# Patient Record
Sex: MALE | Race: WHITE | NOT HISPANIC OR LATINO | Employment: OTHER | URBAN - METROPOLITAN AREA
[De-identification: names, ages, dates, MRNs, and addresses within clinical notes are randomized per-mention and may not be internally consistent; named-entity substitution may affect disease eponyms.]

---

## 2017-01-09 ENCOUNTER — TELEPHONE (OUTPATIENT)
Dept: INTERNAL MEDICINE | Facility: CLINIC | Age: 82
End: 2017-01-09

## 2017-01-09 NOTE — TELEPHONE ENCOUNTER
----- Message from Barb Argueta MA sent at 1/9/2017  9:10 AM CST -----  Contact: self - [phone]  Type: Rx    Name of medication(s): hydrocodone-acetaminophen 10-325mg (NORCO)  mg Tab    Is this a refill? New rx? New Rx    Who prescribed medication?    Pharmacy Name, Phone, & Location:    Comments: Please call. Thanks!

## 2017-01-11 ENCOUNTER — OFFICE VISIT (OUTPATIENT)
Dept: INFECTIOUS DISEASES | Facility: CLINIC | Age: 82
End: 2017-01-11
Payer: MEDICARE

## 2017-01-11 ENCOUNTER — TELEPHONE (OUTPATIENT)
Dept: INTERNAL MEDICINE | Facility: CLINIC | Age: 82
End: 2017-01-11

## 2017-01-11 ENCOUNTER — OFFICE VISIT (OUTPATIENT)
Dept: WOUND CARE | Facility: CLINIC | Age: 82
End: 2017-01-11
Payer: MEDICARE

## 2017-01-11 VITALS
HEIGHT: 67 IN | SYSTOLIC BLOOD PRESSURE: 139 MMHG | DIASTOLIC BLOOD PRESSURE: 63 MMHG | WEIGHT: 178.69 LBS | TEMPERATURE: 99 F | HEART RATE: 67 BPM | BODY MASS INDEX: 28.04 KG/M2

## 2017-01-11 VITALS — TEMPERATURE: 99 F | SYSTOLIC BLOOD PRESSURE: 139 MMHG | HEART RATE: 67 BPM | DIASTOLIC BLOOD PRESSURE: 63 MMHG

## 2017-01-11 DIAGNOSIS — L97.312 ULCER OF ANKLE, RIGHT, WITH FAT LAYER EXPOSED: ICD-10-CM

## 2017-01-11 DIAGNOSIS — I87.2 VENOUS INSUFFICIENCY OF BOTH LOWER EXTREMITIES: ICD-10-CM

## 2017-01-11 DIAGNOSIS — M86.659 CHRONIC OSTEOMYELITIS OF PELVIC REGION, UNSPECIFIED LATERALITY: ICD-10-CM

## 2017-01-11 DIAGNOSIS — L97.312 ULCER OF ANKLE, RIGHT, WITH FAT LAYER EXPOSED: Primary | ICD-10-CM

## 2017-01-11 DIAGNOSIS — L97.919 VARICOSE VEINS OF RIGHT LOWER EXTREMITY WITH ULCER: ICD-10-CM

## 2017-01-11 DIAGNOSIS — I83.019 VARICOSE VEINS OF RIGHT LOWER EXTREMITY WITH ULCER: ICD-10-CM

## 2017-01-11 DIAGNOSIS — I87.2 VENOUS INSUFFICIENCY OF BOTH LOWER EXTREMITIES: Primary | ICD-10-CM

## 2017-01-11 DIAGNOSIS — I83.029 VARICOSE VEINS OF LEFT LOWER EXTREMITY WITH ULCER: ICD-10-CM

## 2017-01-11 DIAGNOSIS — S71.101A: ICD-10-CM

## 2017-01-11 DIAGNOSIS — L97.212 ULCER OF CALF, RIGHT, WITH FAT LAYER EXPOSED: ICD-10-CM

## 2017-01-11 DIAGNOSIS — L92.9 ABNORMAL GRANULATION TISSUE: ICD-10-CM

## 2017-01-11 DIAGNOSIS — L97.929 VARICOSE VEINS OF LEFT LOWER EXTREMITY WITH ULCER: ICD-10-CM

## 2017-01-11 DIAGNOSIS — L97.509 ULCER OF OTHER PART OF FOOT: ICD-10-CM

## 2017-01-11 DIAGNOSIS — I70.25 ATHEROSCLEROSIS OF NATIVE ARTERIES OF THE EXTREMITIES WITH ULCERATION: ICD-10-CM

## 2017-01-11 DIAGNOSIS — L89.623 DECUBITUS ULCER OF LEFT HEEL, STAGE 3: ICD-10-CM

## 2017-01-11 DIAGNOSIS — L02.214 ABSCESS OF RIGHT GROIN: ICD-10-CM

## 2017-01-11 PROCEDURE — 99212 OFFICE O/P EST SF 10 MIN: CPT | Mod: S$PBB,,, | Performed by: NURSE PRACTITIONER

## 2017-01-11 PROCEDURE — 99213 OFFICE O/P EST LOW 20 MIN: CPT | Mod: PBBFAC,27 | Performed by: NURSE PRACTITIONER

## 2017-01-11 PROCEDURE — 17250 CHEM CAUT OF GRANLTJ TISSUE: CPT | Mod: S$PBB,LT,, | Performed by: NURSE PRACTITIONER

## 2017-01-11 PROCEDURE — 99999 PR PBB SHADOW E&M-EST. PATIENT-LVL III: CPT | Mod: PBBFAC,,, | Performed by: NURSE PRACTITIONER

## 2017-01-11 PROCEDURE — 99499 UNLISTED E&M SERVICE: CPT | Mod: S$PBB,,, | Performed by: NURSE PRACTITIONER

## 2017-01-11 PROCEDURE — 99999 PR PBB SHADOW E&M-EST. PATIENT-LVL V: CPT | Mod: PBBFAC,,, | Performed by: NURSE PRACTITIONER

## 2017-01-11 PROCEDURE — 29580 STRAPPING UNNA BOOT: CPT | Mod: 50,51,S$PBB, | Performed by: NURSE PRACTITIONER

## 2017-01-11 NOTE — PROGRESS NOTES
Subjective:       Patient ID: Humphrey Machado is a 85 y.o. male.    Chief Complaint: Wound Check    Wound Check     This patient is seen today for reevaluation of an ulcer to the right lateral calf.  He first noticed this in early summer 2016.  A football dressing is on the right foot and the plantar foot wound is healed.  The right leg ulcers are healing as evidenced by increased granulation.  The left heel ulcer now is hypergranulated.  He is afebrile.  He denies increased redness, swelling or purulent drainate.  His pain level is 8/10.  His wound healing is complicated by venous insufficiency and chronic kidney disease.  He is receiving home health services through [facility].  He is seen with Carlos Barahona NP to coordinate care.  Review of Systems   Constitutional: Negative for chills, diaphoresis, fatigue and fever.   HENT: Negative for congestion, postnasal drip, rhinorrhea, sinus pressure, sneezing, sore throat, tinnitus and trouble swallowing. Hearing loss: left ear.    Eyes: Negative for visual disturbance.   Respiratory: Negative for cough, shortness of breath and wheezing.    Cardiovascular: Positive for leg swelling. Negative for chest pain and palpitations.   Gastrointestinal: Positive for constipation. Negative for diarrhea, nausea and vomiting.   Genitourinary: Positive for difficulty urinating. Negative for dysuria and hematuria.   Musculoskeletal: Positive for back pain and joint swelling. Negative for arthralgias.   Skin: Positive for color change and wound (bilateral lower legs and feet).   Neurological: Positive for dizziness. Negative for weakness, light-headedness and headaches.   Hematological: Does not bruise/bleed easily.   Psychiatric/Behavioral: Negative for decreased concentration and sleep disturbance. The patient is not nervous/anxious.        Objective:      Physical Exam   Constitutional: He is oriented to person, place, and time. He appears well-developed and well-nourished. No  distress.   HENT:   Head: Normocephalic and atraumatic.   Cardiovascular: Intact distal pulses.    Musculoskeletal: Normal range of motion. He exhibits edema (1-2+ lower leg). He exhibits no tenderness.        Legs:       Feet:    Neurological: He is alert and oriented to person, place, and time.   Skin: Skin is warm and dry. No rash noted. He is not diaphoretic. No cyanosis or erythema. Nails show no clubbing.   Psychiatric: He has a normal mood and affect. His behavior is normal. Judgment and thought content normal.   Nursing note and vitals reviewed.      ..  Lab Results   Component Value Date    ALBUMIN 3.2 (L) 11/07/2016     PROCEDURE NOTE:  The left heel wound was cauterized today by applying silver nitrate directly to the wound bed.  The patient tolerated the procedure well.  The wound was then covered with a dry dressing.    Humphrey was seen in the clinic room and placed in the supine position on the treatment table.  Bilateral leg wounds were cleansed with Easi-clense sponges and dried thoroughly.  Medihoney gel and a hydrofiber dressing was applied to the right leg and foot wounds.  Hydrofiber was applied to the left heel wound.  Eucerin cream was applied to the lower legs.  The patient's feet were positioned at a 90 degree angle.  A zinc oxide wrap, followed by kerlix roll gauze and coban were applied using a spiral technique avoiding creases or folds.  The wraps were started behind the first metatarsal and ended below the tibial tubercle of the knee.  There was overlap of each turn half the width of the previous turn.  The compression wraps will be changed every 2-3 days.      Assessment:       1. Ulcer of ankle, right, with fat layer exposed    2. Ulcer of calf, right, with fat layer exposed    3. Ulcer of other part of foot    4. Decubitus ulcer of left heel, unstageable    5. 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 Varicose veins of right lower extremity with ulcer    7. Venous  "insufficiency of both lower extremities    8. Atherosclerosis of native arteries of the extremities with ulceration        Plan:           Nepro 2-3 cans daily in between meals.  Unna boots bilateral lower legs as detailed above.  Darco shoes bilateral feet.  1/4" plain nugauze to right thigh wound, cover with gauze and secure with medipore tape.  Patient was warned not to get the dressings wet and to use cast covers for showering.  Should the dressing become wet, he is to remove it, place a wet-to-dry dressing over the wound, cover with gauze and roll gauze and use ace wraps for compression and to secure bandages.  He should then notify home health as soon as possible to have a new dressing applied.  Return to clinic in two weeks.  [facility] notified of orders via [employer] fax.    Home Health Orders:  Cleanse bilateral leg and foot wounds and right thigh wound with wound .  1/4" plain nugauze to right thigh wound, cover with gauze and secure with medipore tape.  Apply santyl and hydrofiber to right leg and foot ulcers.  Apply mepilex foam to left heel ulcer.  Unna boots (zinc oxide, kerlix and coban) bilateral lower legs.  Skilled nurse visit-three times weekly.    Right lateral calf      Right plantar foot      Right lateral foot and ankle ulcers      Left heel unstageable decubitus      Right medial thigh                      "

## 2017-01-11 NOTE — PATIENT INSTRUCTIONS
Elevate legs as much as possible. Do not get the dressings wet and use cast covers for showering.  Should the dressing become wet, remove it, place a wet-to-dry dressing over the wound, cover with gauze and roll gauze and use ace wraps for compression and to secure bandages.  Notify home health as soon as possible to have a new dressing applied.      Wear Darco shoes on both feet when ambulating.  Do not walk in socks.

## 2017-01-11 NOTE — MR AVS SNAPSHOT
[facility]  [address]  [facility] [address]  Phone: [phone]                  Humphrey Machado   2017 1:20 PM   Office Visit    Description:  Male : 1931   Provider:  Brit Ramirez NP   Department:  [facility]           Reason for Visit     Wound Check           Diagnoses this Visit        Comments    Ulcer of ankle, right, with fat layer exposed    -  Primary     Ulcer of calf, right, with fat layer exposed         Ulcer of other part of foot         Decubitus ulcer of left heel, stage 3         Open wound of thigh, complicated, right, initial encounter         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      [facility]     [facility] On Call Nurse Care Line - 24 Assistance  Registered nurses in the [facility] provide clinical advisement, health education, appointment booking, and other advisory services.  Call for this free service at [phone].             Medications           Message regarding Medications     Verify the changes and/or additions to your medication regime listed below are the same as discussed with your clinician today.  If any of these changes or additions are incorrect, please notify your healthcare provider.             Verify that the below list of medications is an accurate representation of the medications you are currently taking.  If none reported, the list may be  "blank. If incorrect, please contact your healthcare provider. Carry this list with you in case of emergency.           Current Medications     allopurinol (ZYLOPRIM) 100 MG tablet TAKE ONE TABLET BY MOUTH EVERY DAY    aspirin 81 MG Chew Take 1 tablet (81 mg total) by mouth once daily.    atorvastatin (LIPITOR) 80 MG tablet Take 1 tablet (80 mg total) by mouth once daily.    calcitRIOL (ROCALTROL) 0.5 MCG Cap Take 0.5 mcg by mouth once daily.    carvedilol (COREG) 3.125 MG tablet Take 1 tablet (3.125 mg total) by mouth 2 (two) times daily with meals.    catheter 16-16 Fr-" Misc 1 Units by Misc.(Non-Drug; Combo Route) route once daily.    collagenase (SANTYL) ointment Apply topically once daily. Apply to wound daily as directed.    folic acid-vit B6-vit B12 (FOLBEE) 2.5-25-1 mg Tab Take 1 tablet by mouth once daily.    gabapentin (NEURONTIN) 300 MG capsule Take 1 capsule (300 mg total) by mouth 2 (two) times daily.    hydrALAZINE (APRESOLINE) 50 MG tablet Take 0.5 tablets (25 mg total) by mouth 2 (two) times daily.    hydrocodone-acetaminophen 10-325mg (NORCO)  mg Tab Take 1 tablet by mouth 4 (four) times daily as needed.    hydroxychloroquine (PLAQUENIL) 200 mg tablet Take 1 tablet (200 mg total) by mouth 2 (two) times daily.    LACTOBACILLUS ACIDOPHILUS (PROBIOTIC ORAL) Take 1 capsule by mouth once daily.     nitroGLYCERIN (NITROSTAT) 0.3 MG SL tablet Place 1 tablet (0.3 mg total) under the tongue every 5 (five) minutes as needed for Chest pain.    nystatin-triamcinolone (MYCOLOG II) cream Apply topically 4 (four) times daily.    omeprazole (PRILOSEC) 20 MG capsule Take 20 mg by mouth once daily.    omeprazole (PRILOSEC) 40 MG capsule     polyethylene glycol (GLYCOLAX) 17 gram PwPk Take 17 g by mouth once daily.    polyethylene glycol (GLYCOLAX) 17 gram/dose powder     sodium bicarbonate 650 MG tablet Take 1 tablet (650 mg total) by mouth 2 (two) times daily.    vitamin D 1000 units Tab Take 2,000 Units by " mouth once daily.            Clinical Reference Information           Allergies as of 1/11/2017     Ditropan [Oxybutynin Chloride]    Keflex [Cephalexin]    Macrobid [Nitrofurantoin Monohyd/m-cryst]      Immunizations Administered on Date of Encounter - 1/11/2017     None      Instructions    Elevate legs as much as possible. Do not get the dressings wet and use cast covers for showering.  Should the dressing become wet, remove it, place a wet-to-dry dressing over the wound, cover with gauze and roll gauze and use ace wraps for compression and to secure bandages.  Notify home health as soon as possible to have a new dressing applied.      Wear Darco shoes on both feet when ambulating.  Do not walk in socks.         Smoking Cessation     If you would like to quit smoking:   You may be eligible for free services if you are a [place] resident and started smoking cigarettes before September 1, 1988.  Call the [employer]oking [employer]essation [employer] ([employer]) toll free at (303) 752-3264 or (630) 844-0226.   Call 5-800-QUIT-NOW if you do not meet the above criteria.

## 2017-01-11 NOTE — TELEPHONE ENCOUNTER
----- Message from Patricia Castillo sent at 1/11/2017  9:13 AM CST -----  Contact: Self/ [phone] home  Type: Rx    Name of medication(s): hydrocodone-acetaminophen 10-325mg (NORCO)  mg Tab    Is this a refill? New rx?new    Who prescribed medication?Dr. Murray    Pharmacy Name, Phone, & Location:[facility]    Comments:Patient is calling to request a new Rx for medication above. He would like the Rx for five pills daily. Pt would like to speak with someone in the office. Please call and advise.    Thank you!

## 2017-01-11 NOTE — PROGRESS NOTES
Subjective:      Patient ID: Humphrey Machado is a 85 y.o. male.    Chief Complaint:Recurrent Skin Infections      History of Present Illness     Mr. Humphrey Machado is an 85 y.o. male with a long and complicated medical history.  He has history of renal cell carcinoma and is s/p left nephrectomy some years ago.    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       Recently followed by urology for recurrent right groin abscess.  Most recent cultures + for ESBL K. Pneumonia.  Just prior to this grew Pseudomonas and ESBL Kleb for which he had short course of empiric ciprofloxacin in November (before cultures had resulted) and repeat culture on 12/27 showed ESBL Kleb Pneumonia only.  At his last visit with urology, I&D and abx were recommended, but patient declined.  He also declined to come in for evaluation by ID at that time.  See Urology note of 12/27 and my telephone note of 12/29.   I am seeing him today for evaluation of abscess with MELISSA Ramirez in Wound Care who is following him for leg ulcerations.       Patient reports that the wound continues to drain spontaneously and HH is packing daily.  He notes that the drainage amount is decreasing.  Pain had resolved with spontaneous drainage.  He denies fevers, chills, sweats, myalgias.  Reports he is feeling overall better than he has been, is feeling stronger, doing a lot of walking.   His primary complaint is pain of leg ulcerations, though these are noted to be healing well.  He continues to have montano catheter, which he reports is working well and lower abdominal pain and burning he had with prior suprapubic  catheter has resolved.  Appetite fair.  No n/v/d.  Denies chest pain, SOB.  No increase in back/hip pain.  At baseline.     Review of Systems   Constitution: Negative for chills, fever, malaise/fatigue and night sweats.   Cardiovascular: Positive for leg swelling. Negative for chest pain and palpitations.   Respiratory: Negative for cough, shortness of breath and sputum production.    Hematologic/Lymphatic: Negative for adenopathy.   Skin: Positive for poor wound healing (bilateral venous insufficiency ulcerations). Negative for rash.   Musculoskeletal: Positive for back pain and joint pain. Negative for joint swelling.   Gastrointestinal: Negative for abdominal pain, diarrhea, nausea and vomiting.   Genitourinary: Positive for bladder incontinence. Negative for pelvic pain.        Stevens catheter in place     Neurological: Negative for numbness and paresthesias.     Objective:   Physical Exam   Constitutional: He appears well-developed and well-nourished. No distress.   Eyes: Conjunctivae are normal. No scleral icterus.   Cardiovascular: Normal rate and regular rhythm.    Pulmonary/Chest: Effort normal. No respiratory distress.   Abdominal: Soft. He exhibits no distension.   Genitourinary:   Genitourinary Comments: Stevens in place - draining well   Musculoskeletal:   4 Lower extremity ulcerations - see Wound care note of this date.  No evidence of active infection.    Skin: Skin is warm and dry. He is not diaphoretic.   Open wound to right inguinal thigh area  0.2 X 0 .2 X 2.5 cm  (probes 2.5 cm)  Draining moderate amount of sero-sanguinous drainage.  No purulence.  No surrounding erythema.   Firm at base, no fluctuance.   Non-tender     Vitals reviewed.        Assessment:       1. Venous insufficiency of both lower extremities    2. Abscess of right groin    3. Chronic osteomyelitis of pelvic region, unspecified laterality    4. Ulcer of other part of foot    5. Ulcer of ankle, right, with fat layer exposed      Patient with recurrent groin/inguinal abscesses.  Current abscess remains open, draining, packed daily by Home Health  Decreased in size per patient and by measurements.  No signs of extending or surrounding infection.  No signs of systemic infection.  He has been doing well without any antibiotic coverage and more than likely this is an issue of source control/ drainage.   Discussed with ID staff, Dr. Stinson.  Would continue to hold off on antibiotics at this time and watch closely and continue daily wound care so long as it continues to drain.  Highly suspect that once heals and closes, it will be matter of time before it recurs.  Long discussion with Mr. Machado regarding need for I&D should it increase in size, stop draining and again become painful.  This will not be cured with antibiotics alone.  He is not amenable to I&D at this point and wishes to continue with current wound care given that he has no pain and feels good.  I have emphasized to him that there will likely come a point where we will have to surgically I&D again, as in the past, and he verbalizes understanding.       Plan:     1.  Continue daily wound care/packing.   2.  Follow up with urology as scheduled.   3.  I will follow up with him in 2 weeks when he returns to see wound care.   4.  Will call home health to advise they are to contact us immediately for any worsening/concerns.   5.  Patient strongly counseled to call/seek immediate medical attention for fevers, chills, or if lesion increases in size, becomes warm, tender/painful, or redness, erythema of surrounding tissue.  He verbalizes understanding.   6.  Discussed with ID staff.   7.  Will communicate with urology.

## 2017-01-11 NOTE — Clinical Note
Harry Deng:  Saw Mr. Machado today with MELISSA Ramirez in Wound Care (she follows him for venous ulcers).  Appears to be filling in, decreasing.  Still draining and HH is still packing.  I discussed with ID staff.  At this point, he is doing well off abx.  Suspect he is chronically colonized. So long as it is draining, we are going to hold off.  See my note.  Suspect it will close, then fill up again and he will definitely need surgical I&D, but at this point he is not amenable to any further surgical intervention because it is draining, not painful and he feels good.  He was very strongly counseled regarding calling if anything worsens, etc.  I'll see him again in 2 weeks when he goes to see Melani for his leg wounds.  When do you need to see him again?  Call me with any questions/concerns.

## 2017-01-12 ENCOUNTER — TELEPHONE (OUTPATIENT)
Dept: INFECTIOUS DISEASES | Facility: HOSPITAL | Age: 82
End: 2017-01-12

## 2017-01-12 NOTE — TELEPHONE ENCOUNTER
Called [facility] and spoke to Mr. Machado's nurse Christina.  They are making home visits 7 days per week.   Advised to call if any worsening of wound, fevers, chills or other concerns.  They have my contact information and will advise of any changes of concern.

## 2017-01-13 RX ORDER — HYDROCODONE BITARTRATE AND ACETAMINOPHEN 10; 325 MG/1; MG/1
1 TABLET ORAL 4 TIMES DAILY PRN
Qty: 120 TABLET | Refills: 0 | Status: SHIPPED | OUTPATIENT
Start: 2017-01-13 | End: 2017-02-16 | Stop reason: SDUPTHER

## 2017-01-13 NOTE — TELEPHONE ENCOUNTER
----- Message from Jazmine Chung sent at 1/13/2017 11:46 AM CST -----  Contact: self [phone]  Patient is calling to check status of medication hydrocodone-acetaminophen 10-325mg (NORCO)  mg Tab . Please advise, Thanks !

## 2017-01-17 ENCOUNTER — TELEPHONE (OUTPATIENT)
Dept: UROLOGY | Facility: CLINIC | Age: 82
End: 2017-01-17

## 2017-01-17 NOTE — TELEPHONE ENCOUNTER
----- Message from MARILEE Olivas, ANP sent at 1/11/2017  6:38 PM CST -----  Harry Deng:  Saw Mr. Machado today with MELISSA Ramirez in Wound Care (she follows him for venous ulcers).  Appears to be filling in, decreasing.  Still draining and HH is still packing.  I discussed with ID staff.  At this point, he is doing well off abx.  Suspect he is chronically colonized. So long as it is draining, we are going to hold off.  See my note.  Suspect it will close, then fill up again and he will definitely need surgical I&D, but at this point he is not amenable to any further surgical intervention because it is draining, not painful and he feels good.  He was very strongly counseled regarding calling if anything worsens, etc.  I'll see him again in 2 weeks when he goes to see Melani for his leg wounds.  When do you need to see him again?  Call me with any questions/concerns.

## 2017-01-17 NOTE — TELEPHONE ENCOUNTER
Spoke with Mr. Machado.  He having dsg changes 3 x week.  Still draining; positioning will affect drainage.  Will have him come in 1/25/2017 since he already has appts.

## 2017-01-24 ENCOUNTER — TELEPHONE (OUTPATIENT)
Dept: INTERNAL MEDICINE | Facility: CLINIC | Age: 82
End: 2017-01-24

## 2017-01-24 NOTE — TELEPHONE ENCOUNTER
----- Message from Mickie Sanchez MA sent at 1/24/2017  3:24 PM CST -----  Contact: Betzaida calles/Wwmzyiiv-[phone]  Betzaida stated that she is calling to check the Status of the Pt's Home Health Orders that were faxed over since November. Please advise and call. Thanks!

## 2017-01-25 ENCOUNTER — OFFICE VISIT (OUTPATIENT)
Dept: INFECTIOUS DISEASES | Facility: CLINIC | Age: 82
End: 2017-01-25
Payer: MEDICARE

## 2017-01-25 ENCOUNTER — OFFICE VISIT (OUTPATIENT)
Dept: UROLOGY | Facility: CLINIC | Age: 82
End: 2017-01-25
Payer: MEDICARE

## 2017-01-25 ENCOUNTER — OFFICE VISIT (OUTPATIENT)
Dept: WOUND CARE | Facility: CLINIC | Age: 82
End: 2017-01-25
Payer: MEDICARE

## 2017-01-25 VITALS
RESPIRATION RATE: 16 BRPM | SYSTOLIC BLOOD PRESSURE: 152 MMHG | TEMPERATURE: 99 F | HEART RATE: 77 BPM | HEIGHT: 70 IN | BODY MASS INDEX: 25.77 KG/M2 | DIASTOLIC BLOOD PRESSURE: 66 MMHG | WEIGHT: 180 LBS

## 2017-01-25 VITALS
HEART RATE: 77 BPM | DIASTOLIC BLOOD PRESSURE: 97 MMHG | SYSTOLIC BLOOD PRESSURE: 179 MMHG | BODY MASS INDEX: 27.35 KG/M2 | HEIGHT: 68 IN | TEMPERATURE: 99 F | WEIGHT: 180.5 LBS

## 2017-01-25 DIAGNOSIS — I83.029 VARICOSE VEINS OF LEFT LOWER EXTREMITY WITH ULCER: ICD-10-CM

## 2017-01-25 DIAGNOSIS — L02.214 ABSCESS OF RIGHT GROIN: Primary | ICD-10-CM

## 2017-01-25 DIAGNOSIS — L97.509 ULCER OF OTHER PART OF FOOT: ICD-10-CM

## 2017-01-25 DIAGNOSIS — L89.623 DECUBITUS ULCER OF LEFT HEEL, STAGE 3: ICD-10-CM

## 2017-01-25 DIAGNOSIS — L97.919 VARICOSE VEINS OF RIGHT LOWER EXTREMITY WITH ULCER: ICD-10-CM

## 2017-01-25 DIAGNOSIS — L97.929 VARICOSE VEINS OF LEFT LOWER EXTREMITY WITH ULCER: ICD-10-CM

## 2017-01-25 DIAGNOSIS — I70.25 ATHEROSCLEROSIS OF NATIVE ARTERIES OF THE EXTREMITIES WITH ULCERATION: ICD-10-CM

## 2017-01-25 DIAGNOSIS — L92.9 ABNORMAL GRANULATION TISSUE: ICD-10-CM

## 2017-01-25 DIAGNOSIS — L97.212 ULCER OF CALF, RIGHT, WITH FAT LAYER EXPOSED: ICD-10-CM

## 2017-01-25 DIAGNOSIS — L97.312 ULCER OF ANKLE, RIGHT, WITH FAT LAYER EXPOSED: Primary | ICD-10-CM

## 2017-01-25 DIAGNOSIS — M86.659 CHRONIC OSTEOMYELITIS OF PELVIC REGION, UNSPECIFIED LATERALITY: ICD-10-CM

## 2017-01-25 DIAGNOSIS — I83.019 VARICOSE VEINS OF RIGHT LOWER EXTREMITY WITH ULCER: ICD-10-CM

## 2017-01-25 DIAGNOSIS — S71.101A COMPLICATED OPEN WOUND OF RIGHT THIGH, INITIAL ENCOUNTER: ICD-10-CM

## 2017-01-25 PROCEDURE — 99499 UNLISTED E&M SERVICE: CPT | Mod: S$PBB,,, | Performed by: NURSE PRACTITIONER

## 2017-01-25 PROCEDURE — 99999 PR PBB SHADOW E&M-EST. PATIENT-LVL V: CPT | Mod: PBBFAC,,, | Performed by: NURSE PRACTITIONER

## 2017-01-25 PROCEDURE — 99214 OFFICE O/P EST MOD 30 MIN: CPT | Mod: PBBFAC,27 | Performed by: NURSE PRACTITIONER

## 2017-01-25 PROCEDURE — 17250 CHEM CAUT OF GRANLTJ TISSUE: CPT | Mod: S$PBB,,, | Performed by: NURSE PRACTITIONER

## 2017-01-25 PROCEDURE — 99213 OFFICE O/P EST LOW 20 MIN: CPT | Mod: S$PBB,,, | Performed by: NURSE PRACTITIONER

## 2017-01-25 PROCEDURE — 99999 PR PBB SHADOW E&M-EST. PATIENT-LVL IV: CPT | Mod: PBBFAC,,, | Performed by: NURSE PRACTITIONER

## 2017-01-25 PROCEDURE — 99212 OFFICE O/P EST SF 10 MIN: CPT | Mod: S$PBB,,, | Performed by: NURSE PRACTITIONER

## 2017-01-25 PROCEDURE — 99999 PR PBB SHADOW E&M-EST. PATIENT-LVL III: CPT | Mod: PBBFAC,,, | Performed by: NURSE PRACTITIONER

## 2017-01-25 PROCEDURE — 29580 STRAPPING UNNA BOOT: CPT | Mod: 50,51,S$PBB, | Performed by: NURSE PRACTITIONER

## 2017-01-25 NOTE — PROGRESS NOTES
Subjective:       Patient ID: Humphrey Machado is a 85 y.o. male.    Chief Complaint: Recurrent Skin Infections    HPI Comments: Humphrey Machado is a 85 y.o. male with history of prostate CA, kidney CA (s/p (L) nephrectomy), urethral strictures urinary incontinence s/p radiation therapy several years ago.  He has history of CKD IV, AS, chronic UTI, chronic venous insufficiency, and inflammatory arthritis (on long term plaquenil and prednisone)   He is being followed by ID for Chronic osteomyelitis of the left hip/symphysis pubis. IR cultures of bone were negative. Pathology showed chronic osteomyelitis.   He was diagnosed with Perineal abscess by his PCP.    He is here today for  check on abscess.  He followed by ID as well.   He states that wound care already checked his groin area as well as other sites.  They cleaned, packed, and cauterized the groin abscess.  He is tender in that area for their work so does not want me to remove dressing.  He denies pain to site normally. No new abscess; very pleased how things are going.        12/27/2017 seen in clinic and I placed packing in draining right thigh abscess.  Aerobic Bacterial Culture KLEBSIELLA PNEUMONIAE ESBL Moderate  This believed to be colonized.    He has been having the packing changed by HH.  Here today reporting drainage and recheck.  Denies fever/chills/abdominal pain.          He was seen in clinic with Dr. Abraham on 09/30/2016 for evaluation.  He was admitted for I&D, IVAB and released 10/02/2016.    Extensive discussion had with patient.   Given the small caliber catheter in place, we recommend suprapubic catheter placement versus urethral dilation with upsize of urethral montano to allow for healing of fistula.   Currently inadequate drainage of bladder.   He would like to defer more definitive management until he has had further consultation.   He understands that if he leaves prior to urethral dilation with larger montano placement or suprapubic catheter  insertion that this will be against medical advice.   He will be sent home with home health.    10/06/2016 returned to ER with development of two new abscesses & (+) blood culture.  He reported in ER that he was scheduled with Dr. Zuhair Jefferson at [facility] for f/u of I&D as well as SPT placement.  He reports Dr. Jefferson placed SPT (unsure of date) and the next day he went back in due to pain to lower abdomen/pelvic pain  He has not had post op visit with original SPT change.    Seen on 11/23/2016 after his SPT came out;  He went to [facility] ER and had unsuccessful attempts of replacing SPT.  They placed 14fr montano instead and was told to f/u with Dr. Jefferson which he has not.  He is awaiting the appt.              We sent for old records but have not yet received anything.          Past Medical History:    *Atrial fibrillation                                          Acute appendicitis                              2/19/2015     Anemia                                                        Anxiety                                                       Arthritis                                                       Comment:generalized    Basal cell carcinoma                            01/2013         Comment:right temple    BCC (basal cell carcinoma)                                      Comment:right mid forearm    Bladder stone                                   6/28/2016     Blood transfusion                                             Chronic urethral stricture                      10/14/2015    Chronic venous insufficiency                    7/8/2013      CKD (chronic kidney disease) stage 3, GFR 30-5* 9/6/2012      Coronary artery disease                                       Elevated PSA                                                  Essential hypertension                          7/30/2015     Hematuria, gross                                              History of Left Nephrectomy (~2006)             1/29/2014        Comment:Done at [facility].     Hypertension                                                  Inflammatory arthritis                          8/14/2012     Kidney stone                                                  Long-term use of Plaquenil                      6/4/2015      Mixed incontinence urge and stress              4/11/2014     Nonrheumatic aortic valve stenosis              5/30/2016     NSTEMI (non-ST elevated myocardial infarction)  5/30/2016     Olecranon bursitis                              1/14/2013     Osteopenia                                      8/14/2012     Personal history of kidney cancer               4/11/2014     Prostate cancer                                               Radiation                                                       Comment:treatment for prostate cancer    Recurrent UTI                                   7/8/2013      S/P radiation therapy                           4/11/2014     Skin cancer of arm                                              Comment:left leg (malignant)    Solitary kidney                                 7/15/2013     Venous insufficiency of leg                     7/12/2012     Venous stasis ulcers                            7/6/2012      Vitamin D deficiency disease                    5/8/2013      Past Surgical History:    TONSILLECTOMY                                                  NEPHRECTOMY                                      2006-07?        Comment:Removal of left kidney    CYSTOSCOPY                                                       Comment:with dialation    Review of patient's family history indicates:    Cancer                         Mother                    Heart disease                  Father                    Urolithiasis                   Father                    Mental illness                 Daughter                  Cancer                         Brother                     Comment: prostate cancer, (Ervin) removed by                 surgery    Cancer                         Brother                     Comment: prostate cancer    Cancer                         Maternal Aunt             Melanoma                       Neg Hx                    Lupus                          Neg Hx                    Rheum arthritis                Neg Hx                      Social History    Marital status:              Spouse name: Emy               Years of education:                 Number of children: 4             Occupational History  Occupation          Employer            Comment               Retired                                                       retired 1998            Social History Main Topics    Smoking status: Current Every Day Smoker                                                     Packs/day: 0.00      Years: 40.00          Types: Cigars    Smokeless status: Current User                      Comment: pt smokes 3 cigars a day pt will make up his             mind when his ready- did give up smoking              cigarettes at age 35yrs smoked from 18 - 35              years    Alcohol use: Yes           0.6 oz/week       1 Glasses of wine per week       Comment: stop drinking 09/2009. 1 glass of wine at                bedtime     Drug use: No                 Comment: smokes 3 cigars per day    Sexual activity: No                   Other Topics            Concern    None on file    Social History Narrative    None on file        Allergies:  Ditropan (oxybutynin chloride); Keflex (cephalexin); and Macrobid (nitrofurantoin monohyd/m-cryst)    Medications:  Current Outpatient Prescriptions:   allopurinol (ZYLOPRIM) 100 MG tablet, TAKE ONE TABLET BY MOUTH EVERY DAY, Disp: 30 tablet, Rfl: 4  aspirin 81 MG Chew, Take 1 tablet (81 mg total) by mouth once daily., Disp: , Rfl: 0  atorvastatin (LIPITOR) 80 MG tablet, Take 1 tablet (80 mg total) by mouth once daily., Disp: 30 tablet, Rfl: 11  calcitRIOL (ROCALTROL) 0.5 MCG Cap,  "Take 0.5 mcg by mouth once daily., Disp: , Rfl:   carvedilol (COREG) 3.125 MG tablet, Take 1 tablet (3.125 mg total) by mouth 2 (two) times daily with meals., Disp: 60 tablet, Rfl: 11  catheter 16-16 Fr-" Misc, 1 Units by Misc.(Non-Drug; Combo Route) route once daily., Disp: 100 each, Rfl: 11  collagenase (SANTYL) ointment, Apply topically once daily. Apply to wound daily as directed., Disp: 90 g, Rfl: 0  folic acid-vit B6-vit B12 (FOLBEE) 2.5-25-1 mg Tab, Take 1 tablet by mouth once daily., Disp: 30 each, Rfl: 5  gabapentin (NEURONTIN) 300 MG capsule, Take 1 capsule (300 mg total) by mouth 2 (two) times daily., Disp: 60 capsule, Rfl: 3  hydrALAZINE (APRESOLINE) 50 MG tablet, Take 0.5 tablets (25 mg total) by mouth 2 (two) times daily., Disp: 90 tablet, Rfl: 11  hydrocodone-acetaminophen 10-325mg (NORCO)  mg Tab, Take 1 tablet by mouth 4 (four) times daily as needed., Disp: 120 tablet, Rfl: 0  hydroxychloroquine (PLAQUENIL) 200 mg tablet, Take 1 tablet (200 mg total) by mouth 2 (two) times daily., Disp: 60 tablet, Rfl: 2  LACTOBACILLUS ACIDOPHILUS (PROBIOTIC ORAL), Take 1 capsule by mouth once daily. , Disp: , Rfl:   nitroGLYCERIN (NITROSTAT) 0.3 MG SL tablet, Place 1 tablet (0.3 mg total) under the tongue every 5 (five) minutes as needed for Chest pain., Disp: 30 tablet, Rfl: 11  nystatin-triamcinolone (MYCOLOG II) cream, Apply topically 4 (four) times daily., Disp: 60 g, Rfl: 2  omeprazole (PRILOSEC) 20 MG capsule, Take 20 mg by mouth once daily., Disp: , Rfl:   omeprazole (PRILOSEC) 40 MG capsule, , Disp: , Rfl:   polyethylene glycol (GLYCOLAX) 17 gram PwPk, Take 17 g by mouth once daily., Disp: 30 packet, Rfl: 0  polyethylene glycol (GLYCOLAX) 17 gram/dose powder, , Disp: , Rfl:   sodium bicarbonate 650 MG tablet, Take 1 tablet (650 mg total) by mouth 2 (two) times daily., Disp: 60 tablet, Rfl: 11  vitamin D 1000 units Tab, Take 2,000 Units by mouth once daily. , Disp: , Rfl: "             Past Medical History:    *Atrial fibrillation                                          Acute appendicitis                              2/19/2015     Anemia                                                        Anxiety                                                       Arthritis                                                       Comment:generalized    Basal cell carcinoma                            01/2013         Comment:right temple    BCC (basal cell carcinoma)                                      Comment:right mid forearm    Bladder stone                                   6/28/2016     Blood transfusion                                             Chronic urethral stricture                      10/14/2015    Chronic venous insufficiency                    7/8/2013      CKD (chronic kidney disease) stage 3, GFR 30-5* 9/6/2012      Coronary artery disease                                       Elevated PSA                                                  Essential hypertension                          7/30/2015     Hematuria, gross                                              History of Left Nephrectomy (~2006)             1/29/2014       Comment:Done at [facility].     Hypertension                                                  Inflammatory arthritis                          8/14/2012     Kidney stone                                                  Long-term use of Plaquenil                      6/4/2015      Mixed incontinence urge and stress              4/11/2014     Nonrheumatic aortic valve stenosis              5/30/2016     NSTEMI (non-ST elevated myocardial infarction)  5/30/2016     Olecranon bursitis                              1/14/2013     Osteopenia                                      8/14/2012     Personal history of kidney cancer               4/11/2014     Prostate cancer                                               Radiation                                                        Comment:treatment for prostate cancer    Recurrent UTI                                   7/8/2013      S/P radiation therapy                           4/11/2014     Skin cancer of arm                                              Comment:left leg (malignant)    Solitary kidney                                 7/15/2013     Venous insufficiency of leg                     7/12/2012     Venous stasis ulcers                            7/6/2012      Vitamin D deficiency disease                    5/8/2013      Past Surgical History:    TONSILLECTOMY                                                  NEPHRECTOMY                                      2006-07?        Comment:Removal of left kidney    CYSTOSCOPY                                                       Comment:with dialation    Review of patient's family history indicates:    Cancer                         Mother                    Heart disease                  Father                    Urolithiasis                   Father                    Mental illness                 Daughter                  Cancer                         Brother                     Comment: prostate cancer, (Ervin) removed by                surgery    Cancer                         Brother                     Comment: prostate cancer    Cancer                         Maternal Aunt             Melanoma                       Neg Hx                    Lupus                          Neg Hx                    Rheum arthritis                Neg Hx                      Social History    Marital status:              Spouse name: Emy               Years of education:                 Number of children: 4             Occupational History  Occupation          Employer            Comment               Retired                                                       retired 1998            Social History Main Topics    Smoking status: Current Every Day Smoker                                       "               Packs/day: 0.00      Years: 40.00          Types: Cigars    Smokeless status: Current User                      Comment: pt smokes 3 cigars a day pt will make up his             mind when his ready- did give up smoking              cigarettes at age 35yrs smoked from 18 - 35              years    Alcohol use: Yes           0.6 oz/week       1 Glasses of wine per week       Comment: stop drinking 09/2009. 1 glass of wine at                bedtime     Drug use: No                 Comment: smokes 3 cigars per day    Sexual activity: No                   Other Topics            Concern    None on file    Social History Narrative    None on file        Allergies:  Ditropan (oxybutynin chloride); Keflex (cephalexin); and Macrobid (nitrofurantoin monohyd/m-cryst)    Medications:  Current Outpatient Prescriptions:   allopurinol (ZYLOPRIM) 100 MG tablet, TAKE ONE TABLET BY MOUTH EVERY DAY, Disp: 30 tablet, Rfl: 4  amoxicillin-clavulanate 500-125mg (AUGMENTIN) 500-125 mg Tab, , Disp: , Rfl:   aspirin 81 MG Chew, Take 1 tablet (81 mg total) by mouth once daily., Disp: , Rfl: 0  atorvastatin (LIPITOR) 80 MG tablet, Take 1 tablet (80 mg total) by mouth once daily., Disp: 30 tablet, Rfl: 11  calcitRIOL (ROCALTROL) 0.5 MCG Cap, Take 0.5 mcg by mouth once daily., Disp: , Rfl:   carvedilol (COREG) 3.125 MG tablet, Take 1 tablet (3.125 mg total) by mouth 2 (two) times daily with meals., Disp: 60 tablet, Rfl: 11  catheter 16-16 Fr-" Misc, 1 Units by Misc.(Non-Drug; Combo Route) route once daily., Disp: 100 each, Rfl: 11  fluconazole (DIFLUCAN) 100 MG tablet, , Disp: , Rfl:   fluconazole (DIFLUCAN) 200 MG Tab, , Disp: , Rfl:   folic acid-vit B6-vit B12 (FOLBEE) 2.5-25-1 mg Tab, Take 1 tablet by mouth once daily., Disp: 30 each, Rfl: 5  gabapentin (NEURONTIN) 300 MG capsule, Take 1 capsule (300 mg total) by mouth 2 (two) times daily., Disp: 60 capsule, Rfl: 3  hydrALAZINE (APRESOLINE) 50 MG tablet, Take 0.5 " tablets (25 mg total) by mouth 2 (two) times daily., Disp: 90 tablet, Rfl: 11  hydrocodone-acetaminophen 10-325mg (NORCO)  mg Tab, Take 1 tablet by mouth 4 (four) times daily as needed., Disp: 120 tablet, Rfl: 0  hydroxychloroquine (PLAQUENIL) 200 mg tablet, Take 1 tablet (200 mg total) by mouth 2 (two) times daily., Disp: 60 tablet, Rfl: 2  INVANZ 1 gram injection, , Disp: , Rfl:   LACTOBACILLUS ACIDOPHILUS (PROBIOTIC ORAL), Take 1 capsule by mouth once daily. , Disp: , Rfl:   nitroGLYCERIN (NITROSTAT) 0.3 MG SL tablet, Place 1 tablet (0.3 mg total) under the tongue every 5 (five) minutes as needed for Chest pain., Disp: 30 tablet, Rfl: 11  nystatin-triamcinolone (MYCOLOG II) cream, Apply topically 4 (four) times daily., Disp: 60 g, Rfl: 2  omeprazole (PRILOSEC) 20 MG capsule, Take 20 mg by mouth once daily., Disp: , Rfl:   phenazopyridine (PYRIDIUM) 100 MG tablet, Take 200 mg by mouth 3 (three) times daily as needed for Pain., Disp: , Rfl:   polyethylene glycol (GLYCOLAX) 17 gram PwPk, Take 17 g by mouth once daily., Disp: 30 packet, Rfl: 0  polyethylene glycol (GLYCOLAX) 17 gram/dose powder, , Disp: , Rfl:   sodium bicarbonate 650 MG tablet, Take 1 tablet (650 mg total) by mouth 2 (two) times daily., Disp: 60 tablet, Rfl: 11  tramadol (ULTRAM) 50 mg tablet, , Disp: , Rfl:   vitamin D 1000 units Tab, Take 2,000 Units by mouth once daily. , Disp: , Rfl:         Review of Systems   Constitutional: Negative.  Negative for activity change, appetite change and fever.   HENT: Negative.  Negative for facial swelling and trouble swallowing.    Eyes: Negative.    Respiratory: Negative.  Negative for shortness of breath.    Cardiovascular: Negative.  Negative for chest pain and palpitations.   Gastrointestinal: Negative for abdominal pain, constipation, diarrhea, nausea and vomiting.   Genitourinary: Positive for difficulty urinating and enuresis. Negative for dysuria, flank pain, frequency, genital  sores, hematuria, nocturia, penile pain, scrotal swelling, testicular pain and urgency.        Montano draining well  HH changing montano.     Musculoskeletal: Positive for arthralgias. Negative for back pain, gait problem and neck stiffness.   Skin: Positive for wound.        Dressing intact and freshly dressed.     Neurological: Negative for dizziness, tremors, seizures, syncope, speech difficulty, light-headedness and headaches.   Hematological: Does not bruise/bleed easily.   Psychiatric/Behavioral: Negative for confusion and hallucinations. The patient is not nervous/anxious.        Objective:      Physical Exam   Nursing note and vitals reviewed.  Constitutional: He is oriented to person, place, and time. He appears well-developed and well-nourished.  Non-toxic appearance. He does not have a sickly appearance.   HENT:   Head: Normocephalic and atraumatic.   Right Ear: External ear normal.   Left Ear: External ear normal.   Nose: Nose normal.   Mouth/Throat: Mucous membranes are normal.   Eyes: Conjunctivae and lids are normal. No scleral icterus.   Neck: Trachea normal, normal range of motion and full passive range of motion without pain. Neck supple. No JVD present. No tracheal deviation present.   Cardiovascular: Normal rate, regular rhythm, S1 normal and S2 normal.    Pulmonary/Chest: Effort normal and breath sounds normal. No respiratory distress. He exhibits no tenderness.   Abdominal: Soft. Normal appearance and bowel sounds are normal. There is no hepatosplenomegaly. There is no tenderness. There is no rebound, no guarding and no CVA tenderness.   Genitourinary: Testes normal and penis normal. No penile tenderness.   Musculoskeletal: Normal range of motion.   Pressure dsg to LE bilaterally intact just below the knee.   Neurological: He is alert and oriented to person, place, and time. He has normal strength.   Skin: Skin is warm, dry and intact.          Psychiatric: He has a normal mood and affect. His  behavior is normal. Judgment and thought content normal.       Assessment:       1. Abscess of right groin        Plan:         I spent 20 minutes with the patient of which more than half was spent in direct consultation with the patient in regards to our treatment and plan.    Education and recommendations of today's plan of care including home remedies.  Continue with HH and scheduled dressing changes.  Will follow through wound care.  No surgical intervention at present (nor does he want it).

## 2017-01-25 NOTE — MR AVS SNAPSHOT
[facility]  [address]  [facility] [address]  Phone: [phone]                  Humphrey Machado   2017 11:20 AM   Office Visit    Description:  Male : 1931   Provider:  Brit Ramirez NP   Department:  [facility]           Reason for Visit     Wound Check           Diagnoses this Visit        Comments    Ulcer of ankle, right, with fat layer exposed    -  Primary     Ulcer of calf, right, with fat layer exposed         Ulcer of other part of foot         Decubitus ulcer of left heel, stage 3         Complicated open wound of right thigh, initial encounter         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      [facility] On Call     [facility] On Call Nurse Care Line -  Assistance  Registered nurses in the [facility] On Call Center provide clinical advisement, health education, appointment booking, and other advisory services.  Call for this free service at [phone].             Medications           Message regarding Medications     Verify the changes and/or additions to your medication regime listed below are the same as discussed with your clinician today.  If any of these changes or additions are incorrect, please notify your healthcare provider.             Verify that the below list of medications is an accurate representation of the medications you are currently taking.  If none  "reported, the list may be blank. If incorrect, please contact your healthcare provider. Carry this list with you in case of emergency.           Current Medications     allopurinol (ZYLOPRIM) 100 MG tablet TAKE ONE TABLET BY MOUTH EVERY DAY    aspirin 81 MG Chew Take 1 tablet (81 mg total) by mouth once daily.    atorvastatin (LIPITOR) 80 MG tablet Take 1 tablet (80 mg total) by mouth once daily.    calcitRIOL (ROCALTROL) 0.5 MCG Cap Take 0.5 mcg by mouth once daily.    carvedilol (COREG) 3.125 MG tablet Take 1 tablet (3.125 mg total) by mouth 2 (two) times daily with meals.    catheter 16-16 Fr-" Misc 1 Units by Misc.(Non-Drug; Combo Route) route once daily.    collagenase (SANTYL) ointment Apply topically once daily. Apply to wound daily as directed.    folic acid-vit B6-vit B12 (FOLBEE) 2.5-25-1 mg Tab Take 1 tablet by mouth once daily.    gabapentin (NEURONTIN) 300 MG capsule Take 1 capsule (300 mg total) by mouth 2 (two) times daily.    hydrALAZINE (APRESOLINE) 50 MG tablet Take 0.5 tablets (25 mg total) by mouth 2 (two) times daily.    hydrocodone-acetaminophen 10-325mg (NORCO)  mg Tab Take 1 tablet by mouth 4 (four) times daily as needed.    hydroxychloroquine (PLAQUENIL) 200 mg tablet Take 1 tablet (200 mg total) by mouth 2 (two) times daily.    LACTOBACILLUS ACIDOPHILUS (PROBIOTIC ORAL) Take 1 capsule by mouth once daily.     nitroGLYCERIN (NITROSTAT) 0.3 MG SL tablet Place 1 tablet (0.3 mg total) under the tongue every 5 (five) minutes as needed for Chest pain.    nystatin-triamcinolone (MYCOLOG II) cream Apply topically 4 (four) times daily.    omeprazole (PRILOSEC) 20 MG capsule Take 20 mg by mouth once daily.    omeprazole (PRILOSEC) 40 MG capsule     polyethylene glycol (GLYCOLAX) 17 gram PwPk Take 17 g by mouth once daily.    polyethylene glycol (GLYCOLAX) 17 gram/dose powder     sodium bicarbonate 650 MG tablet Take 1 tablet (650 mg total) by mouth 2 (two) times daily.    vitamin D 1000 units " "Tab Take 2,000 Units by mouth once daily.            Clinical Reference Information           Vital Signs - Last Recorded  Most recent update: 1/25/2017 11:19 AM by Karen Hurtado LPN    BP Pulse Temp Ht Wt BMI    (!) 179/97 77 98.7 °F (37.1 °C) (Oral) 5' 8" (1.727 m) 81.9 kg (180 lb 8 oz) 27.44 kg/m2      Blood Pressure          Most Recent Value    BP  (!)  179/97      Allergies as of 1/25/2017     Ditropan [Oxybutynin Chloride]    Keflex [Cephalexin]    Macrobid [Nitrofurantoin Monohyd/m-cryst]      Immunizations Administered on Date of Encounter - 1/25/2017     None      Instructions    Elevate legs as much as possible. Do not get the dressings wet and use cast covers for showering.  Should the dressing become wet, remove it, place a wet-to-dry dressing over the wound, cover with gauze and roll gauze and use ace wraps for compression and to secure bandages.  Notify home health as soon as possible to have a new dressing applied.         Smoking Cessation     If you would like to quit smoking:   You may be eligible for free services if you are a [place] resident and started smoking cigarettes before September 1, 1988.  Call the Smoking Cessation Trust ([employer]) toll free at [phone] or [phone].   Call 5-619-QUIT-NOW if you do not meet the above criteria.            "

## 2017-01-25 NOTE — PROGRESS NOTES
Subjective:       Patient ID: Humphrey Machado is a 85 y.o. male.    Chief Complaint: Wound Check    Wound Check     This patient is seen today for reevaluation of an ulcer to the right lateral calf.  He first noticed this in early summer 2016.  Unna boots are on both legs and the wounds are healing as evidenced by wound contracture.  He is afebrile.  He denies increased redness, swelling or purulent drainate.  His pain level is 7/10.  His wound healing is complicated by venous insufficiency and chronic kidney disease.  He is receiving [facility].  He is seen with Carlos Barahona NP to coordinate care.  Review of Systems   Constitutional: Negative for chills, diaphoresis, fatigue and fever.   HENT: Negative for congestion, postnasal drip, rhinorrhea, sinus pressure, sneezing, sore throat, tinnitus and trouble swallowing. Hearing loss: left ear.    Eyes: Negative for visual disturbance.   Respiratory: Negative for cough, shortness of breath and wheezing.    Cardiovascular: Positive for leg swelling. Negative for chest pain and palpitations.   Gastrointestinal: Positive for constipation. Negative for diarrhea, nausea and vomiting.   Genitourinary: Positive for difficulty urinating. Negative for dysuria and hematuria.   Musculoskeletal: Positive for back pain and joint swelling. Negative for arthralgias.   Skin: Positive for color change and wound (bilateral lower legs and feet).   Neurological: Positive for dizziness. Negative for weakness, light-headedness and headaches.   Hematological: Does not bruise/bleed easily.   Psychiatric/Behavioral: Negative for decreased concentration and sleep disturbance. The patient is not nervous/anxious.        Objective:      Physical Exam   Constitutional: He is oriented to person, place, and time. He appears well-developed and well-nourished. No distress.   HENT:   Head: Normocephalic and atraumatic.   Cardiovascular: Intact distal pulses.     Musculoskeletal: Normal range of motion. He exhibits edema (1-2+ lower leg). He exhibits no tenderness.        Legs:       Feet:    Neurological: He is alert and oriented to person, place, and time.   Skin: Skin is warm and dry. No rash noted. He is not diaphoretic. No cyanosis or erythema. Nails show no clubbing.   Psychiatric: He has a normal mood and affect. His behavior is normal. Judgment and thought content normal.   Nursing note and vitals reviewed.      ..  Lab Results   Component Value Date    ALBUMIN 3.2 (L) 11/07/2016     PROCEDURE NOTE:  The right medial thigh wounds were cauterized today by applying silver nitrate directly to the wound bed.  The patient tolerated the procedure well.  The wound was then covered with a dry dressing.    Humphrey was seen in the clinic room and placed in the supine position on the treatment table.  Bilateral leg wounds were cleansed with Easi-clense sponges and dried thoroughly.  Santyl and a hydrofiber dressing was applied to the right leg and foot wounds.  Silver alginate was applied to the left heel wound.  Eucerin cream was applied to the lower legs.  The patient's feet were positioned at a 90 degree angle.  A zinc oxide wrap, followed by kerlix roll gauze and coban were applied using a spiral technique avoiding creases or folds.  The wraps were started behind the first metatarsal and ended below the tibial tubercle of the knee.  There was overlap of each turn half the width of the previous turn.  The compression wraps will be changed every 2-3 days.      Assessment:       1. Ulcer of ankle, right, with fat layer exposed    2. Ulcer of calf, right, with fat layer exposed    3. Ulcer of other part of foot    4. Decubitus ulcer of left heel, stage 3    5. Complicated open wound of right thigh, initial encounter    6. 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 Varicose veins of right lower extremity with ulcer    8. Atherosclerosis of native arteries of the  "extremities with ulceration    9. Abnormal granulation tissue        Plan:           Nepro 2-3 cans daily in between meals.  Unna boots bilateral lower legs as detailed above.  Darco shoes bilateral feet.  1/4" plain nugauze to right thigh wound, cover with gauze and secure with medipore tape.  Patient was warned not to get the dressings wet and to use cast covers for showering.  Should the dressing become wet, he is to remove it, place a wet-to-dry dressing over the wound, cover with gauze and roll gauze and use ace wraps for compression and to secure bandages.  He should then notify home health as soon as possible to have a new dressing applied.  Return to clinic in two weeks.  [facility] notified of orders via [employer] fax.    Home Health Orders:  Cleanse bilateral leg and foot wounds and right thigh wound with wound .  1/4" plain nugauze to right thigh wound, cover with gauze and secure with medipore tape.  Apply santyl and hydrofiber to right leg and foot ulcers.  Cover right calf ulcer with ABD pad to capture drainage.  Apply silver alginate to left heel ulcer.  Unna boots (zinc oxide, kerlix and coban) bilateral lower legs.  Skilled nurse visit-three times weekly.    Right lateral calf      Right lateral foot and ankle ulcers      Left heel stage III decubitus      Right medial thigh                          "

## 2017-01-25 NOTE — MR AVS SNAPSHOT
[facility]  [address]  [facility] [address]  Phone: [phone]                  Humphrey Machado   2017 1:00 PM   Office Visit    Description:  Male : 1931   Provider:  Sowmya Rincon NP   Department:  [facility]           Reason for Visit     Recurrent Skin Infections           Diagnoses this Visit        Comments    Abscess of right groin    -  Primary            To Do List           Future Appointments        Provider Department Dept Phone    2017 11:00 AM [place] MELISSA Castillo III, MD [facility] - Otorhinolaryngology [phone]    2/15/2017 11:20 AM Brit Ramirez NP [facility] - Wound Care [phone]      Goals (5 Years of Data)     None      Follow-Up and Disposition     Return if symptoms worsen or fail to improve.      [facility] On Call     [facility] On Call Nurse Care Line - 24/7 Assistance  Registered nurses in the [facility] On Call Center provide clinical advisement, health education, appointment booking, and other advisory services.  Call for this free service at [phone].             Medications           Message regarding Medications     Verify the changes and/or additions to your medication regime listed below are the same as discussed with your clinician today.  If any of these changes or additions are incorrect, please notify your healthcare provider.        STOP taking these medications     hydrALAZINE (APRESOLINE) 50 MG tablet Take 0.5 tablets (25 mg total) by mouth 2 (two) times daily.           Verify that the below list of medications is an accurate representation of the medications you are currently taking.  If none reported, the list may be blank. If incorrect, please contact your healthcare provider. Carry this list with you in case of emergency.           Current Medications     allopurinol (ZYLOPRIM) 100 MG tablet TAKE ONE TABLET BY MOUTH EVERY DAY    aspirin 81 MG Chew Take 1 tablet (81 mg total) by mouth once daily.     "atorvastatin (LIPITOR) 80 MG tablet Take 1 tablet (80 mg total) by mouth once daily.    calcitRIOL (ROCALTROL) 0.5 MCG Cap Take 0.5 mcg by mouth once daily.    carvedilol (COREG) 3.125 MG tablet Take 1 tablet (3.125 mg total) by mouth 2 (two) times daily with meals.    catheter 16-16 Fr-" Misc 1 Units by Misc.(Non-Drug; Combo Route) route once daily.    collagenase (SANTYL) ointment Apply topically once daily. Apply to wound daily as directed.    folic acid-vit B6-vit B12 (FOLBEE) 2.5-25-1 mg Tab Take 1 tablet by mouth once daily.    gabapentin (NEURONTIN) 300 MG capsule Take 1 capsule (300 mg total) by mouth 2 (two) times daily.    hydrocodone-acetaminophen 10-325mg (NORCO)  mg Tab Take 1 tablet by mouth 4 (four) times daily as needed.    hydroxychloroquine (PLAQUENIL) 200 mg tablet Take 1 tablet (200 mg total) by mouth 2 (two) times daily.    LACTOBACILLUS ACIDOPHILUS (PROBIOTIC ORAL) Take 1 capsule by mouth once daily.     nitroGLYCERIN (NITROSTAT) 0.3 MG SL tablet Place 1 tablet (0.3 mg total) under the tongue every 5 (five) minutes as needed for Chest pain.    nystatin-triamcinolone (MYCOLOG II) cream Apply topically 4 (four) times daily.    omeprazole (PRILOSEC) 20 MG capsule Take 20 mg by mouth once daily.    omeprazole (PRILOSEC) 40 MG capsule     polyethylene glycol (GLYCOLAX) 17 gram PwPk Take 17 g by mouth once daily.    polyethylene glycol (GLYCOLAX) 17 gram/dose powder     sodium bicarbonate 650 MG tablet Take 1 tablet (650 mg total) by mouth 2 (two) times daily.    vitamin D 1000 units Tab Take 2,000 Units by mouth once daily.            Clinical Reference Information           Vital Signs - Last Recorded  Most recent update: 1/25/2017  1:06 PM by Bell Shen LPN    BP Pulse Temp Resp Ht Wt    (!) 152/66 77 98.7 °F (37.1 °C) (Oral) 16 5' 10" (1.778 m) 81.6 kg (180 lb)    BMI                25.83 kg/m2          Blood Pressure          Most Recent Value    BP  (!)  152/66      Allergies as of " 1/25/2017     Ditropan [Oxybutynin Chloride]    Keflex [Cephalexin]    Macrobid [Nitrofurantoin Monohyd/m-cryst]      Immunizations Administered on Date of Encounter - 1/25/2017     None      Smoking Cessation     If you would like to quit smoking:   You may be eligible for free services if you are a [place] resident and started smoking cigarettes before September 1, 1988.  Call the Smoking Cessation Trust ([facility]) toll free at ([phone] or [phone].   Call 2-419-QUIT-NOW if you do not meet the above criteria.

## 2017-01-25 NOTE — TELEPHONE ENCOUNTER
----- Message from Gabriel Polanco sent at 1/25/2017  3:57 PM CST -----  Contact: [facility]/ [facility]/ [phone]  Type: Rx    Name of medication(s): gabapentin (NEURONTIN) 300 MG capsule    Is this a refill? New rx? refill    Who prescribed medication? Dr. Murray    Pharmacy Name, Phone, & Location: [facility] on file    Comments:  Pharmacist is calling to request a refill on the medication above.  The pharmacy states that they have been faxing over the request since last week, but there is nothing in the system.  Please call and advise.    Thank you

## 2017-01-26 RX ORDER — GABAPENTIN 300 MG/1
300 CAPSULE ORAL 2 TIMES DAILY
Qty: 60 CAPSULE | Refills: 3 | Status: ON HOLD | OUTPATIENT
Start: 2017-01-26 | End: 2017-06-01 | Stop reason: HOSPADM

## 2017-01-27 NOTE — PROGRESS NOTES
Subjective:      Patient ID: Humphrey Machado is a 85 y.o. male.    Chief Complaint:Follow-up and Wound Infection      History of Present Illness     Mr. Humphrey Machado is an 85 y.o. male with a long and complicated medical history.  He has history of renal cell carcinoma and is s/p left nephrectomy some years ago.    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  He is here for follow up of groin abscess.  See my note of 1/11/17.  I am seeing him today with MELISSA Ramirez NP of Wound care.      Patient reports that the wound continues to drain spontaneously and HH is packing three times per week.  Nopain at site.  He denies fevers, chills, sweats.  Continues to have his chronic back and hip pain which is at baseline.   Still walking and exercising.   He has pain related to his  leg ulcerations, though these are noted to be healing well.  He continues to have montano catheter, which he reports is working well   No n/v/d.  Denies chest pain, SOB.     He is also following up with Urology today.     Review of Systems   Constitution: Negative for chills, fever, malaise/fatigue and night sweats.   Cardiovascular: Positive for leg swelling. Negative for chest pain and palpitations.   Respiratory: Negative for cough, shortness of breath and sputum production.    Hematologic/Lymphatic: Negative for adenopathy.   Skin: Positive for poor wound healing (bilateral venous insufficiency ulcerations). Negative for rash.   Musculoskeletal: Positive for back pain and joint pain. Negative for joint swelling.   Gastrointestinal: Negative for abdominal pain, diarrhea, nausea and  vomiting.   Genitourinary: Positive for bladder incontinence. Negative for pelvic pain.        Stevens catheter in place     Neurological: Negative for numbness and paresthesias.     Objective:   Physical Exam   Constitutional: He appears well-developed and well-nourished. No distress.   Eyes: Conjunctivae are normal. No scleral icterus.   Cardiovascular: Normal rate and regular rhythm.    Pulmonary/Chest: Effort normal. No respiratory distress.   Abdominal: Soft. He exhibits no distension.   Genitourinary:   Genitourinary Comments: Stevens in place - draining well   Musculoskeletal:   Lower extremity ulcerations - see Wound care note of this date.  No evidence of active infection.    Skin: Skin is warm and dry. He is not diaphoretic.    Right inner thigh/ inguinal thigh area wound appears to be closing up - depth today 1.4 cm, previously 2.5 cm.  Hypergranulation tissue.  Small amount of serosanguinous drainage. No purulence.    Firm at base, no fluctuance.   Non-tender  Packed by Wound Care     Vitals reviewed.        Assessment:       1. Abscess of right groin    2. Chronic osteomyelitis of pelvic region, unspecified laterality     Patient with recurrent groin/inguinal abscess here for follow up.  Continues to drain spontaneously and packed 3 time per week by HH.  Improved from last visit.  No purulence, no fluctuance/erythema.   Continues to do well without antibiotics.  Would continue to hold off on antibiotics at this time and watch closely and continue  wound care so long as it continues to drain.      Chronic hip and back pain unchanged.  Chronic osteo stable.  Last indium on 11/25/16 showed no evidence of active infection.   Plan:     1.  Continue wound care/packing.  No antibiotics needed at this time.   2.  Follow up with urology as scheduled.   3.  Follow up with next Wound Care visit.   4.  Patient again strongly counseled to call/seek immediate medical attention for fevers, chills, or if lesion increases  in size, becomes warm, tender/painful, or redness, erythema of surrounding tissue.  He verbalizes understanding.

## 2017-02-07 ENCOUNTER — TELEPHONE (OUTPATIENT)
Dept: INTERNAL MEDICINE | Facility: CLINIC | Age: 82
End: 2017-02-07

## 2017-02-08 ENCOUNTER — TELEPHONE (OUTPATIENT)
Dept: INFECTIOUS DISEASES | Facility: HOSPITAL | Age: 82
End: 2017-02-08

## 2017-02-08 ENCOUNTER — CLINICAL SUPPORT (OUTPATIENT)
Dept: AUDIOLOGY | Facility: CLINIC | Age: 82
End: 2017-02-08
Payer: MEDICARE

## 2017-02-08 ENCOUNTER — OFFICE VISIT (OUTPATIENT)
Dept: OTOLARYNGOLOGY | Facility: CLINIC | Age: 82
End: 2017-02-08
Payer: MEDICARE

## 2017-02-08 DIAGNOSIS — H61.20 IMPACTED CERUMEN, UNSPECIFIED LATERALITY: ICD-10-CM

## 2017-02-08 DIAGNOSIS — J31.0 CHRONIC RHINITIS: Primary | ICD-10-CM

## 2017-02-08 DIAGNOSIS — H61.23 BILATERAL IMPACTED CERUMEN: Primary | ICD-10-CM

## 2017-02-08 DIAGNOSIS — H93.8X2 EAR POPPING, LEFT: ICD-10-CM

## 2017-02-08 DIAGNOSIS — H90.3 SENSORINEURAL HEARING LOSS, BILATERAL: ICD-10-CM

## 2017-02-08 DIAGNOSIS — H90.3 SENSORINEURAL HEARING LOSS, BILATERAL: Primary | ICD-10-CM

## 2017-02-08 DIAGNOSIS — H93.8X2 EAR FULLNESS, LEFT: ICD-10-CM

## 2017-02-08 PROCEDURE — 99211 OFF/OP EST MAY X REQ PHY/QHP: CPT | Mod: PBBFAC | Performed by: OTOLARYNGOLOGY

## 2017-02-08 PROCEDURE — 99214 OFFICE O/P EST MOD 30 MIN: CPT | Mod: S$PBB,,, | Performed by: OTOLARYNGOLOGY

## 2017-02-08 PROCEDURE — 99213 OFFICE O/P EST LOW 20 MIN: CPT | Mod: 25,S$PBB,, | Performed by: OTOLARYNGOLOGY

## 2017-02-08 PROCEDURE — 69210 REMOVE IMPACTED EAR WAX UNI: CPT | Mod: 50,PBBFAC | Performed by: OTOLARYNGOLOGY

## 2017-02-08 PROCEDURE — 99999 PR PBB SHADOW E&M-EST. PATIENT-LVL I: CPT | Mod: PBBFAC,,, | Performed by: OTOLARYNGOLOGY

## 2017-02-08 PROCEDURE — 99999 PR PBB SHADOW E&M-EST. PATIENT-LVL II: CPT | Mod: PBBFAC,,, | Performed by: OTOLARYNGOLOGY

## 2017-02-08 PROCEDURE — 69210 REMOVE IMPACTED EAR WAX UNI: CPT | Mod: S$PBB,,, | Performed by: OTOLARYNGOLOGY

## 2017-02-08 RX ORDER — AZELASTINE 1 MG/ML
1 SPRAY, METERED NASAL 2 TIMES DAILY
Qty: 30 ML | Refills: 1 | Status: SHIPPED | OUTPATIENT
Start: 2017-02-08 | End: 2017-05-03 | Stop reason: CLARIF

## 2017-02-08 NOTE — TELEPHONE ENCOUNTER
Received call from Home Health nurse who had seen Mr. Machado today.  Reports he had temp of 100.5.  No chills or other symptoms.  His thigh wound is almost completely closed, no drainage, erythema, induration.  Called patient.  Spoke to his wife who advises he is napping.  She denies that he has been having any subjective fevers, chills or other symptoms.  Continues to have his chronic back pain, but otherwise no changes.      I have asked Home Health to look in on him tomorrow.   Wife advised to call immediately or seek medical attention if persistent fevers.   Patient has follow up with me next week.

## 2017-02-08 NOTE — PROGRESS NOTES
"Chief complaint: Ear cleaning plus   HPI:Mr. Machado is a 85-year-old  gentleman who walks slowly and deliberately with the assistance of a walker; he is wearing a urinary catheter.  He was referred by my colleague Dr. LIZ Castillo ( who he had just visited) for an ear cleaning procedure late this morning.  I worked him into my schedule.  His chief complaint is a blocked sensation in his left ear as if there is fluid there.  He can occasionally "pop" his ear.  He admits to significant weight loss over the past 6 months.  He may be describing eustachian tube dysfunction.    PE: Vital signs not recorded  Both ears were examined under the microscope and the micro-procedure room.  A large piece of cerumen is extracted from the outer one third of the right ear canal with a blunt curette.  Right eardrum is slightly pale white by do believe the right middle ear space is well aerated.  A piece of cerumen extracted from the floor the left ear canal in a similar fashion.  Left eardrum is relatively clear when visualized in its entirety.    The patient is immediately sent for an audiometric study which is kindly performed by BHARGAVI Guerin during the lunch hour.    The results are discussed with the patient in some detail.  I cannot reproduce the audiometric study into the EMR at this point.  SRT scores measured 25 dB for each ear.  Tympanometry is within normal limits with normal pressures measured for both ears; the right eardrum graft is lower in amplitude than the left.  The 2K pure tone responses measured 35 dB and a 3K responses measured 55 dB.  The 1K response measured at 25 dB for both ears    DIAGNOSIS:   1. Bilateral impacted cerumen     2. Ear fullness, left     3. Ear popping, left     4. Sensorineural hearing loss, bilateral       PLAN: Cerumen removed from both ear canals  Audiometry reviewed: bilateral mild to moderate to severe 1 through 8K sloping sensorineural hearing loss  Patient is a candidate for hearing " amplication for one or both ears  Copy of audiogram/[facility]'s card/prescription to obtain hearing aid(s) provided  Monitor hearing yearly

## 2017-02-08 NOTE — LETTER
February 9, 2017      Gonzalez Castillo III, MD  [address]  [facility] [address]           [facility] - Otorhinolaryngology  [address]  [facility] [address]  Phone: [phone]  Fax: [phone]          Patient: Humphrey Machado   MR Number: [id number]   YOB: 1931   Date of Visit: 2/8/2017       Dear Dr. Gonzalez Castillo III:    Thank you for referring Humphrey Machado to me for evaluation. Attached you will find relevant portions of my assessment and plan of care.    If you have questions, please do not hesitate to call me. I look forward to following Humphrey Machado along with you.    Sincerely,    Jonathan Hill III, MD    Enclosure  CC:  No Recipients    If you would like to receive this communication electronically, please contact [email or web address] or [phone] to request more information on [employer] access.    For providers and/or their staff who would like to refer a patient to [facility], please contact us through our one-stop-shop provider referral line, [facility], at [phone].    If you feel you have received this communication in error or would no longer like to receive these types of communications, please e-mail [email or web address]

## 2017-02-08 NOTE — PATIENT INSTRUCTIONS
Cerumen removed from both ear canals  Audiometry reviewed: bilateral mild to moderate to severe 1 through 8K sloping sensorineural hearing loss  Patient is a candidate for hearing amplication for one or both ears  Copy of audiogram/M Henry's card/prescription to obtain hearing aid(s) provided  Monitor hearing yearly

## 2017-02-08 NOTE — MR AVS SNAPSHOT
[facility] - Otorhinolaryngology  [address]  [facility] [address]  Phone: [phone]  Fax: [phone]                  Humphrey Machado   2017 4:00 PM   Office Visit    Description:  Male : 1931   Provider:  Jonathan Hill III, MD   Department:  [facility] - Otorhinolaryngology           Diagnoses this Visit        Comments    Bilateral impacted cerumen    -  Primary     Ear fullness, left         Ear popping, left         Sensorineural hearing loss, bilateral                To Do List           Future Appointments        Provider Department Dept Phone    2/15/2017 11:20 AM Brit Ramirez, EDWIN [facility] - Wound Care [phone]    2/15/2017 11:30 AM MARILEE Olivas, ANP [facility] - Infectious Diseases [phone]    2017 11:30 AM LAB, APPOINTMENT [place] [facility] [address] [phone]    2017 11:35 AM SPECIMEN, MAIN CAMPUS [facility] [address] [phone]    3/1/2017 2:20 PM Diya Vega MD [facility] - Nephrology [phone]      Goals (5 Years of Data)     None      [facility]     [facility] Nurse Care Line - 24/7 Assistance  Registered nurses in the [facility] provide clinical advisement, health education, appointment booking, and other advisory services.  Call for this free service at [phone].             Medications           Message regarding Medications     Verify the changes and/or additions to your medication regime listed below are the same as discussed with your clinician today.  If any of these changes or additions are incorrect, please notify your healthcare provider.             Verify that the below list of medications is an accurate representation of the medications you are currently taking.  If none reported, the list may be blank. If incorrect, please contact your healthcare provider. Carry this list with you in case of emergency.           Current Medications     allopurinol (ZYLOPRIM) 100 MG  "tablet TAKE ONE TABLET BY MOUTH EVERY DAY    aspirin 81 MG Chew Take 1 tablet (81 mg total) by mouth once daily.    atorvastatin (LIPITOR) 80 MG tablet Take 1 tablet (80 mg total) by mouth once daily.    azelastine (ASTELIN) 137 mcg (0.1 %) nasal spray 1 spray (137 mcg total) by Nasal route 2 (two) times daily.    calcitRIOL (ROCALTROL) 0.5 MCG Cap Take 0.5 mcg by mouth once daily.    carvedilol (COREG) 3.125 MG tablet Take 1 tablet (3.125 mg total) by mouth 2 (two) times daily with meals.    catheter 16-16 Fr-" Misc 1 Units by Misc.(Non-Drug; Combo Route) route once daily.    collagenase (SANTYL) ointment Apply topically once daily. Apply to wound daily as directed.    folic acid-vit B6-vit B12 (FOLBEE) 2.5-25-1 mg Tab Take 1 tablet by mouth once daily.    gabapentin (NEURONTIN) 300 MG capsule Take 1 capsule (300 mg total) by mouth 2 (two) times daily.    hydrocodone-acetaminophen 10-325mg (NORCO)  mg Tab Take 1 tablet by mouth 4 (four) times daily as needed.    hydroxychloroquine (PLAQUENIL) 200 mg tablet Take 1 tablet (200 mg total) by mouth 2 (two) times daily.    LACTOBACILLUS ACIDOPHILUS (PROBIOTIC ORAL) Take 1 capsule by mouth once daily.     nitroGLYCERIN (NITROSTAT) 0.3 MG SL tablet Place 1 tablet (0.3 mg total) under the tongue every 5 (five) minutes as needed for Chest pain.    nystatin-triamcinolone (MYCOLOG II) cream Apply topically 4 (four) times daily.    omeprazole (PRILOSEC) 20 MG capsule Take 20 mg by mouth once daily.    omeprazole (PRILOSEC) 40 MG capsule     polyethylene glycol (GLYCOLAX) 17 gram PwPk Take 17 g by mouth once daily.    polyethylene glycol (GLYCOLAX) 17 gram/dose powder     sodium bicarbonate 650 MG tablet Take 1 tablet (650 mg total) by mouth 2 (two) times daily.    vitamin D 1000 units Tab Take 2,000 Units by mouth once daily.            Clinical Reference Information           Allergies as of 2/8/2017     Ditropan [Oxybutynin Chloride]    Keflex [Cephalexin]    Macrobid " [Nitrofurantoin Monohyd/m-cryst]      Immunizations Administered on Date of Encounter - 2/8/2017     None      Instructions    Cerumen removed from both ear canals  Audiometry reviewed: bilateral mild to moderate to severe 1 through 8K sloping sensorineural hearing loss  Patient is a candidate for hearing application for one or both ears  Copy of audiogram/M Henry's card/prescription to obtain hearing aid(s) provided  Monitor hearing yearly       Smoking Cessation     If you would like to quit smoking:   You may be eligible for free services if you are a [place] resident and started smoking cigarettes before September 1, 1988.  Call the Smoking Cessation Trust (SCT) toll free at (120) 818-7042 or ([phone] 695-4187.   Call 1-800-QUIT-NOW if you do not meet the above criteria.            Language Assistance Services     ATTENTION: Language assistance services are available, free of charge. Please call [phone].      ATENCIÓN: Si habla español, tiene a lawrence disposición servicios gratuitos de asistencia lingüística. Llame al [phone].     CHÚ Ý: N?u b?n nói Ti?ng Vi?t, có các d?ch v? h? tr? ngôn ng? mi?n phí dành cho b?n. G?i s? [phone].         [facility] complies with applicable Federal civil rights laws and does not discriminate on the basis of race, color, national origin, age, disability, or sex.

## 2017-02-10 NOTE — CONSULTS
Mr. Machado presents for consultation.    VITAL SIGNS:  Per nurses' notes.    CHIEF COMPLAINT:  Clogged ears and allergy.    HISTORY OF PRESENT ILLNESS:  This is an 85-year-old white male with multiple   medical issues complaining of a clogged feeling in his ears as well as some   allergy symptoms with marked postnasal drip.  The discharge is clear and never   thick or purulent.  He has tried Mucinex with no help.    REVIEW OF SYSTEMS:  CONSTITUTIONAL: Weight loss or weight gain: Negative.  ALLERGY/IMMUNOLOGIC: Negative.  ENT/Mouth:  Hearing Loss/Dizziness/Tinnitus: Negative.  Ear Infections/Otalgia: Negative.  Rhinitis/Sinusitis/Epistaxis: Negative.  Headache/Facial Pain: Negative.  Nasal Obstruction/Snoring/ANITA: Negative.  Throat: Infections/Pain: Negative.  Hoarseness/Speech Disturbance: Negative.  Salivary Glands Disorder: Negative.  Trauma: Hx: Negative.    Cardiovascular:  MI/Angina: Negative.  Hypertension: Negative.  Endo: DM/Steroids: Negative.  Eyes: Negative.  GI: Dysphagia/Reflux: Negative.  : GYN Pregnancy: Negative.      Renal: Dialysis: Negative.  Lymph: Neck Mass/Lymphadenopathy: Negative.  Musculoskeletal: Negative.  Hem: Bleeding Disorders/Anemia: Negative.  Neuro: Cranial/Neuralgia: Negative.  Pulm: Asthma/SOB/Cough: Negative.  Skin/Breast: Negative.    PAST MEDICAL/FAMILY/SOCIAL HISTORY:    Additional Past Medical History   ENT Surgery: Negative.   Occupational Exposure: Negative.    Problems: Negative.   Cancer: Positive for basal cell carcinomas.   Positive for atrial fibrillation, anxiety, chronic kidney disease, coronary   artery disease, hypertension, elevated PSA and venous insufficiency of his legs.    Surgeries include tonsillectomy, nephrectomy and cystoscopy.    Past Family History   Family history hearing loss: Negative.   Family history cancer: Positive for prostate cancer.   Positive for heart disease and mental illness.    Past Social History   Tobacco: He is a cigar  smoker for the last 40 years daily.  Actually, he smokes   three cigars per day.   Alcohol: He has one glass of wine at bedtime.    MEDICATIONS:  Per MedCard.    ALLERGIES:  Per MedCard.    EXAMINATION:  General Appearance:  Elderly 85-year-old white male in no acute distress.  Communication Ability:  Good.  EARS, NOSE, THROAT, MOUTH;  EARS:   External auditory canals: Bilateral external auditory canals show some cerumen   impaction.   Hearing: Grossly Intact.   Tympanic membranes: Clear.  NOSE:   External: Normal.   Intranasal: Septum is straight.  He has 1+ turbinates.  Airway clear.  MOUTH:   Intraorally: Lips, teeth and gums: Normal.   Oropharynx: Normal.   Mucosa: Normal.  THROAT:   Tongue: Normal.   Palate: Normal.   Tonsils: Absent.   Posterior pharynx: Normal.  HEAD/FACE INSPECTION: Normal and atraumatic.   Palpation/Percussion:  Non tender.   Facial Strength: Normal and symmetric.   Salivary glands: Normal.    NECK: Supple.  THYROID: No masses.  LYMPHATICS: No nodes.  RESPIRATORY:   Effort: Normal.  EYES:   Ocular Mobility: Normal.   Vision: Grossly intact.  NEURO/PSYCH:   Cranial nerves: 2-12 grossly intact.   Orientation: x3.   Mood/Affect: Normal.    IMPRESSION:  An 85-year-old white male with history of chronic rhinitis and   cerumen impaction.    RECOMMENDATION:  I have discussed my findings with him in detail as well as my   recommendations for treatment.  We have discussed sinus rinses utilizing   distilled water and I have given him literature on this.  I have also   e-prescribed Astelin as a spray antihistamine for him to help dry his watery   postnasal drip.  I have referred him to Dr. Hill to address his cerumen   impaction.  He will return to clinic p.r.n.      HG/HN  dd: 02/09/2017 12:19:13 (CST)  td: 02/10/2017 04:18:27 (CST)  Doc ID   #[id number]  Job ID #[id number]    CC:

## 2017-02-13 RX ORDER — HYDROCODONE BITARTRATE AND ACETAMINOPHEN 10; 325 MG/1; MG/1
1 TABLET ORAL 4 TIMES DAILY PRN
Qty: 120 TABLET | Refills: 0 | Status: CANCELLED | OUTPATIENT
Start: 2017-02-13

## 2017-02-13 NOTE — TELEPHONE ENCOUNTER
----- Message from Mickie Sanchez MA sent at 2/13/2017  8:36 AM CST -----  Contact: [phone]  Please advise that the Pt is calling to check the status of the Medication hydrocodone-acetaminophen 10-325mg (NORCO)  mg Tab. Please call. Thanks!

## 2017-02-14 RX ORDER — HYDROCODONE BITARTRATE AND ACETAMINOPHEN 10; 325 MG/1; MG/1
1 TABLET ORAL 4 TIMES DAILY PRN
Qty: 120 TABLET | Refills: 0 | Status: CANCELLED | OUTPATIENT
Start: 2017-02-14

## 2017-02-15 ENCOUNTER — OFFICE VISIT (OUTPATIENT)
Dept: WOUND CARE | Facility: CLINIC | Age: 82
End: 2017-02-15
Payer: MEDICARE

## 2017-02-15 ENCOUNTER — OFFICE VISIT (OUTPATIENT)
Dept: INFECTIOUS DISEASES | Facility: CLINIC | Age: 82
End: 2017-02-15
Payer: MEDICARE

## 2017-02-15 VITALS
HEIGHT: 70 IN | SYSTOLIC BLOOD PRESSURE: 173 MMHG | WEIGHT: 181.19 LBS | HEART RATE: 74 BPM | BODY MASS INDEX: 25.94 KG/M2 | DIASTOLIC BLOOD PRESSURE: 73 MMHG | TEMPERATURE: 99 F

## 2017-02-15 DIAGNOSIS — L97.212 ULCER OF CALF, RIGHT, WITH FAT LAYER EXPOSED: ICD-10-CM

## 2017-02-15 DIAGNOSIS — I70.25 ATHEROSCLEROSIS OF NATIVE ARTERIES OF THE EXTREMITIES WITH ULCERATION: ICD-10-CM

## 2017-02-15 DIAGNOSIS — L02.214 ABSCESS OF RIGHT GROIN: Primary | ICD-10-CM

## 2017-02-15 DIAGNOSIS — L97.509 ULCER OF OTHER PART OF FOOT: Primary | ICD-10-CM

## 2017-02-15 DIAGNOSIS — S71.101A COMPLICATED OPEN WOUND OF RIGHT THIGH, INITIAL ENCOUNTER: ICD-10-CM

## 2017-02-15 DIAGNOSIS — L97.312 ULCER OF ANKLE, RIGHT, WITH FAT LAYER EXPOSED: ICD-10-CM

## 2017-02-15 DIAGNOSIS — L89.623 DECUBITUS ULCER OF LEFT HEEL, STAGE 3: ICD-10-CM

## 2017-02-15 DIAGNOSIS — L97.919 VARICOSE VEINS OF RIGHT LOWER EXTREMITY WITH ULCER: ICD-10-CM

## 2017-02-15 DIAGNOSIS — L97.929 VARICOSE VEINS OF LEFT LOWER EXTREMITY WITH ULCER: ICD-10-CM

## 2017-02-15 DIAGNOSIS — I83.019 VARICOSE VEINS OF RIGHT LOWER EXTREMITY WITH ULCER: ICD-10-CM

## 2017-02-15 DIAGNOSIS — L92.9 ABNORMAL GRANULATION TISSUE: ICD-10-CM

## 2017-02-15 DIAGNOSIS — I83.029 VARICOSE VEINS OF LEFT LOWER EXTREMITY WITH ULCER: ICD-10-CM

## 2017-02-15 PROCEDURE — 99999 PR PBB SHADOW E&M-EST. PATIENT-LVL III: CPT | Mod: PBBFAC,,, | Performed by: NURSE PRACTITIONER

## 2017-02-15 PROCEDURE — 17250 CHEM CAUT OF GRANLTJ TISSUE: CPT | Mod: S$PBB,,, | Performed by: NURSE PRACTITIONER

## 2017-02-15 PROCEDURE — 29580 STRAPPING UNNA BOOT: CPT | Mod: 50,51,S$PBB, | Performed by: NURSE PRACTITIONER

## 2017-02-15 PROCEDURE — 99215 OFFICE O/P EST HI 40 MIN: CPT | Mod: PBBFAC | Performed by: NURSE PRACTITIONER

## 2017-02-15 PROCEDURE — 99499 UNLISTED E&M SERVICE: CPT | Mod: S$PBB,,, | Performed by: NURSE PRACTITIONER

## 2017-02-15 PROCEDURE — 29580 STRAPPING UNNA BOOT: CPT | Mod: 50,PBBFAC | Performed by: NURSE PRACTITIONER

## 2017-02-15 PROCEDURE — 17250 CHEM CAUT OF GRANLTJ TISSUE: CPT | Mod: PBBFAC | Performed by: NURSE PRACTITIONER

## 2017-02-15 PROCEDURE — 99212 OFFICE O/P EST SF 10 MIN: CPT | Mod: S$PBB,,, | Performed by: NURSE PRACTITIONER

## 2017-02-15 PROCEDURE — 99999 PR PBB SHADOW E&M-EST. PATIENT-LVL V: CPT | Mod: PBBFAC,,, | Performed by: NURSE PRACTITIONER

## 2017-02-15 NOTE — MR AVS SNAPSHOT
[facility]  [address]  [facility] [address]  Phone: [phone]                  Humphrey Machado   2/15/2017 11:20 AM   Office Visit    Description:  Male : 1931   Provider:  Brit Ramirez NP   Department:  [facility]           Reason for Visit     Wound Check           Diagnoses this Visit        Comments    Ulcer of other part of foot    -  Primary     Ulcer of calf, right, with fat layer exposed         Ulcer of ankle, right, with fat layer exposed         Decubitus ulcer of left heel, stage 3         Complicated open wound of right thigh, initial encounter         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         Varicose veins of right lower extremity with ulcer         Atherosclerosis of native arteries of the extremities with ulceration         Abnormal granulation tissue                To Do List           Future Appointments        Provider Department Dept Phone    2017 1:30 PM LAB, [facility] [phone]    2017 2:20 PM [facility], MAIN CAMPUS [facility] [phone]    3/1/2017 2:20 PM Diya Vega MD [facility] - Nephrology [phone]      Goals (5 Years of Data)     None      Follow-Up and Disposition     Return in about 2 weeks (around 3/1/2017) for Wound evaluation.      [facility]     [facility] Nurse Care Line - 24/7 Assistance  Registered nurses in the [facility] provide clinical advisement, health education, appointment booking, and other advisory services.  Call for this free service at [phone].             Medications           Message regarding Medications     Verify the changes and/or additions to your medication regime listed below are the same as discussed with your clinician today.  If any of these changes or additions are incorrect, please notify your healthcare provider.             Verify that the below list of medications is an accurate  "representation of the medications you are currently taking.  If none reported, the list may be blank. If incorrect, please contact your healthcare provider. Carry this list with you in case of emergency.           Current Medications     allopurinol (ZYLOPRIM) 100 MG tablet TAKE ONE TABLET BY MOUTH EVERY DAY    aspirin 81 MG Chew Take 1 tablet (81 mg total) by mouth once daily.    atorvastatin (LIPITOR) 80 MG tablet Take 1 tablet (80 mg total) by mouth once daily.    azelastine (ASTELIN) 137 mcg (0.1 %) nasal spray 1 spray (137 mcg total) by Nasal route 2 (two) times daily.    calcitRIOL (ROCALTROL) 0.5 MCG Cap Take 0.5 mcg by mouth once daily.    carvedilol (COREG) 3.125 MG tablet Take 1 tablet (3.125 mg total) by mouth 2 (two) times daily with meals.    catheter 16-16 Fr-" Misc 1 Units by Misc.(Non-Drug; Combo Route) route once daily.    collagenase (SANTYL) ointment Apply topically once daily. Apply to wound daily as directed.    folic acid-vit B6-vit B12 (FOLBEE) 2.5-25-1 mg Tab Take 1 tablet by mouth once daily.    gabapentin (NEURONTIN) 300 MG capsule Take 1 capsule (300 mg total) by mouth 2 (two) times daily.    hydrocodone-acetaminophen 10-325mg (NORCO)  mg Tab Take 1 tablet by mouth 4 (four) times daily as needed.    hydroxychloroquine (PLAQUENIL) 200 mg tablet Take 1 tablet (200 mg total) by mouth 2 (two) times daily.    LACTOBACILLUS ACIDOPHILUS (PROBIOTIC ORAL) Take 1 capsule by mouth once daily.     nitroGLYCERIN (NITROSTAT) 0.3 MG SL tablet Place 1 tablet (0.3 mg total) under the tongue every 5 (five) minutes as needed for Chest pain.    nystatin-triamcinolone (MYCOLOG II) cream Apply topically 4 (four) times daily.    omeprazole (PRILOSEC) 20 MG capsule Take 20 mg by mouth once daily.    omeprazole (PRILOSEC) 40 MG capsule     polyethylene glycol (GLYCOLAX) 17 gram PwPk Take 17 g by mouth once daily.    polyethylene glycol (GLYCOLAX) 17 gram/dose powder     sodium bicarbonate 650 MG tablet Take " "1 tablet (650 mg total) by mouth 2 (two) times daily.    vitamin D 1000 units Tab Take 2,000 Units by mouth once daily.            Clinical Reference Information           Your Vitals Were     Height Weight BMI          5' 10" (1.778 m) 82.2 kg (181 lb 3.2 oz) 26 kg/m2        Allergies as of 2/15/2017     Ditropan [Oxybutynin Chloride]    Keflex [Cephalexin]    Macrobid [Nitrofurantoin Monohyd/m-cryst]      Immunizations Administered on Date of Encounter - 2/15/2017     None      Instructions    Elevate legs as much as possible. Do not get the dressings wet and use cast covers for showering.  Should the dressing become wet, remove it, place a wet-to-dry dressing over the wound, cover with gauze and roll gauze and use ace wraps for compression and to secure bandages.  Notify home health as soon as possible to have a new dressing applied.      Keep your right thigh dressing dry.  On the day home health is coming to change your dressing, you may shower with a mild soap such as Dove.  Irrigate the wound with lukewarm water for 5 minutes, then dry thoroughly.  Place a dry bandage over the wound to cover it until the nurse arrives.       Smoking Cessation     If you would like to quit smoking:   You may be eligible for free services if you are a [place] resident and started smoking cigarettes before September 1, 1988.  Call the Smoking Cessation Trust ([facility]) toll free at (762) [phone] or (831) 931-7220.   Call 1-800-QUIT-NOW if you do not meet the above criteria.            Language Assistance Services     ATTENTION: Language assistance services are available, free of charge. Please call [phone].      ATENCIÓN: Si habla español, tiene a lawrence disposición servicios gratuitos de asistencia lingüística. Llame al [phone].     [facility] Ý: N?u b?n nói Ti?ng Vi?t, có các d?ch v? h? tr? ngôn ng? mi?n phí dành cho b?n. G?i s? [phone].         [facility] complies with applicable Federal civil rights laws " and does not discriminate on the basis of race, color, national origin, age, disability, or sex.

## 2017-02-15 NOTE — PROGRESS NOTES
Subjective:       Patient ID: Humphrey Machado is a 85 y.o. male.    Chief Complaint: Wound Check    Wound Check     This patient is seen today for reevaluation of an ulcer to the right lateral calf.  He first noticed this in early summer 2016.  Unna boots are on both legs and the wounds are healing as evidenced by wound contracture.  He is afebrile.  He denies increased redness, swelling or purulent drainate.  His pain level is 4/10.  His wound healing is complicated by venous insufficiency and chronic kidney disease.  He is receiving home health services through [facility].  He is seen with Carlos Barahona NP to coordinate care.  Review of Systems   Constitutional: Negative for chills, diaphoresis, fatigue and fever.   HENT: Negative for congestion, postnasal drip, rhinorrhea, sinus pressure, sneezing, sore throat, tinnitus and trouble swallowing. Hearing loss: left ear.    Eyes: Negative for visual disturbance.   Respiratory: Negative for cough, shortness of breath and wheezing.    Cardiovascular: Positive for leg swelling. Negative for chest pain and palpitations.   Gastrointestinal: Positive for constipation. Negative for diarrhea, nausea and vomiting.   Genitourinary: Positive for difficulty urinating. Negative for dysuria and hematuria.   Musculoskeletal: Positive for back pain and joint swelling. Negative for arthralgias.   Skin: Positive for color change and wound (bilateral lower legs and feet).   Neurological: Positive for dizziness. Negative for weakness, light-headedness and headaches.   Hematological: Does not bruise/bleed easily.   Psychiatric/Behavioral: Negative for decreased concentration and sleep disturbance. The patient is not nervous/anxious.        Objective:      Physical Exam   Constitutional: He is oriented to person, place, and time. He appears well-developed and well-nourished. No distress.   HENT:   Head: Normocephalic and atraumatic.   Cardiovascular: Intact distal pulses.     Musculoskeletal: Normal range of motion. He exhibits edema (1-2+ lower leg). He exhibits no tenderness.        Legs:       Feet:    Neurological: He is alert and oriented to person, place, and time.   Skin: Skin is warm and dry. No rash noted. He is not diaphoretic. No cyanosis or erythema. Nails show no clubbing.   Psychiatric: He has a normal mood and affect. His behavior is normal. Judgment and thought content normal.   Nursing note and vitals reviewed.      ..  Lab Results   Component Value Date    ALBUMIN 3.2 (L) 11/07/2016     PROCEDURE NOTE:  The right medial thigh and left heel wounds were cauterized today by applying silver nitrate directly to the wound bed.  The patient tolerated the procedure well.  The wound was then covered with a dry dressing.    Humphrey was seen in the clinic room and placed in the supine position on the treatment table.  The unna boots were removed with scissors and the leg was cleansed with Easi-clense sponges and dried thoroughly.  Eucerin cream cream was applied to the lower legs. Santyl and a hydrofiber dressing was applied to the right leg wounds.  A hydrofiber dressing was applied to the left heel wound.  The patient's feet were positioned at a 90 degree angle.  The coflex was applied per package instructions.  A two layered application was performed using a spiral technique beginning with the foam layer followed by the cohesive bandage avoiding creases or folds.  The wraps were started behind the first metatarsal and ended below the tibial tubercle of the knee.  There was overlap of each turn half the width of the previous turn.  The compression wraps will be changed every 2-3 days.    Assessment:       1. Ulcer of other part of foot    2. Ulcer of calf, right, with fat layer exposed    3. Ulcer of ankle, right, with fat layer exposed    4. Decubitus ulcer of left heel, stage 3    5. Complicated open wound of right thigh, initial encounter    6. Varicose veins of left lower  extremity with ulcer    7. Varicose veins of right lower extremity with ulcer    8. Atherosclerosis of native arteries of the extremities with ulceration    9. Abnormal granulation tissue        Plan:           Nepro 2-3 cans daily in between meals.  Coflex compression wraps bilateral lower legs as detailed above.  Darco shoes bilateral feet.  Mepore pro island dressing to right groin wound.  Patient was warned not to get the dressings wet and to use cast covers for showering.  Should the dressing become wet, he is to remove it, place a wet-to-dry dressing over the wound, cover with gauze and roll gauze and use ace wraps for compression and to secure bandages.  He should then notify home health as soon as possible to have a new dressing applied.  Return to clinic in two weeks.  [facility] notified of orders via [employer] fax.    Home Health Orders:  Cleanse bilateral leg and foot wounds and right thigh wound with wound .  Skin prep to periwound area and mepilex foam border dressing to right medial groin wound. Change dressing 3 times weekly.  Apply santyl and hydrofiber to right leg and foot ulcers.  Cover right calf ulcer with ABD pad to capture drainage.  Apply mepilex foam to left heel ulcer.  Coflex compression wrap bilateral lower legs.  Change wraps three times weekly.  Skilled nurse visit-three times weekly.    Right lateral calf      Right lateral foot and ankle ulcers      Left heel stage III decubitus      Right medial thigh

## 2017-02-15 NOTE — PATIENT INSTRUCTIONS
Elevate legs as much as possible. Do not get the dressings wet and use cast covers for showering.  Should the dressing become wet, remove it, place a wet-to-dry dressing over the wound, cover with gauze and roll gauze and use ace wraps for compression and to secure bandages.  Notify home health as soon as possible to have a new dressing applied.      Keep your right thigh dressing dry.  On the day home health is coming to change your dressing, you may shower with a mild soap such as Dove.  Irrigate the wound with lukewarm water for 5 minutes, then dry thoroughly.  Place a dry bandage over the wound to cover it until the nurse arrives.

## 2017-02-16 RX ORDER — HYDROCODONE BITARTRATE AND ACETAMINOPHEN 10; 325 MG/1; MG/1
1 TABLET ORAL 4 TIMES DAILY PRN
Qty: 120 TABLET | Refills: 0 | Status: SHIPPED | OUTPATIENT
Start: 2017-02-16 | End: 2017-03-13 | Stop reason: SDUPTHER

## 2017-02-16 NOTE — PROGRESS NOTES
Subjective:      Patient ID: Humphrey Machado is a 85 y.o. male.    Chief Complaint:Follow-up and Recurrent Skin Infections      History of Present Illness     Mr. Humphrey Machado is an 85 y.o. male with a long and complicated medical history.  See my note of 1/11/17. He is here for follow up of recurrent perineal groin abscesses.  I am seeing him today with MELISSA Ramirez in Wound Care.       Patient reports that the wound is closing and Home Health is unable to pack.   No drainage, no pain.  No current fevers, chills, sweats.  No new complaints.     Review of Systems   Constitution: Negative for chills, fever, malaise/fatigue and night sweats.   Cardiovascular: Positive for leg swelling. Negative for chest pain and palpitations.   Respiratory: Negative for cough, shortness of breath and sputum production.    Hematologic/Lymphatic: Negative for adenopathy.   Skin: Positive for poor wound healing (bilateral venous insufficiency ulcerations). Negative for rash.   Musculoskeletal: Positive for back pain and joint pain. Negative for joint swelling.   Gastrointestinal: Negative for abdominal pain, diarrhea, nausea and vomiting.   Genitourinary: Positive for bladder incontinence. Negative for pelvic pain.        Stevens catheter in place     Neurological: Negative for numbness and paresthesias.     Objective:   Physical Exam   Constitutional: He appears well-developed and well-nourished. No distress.   Eyes: Conjunctivae are normal. No scleral icterus.   Cardiovascular: Normal rate and regular rhythm.    Pulmonary/Chest: Effort normal. No respiratory distress.   Abdominal: Soft. He exhibits no distension.   Genitourinary:   Genitourinary Comments: Stevens in place - draining well   Musculoskeletal:   Lower extremity ulcerations - see Wound care note of this date.  No evidence of active infection.    Skin: Skin is warm and dry. He is not diaphoretic.    Right inner thigh/ inguinal thigh area wound is closed with now pink  hypergranulation tissue.  No induration, underlying fluctuance or surrounding erythema.   No drainage.     Vitals reviewed.    Assessment:       1. Abscess of right groin      Wound has healed.  No evidence of infection.  Sliver nitrate applied to hypergranulation tissue by MELISSA Ramirez NP>     Plan:     1.  Continue wound care with MELISSA Ramirez NP who will monitor wound  2.  Follow up with ID as needed.

## 2017-02-16 NOTE — TELEPHONE ENCOUNTER
----- Message from Loni Valenzuela sent at 2/16/2017 12:40 PM CST -----  Contact: self  [phone]  Pt states he is completely out of his medication hydrocodone-acetaminophen (NORCO)  mg Tab and has been trying to reach someone for the past few days to see if it has been refilled. Please call to advise or when script is ready for .

## 2017-02-20 DIAGNOSIS — M19.90 INFLAMMATORY ARTHRITIS: ICD-10-CM

## 2017-02-20 RX ORDER — HYDROXYCHLOROQUINE SULFATE 200 MG/1
200 TABLET, FILM COATED ORAL 2 TIMES DAILY
Qty: 60 TABLET | Refills: 2 | Status: ON HOLD | OUTPATIENT
Start: 2017-02-20 | End: 2017-06-01 | Stop reason: HOSPADM

## 2017-02-21 ENCOUNTER — LAB VISIT (OUTPATIENT)
Dept: LAB | Facility: HOSPITAL | Age: 82
End: 2017-02-21
Attending: INTERNAL MEDICINE
Payer: MEDICARE

## 2017-02-21 DIAGNOSIS — N18.4 CHRONIC KIDNEY DISEASE (CKD), STAGE 4 (SEVERE): ICD-10-CM

## 2017-02-21 DIAGNOSIS — N18.30 CHRONIC KIDNEY DISEASE, STAGE III (MODERATE): ICD-10-CM

## 2017-02-21 DIAGNOSIS — R80.9 PROTEINURIA: ICD-10-CM

## 2017-02-21 LAB
ALBUMIN SERPL BCP-MCNC: 2.6 G/DL
ANION GAP SERPL CALC-SCNC: 12 MMOL/L
ANION GAP SERPL CALC-SCNC: 12 MMOL/L
BUN SERPL-MCNC: 52 MG/DL
BUN SERPL-MCNC: 52 MG/DL
C3 SERPL-MCNC: 149 MG/DL
C4 SERPL-MCNC: 34 MG/DL
CALCIUM SERPL-MCNC: 9.8 MG/DL
CALCIUM SERPL-MCNC: 9.8 MG/DL
CHLORIDE SERPL-SCNC: 106 MMOL/L
CHLORIDE SERPL-SCNC: 106 MMOL/L
CO2 SERPL-SCNC: 21 MMOL/L
CO2 SERPL-SCNC: 21 MMOL/L
CREAT SERPL-MCNC: 2.5 MG/DL
CREAT SERPL-MCNC: 2.5 MG/DL
EST. GFR  (AFRICAN AMERICAN): 26.1 ML/MIN/1.73 M^2
EST. GFR  (AFRICAN AMERICAN): 26.1 ML/MIN/1.73 M^2
EST. GFR  (NON AFRICAN AMERICAN): 22.6 ML/MIN/1.73 M^2
EST. GFR  (NON AFRICAN AMERICAN): 22.6 ML/MIN/1.73 M^2
GLUCOSE SERPL-MCNC: 82 MG/DL
GLUCOSE SERPL-MCNC: 82 MG/DL
PHOSPHATE SERPL-MCNC: 4.4 MG/DL
POTASSIUM SERPL-SCNC: 4.4 MMOL/L
POTASSIUM SERPL-SCNC: 4.4 MMOL/L
SODIUM SERPL-SCNC: 139 MMOL/L
SODIUM SERPL-SCNC: 139 MMOL/L

## 2017-02-21 PROCEDURE — 36415 COLL VENOUS BLD VENIPUNCTURE: CPT | Mod: PO

## 2017-02-21 PROCEDURE — 86160 COMPLEMENT ANTIGEN: CPT

## 2017-02-21 PROCEDURE — 80069 RENAL FUNCTION PANEL: CPT

## 2017-02-21 PROCEDURE — 86160 COMPLEMENT ANTIGEN: CPT | Mod: 59

## 2017-02-21 PROCEDURE — 86255 FLUORESCENT ANTIBODY SCREEN: CPT | Mod: 91

## 2017-02-21 PROCEDURE — 86038 ANTINUCLEAR ANTIBODIES: CPT

## 2017-02-22 ENCOUNTER — TELEPHONE (OUTPATIENT)
Dept: INTERNAL MEDICINE | Facility: CLINIC | Age: 82
End: 2017-02-22

## 2017-02-22 LAB — ANA SER QL IF: NORMAL

## 2017-02-23 LAB — BM IGG SER-ACNC: <0.2 U

## 2017-02-24 LAB
ANCA AB TITR SER IF: NORMAL TITER
P-ANCA TITR SER IF: NORMAL TITER

## 2017-03-01 ENCOUNTER — OFFICE VISIT (OUTPATIENT)
Dept: NEPHROLOGY | Facility: CLINIC | Age: 82
End: 2017-03-01
Payer: MEDICARE

## 2017-03-01 VITALS
HEIGHT: 70 IN | WEIGHT: 177.69 LBS | BODY MASS INDEX: 25.44 KG/M2 | OXYGEN SATURATION: 95 % | DIASTOLIC BLOOD PRESSURE: 70 MMHG | SYSTOLIC BLOOD PRESSURE: 130 MMHG | HEART RATE: 80 BPM

## 2017-03-01 DIAGNOSIS — N18.4 CKD (CHRONIC KIDNEY DISEASE), STAGE 4 (SEVERE): Primary | ICD-10-CM

## 2017-03-01 PROCEDURE — 99214 OFFICE O/P EST MOD 30 MIN: CPT | Mod: S$PBB,GC,, | Performed by: INTERNAL MEDICINE

## 2017-03-01 PROCEDURE — 99999 PR PBB SHADOW E&M-EST. PATIENT-LVL IV: CPT | Mod: PBBFAC,GC,, | Performed by: INTERNAL MEDICINE

## 2017-03-01 PROCEDURE — 99214 OFFICE O/P EST MOD 30 MIN: CPT | Mod: PBBFAC | Performed by: INTERNAL MEDICINE

## 2017-03-01 RX ORDER — FERROUS SULFATE 324(65)MG
325 TABLET, DELAYED RELEASE (ENTERIC COATED) ORAL 3 TIMES DAILY
Refills: 0 | Status: ON HOLD | COMMUNITY
Start: 2017-03-01 | End: 2017-06-01 | Stop reason: HOSPADM

## 2017-03-01 NOTE — PROGRESS NOTES
Subjective:       Patient ID: Humphrey Machado is a 85 y.o. male.    Chief Complaint: Follow-up    HPI   84 y.o. c PMH of anemia, anxiety, arthritis, a-fib, HTN, CKD, prostate cancer s/p XRT, CAD, and asthma recently admitted with osteomyelitis and CKD stage III-IV.   Pt here for f/u of CKD     Since last visit no h/o ED visits or hospitalizations.   No c/o SOB, pt has a chronic montano cath in place for ureteral strictures and urinary incontinance s/p radiotion therapy many yrs back. Has urology follow up regularly   Has a groin abscess which is healing at this time per ID     Review of Systems      Constitutional: no chills, no fevers  HEENT: no issues   Resp: no shortness of breath, no cough   Cardiac: no chest pain, no palpitations  Abd: no nausea or vomiting. No abdominal pain  : gross hematuria, severe pain in the perineal region due to chronic abscess which is healing   Neuro: no headache, no dizziness.   Psych: seems depressed, no agitation    Objective:      Physical Exam      General: Well developed, well nourished in NAD, presents in wheel chair   HEENT: Conjunctiva clear; Oropharynx clear  Neck: No JVD noted, Supple  CV- Normal S1, S2 with no murmurs,gallops,rubs  Resp- Lungs CTA Bilaterally, Unlabored  Abdomen- NTND, BS normoactive x4 quads, soft, has supra pubic cath   Extrem- No cyanosis, clubbing, edema.  Skin- No rashes, lesions, ulcers  Neuro: awake, Oriented x3, no FND      Assessment:       No diagnosis found.    Plan:           1. CKD: stage IV likely 2/2 HTN, solitary kidney and recurrent UTIs as a complication h/o prostate ca and XRt many yrs ago  - baseline creat 2.4 to 2.7   - creat 3 wks back at baseline.      Lab Results   Component Value Date    CREATININE 2.5 (H) 02/21/2017    CREATININE 2.5 (H) 02/21/2017     Protein Creatinine Ratios: high, but holding off lisinopril due to tendency towards hyperkalemia   Deferred biopsy as pt has solitary kidney     Prot/Creat Ratio, Ur   Date Value Ref  Range Status   02/21/2017 4.22 (H) 0.00 - 0.20 Final   05/30/2016 1.71 (H) 0.00 - 0.20 Final   05/27/2016 2.91 (H) 0.00 - 0.20 Final     ·   ·   Acid-Base: stable   Lab Results   Component Value Date     02/21/2017     02/21/2017    K 4.4 02/21/2017    K 4.4 02/21/2017    CO2 21 (L) 02/21/2017    CO2 21 (L) 02/21/2017     2. HTN: Blood pressures stable, continue coreg    3. Renal osteodystrophy: PTH normal, asif recheck PTH at next visit   Ca+, phos normal, continue vitamin D 2000 U daily and calcitriol   Lab Results   Component Value Date    PTH 58.0 05/13/2016    CALCIUM 9.8 02/21/2017    CALCIUM 9.8 02/21/2017    PHOS 4.4 02/21/2017       4. Anemia:  Iron studies show low TSAT. Will start PO iron supplementation   Lab Results   Component Value Date    HGB 8.0 (L) 12/07/2016        5. DM:  Last HbA1C normal. Management per primary   Lab Results   Component Value Date    HGBA1C 4.8 05/29/2016       6. Lipid management: normal. Management per primary   Lab Results   Component Value Date    LDLCALC 64.6 05/29/2016       7. ESRD planning     Will rpt renal panel, PTH, Vit D in 3 months at [facility]   Pt says it is highly inconvenient for him to come to clinic appts.   Will make appt based on labs     Diya Vega MD   Nephrology fellow   Pager [phone]

## 2017-03-08 ENCOUNTER — TELEPHONE (OUTPATIENT)
Dept: RHEUMATOLOGY | Facility: CLINIC | Age: 82
End: 2017-03-08

## 2017-03-08 RX ORDER — HYDROCODONE BITARTRATE AND ACETAMINOPHEN 10; 325 MG/1; MG/1
1 TABLET ORAL 4 TIMES DAILY PRN
Qty: 120 TABLET | Refills: 0 | Status: CANCELLED | OUTPATIENT
Start: 2017-03-08

## 2017-03-08 NOTE — TELEPHONE ENCOUNTER
----- Message from Trinidad Samaniego MA sent at 3/7/2017  1:11 PM CST -----  Contact: self/home       ----- Message -----     From: Brain Verdugo     Sent: 3/7/2017   1:09 PM       To: [facility] A Staff    Pt called in stating that he needs to speak with you regarding a different type of medication due to plaquenil price increasing.

## 2017-03-08 NOTE — TELEPHONE ENCOUNTER
----- Message from Kay Robles sent at 3/8/2017  8:55 AM CST -----  Contact: Self/[phone]   RX request - refill or new RX.  Is this a refill or new RX:  Refill   RX name and strength: hydrocodone-acetaminophen 10-325mg (NORCO)  mg Tab  Directions: Take 1 tablet by mouth 4 (four) times daily as needed  Is this a 30 day or 90 day RX:    Pharmacy name and phone #: pt has to get a wriiten rx  Comments:  Pt said to please call him when the rx is ready to be picked up. Please call and advise        Thank you

## 2017-03-10 ENCOUNTER — TELEPHONE (OUTPATIENT)
Dept: INFECTIOUS DISEASES | Facility: HOSPITAL | Age: 82
End: 2017-03-10

## 2017-03-10 ENCOUNTER — OFFICE VISIT (OUTPATIENT)
Dept: UROLOGY | Facility: CLINIC | Age: 82
End: 2017-03-10
Payer: MEDICARE

## 2017-03-10 VITALS — HEART RATE: 68 BPM | DIASTOLIC BLOOD PRESSURE: 66 MMHG | SYSTOLIC BLOOD PRESSURE: 142 MMHG

## 2017-03-10 DIAGNOSIS — L02.91 ABSCESS: Primary | ICD-10-CM

## 2017-03-10 DIAGNOSIS — L02.415 ABSCESS OF LEG, RIGHT: Primary | ICD-10-CM

## 2017-03-10 PROCEDURE — 99999 PR PBB SHADOW E&M-EST. PATIENT-LVL IV: CPT | Mod: PBBFAC,,, | Performed by: NURSE PRACTITIONER

## 2017-03-10 PROCEDURE — 99214 OFFICE O/P EST MOD 30 MIN: CPT | Mod: PBBFAC | Performed by: NURSE PRACTITIONER

## 2017-03-10 PROCEDURE — 99214 OFFICE O/P EST MOD 30 MIN: CPT | Mod: S$PBB,,, | Performed by: NURSE PRACTITIONER

## 2017-03-10 PROCEDURE — 87186 SC STD MICRODIL/AGAR DIL: CPT

## 2017-03-10 PROCEDURE — 87070 CULTURE OTHR SPECIMN AEROBIC: CPT

## 2017-03-10 PROCEDURE — 87077 CULTURE AEROBIC IDENTIFY: CPT | Mod: 59

## 2017-03-10 NOTE — PROGRESS NOTES
Subjective:       Patient ID: Humphrey Machado is a 85 y.o. male.    Chief Complaint: Abscess    HPI Comments: Humphrey Machado is a 85 y.o. male with history of prostate CA, kidney CA (s/p (L) nephrectomy), urethral strictures urinary incontinence s/p radiation therapy several years ago.  He has history of CKD IV, AS, chronic UTI, chronic venous insufficiency, and inflammatory arthritis (on long term plaquenil and prednisone)   He is being followed by ID for Chronic osteomyelitis of the left hip/symphysis pubis. IR cultures of bone were negative. Pathology showed chronic osteomyelitis.   He was diagnosed with Perineal abscess by his PCP.  12/27/2017 seen in clinic and I placed packing in draining right thigh abscess.  Aerobic Bacterial Culture KLEBSIELLA PNEUMONIAE ESBL Moderate  This believed to be colonized.    He had been having the packing changed by HH.  Last clinic visit was 1/25/2017.  He saw ID and wound care on 02/15/2017;  Groin abscess had healed up.    He is here today because he had some dysuria yesterday; took some AZO that helped.  He had montano changed < 10 days ago; he noticed some clear drainage coming from top part of his right leg.   Home health came today to change dressing and noticed it; now drainage has yellow tint from AZO  He denies any pustular or pain from site.                  He was seen in clinic with Dr. Abraham on 09/30/2016 for evaluation.  He was admitted for I&D, IVAB and released 10/02/2016.    Extensive discussion had with patient.   Given the small caliber catheter in place, we recommend suprapubic catheter placement versus urethral dilation with upsize of urethral montano to allow for healing of fistula.   Currently inadequate drainage of bladder.   He would like to defer more definitive management until he has had further consultation.   He understands that if he leaves prior to urethral dilation with larger montano placement or suprapubic catheter insertion that this will be against  medical advice.   He will be sent home with home health.    10/06/2016 returned to ER with development of two new abscesses & (+) blood culture.  He reported in ER that he was scheduled with Dr. Zuhair Jefferson at [facility] for f/u of I&D as well as SPT placement.  He reports Dr. Jefferson placed SPT (unsure of date) and the next day he went back in due to pain to lower abdomen/pelvic pain  He has not had post op visit with original SPT change.    Seen on 11/23/2016 after his SPT came out;  He went to [facility] ER and had unsuccessful attempts of replacing SPT.  They placed 14fr montano instead and was told to f/u with Dr. Jefferson which he has not.  He is awaiting the appt.              We sent for old records but have not yet received anything.          Past Medical History:    *Atrial fibrillation                                          Acute appendicitis                              2/19/2015     Anemia                                                        Anxiety                                                       Arthritis                                                       Comment:generalized    Basal cell carcinoma                            01/2013         Comment:right temple    BCC (basal cell carcinoma)                                      Comment:right mid forearm    Bladder stone                                   6/28/2016     Blood transfusion                                             Chronic urethral stricture                      10/14/2015    Chronic venous insufficiency                    7/8/2013      CKD (chronic kidney disease) stage 3, GFR 30-5* 9/6/2012      Coronary artery disease                                       Elevated PSA                                                  Essential hypertension                          7/30/2015     Hematuria, gross                                              History of Left Nephrectomy (~2006)             1/29/2014       Comment:Done at [facility].      Hypertension                                                  Inflammatory arthritis                          8/14/2012     Kidney stone                                                  Long-term use of Plaquenil                      6/4/2015      Mixed incontinence urge and stress              4/11/2014     Nonrheumatic aortic valve stenosis              5/30/2016     NSTEMI (non-ST elevated myocardial infarction)  5/30/2016     Olecranon bursitis                              1/14/2013     Osteopenia                                      8/14/2012     Personal history of kidney cancer               4/11/2014     Prostate cancer                                               Radiation                                                       Comment:treatment for prostate cancer    Recurrent UTI                                   7/8/2013      S/P radiation therapy                           4/11/2014     Skin cancer of arm                                              Comment:left leg (malignant)    Solitary kidney                                 7/15/2013     Venous insufficiency of leg                     7/12/2012     Venous stasis ulcers                            7/6/2012      Vitamin D deficiency disease                    5/8/2013      Past Surgical History:    TONSILLECTOMY                                                  NEPHRECTOMY                                      2006-07?        Comment:Removal of left kidney    CYSTOSCOPY                                                       Comment:with dialation    Review of patient's family history indicates:    Cancer                         Mother                    Heart disease                  Father                    Urolithiasis                   Father                    Mental illness                 Daughter                  Cancer                         Brother                     Comment: prostate cancer, (Ervin) removed by                surgery    Cancer                          Brother                     Comment: prostate cancer    Cancer                         Maternal Aunt             Melanoma                       Neg Hx                    Lupus                          Neg Hx                    Rheum arthritis                Neg Hx                      Social History    Marital status:              Spouse name: Emy               Years of education:                 Number of children: 4             Occupational History  Occupation          Employer            Comment               Retired                                                       retired 1998            Social History Main Topics    Smoking status: Current Every Day Smoker                                                     Packs/day: 0.00      Years: 40.00          Types: Cigars    Smokeless status: Current User                      Comment: pt smokes 3 cigars a day pt will make up his             mind when his ready- did give up smoking              cigarettes at age 35yrs smoked from 18 - 35              years    Alcohol use: Yes           0.6 oz/week       1 Glasses of wine per week       Comment: stop drinking 09/2009. 1 glass of wine at                bedtime     Drug use: No                 Comment: smokes 3 cigars per day    Sexual activity: No                   Other Topics            Concern    None on file    Social History Narrative    None on file        Allergies:  Ditropan (oxybutynin chloride); Keflex (cephalexin); and Macrobid (nitrofurantoin monohyd/m-cryst)    Medications:  Current Outpatient Prescriptions:   allopurinol (ZYLOPRIM) 100 MG tablet, TAKE ONE TABLET BY MOUTH EVERY DAY, Disp: 30 tablet, Rfl: 4  aspirin 81 MG Chew, Take 1 tablet (81 mg total) by mouth once daily., Disp: , Rfl: 0  atorvastatin (LIPITOR) 80 MG tablet, Take 1 tablet (80 mg total) by mouth once daily., Disp: 30 tablet, Rfl: 11  calcitRIOL (ROCALTROL) 0.5 MCG Cap, Take 0.5 mcg by mouth once  "daily., Disp: , Rfl:   carvedilol (COREG) 3.125 MG tablet, Take 1 tablet (3.125 mg total) by mouth 2 (two) times daily with meals., Disp: 60 tablet, Rfl: 11  catheter 16-16 Fr-" Misc, 1 Units by Misc.(Non-Drug; Combo Route) route once daily., Disp: 100 each, Rfl: 11  collagenase (SANTYL) ointment, Apply topically once daily. Apply to wound daily as directed., Disp: 90 g, Rfl: 0  folic acid-vit B6-vit B12 (FOLBEE) 2.5-25-1 mg Tab, Take 1 tablet by mouth once daily., Disp: 30 each, Rfl: 5  gabapentin (NEURONTIN) 300 MG capsule, Take 1 capsule (300 mg total) by mouth 2 (two) times daily., Disp: 60 capsule, Rfl: 3  hydrALAZINE (APRESOLINE) 50 MG tablet, Take 0.5 tablets (25 mg total) by mouth 2 (two) times daily., Disp: 90 tablet, Rfl: 11  hydrocodone-acetaminophen 10-325mg (NORCO)  mg Tab, Take 1 tablet by mouth 4 (four) times daily as needed., Disp: 120 tablet, Rfl: 0  hydroxychloroquine (PLAQUENIL) 200 mg tablet, Take 1 tablet (200 mg total) by mouth 2 (two) times daily., Disp: 60 tablet, Rfl: 2  LACTOBACILLUS ACIDOPHILUS (PROBIOTIC ORAL), Take 1 capsule by mouth once daily. , Disp: , Rfl:   nitroGLYCERIN (NITROSTAT) 0.3 MG SL tablet, Place 1 tablet (0.3 mg total) under the tongue every 5 (five) minutes as needed for Chest pain., Disp: 30 tablet, Rfl: 11  nystatin-triamcinolone (MYCOLOG II) cream, Apply topically 4 (four) times daily., Disp: 60 g, Rfl: 2  omeprazole (PRILOSEC) 20 MG capsule, Take 20 mg by mouth once daily., Disp: , Rfl:   omeprazole (PRILOSEC) 40 MG capsule, , Disp: , Rfl:   polyethylene glycol (GLYCOLAX) 17 gram PwPk, Take 17 g by mouth once daily., Disp: 30 packet, Rfl: 0  polyethylene glycol (GLYCOLAX) 17 gram/dose powder, , Disp: , Rfl:   sodium bicarbonate 650 MG tablet, Take 1 tablet (650 mg total) by mouth 2 (two) times daily., Disp: 60 tablet, Rfl: 11  vitamin D 1000 units Tab, Take 2,000 Units by mouth once daily. , Disp: , Rfl:             Past Medical History:    " *Atrial fibrillation                                          Acute appendicitis                              2/19/2015     Anemia                                                        Anxiety                                                       Arthritis                                                       Comment:generalized    Basal cell carcinoma                            01/2013         Comment:right temple    BCC (basal cell carcinoma)                                      Comment:right mid forearm    Bladder stone                                   6/28/2016     Blood transfusion                                             Chronic urethral stricture                      10/14/2015    Chronic venous insufficiency                    7/8/2013      CKD (chronic kidney disease) stage 3, GFR 30-5* 9/6/2012      Coronary artery disease                                       Elevated PSA                                                  Essential hypertension                          7/30/2015     Hematuria, gross                                              History of Left Nephrectomy (~2006)             1/29/2014       Comment:Done at [facility].     Hypertension                                                  Inflammatory arthritis                          8/14/2012     Kidney stone                                                  Long-term use of Plaquenil                      6/4/2015      Mixed incontinence urge and stress              4/11/2014     Nonrheumatic aortic valve stenosis              5/30/2016     NSTEMI (non-ST elevated myocardial infarction)  5/30/2016     Olecranon bursitis                              1/14/2013     Osteopenia                                      8/14/2012     Personal history of kidney cancer               4/11/2014     Prostate cancer                                               Radiation                                                       Comment:treatment for prostate  cancer    Recurrent UTI                                   7/8/2013      S/P radiation therapy                           4/11/2014     Skin cancer of arm                                              Comment:left leg (malignant)    Solitary kidney                                 7/15/2013     Venous insufficiency of leg                     7/12/2012     Venous stasis ulcers                            7/6/2012      Vitamin D deficiency disease                    5/8/2013      Past Surgical History:    TONSILLECTOMY                                                  NEPHRECTOMY                                      2006-07?        Comment:Removal of left kidney    CYSTOSCOPY                                                       Comment:with dialation    Review of patient's family history indicates:    Cancer                         Mother                    Heart disease                  Father                    Urolithiasis                   Father                    Mental illness                 Daughter                  Cancer                         Brother                     Comment: prostate cancer, (Ervin) removed by                surgery    Cancer                         Brother                     Comment: prostate cancer    Cancer                         Maternal Aunt             Melanoma                       Neg Hx                    Lupus                          Neg Hx                    Rheum arthritis                Neg Hx                      Social History    Marital status:              Spouse name: Emy               Years of education:                 Number of children: 4             Occupational History  Occupation          Employer            Comment               Retired                                                       retired 1998            Social History Main Topics    Smoking status: Current Every Day Smoker                                                     Packs/day: 0.00  "     Years: 40.00          Types: Cigars    Smokeless status: Current User                      Comment: pt smokes 3 cigars a day pt will make up his             mind when his ready- did give up smoking              cigarettes at age 35yrs smoked from 18 - 35              years    Alcohol use: Yes           0.6 oz/week       1 Glasses of wine per week       Comment: stop drinking 09/2009. 1 glass of wine at                bedtime     Drug use: No                 Comment: smokes 3 cigars per day    Sexual activity: No                   Other Topics            Concern    None on file    Social History Narrative    None on file        Allergies:  Ditropan (oxybutynin chloride); Keflex (cephalexin); and Macrobid (nitrofurantoin monohyd/m-cryst)    Medications:  Current Outpatient Prescriptions:   allopurinol (ZYLOPRIM) 100 MG tablet, TAKE ONE TABLET BY MOUTH EVERY DAY, Disp: 30 tablet, Rfl: 4  amoxicillin-clavulanate 500-125mg (AUGMENTIN) 500-125 mg Tab, , Disp: , Rfl:   aspirin 81 MG Chew, Take 1 tablet (81 mg total) by mouth once daily., Disp: , Rfl: 0  atorvastatin (LIPITOR) 80 MG tablet, Take 1 tablet (80 mg total) by mouth once daily., Disp: 30 tablet, Rfl: 11  calcitRIOL (ROCALTROL) 0.5 MCG Cap, Take 0.5 mcg by mouth once daily., Disp: , Rfl:   carvedilol (COREG) 3.125 MG tablet, Take 1 tablet (3.125 mg total) by mouth 2 (two) times daily with meals., Disp: 60 tablet, Rfl: 11  catheter 16-16 Fr-" Misc, 1 Units by Misc.(Non-Drug; Combo Route) route once daily., Disp: 100 each, Rfl: 11  fluconazole (DIFLUCAN) 100 MG tablet, , Disp: , Rfl:   fluconazole (DIFLUCAN) 200 MG Tab, , Disp: , Rfl:   folic acid-vit B6-vit B12 (FOLBEE) 2.5-25-1 mg Tab, Take 1 tablet by mouth once daily., Disp: 30 each, Rfl: 5  gabapentin (NEURONTIN) 300 MG capsule, Take 1 capsule (300 mg total) by mouth 2 (two) times daily., Disp: 60 capsule, Rfl: 3  hydrALAZINE (APRESOLINE) 50 MG tablet, Take 0.5 tablets (25 mg total) by mouth 2 " (two) times daily., Disp: 90 tablet, Rfl: 11  hydrocodone-acetaminophen 10-325mg (NORCO)  mg Tab, Take 1 tablet by mouth 4 (four) times daily as needed., Disp: 120 tablet, Rfl: 0  hydroxychloroquine (PLAQUENIL) 200 mg tablet, Take 1 tablet (200 mg total) by mouth 2 (two) times daily., Disp: 60 tablet, Rfl: 2  INVANZ 1 gram injection, , Disp: , Rfl:   LACTOBACILLUS ACIDOPHILUS (PROBIOTIC ORAL), Take 1 capsule by mouth once daily. , Disp: , Rfl:   nitroGLYCERIN (NITROSTAT) 0.3 MG SL tablet, Place 1 tablet (0.3 mg total) under the tongue every 5 (five) minutes as needed for Chest pain., Disp: 30 tablet, Rfl: 11  nystatin-triamcinolone (MYCOLOG II) cream, Apply topically 4 (four) times daily., Disp: 60 g, Rfl: 2  omeprazole (PRILOSEC) 20 MG capsule, Take 20 mg by mouth once daily., Disp: , Rfl:   phenazopyridine (PYRIDIUM) 100 MG tablet, Take 200 mg by mouth 3 (three) times daily as needed for Pain., Disp: , Rfl:   polyethylene glycol (GLYCOLAX) 17 gram PwPk, Take 17 g by mouth once daily., Disp: 30 packet, Rfl: 0  polyethylene glycol (GLYCOLAX) 17 gram/dose powder, , Disp: , Rfl:   sodium bicarbonate 650 MG tablet, Take 1 tablet (650 mg total) by mouth 2 (two) times daily., Disp: 60 tablet, Rfl: 11  tramadol (ULTRAM) 50 mg tablet, , Disp: , Rfl:   vitamin D 1000 units Tab, Take 2,000 Units by mouth once daily. , Disp: , Rfl:         Review of Systems   Constitutional: Negative.  Negative for activity change, appetite change and fever.   HENT: Negative.  Negative for facial swelling and trouble swallowing.    Eyes: Negative.    Respiratory: Negative.  Negative for shortness of breath.    Cardiovascular: Negative.  Negative for chest pain and palpitations.   Gastrointestinal: Negative for abdominal pain, constipation, diarrhea, nausea and vomiting.   Genitourinary: Positive for difficulty urinating. Negative for dysuria, enuresis, flank pain, frequency, genital sores, hematuria, nocturia, penile pain,  scrotal swelling, testicular pain and urgency.        Stevens draining; burning improved with AZO   Musculoskeletal: Positive for arthralgias and gait problem. Negative for back pain and neck stiffness.   Skin: Positive for wound.        Draining from opening on right leg     Neurological: Negative for dizziness, tremors, seizures, syncope, speech difficulty, light-headedness and headaches.   Hematological: Does not bruise/bleed easily.   Psychiatric/Behavioral: Negative for confusion and hallucinations. The patient is not nervous/anxious.        Objective:      Physical Exam   Nursing note and vitals reviewed.  Constitutional: He is oriented to person, place, and time. Vital signs are normal. He appears well-developed and well-nourished. He is active and cooperative.  Non-toxic appearance. He does not have a sickly appearance.   HENT:   Head: Normocephalic and atraumatic.   Right Ear: External ear normal.   Left Ear: External ear normal.   Nose: Nose normal.   Mouth/Throat: Mucous membranes are normal.   Eyes: Conjunctivae and lids are normal. No scleral icterus.   Neck: Trachea normal, normal range of motion and full passive range of motion without pain. Neck supple. No JVD present. No tracheal deviation present.   Cardiovascular: Normal rate, regular rhythm, S1 normal and S2 normal.    Pulmonary/Chest: Effort normal and breath sounds normal. No respiratory distress. He exhibits no tenderness.   Abdominal: Soft. Normal appearance and bowel sounds are normal. There is no hepatosplenomegaly. There is no tenderness. There is no rebound, no guarding and no CVA tenderness.   Genitourinary: Testes normal and penis normal. Right testis shows no mass, no swelling and no tenderness. Left testis shows no mass, no swelling and no tenderness. Circumcised. No hypospadias, penile erythema or penile tenderness. No discharge found.         Genitourinary Comments: Stevens draining; well; clear dark yellow/orange (AZO).      Musculoskeletal: Normal range of motion.   Neurological: He is alert and oriented to person, place, and time. He has normal strength.   Skin: Skin is warm, dry and intact.     Psychiatric: He has a normal mood and affect. His behavior is normal. Judgment and thought content normal.       Assessment:       1. Abscess of leg, right        Plan:         I spent 30 minutes with the patient of which more than half was spent in direct consultation with the patient in regards to our treatment and plan.    Education and recommendations of today's plan of care including home remedies.  Discussed surgical consult.  Discussed findings and possible surgical management; he not wanting any surgery/hospitalization.  I took culture from opening;   Cleaned site then placed some iodoform packing; dressing to cover.  Will notify HH as well as ID  Continue dressing changes

## 2017-03-10 NOTE — TELEPHONE ENCOUNTER
Received call from Home Health this afternoon to advise that nurse believes groin/thigh abscess has re-formed and is draining clear yellow/orange fluid that he belies is urine?  Reports he is afebrile.   Per nurse patient has appointment with Urology today.  I will follow up on that visit.

## 2017-03-10 NOTE — MR AVS SNAPSHOT
[facility] Urology 4th Floor  [address]  [facility] [address]  Phone: [phone]                  Humphrey Machado   3/10/2017 1:40 PM   Office Visit    Description:  Male : 1931   Provider:  Sowmya Rincon NP   Department:  [facility] - Urolog 4th Floor           Reason for Visit     Abscess           Diagnoses this Visit        Comments    Abscess of leg, right    -  Primary            To Do List           Future Appointments        Provider Department Dept Phone    3/15/2017 12:40 PM Brit Ramirez NP [facility] Wound Care [phone]    2017 1:30 PM LAB, [facility] [phone]      Goals (5 Years of Data)     None      Follow-Up and Disposition     Return in about 4 weeks (around 2017), or if symptoms worsen or fail to improve.      [facility] -  Assistance  Registered nurses in the [facility] provide clinical advisement, health education, appointment booking, and other advisory services.  Call for this free service at [phone].             Medications           Message regarding Medications     Verify the changes and/or additions to your medication regime listed below are the same as discussed with your clinician today.  If any of these changes or additions are incorrect, please notify your healthcare provider.             Verify that the below list of medications is an accurate representation of the medications you are currently taking.  If none reported, the list may be blank. If incorrect, please contact your healthcare provider. Carry this list with you in case of emergency.           Current Medications     allopurinol (ZYLOPRIM) 100 MG tablet TAKE ONE TABLET BY MOUTH EVERY DAY    aspirin 81 MG Chew Take 1 tablet (81 mg total) by mouth once daily.    atorvastatin (LIPITOR) 80 MG tablet Take 1 tablet (80 mg total) by mouth once daily.    azelastine (ASTELIN) 137 mcg (0.1 %)  "nasal spray 1 spray (137 mcg total) by Nasal route 2 (two) times daily.    calcitRIOL (ROCALTROL) 0.5 MCG Cap Take 0.5 mcg by mouth once daily.    carvedilol (COREG) 3.125 MG tablet Take 1 tablet (3.125 mg total) by mouth 2 (two) times daily with meals.    catheter 16-16 Fr-" Misc 1 Units by Misc.(Non-Drug; Combo Route) route once daily.    collagenase (SANTYL) ointment Apply topically once daily. Apply to wound daily as directed.    ferrous sulfate 324 mg (65 mg iron) TbEC Take 1 tablet (325 mg total) by mouth 3 (three) times daily.    folic acid-vit B6-vit B12 (FOLBEE) 2.5-25-1 mg Tab Take 1 tablet by mouth once daily.    gabapentin (NEURONTIN) 300 MG capsule Take 1 capsule (300 mg total) by mouth 2 (two) times daily.    hydrocodone-acetaminophen 10-325mg (NORCO)  mg Tab Take 1 tablet by mouth 4 (four) times daily as needed.    hydroxychloroquine (PLAQUENIL) 200 mg tablet Take 1 tablet (200 mg total) by mouth 2 (two) times daily.    LACTOBACILLUS ACIDOPHILUS (PROBIOTIC ORAL) Take 1 capsule by mouth once daily.     nitroGLYCERIN (NITROSTAT) 0.3 MG SL tablet Place 1 tablet (0.3 mg total) under the tongue every 5 (five) minutes as needed for Chest pain.    nystatin-triamcinolone (MYCOLOG II) cream Apply topically 4 (four) times daily.    omeprazole (PRILOSEC) 20 MG capsule Take 20 mg by mouth once daily.    omeprazole (PRILOSEC) 40 MG capsule     polyethylene glycol (GLYCOLAX) 17 gram PwPk Take 17 g by mouth once daily.    polyethylene glycol (GLYCOLAX) 17 gram/dose powder     sodium bicarbonate 650 MG tablet Take 1 tablet (650 mg total) by mouth 2 (two) times daily.    vitamin D 1000 units Tab Take 2,000 Units by mouth once daily.            Clinical Reference Information           Your Vitals Were     BP Pulse                142/66 (BP Location: Left arm, Patient Position: Sitting, BP Method: Automatic) 68          Blood Pressure          Most Recent Value    BP  (!)  142/66      Allergies as of 3/10/2017     " Ditropan [Oxybutynin Chloride]    Keflex [Cephalexin]    Macrobid [Nitrofurantoin Monohyd/m-cryst]      Immunizations Administered on Date of Encounter - 3/10/2017     None      Orders Placed During Today's Visit      Normal Orders This Visit    CULTURE, AEROBIC  (SPECIFY SOURCE)       Smoking Cessation     If you would like to quit smoking:   You may be eligible for free services if you are a [place] resident and started smoking cigarettes before September 1, 1988.  Call the Smoking Cessation Trust ([facility]) toll free at [phone] or ([phone] 639-9455.   Call -129-QUIT-NOW if you do not meet the above criteria.            Language Assistance Services     ATTENTION: Language assistance services are available, free of charge. Please call [phone].      ATENCIÓN: Si evangelina becki, tiene a lawrence disposición servicios gratuitos de asistencia lingüística. Llame al [phone].     JHONATAN Ý: N?u b?n nói Ti?ng Vi?t, có các d?ch v? h? tr? ngôn ng? mi?n phí dành cho b?n. G?i s? [phone].         [facility] 4th Floor complies with applicable Federal civil rights laws and does not discriminate on the basis of race, color, national origin, age, disability, or sex.

## 2017-03-14 LAB
BACTERIA SPEC AEROBE CULT: NORMAL
BACTERIA SPEC AEROBE CULT: NORMAL

## 2017-03-14 RX ORDER — HYDROCODONE BITARTRATE AND ACETAMINOPHEN 10; 325 MG/1; MG/1
1 TABLET ORAL 4 TIMES DAILY PRN
Qty: 120 TABLET | Refills: 0 | Status: SHIPPED | OUTPATIENT
Start: 2017-03-14 | End: 2017-04-11 | Stop reason: SDUPTHER

## 2017-03-15 ENCOUNTER — TELEPHONE (OUTPATIENT)
Dept: INTERNAL MEDICINE | Facility: CLINIC | Age: 82
End: 2017-03-15

## 2017-03-15 ENCOUNTER — OFFICE VISIT (OUTPATIENT)
Dept: WOUND CARE | Facility: CLINIC | Age: 82
End: 2017-03-15
Payer: MEDICARE

## 2017-03-15 VITALS
SYSTOLIC BLOOD PRESSURE: 159 MMHG | HEART RATE: 74 BPM | BODY MASS INDEX: 27.11 KG/M2 | HEIGHT: 68 IN | TEMPERATURE: 98 F | DIASTOLIC BLOOD PRESSURE: 75 MMHG | WEIGHT: 178.88 LBS

## 2017-03-15 DIAGNOSIS — I83.019 VARICOSE VEINS OF RIGHT LOWER EXTREMITY WITH ULCER: ICD-10-CM

## 2017-03-15 DIAGNOSIS — S71.101A COMPLICATED OPEN WOUND OF RIGHT THIGH, INITIAL ENCOUNTER: ICD-10-CM

## 2017-03-15 DIAGNOSIS — L89.623 DECUBITUS ULCER OF LEFT HEEL, STAGE 3: ICD-10-CM

## 2017-03-15 DIAGNOSIS — L30.9 DERMATITIS: ICD-10-CM

## 2017-03-15 DIAGNOSIS — I83.029 VARICOSE VEINS OF LEFT LOWER EXTREMITY WITH ULCER: ICD-10-CM

## 2017-03-15 DIAGNOSIS — L97.509 ULCER OF OTHER PART OF FOOT: ICD-10-CM

## 2017-03-15 DIAGNOSIS — L97.919 VARICOSE VEINS OF RIGHT LOWER EXTREMITY WITH ULCER: ICD-10-CM

## 2017-03-15 DIAGNOSIS — L97.929 VARICOSE VEINS OF LEFT LOWER EXTREMITY WITH ULCER: ICD-10-CM

## 2017-03-15 DIAGNOSIS — I70.25 ATHEROSCLEROSIS OF NATIVE ARTERIES OF THE EXTREMITIES WITH ULCERATION: ICD-10-CM

## 2017-03-15 DIAGNOSIS — L97.312 ULCER OF ANKLE, RIGHT, WITH FAT LAYER EXPOSED: Primary | ICD-10-CM

## 2017-03-15 DIAGNOSIS — L97.212 ULCER OF CALF, RIGHT, WITH FAT LAYER EXPOSED: ICD-10-CM

## 2017-03-15 PROCEDURE — 29580 STRAPPING UNNA BOOT: CPT | Mod: 50,S$PBB,, | Performed by: NURSE PRACTITIONER

## 2017-03-15 PROCEDURE — 99499 UNLISTED E&M SERVICE: CPT | Mod: S$PBB,,, | Performed by: NURSE PRACTITIONER

## 2017-03-15 PROCEDURE — 99215 OFFICE O/P EST HI 40 MIN: CPT | Mod: PBBFAC,25 | Performed by: NURSE PRACTITIONER

## 2017-03-15 PROCEDURE — 99999 PR PBB SHADOW E&M-EST. PATIENT-LVL V: CPT | Mod: PBBFAC,,, | Performed by: NURSE PRACTITIONER

## 2017-03-15 PROCEDURE — 29580 STRAPPING UNNA BOOT: CPT | Mod: 50,PBBFAC | Performed by: NURSE PRACTITIONER

## 2017-03-15 RX ORDER — TRIAMCINOLONE ACETONIDE 1 MG/G
CREAM TOPICAL 2 TIMES DAILY
Qty: 454 G | Refills: 0 | Status: ON HOLD | OUTPATIENT
Start: 2017-03-15 | End: 2017-06-01 | Stop reason: HOSPADM

## 2017-03-15 NOTE — PROGRESS NOTES
Subjective:       Patient ID: Humphrey Machado is a 85 y.o. male.    Chief Complaint: Wound Check    Wound Check     This patient is seen today for reevaluation of an ulcers to both lower legs.  Unna boots are on both legs and the wounds are healing as evidenced by wound contracture.  The right medial thigh wound has grown larger and he reports that this is draining urine (see photo below).  He is afebrile.  He denies increased redness, swelling or purulent drainage.  His pain level is 4/10.  His wound healing is complicated by venous insufficiency and chronic kidney disease.  He is receiving home health services through [facility].  Carlos Barahona NP is aware of his condition.    Review of Systems   Constitutional: Negative for chills, diaphoresis, fatigue and fever.   HENT: Negative for congestion, postnasal drip, rhinorrhea, sinus pressure, sneezing, sore throat, tinnitus and trouble swallowing. Hearing loss: left ear.    Eyes: Negative for visual disturbance.   Respiratory: Negative for cough, shortness of breath and wheezing.    Cardiovascular: Positive for leg swelling. Negative for chest pain and palpitations.   Gastrointestinal: Positive for constipation. Negative for diarrhea, nausea and vomiting.   Genitourinary: Positive for difficulty urinating. Negative for dysuria and hematuria.   Musculoskeletal: Positive for back pain and joint swelling. Negative for arthralgias.   Skin: Positive for color change and wound (bilateral lower legs and feet).   Neurological: Positive for dizziness. Negative for weakness, light-headedness and headaches.   Hematological: Does not bruise/bleed easily.   Psychiatric/Behavioral: Negative for decreased concentration and sleep disturbance. The patient is not nervous/anxious.        Objective:      Physical Exam   Constitutional: He is oriented to person, place, and time. He appears well-developed and well-nourished. No distress.   HENT:   Head: Normocephalic and atraumatic.    Cardiovascular: Intact distal pulses.    Musculoskeletal: Normal range of motion. He exhibits edema (1-2+ lower leg). He exhibits no tenderness.        Legs:       Feet:    Neurological: He is alert and oriented to person, place, and time.   Skin: Skin is warm and dry. No rash noted. He is not diaphoretic. No cyanosis or erythema. Nails show no clubbing.   Psychiatric: He has a normal mood and affect. His behavior is normal. Judgment and thought content normal.   Nursing note and vitals reviewed.      ..  Lab Results   Component Value Date    ALBUMIN 2.6 (L) 02/21/2017     Humphrey was seen in the clinic room and placed in the supine position on the treatment table.  The unna boots were removed with scissors and the leg was cleansed with Easi-clense sponges and dried thoroughly.  Eucerin cream cream was applied to the lower legs. Santyl and a hydrofiber dressing was applied to the wounds on both lower legs.  The patient's feet were positioned at a 90 degree angle.  The coflex was applied per package instructions.  A two layered application was performed using a spiral technique beginning with the foam layer followed by the cohesive bandage avoiding creases or folds.  The wraps were started behind the first metatarsal and ended below the tibial tubercle of the knee.  There was overlap of each turn half the width of the previous turn.  The compression wraps will be changed every 2-3 days.    Assessment:       1. Ulcer of ankle, right, with fat layer exposed    2. Ulcer of calf, right, with fat layer exposed    3. Ulcer of other part of foot    4. Decubitus ulcer of left heel, stage 3    5. Complicated open wound of right thigh, initial encounter    6. 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 Varicose veins of right lower extremity with ulcer    8. Atherosclerosis of native arteries of the extremities with ulceration    9. Dermatitis        Plan:           Nepro 2-3 cans daily in between meals.  Coflex  "compression wraps bilateral lower legs as detailed above.  Darco shoes bilateral feet.  Pack right groin wound with 1/4" plain nugauze and cover with ABD pad.  Use diaper to hold dressing in place.  Patient was warned not to get the dressings wet and to use cast covers for showering.  Should the dressing become wet, he is to remove it, place a wet-to-dry dressing over the wound, cover with gauze and roll gauze and use ace wraps for compression and to secure bandages.  He should then notify home health as soon as possible to have a new dressing applied.  Return to clinic in two weeks.  [facility] notified of orders via [employer] fax.    Home Health Orders:  Cleanse bilateral leg and foot wounds and right thigh wound with wound .  Pack right groin wound with 1/4" plain nugauze and cover with ABD pad.  Use diaper to hold dressing in place.  Change daily.  Triamcinolone cream to right lower leg dermatitis.  Apply santyl and hydrofiber to bilateral leg and foot ulcers.  Cover right calf ulcer with ABD pad to capture drainage.  Coflex compression wrap bilateral lower legs.  Change wraps three times weekly.  Skilled nurse visit-three times weekly.    Right lateral calf      Right lateral foot and ankle ulcers      Left heel stage III decubitus      Right medial thigh                                  "

## 2017-03-15 NOTE — MR AVS SNAPSHOT
[facility]  [address]  [facility] [address]  Phone: [phone]                  Humphrey Machado   3/15/2017 12:40 PM   Office Visit    Description:  Male : 1931   Provider:  Brit Ramirez NP   Department:  [facility]           Reason for Visit     Wound Check           Diagnoses this Visit        Comments    Ulcer of ankle, right, with fat layer exposed    -  Primary     Ulcer of calf, right, with fat layer exposed         Ulcer of other part of foot         Decubitus ulcer of left heel, stage 3         Complicated open wound of right thigh, initial encounter         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       These Medications        Disp Refills Start End    triamcinolone acetonide 0.1% (KENALOG) 0.1 % cream 454 g 0 3/15/2017     Apply topically 2 (two) times daily. Apply to affected area twice daily as directed. - Topical (Top)    Pharmacy: [id number] [place] [facility] [address] #: [phone]         [facility]     [facility] On Call Nurse Care Line -  Assistance  Registered nurses in the [facility] provide clinical advisement, health education, appointment booking, and other advisory services.  Call for this free service at [phone].             Medications           Message regarding Medications     Verify the changes and/or additions to your medication regime listed below are the same as discussed with your clinician today.  If any of  "these changes or additions are incorrect, please notify your healthcare provider.        START taking these NEW medications        Refills    triamcinolone acetonide 0.1% (KENALOG) 0.1 % cream 0    Sig: Apply topically 2 (two) times daily. Apply to affected area twice daily as directed.    Class: Normal    Route: Topical (Top)      STOP taking these medications     triamcinolone acetonide 0.025% (KENALOG) 0.025 % cream Apply topically 2 (two) times daily.    triamcinolone acetonide 0.1% (KENALOG) 0.1 % ointment Apply topically 2 (two) times daily. Apply to rash in groin           Verify that the below list of medications is an accurate representation of the medications you are currently taking.  If none reported, the list may be blank. If incorrect, please contact your healthcare provider. Carry this list with you in case of emergency.           Current Medications     allopurinol (ZYLOPRIM) 100 MG tablet TAKE ONE TABLET BY MOUTH EVERY DAY    aspirin 81 MG Chew Take 1 tablet (81 mg total) by mouth once daily.    atorvastatin (LIPITOR) 80 MG tablet Take 1 tablet (80 mg total) by mouth once daily.    azelastine (ASTELIN) 137 mcg (0.1 %) nasal spray 1 spray (137 mcg total) by Nasal route 2 (two) times daily.    calcitRIOL (ROCALTROL) 0.5 MCG Cap Take 0.5 mcg by mouth once daily.    carvedilol (COREG) 3.125 MG tablet Take 1 tablet (3.125 mg total) by mouth 2 (two) times daily with meals.    catheter 16-16 Fr-" Misc 1 Units by Misc.(Non-Drug; Combo Route) route once daily.    collagenase (SANTYL) ointment Apply topically once daily. Apply to wound daily as directed.    ferrous sulfate 324 mg (65 mg iron) TbEC Take 1 tablet (325 mg total) by mouth 3 (three) times daily.    folic acid-vit B6-vit B12 (FOLBEE) 2.5-25-1 mg Tab Take 1 tablet by mouth once daily.    gabapentin (NEURONTIN) 300 MG capsule Take 1 capsule (300 mg total) by mouth 2 (two) times daily.    hydrocodone-acetaminophen 10-325mg (NORCO)  mg Tab Take 1 " "tablet by mouth 4 (four) times daily as needed.    hydroxychloroquine (PLAQUENIL) 200 mg tablet Take 1 tablet (200 mg total) by mouth 2 (two) times daily.    LACTOBACILLUS ACIDOPHILUS (PROBIOTIC ORAL) Take 1 capsule by mouth once daily.     nitroGLYCERIN (NITROSTAT) 0.3 MG SL tablet Place 1 tablet (0.3 mg total) under the tongue every 5 (five) minutes as needed for Chest pain.    nystatin-triamcinolone (MYCOLOG II) cream Apply topically 4 (four) times daily.    omeprazole (PRILOSEC) 20 MG capsule Take 20 mg by mouth once daily.    omeprazole (PRILOSEC) 40 MG capsule     polyethylene glycol (GLYCOLAX) 17 gram PwPk Take 17 g by mouth once daily.    polyethylene glycol (GLYCOLAX) 17 gram/dose powder     sodium bicarbonate 650 MG tablet Take 1 tablet (650 mg total) by mouth 2 (two) times daily.    triamcinolone acetonide 0.1% (KENALOG) 0.1 % cream Apply topically 2 (two) times daily. Apply to affected area twice daily as directed.    vitamin D 1000 units Tab Take 2,000 Units by mouth once daily.            Clinical Reference Information           Your Vitals Were     BP Pulse Temp Height Weight BMI    159/75 74 97.5 °F (36.4 °C) (Oral) 5' 8" (1.727 m) 81.1 kg (178 lb 14.4 oz) 27.2 kg/m2      Blood Pressure          Most Recent Value    BP  (!)  159/75      Allergies as of 3/15/2017     Ditropan [Oxybutynin Chloride]    Keflex [Cephalexin]    Macrobid [Nitrofurantoin Monohyd/m-cryst]      Immunizations Administered on Date of Encounter - 3/15/2017     None      Instructions    Elevate legs as much as possible. Do not get the dressings wet and use cast covers for showering.  Should the dressing become wet, remove it, place a wet-to-dry dressing over the wound, cover with gauze and roll gauze and use ace wraps for compression and to secure bandages.  Notify this office as soon as possible to have a new dressing applied.         Smoking Cessation     If you would like to quit smoking:   You may be eligible for free " services if you are a [place] resident and started smoking cigarettes before September 1, 1988.  Call the Smoking Cessation Trust ([employer]) toll free at (589) 657-2042 or (103) 077-4862.   Call 1-800-QUIT-NOW if you do not meet the above criteria.            Language Assistance Services     ATTENTION: Language assistance services are available, free of charge. Please call [phone].      ATENCIÓN: Si habla español, tiene a lawrence disposición servicios gratuitos de asistencia lingüística. Llame al [phone].     CHÚ Ý: N?u b?n nói Ti?ng Vi?t, có các d?ch v? h? tr? ngôn ng? mi?n phí dành cho b?n. G?i s? [phone].         [facility] complies with applicable Federal civil rights laws and does not discriminate on the basis of race, color, national origin, age, disability, or sex.

## 2017-03-15 NOTE — TELEPHONE ENCOUNTER
----- Message from Babr Argueta MA sent at 3/15/2017 12:31 PM CDT -----  Contact: Andrew calles/Lady - [phone]  Did not need to see patient for Wound care today. Patient is seeing another provider for wound care. Thanks!

## 2017-03-15 NOTE — PATIENT INSTRUCTIONS
Elevate legs as much as possible. Do not get the dressings wet and use cast covers for showering.  Should the dressing become wet, remove it, place a wet-to-dry dressing over the wound, cover with gauze and roll gauze and use ace wraps for compression and to secure bandages.  Notify this office as soon as possible to have a new dressing applied.

## 2017-03-29 ENCOUNTER — TELEPHONE (OUTPATIENT)
Dept: UROLOGY | Facility: CLINIC | Age: 82
End: 2017-03-29

## 2017-03-29 DIAGNOSIS — L02.214 ABSCESS OF RIGHT GROIN: Primary | ICD-10-CM

## 2017-03-29 NOTE — TELEPHONE ENCOUNTER
Spoke with Dr. Abraham yesterday.  Will have HH collect fluid from groin abscess and send for creatinine.   I spoke with Stacia at [facility] to collection process.    I faxed the orders.

## 2017-03-29 NOTE — PROGRESS NOTES
Discussed with Dr. Abraham  Will have home health collect fluid from right groin abscess and send for creatinine    Orders to [facility] faxed.

## 2017-04-10 NOTE — TELEPHONE ENCOUNTER
Home health called stating the patient is still in a lot of pain please advise thanks the pain medication pt is on is not helping

## 2017-04-10 NOTE — TELEPHONE ENCOUNTER
----- Message from Barb Argueta MA sent at 4/10/2017 12:41 PM CDT -----  Contact: Andrew calles/Lady  - [phone]   Stated patient pain is not being well controlled on current pain medication. Please call. Thanks!

## 2017-04-11 NOTE — TELEPHONE ENCOUNTER
----- Message from Michelle Garcia sent at 4/11/2017 12:36 PM CDT -----  Contact: pt [phone]  RX request - refill or new RX.  Is this a refill or new RX:    RX name and strength: hydrocodone-acetaminophen 10-325mg (NORCO)  mg Tab  Directions:   Is this a 30 day or 90 day RX:      Comments:

## 2017-04-12 ENCOUNTER — OFFICE VISIT (OUTPATIENT)
Dept: UROLOGY | Facility: CLINIC | Age: 82
End: 2017-04-12
Payer: MEDICARE

## 2017-04-12 ENCOUNTER — OFFICE VISIT (OUTPATIENT)
Dept: WOUND CARE | Facility: CLINIC | Age: 82
End: 2017-04-12
Payer: MEDICARE

## 2017-04-12 ENCOUNTER — OFFICE VISIT (OUTPATIENT)
Dept: INFECTIOUS DISEASES | Facility: CLINIC | Age: 82
End: 2017-04-12
Payer: MEDICARE

## 2017-04-12 VITALS
DIASTOLIC BLOOD PRESSURE: 78 MMHG | HEIGHT: 68 IN | WEIGHT: 180 LBS | HEART RATE: 78 BPM | RESPIRATION RATE: 16 BRPM | SYSTOLIC BLOOD PRESSURE: 132 MMHG | BODY MASS INDEX: 27.28 KG/M2

## 2017-04-12 VITALS
SYSTOLIC BLOOD PRESSURE: 138 MMHG | BODY MASS INDEX: 27.34 KG/M2 | WEIGHT: 180.38 LBS | HEIGHT: 68 IN | DIASTOLIC BLOOD PRESSURE: 71 MMHG | HEART RATE: 72 BPM | TEMPERATURE: 100 F

## 2017-04-12 DIAGNOSIS — L97.512 ULCER OF RIGHT FOOT WITH FAT LAYER EXPOSED: ICD-10-CM

## 2017-04-12 DIAGNOSIS — L89.623 DECUBITUS ULCER OF LEFT HEEL, STAGE 3: ICD-10-CM

## 2017-04-12 DIAGNOSIS — L02.214 ABSCESS OF RIGHT GROIN: Primary | ICD-10-CM

## 2017-04-12 DIAGNOSIS — L97.919 VARICOSE VEINS OF RIGHT LOWER EXTREMITY WITH ULCER: ICD-10-CM

## 2017-04-12 DIAGNOSIS — I83.029 VARICOSE VEINS OF LEFT LOWER EXTREMITY WITH ULCER: ICD-10-CM

## 2017-04-12 DIAGNOSIS — S71.101A COMPLICATED OPEN WOUND OF RIGHT THIGH, INITIAL ENCOUNTER: ICD-10-CM

## 2017-04-12 DIAGNOSIS — S71.101D COMPLICATED OPEN WOUND OF RIGHT THIGH, SUBSEQUENT ENCOUNTER: Primary | ICD-10-CM

## 2017-04-12 DIAGNOSIS — L97.312 ULCER OF ANKLE, RIGHT, WITH FAT LAYER EXPOSED: Primary | ICD-10-CM

## 2017-04-12 DIAGNOSIS — L97.929 VARICOSE VEINS OF LEFT LOWER EXTREMITY WITH ULCER: ICD-10-CM

## 2017-04-12 DIAGNOSIS — I83.019 VARICOSE VEINS OF RIGHT LOWER EXTREMITY WITH ULCER: ICD-10-CM

## 2017-04-12 DIAGNOSIS — L97.212 ULCER OF CALF, RIGHT, WITH FAT LAYER EXPOSED: ICD-10-CM

## 2017-04-12 PROCEDURE — 99999 PR PBB SHADOW E&M-EST. PATIENT-LVL IV: CPT | Mod: PBBFAC,,, | Performed by: NURSE PRACTITIONER

## 2017-04-12 PROCEDURE — 29580 STRAPPING UNNA BOOT: CPT | Mod: 50,S$PBB,, | Performed by: NURSE PRACTITIONER

## 2017-04-12 PROCEDURE — 99214 OFFICE O/P EST MOD 30 MIN: CPT | Mod: PBBFAC,27 | Performed by: NURSE PRACTITIONER

## 2017-04-12 PROCEDURE — 99999 PR PBB SHADOW E&M-EST. PATIENT-LVL V: CPT | Mod: PBBFAC,,, | Performed by: NURSE PRACTITIONER

## 2017-04-12 PROCEDURE — 99499 UNLISTED E&M SERVICE: CPT | Mod: S$PBB,,, | Performed by: NURSE PRACTITIONER

## 2017-04-12 PROCEDURE — 99212 OFFICE O/P EST SF 10 MIN: CPT | Mod: S$PBB,,, | Performed by: NURSE PRACTITIONER

## 2017-04-12 PROCEDURE — 99999 PR PBB SHADOW E&M-EST. PATIENT-LVL III: CPT | Mod: PBBFAC,,, | Performed by: NURSE PRACTITIONER

## 2017-04-12 PROCEDURE — 99214 OFFICE O/P EST MOD 30 MIN: CPT | Mod: S$PBB,,, | Performed by: NURSE PRACTITIONER

## 2017-04-12 RX ORDER — HYDROCODONE BITARTRATE AND ACETAMINOPHEN 10; 325 MG/1; MG/1
1 TABLET ORAL 4 TIMES DAILY PRN
Qty: 120 TABLET | Refills: 0 | Status: ON HOLD | OUTPATIENT
Start: 2017-04-12 | End: 2017-06-01 | Stop reason: HOSPADM

## 2017-04-12 NOTE — PROGRESS NOTES
Subjective:      Patient ID: Humphrey Machado is a 85 y.o. male.    Chief Complaint:Follow-up (abscess of right groin )      History of Present Illness  HPI     Follow-up    Additional comments: abscess of right groin        Last edited by MARILEE Olivas, ANP on 4/12/2017  2:24 PM. (History)        Mr. Machado is here today for follow up of right thigh wound.  I am seeing him today with MELISSA Ramirez in Wound Care who is following him for chronic leg ulcerations.     He has a long and complicated medical history.    He has history of renal cell carcinoma and is s/p left nephrectomy some years ago.    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  He has had a recurring abscess of right thigh.  At multiple visits surgical I&D was recommended but patient declined.  The wound had actually healed, closed in February, but re-opened in March and was draining what appeared to be urine.    Cultures chronically grow ESBL K. Pneumonia and Pseudomonas, but there is no associated purulence or surrounding erythema and he has been without symptoms of systemic infection and in January had determined that so long as stable, would hold off antibiotics and continue wound care as issue unlikely to clear with antibiotics alone and we've been concerned about creating more resistance.  Since wound re-opened, it has continued to drain, though he denies purulent drainage or pain at site.      He denies fevers, chills, sweats or myalgias.   Does have intermittent temperatures in 99's.  Appetite good.  No n/v/d.  Complaining of constipation.  He continues to have hip/back/pelvic pain  "(like a toothache) which is worse with sitting, relieved when supine.  Last indium in November showed no osteo.   He reports he has been overall feeling "normal", trying to exercise.     Urology had ordered HH to attempt to collect fluid from wound to send for creatinine, but they have been unable to do so.     He has follow up with Urology today.           Review of Systems   Constitution: Negative for chills, decreased appetite, fever, malaise/fatigue and night sweats.   Cardiovascular: Positive for leg swelling (improved). Negative for chest pain and palpitations.   Respiratory: Negative for cough, shortness of breath and sputum production.    Skin: Positive for poor wound healing (bilateral venous insufficiency ulcerations - improving ). Negative for rash.   Musculoskeletal: Positive for back pain and joint pain. Negative for joint swelling.   Gastrointestinal: Positive for constipation. Negative for abdominal pain, diarrhea, nausea and vomiting.   Genitourinary: Positive for bladder incontinence and pelvic pain.        Stevens catheter in place     Neurological: Negative for numbness and paresthesias.     Objective:   Physical Exam   Constitutional: He appears well-developed and well-nourished. No distress.   Eyes: Conjunctivae are normal. No scleral icterus.   Cardiovascular: Normal rate and regular rhythm.  Exam reveals no gallop and no friction rub.    Murmur heard.  Pulmonary/Chest: Effort normal. No respiratory distress.   Abdominal: Soft. He exhibits no distension.   Genitourinary:   Genitourinary Comments: Stevens in place - draining well   Musculoskeletal:   Lower extremity ulcerations - see Wound care note of this date.  No evidence of active infection.    Skin: Skin is warm and dry. He is not diaphoretic.    Right inner thigh/ inguinal thigh area wound is open, surrounded by hypergranulation tissue and looks like fistula.   No purulence, no malodor, no underlying fluctuance or surrounding erythema.  with " now pink hypergranulation tissue. No active drainage at time of exam.    Vitals reviewed.    Assessment:       1. Complicated open wound of right thigh, subsequent encounter        Right groin wound appears to be  fistula - ? Draining urine.  No active drainage at time of exam. No purulence. Urology evaluation is pending and will discuss with them and discuss further with ID staff once seen by Urology.     Plan:     1.  Will follow up with Urology after his visit.   2.  No antibiotics indicated at this time.   3.  Follow up to be determined pending possible imaging/surgical plan?   4.  Instructed to call or seek immediate medical attention for any fevers, chills, sweats, increase in pain, swelling, purulent drainage from wound.

## 2017-04-12 NOTE — Clinical Note
What are we going to do with Mr. Machado?  Keep me posted on your discussions with Dr. Abraham!  Thanks, Carlos

## 2017-04-12 NOTE — MR AVS SNAPSHOT
[facility]  [address]  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 [phone]      Goals (5 Years of Data)     None      Follow-Up and Disposition     Return in about 2 weeks (around 2017) for Wound evaluation.    Follow-up and Disposition History      [facility]     [facility] -  Assistance  Unless otherwise directed by your provider, please contact [facility], our nurse care line that is available for  assistance.     Registered nurses in the [facility] provide: appointment scheduling, clinical advisement, health education, and other advisory services.  Call: [phone] (toll free)               Medications           Message regarding Medications     Verify the changes and/or additions to your medication regime listed below are the same as discussed with your clinician today.  If any of these changes or additions are incorrect, please notify your healthcare provider.       "       Verify that the below list of medications is an accurate representation of the medications you are currently taking.  If none reported, the list may be blank. If incorrect, please contact your healthcare provider. Carry this list with you in case of emergency.           Current Medications     allopurinol (ZYLOPRIM) 100 MG tablet TAKE ONE TABLET BY MOUTH EVERY DAY    aspirin 81 MG Chew Take 1 tablet (81 mg total) by mouth once daily.    atorvastatin (LIPITOR) 80 MG tablet Take 1 tablet (80 mg total) by mouth once daily.    azelastine (ASTELIN) 137 mcg (0.1 %) nasal spray 1 spray (137 mcg total) by Nasal route 2 (two) times daily.    calcitRIOL (ROCALTROL) 0.5 MCG Cap Take 0.5 mcg by mouth once daily.    carvedilol (COREG) 3.125 MG tablet Take 1 tablet (3.125 mg total) by mouth 2 (two) times daily with meals.    catheter 16-16 Fr-" Misc 1 Units by Misc.(Non-Drug; Combo Route) route once daily.    collagenase (SANTYL) ointment Apply topically once daily. Apply to wound daily as directed.    ferrous sulfate 324 mg (65 mg iron) TbEC Take 1 tablet (325 mg total) by mouth 3 (three) times daily.    folic acid-vit B6-vit B12 (FOLBEE) 2.5-25-1 mg Tab Take 1 tablet by mouth once daily.    gabapentin (NEURONTIN) 300 MG capsule Take 1 capsule (300 mg total) by mouth 2 (two) times daily.    hydrocodone-acetaminophen 10-325mg (NORCO)  mg Tab Take 1 tablet by mouth 4 (four) times daily as needed.    hydroxychloroquine (PLAQUENIL) 200 mg tablet Take 1 tablet (200 mg total) by mouth 2 (two) times daily.    LACTOBACILLUS ACIDOPHILUS (PROBIOTIC ORAL) Take 1 capsule by mouth once daily.     nitroGLYCERIN (NITROSTAT) 0.3 MG SL tablet Place 1 tablet (0.3 mg total) under the tongue every 5 (five) minutes as needed for Chest pain.    nystatin-triamcinolone (MYCOLOG II) cream Apply topically 4 (four) times daily.    omeprazole (PRILOSEC) 20 MG capsule Take 20 mg by mouth once daily.    omeprazole (PRILOSEC) 40 MG capsule     " "polyethylene glycol (GLYCOLAX) 17 gram PwPk Take 17 g by mouth once daily.    polyethylene glycol (GLYCOLAX) 17 gram/dose powder     sodium bicarbonate 650 MG tablet Take 1 tablet (650 mg total) by mouth 2 (two) times daily.    triamcinolone acetonide 0.1% (KENALOG) 0.1 % cream Apply topically 2 (two) times daily. Apply to affected area twice daily as directed.    vitamin D 1000 units Tab Take 2,000 Units by mouth once daily.            Clinical Reference Information           Your Vitals Were     BP Pulse Temp Height Weight BMI    138/71 72 100.2 °F (37.9 °C) 5' 8" (1.727 m) 81.8 kg (180 lb 6.4 oz) 27.43 kg/m2      Blood Pressure          Most Recent Value    BP  138/71      Allergies as of 4/12/2017     Ditropan [Oxybutynin Chloride]    Keflex [Cephalexin]    Macrobid [Nitrofurantoin Monohyd/m-cryst]      Immunizations Administered on Date of Encounter - 4/12/2017     None      Instructions    Elevate legs as much as possible. Do not get the dressings wet and use cast covers for showering.  Should the dressing become wet, remove it, place a wet-to-dry dressing over the wound, cover with gauze and roll gauze and use ace wraps for compression and to secure bandages.  Notify home health as soon as possible to have a new dressing applied.         Smoking Cessation     If you would like to quit smoking:   You may be eligible for free services if you are a [place] resident and started smoking cigarettes before September 1, 1988.  Call the Smoking Cessation [employer] ([facility]) toll free at ([phone] [phone] or [phone] 123-9976.   Call 1-800-QUIT-NOW if you do not meet the above criteria.   Contact us via email: [email or web address]   View our website for more information: [email or web address]        Language Assistance Services     ATTENTION: Language assistance services are available, free of charge. Please call [phone].      ATENCIÓN: Si habla español, tiene a lawrence disposición servicios gratuitos de asistencia " lingüística. Angelica al [phone].     JHONATAN Ý: N?u b?n nói Ti?ng Vi?t, có các d?ch v? h? tr? ngôn ng? mi?n phí dành cho b?n. G?i s? [phone].         [facility] complies with applicable Federal civil rights laws and does not discriminate on the basis of race, color, national origin, age, disability, or sex.

## 2017-04-12 NOTE — PROGRESS NOTES
Subjective:       Patient ID: Humphrey Machado is a 85 y.o. male.    Chief Complaint: Wound Check    Wound Check     This patient is seen today for reevaluation of an ulcers to both lower legs.  Unna boots are on both legs and the wounds are healing as evidenced by wound contracture.  The right medial thigh wound is unchanged (see photo below).  He is afebrile.  He denies increased redness, swelling or purulent drainage.  His pain level is 6/10.  His wound healing is complicated by venous insufficiency and chronic kidney disease.  He is receiving home health services through [facility].  Carlos Barahona NP is aware of his condition.    Review of Systems   Constitutional: Negative for chills, diaphoresis, fatigue and fever.   HENT: Negative for congestion, postnasal drip, rhinorrhea, sinus pressure, sneezing, sore throat, tinnitus and trouble swallowing. Hearing loss: left ear.    Eyes: Negative for visual disturbance.   Respiratory: Negative for cough, shortness of breath and wheezing.    Cardiovascular: Positive for leg swelling. Negative for chest pain and palpitations.   Gastrointestinal: Positive for constipation. Negative for diarrhea, nausea and vomiting.   Genitourinary: Positive for difficulty urinating. Negative for dysuria and hematuria.   Musculoskeletal: Positive for back pain and joint swelling. Negative for arthralgias.   Skin: Positive for color change and wound (bilateral lower legs and feet).   Neurological: Positive for dizziness. Negative for weakness, light-headedness and headaches.   Hematological: Does not bruise/bleed easily.   Psychiatric/Behavioral: Negative for decreased concentration and sleep disturbance. The patient is not nervous/anxious.        Objective:      Physical Exam   Constitutional: He is oriented to person, place, and time. He appears well-developed and well-nourished. No distress.   HENT:   Head: Normocephalic and atraumatic.   Cardiovascular: Intact distal pulses.     Musculoskeletal: Normal range of motion. He exhibits edema (1-2+ lower leg). He exhibits no tenderness.        Legs:       Feet:    Neurological: He is alert and oriented to person, place, and time.   Skin: Skin is warm and dry. No rash noted. He is not diaphoretic. No cyanosis or erythema. Nails show no clubbing.   Psychiatric: He has a normal mood and affect. His behavior is normal. Judgment and thought content normal.   Nursing note and vitals reviewed.      ..  Lab Results   Component Value Date    ALBUMIN 2.6 (L) 02/21/2017     Humphrey was seen in the clinic room and placed in the supine position on the treatment table.  The unna boots were removed with scissors and the leg was cleansed with Easi-clense sponges and dried thoroughly.  Eucerin cream cream was applied to the lower legs. Santyl and a hydrofiber dressing was applied to the right leg and foot wounds.  A mepilex foam dressing was placed on the left heel wound.  The patient's feet were positioned at a 90 degree angle.  The coflex was applied per package instructions.  A two layered application was performed using a spiral technique beginning with the foam layer followed by the cohesive bandage avoiding creases or folds.  The wraps were started behind the first metatarsal and ended below the tibial tubercle of the knee.  There was overlap of each turn half the width of the previous turn.  The compression wraps will be changed every 2-3 days.    Assessment:       1. Ulcer of ankle, right, with fat layer exposed    2. Ulcer of calf, right, with fat layer exposed    3. Ulcer of right foot with fat layer exposed    4. Decubitus ulcer of left heel, stage 3    5. Complicated open wound of right thigh, initial encounter    6. 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 Varicose veins of right lower extremity with ulcer        Plan:           Nepro 2-3 cans daily in between meals.  Coflex compression wraps bilateral lower legs as detailed  "above.  Darco shoes bilateral feet.  Pack right groin wound with 1/4" plain nugauze and cover with ABD pad.  Use diaper to hold dressing in place.  Patient was warned not to get the dressings wet and to use cast covers for showering.  Should the dressing become wet, he is to remove it, place a wet-to-dry dressing over the wound, cover with gauze and roll gauze and use ace wraps for compression and to secure bandages.  He should then notify home health as soon as possible to have a new dressing applied.  Return to clinic in two weeks.  [facility] notified of orders via [employer] fax.    Home Health Orders:  Cleanse bilateral leg and foot wounds and right thigh wound with wound .  Cover right groin wound with ABD pad.  Use diaper to hold dressing in place.  Change daily.  Triamcinolone cream to right lower leg dermatitis.  Apply santyl and hydrofiber to right leg and foot ulcers.  Apply mepilex foam to left heel ulcer.  Cover right calf ulcer with ABD pad to capture drainage.  Coflex compression wrap bilateral lower legs.  Change wraps three times weekly.  Skilled nurse visit-three times weekly.    Right lateral calf      Right lateral ankle ulcer      Right lateral foot ulcer      Left heel stage III decubitus      Right medial thigh                                      "

## 2017-04-12 NOTE — PROGRESS NOTES
Subjective:       Patient ID: Humphrey Machado is a 85 y.o. male.    Chief Complaint: perineal abcess (f/u)    HPI Comments: Humphrey Machado is a 85 y.o. male with history of prostate CA, kidney CA (s/p (L) nephrectomy), urethral strictures urinary incontinence s/p radiation therapy several years ago.  He has history of CKD IV, AS, chronic UTI, chronic venous insufficiency, and inflammatory arthritis (on long term plaquenil and prednisone)   He is being followed by ID for Chronic osteomyelitis of the left hip/symphysis pubis. IR cultures of bone were negative. Pathology showed chronic osteomyelitis.   He was diagnosed with Perineal abscess by his PCP.  12/27/2017 seen in clinic and I placed packing in draining right thigh abscess.  Aerobic Bacterial Culture KLEBSIELLA PNEUMONIAE ESBL Moderate  This believed to be colonized.    He saw ID and wound care on 02/15/2017;  Groin abscess had healed up.  On 3/10/2017 at visit  he noticed some clear drainage coming from top part of his right leg.   Home health came today to change dressing and noticed it; now drainage has yellow tint from AZO  He denies any pustular or pain from site.    He had been having the packing changed by HH and unable to collect fluid from site.  He is here today after seeing wound care to see if can collect urine.                        He was seen in clinic with Dr. Abraham on 09/30/2016 for evaluation.  He was admitted for I&D, IVAB and released 10/02/2016.    Extensive discussion had with patient.   Given the small caliber catheter in place, we recommend suprapubic catheter placement versus urethral dilation with upsize of urethral montano to allow for healing of fistula.   Currently inadequate drainage of bladder.   He would like to defer more definitive management until he has had further consultation.   He understands that if he leaves prior to urethral dilation with larger montano placement or suprapubic catheter insertion that this will be against  medical advice.   He will be sent home with home health.    10/06/2016 returned to ER with development of two new abscesses & (+) blood culture.  He reported in ER that he was scheduled with Dr. Zuhair Jefferson at [facility] for f/u of I&D as well as SPT placement.  He reports Dr. Jefferson placed SPT (unsure of date) and the next day he went back in due to pain to lower abdomen/pelvic pain  He has not had post op visit with original SPT change.    Seen on 11/23/2016 after his SPT came out;  He went to [facility] ER and had unsuccessful attempts of replacing SPT.  They placed 14fr montano instead and was told to f/u with Dr. Jefferson which he has not.  He is awaiting the appt.              We sent for old records but have not yet received anything.          Past Medical History:    *Atrial fibrillation                                          Acute appendicitis                              2/19/2015     Anemia                                                        Anxiety                                                       Arthritis                                                       Comment:generalized    Basal cell carcinoma                            01/2013         Comment:right temple    BCC (basal cell carcinoma)                                      Comment:right mid forearm    Bladder stone                                   6/28/2016     Blood transfusion                                             Chronic urethral stricture                      10/14/2015    Chronic venous insufficiency                    7/8/2013      CKD (chronic kidney disease) stage 3, GFR 30-5* 9/6/2012      Coronary artery disease                                       Elevated PSA                                                  Essential hypertension                          7/30/2015     Hematuria, gross                                              History of Left Nephrectomy (~2006)             1/29/2014       Comment:Done at [facility].      Hypertension                                                  Inflammatory arthritis                          8/14/2012     Kidney stone                                                  Long-term use of Plaquenil                      6/4/2015      Mixed incontinence urge and stress              4/11/2014     Nonrheumatic aortic valve stenosis              5/30/2016     NSTEMI (non-ST elevated myocardial infarction)  5/30/2016     Olecranon bursitis                              1/14/2013     Osteopenia                                      8/14/2012     Personal history of kidney cancer               4/11/2014     Prostate cancer                                               Radiation                                                       Comment:treatment for prostate cancer    Recurrent UTI                                   7/8/2013      S/P radiation therapy                           4/11/2014     Skin cancer of arm                                              Comment:left leg (malignant)    Solitary kidney                                 7/15/2013     Venous insufficiency of leg                     7/12/2012     Venous stasis ulcers                            7/6/2012      Vitamin D deficiency disease                    5/8/2013      Past Surgical History:    TONSILLECTOMY                                                  NEPHRECTOMY                                      2006-07?        Comment:Removal of left kidney    CYSTOSCOPY                                                       Comment:with dialation    Review of patient's family history indicates:    Cancer                         Mother                    Heart disease                  Father                    Urolithiasis                   Father                    Mental illness                 Daughter                  Cancer                         Brother                     Comment: prostate cancer, (Ervin) removed by                surgery    Cancer                          Brother                     Comment: prostate cancer    Cancer                         Maternal Aunt             Melanoma                       Neg Hx                    Lupus                          Neg Hx                    Rheum arthritis                Neg Hx                      Social History    Marital status:              Spouse name: Emy               Years of education:                 Number of children: 4             Occupational History  Occupation          Employer            Comment               Retired                                                       retired 1998            Social History Main Topics    Smoking status: Current Every Day Smoker                                                     Packs/day: 0.00      Years: 40.00          Types: Cigars    Smokeless status: Current User                      Comment: pt smokes 3 cigars a day pt will make up his             mind when his ready- did give up smoking              cigarettes at age 35yrs smoked from 18 - 35              years    Alcohol use: Yes           0.6 oz/week       1 Glasses of wine per week       Comment: stop drinking 09/2009. 1 glass of wine at                bedtime     Drug use: No                 Comment: smokes 3 cigars per day    Sexual activity: No                   Other Topics            Concern    None on file    Social History Narrative    None on file        Allergies:  Ditropan (oxybutynin chloride); Keflex (cephalexin); and Macrobid (nitrofurantoin monohyd/m-cryst)    Medications:  Current Outpatient Prescriptions:   allopurinol (ZYLOPRIM) 100 MG tablet, TAKE ONE TABLET BY MOUTH EVERY DAY, Disp: 30 tablet, Rfl: 4  aspirin 81 MG Chew, Take 1 tablet (81 mg total) by mouth once daily., Disp: , Rfl: 0  atorvastatin (LIPITOR) 80 MG tablet, Take 1 tablet (80 mg total) by mouth once daily., Disp: 30 tablet, Rfl: 11  calcitRIOL (ROCALTROL) 0.5 MCG Cap, Take 0.5 mcg by mouth once  "daily., Disp: , Rfl:   carvedilol (COREG) 3.125 MG tablet, Take 1 tablet (3.125 mg total) by mouth 2 (two) times daily with meals., Disp: 60 tablet, Rfl: 11  catheter 16-16 Fr-" Misc, 1 Units by Misc.(Non-Drug; Combo Route) route once daily., Disp: 100 each, Rfl: 11  collagenase (SANTYL) ointment, Apply topically once daily. Apply to wound daily as directed., Disp: 90 g, Rfl: 0  folic acid-vit B6-vit B12 (FOLBEE) 2.5-25-1 mg Tab, Take 1 tablet by mouth once daily., Disp: 30 each, Rfl: 5  gabapentin (NEURONTIN) 300 MG capsule, Take 1 capsule (300 mg total) by mouth 2 (two) times daily., Disp: 60 capsule, Rfl: 3  hydrALAZINE (APRESOLINE) 50 MG tablet, Take 0.5 tablets (25 mg total) by mouth 2 (two) times daily., Disp: 90 tablet, Rfl: 11  hydrocodone-acetaminophen 10-325mg (NORCO)  mg Tab, Take 1 tablet by mouth 4 (four) times daily as needed., Disp: 120 tablet, Rfl: 0  hydroxychloroquine (PLAQUENIL) 200 mg tablet, Take 1 tablet (200 mg total) by mouth 2 (two) times daily., Disp: 60 tablet, Rfl: 2  LACTOBACILLUS ACIDOPHILUS (PROBIOTIC ORAL), Take 1 capsule by mouth once daily. , Disp: , Rfl:   nitroGLYCERIN (NITROSTAT) 0.3 MG SL tablet, Place 1 tablet (0.3 mg total) under the tongue every 5 (five) minutes as needed for Chest pain., Disp: 30 tablet, Rfl: 11  nystatin-triamcinolone (MYCOLOG II) cream, Apply topically 4 (four) times daily., Disp: 60 g, Rfl: 2  omeprazole (PRILOSEC) 20 MG capsule, Take 20 mg by mouth once daily., Disp: , Rfl:   omeprazole (PRILOSEC) 40 MG capsule, , Disp: , Rfl:   polyethylene glycol (GLYCOLAX) 17 gram PwPk, Take 17 g by mouth once daily., Disp: 30 packet, Rfl: 0  polyethylene glycol (GLYCOLAX) 17 gram/dose powder, , Disp: , Rfl:   sodium bicarbonate 650 MG tablet, Take 1 tablet (650 mg total) by mouth 2 (two) times daily., Disp: 60 tablet, Rfl: 11  vitamin D 1000 units Tab, Take 2,000 Units by mouth once daily. , Disp: , Rfl:             Past Medical History:    " *Atrial fibrillation                                          Acute appendicitis                              2/19/2015     Anemia                                                        Anxiety                                                       Arthritis                                                       Comment:generalized    Basal cell carcinoma                            01/2013         Comment:right temple    BCC (basal cell carcinoma)                                      Comment:right mid forearm    Bladder stone                                   6/28/2016     Blood transfusion                                             Chronic urethral stricture                      10/14/2015    Chronic venous insufficiency                    7/8/2013      CKD (chronic kidney disease) stage 3, GFR 30-5* 9/6/2012      Coronary artery disease                                       Elevated PSA                                                  Essential hypertension                          7/30/2015     Hematuria, gross                                              History of Left Nephrectomy (~2006)             1/29/2014       Comment:Done at [facility].     Hypertension                                                  Inflammatory arthritis                          8/14/2012     Kidney stone                                                  Long-term use of Plaquenil                      6/4/2015      Mixed incontinence urge and stress              4/11/2014     Nonrheumatic aortic valve stenosis              5/30/2016     NSTEMI (non-ST elevated myocardial infarction)  5/30/2016     Olecranon bursitis                              1/14/2013     Osteopenia                                      8/14/2012     Personal history of kidney cancer               4/11/2014     Prostate cancer                                               Radiation                                                       Comment:treatment for prostate  cancer    Recurrent UTI                                   7/8/2013      S/P radiation therapy                           4/11/2014     Skin cancer of arm                                              Comment:left leg (malignant)    Solitary kidney                                 7/15/2013     Venous insufficiency of leg                     7/12/2012     Venous stasis ulcers                            7/6/2012      Vitamin D deficiency disease                    5/8/2013      Past Surgical History:    TONSILLECTOMY                                                  NEPHRECTOMY                                      2006-07?        Comment:Removal of left kidney    CYSTOSCOPY                                                       Comment:with dialation    Review of patient's family history indicates:    Cancer                         Mother                    Heart disease                  Father                    Urolithiasis                   Father                    Mental illness                 Daughter                  Cancer                         Brother                     Comment: prostate cancer, (Ervin) removed by                surgery    Cancer                         Brother                     Comment: prostate cancer    Cancer                         Maternal Aunt             Melanoma                       Neg Hx                    Lupus                          Neg Hx                    Rheum arthritis                Neg Hx                      Social History    Marital status:              Spouse name: Emy               Years of education:                 Number of children: 4             Occupational History  Occupation          Employer            Comment               Retired                                                       retired 1998            Social History Main Topics    Smoking status: Current Every Day Smoker                                                     Packs/day: 0.00  "     Years: 40.00          Types: Cigars    Smokeless status: Current User                      Comment: pt smokes 3 cigars a day pt will make up his             mind when his ready- did give up smoking              cigarettes at age 35yrs smoked from 18 - 35              years    Alcohol use: Yes           0.6 oz/week       1 Glasses of wine per week       Comment: stop drinking 09/2009. 1 glass of wine at                bedtime     Drug use: No                 Comment: smokes 3 cigars per day    Sexual activity: No                   Other Topics            Concern    None on file    Social History Narrative    None on file        Allergies:  Ditropan (oxybutynin chloride); Keflex (cephalexin); and Macrobid (nitrofurantoin monohyd/m-cryst)    Medications:  Current Outpatient Prescriptions:   allopurinol (ZYLOPRIM) 100 MG tablet, TAKE ONE TABLET BY MOUTH EVERY DAY, Disp: 30 tablet, Rfl: 4  amoxicillin-clavulanate 500-125mg (AUGMENTIN) 500-125 mg Tab, , Disp: , Rfl:   aspirin 81 MG Chew, Take 1 tablet (81 mg total) by mouth once daily., Disp: , Rfl: 0  atorvastatin (LIPITOR) 80 MG tablet, Take 1 tablet (80 mg total) by mouth once daily., Disp: 30 tablet, Rfl: 11  calcitRIOL (ROCALTROL) 0.5 MCG Cap, Take 0.5 mcg by mouth once daily., Disp: , Rfl:   carvedilol (COREG) 3.125 MG tablet, Take 1 tablet (3.125 mg total) by mouth 2 (two) times daily with meals., Disp: 60 tablet, Rfl: 11  catheter 16-16 Fr-" Misc, 1 Units by Misc.(Non-Drug; Combo Route) route once daily., Disp: 100 each, Rfl: 11  fluconazole (DIFLUCAN) 100 MG tablet, , Disp: , Rfl:   fluconazole (DIFLUCAN) 200 MG Tab, , Disp: , Rfl:   folic acid-vit B6-vit B12 (FOLBEE) 2.5-25-1 mg Tab, Take 1 tablet by mouth once daily., Disp: 30 each, Rfl: 5  gabapentin (NEURONTIN) 300 MG capsule, Take 1 capsule (300 mg total) by mouth 2 (two) times daily., Disp: 60 capsule, Rfl: 3  hydrALAZINE (APRESOLINE) 50 MG tablet, Take 0.5 tablets (25 mg total) by mouth 2 " (two) times daily., Disp: 90 tablet, Rfl: 11  hydrocodone-acetaminophen 10-325mg (NORCO)  mg Tab, Take 1 tablet by mouth 4 (four) times daily as needed., Disp: 120 tablet, Rfl: 0  hydroxychloroquine (PLAQUENIL) 200 mg tablet, Take 1 tablet (200 mg total) by mouth 2 (two) times daily., Disp: 60 tablet, Rfl: 2  INVANZ 1 gram injection, , Disp: , Rfl:   LACTOBACILLUS ACIDOPHILUS (PROBIOTIC ORAL), Take 1 capsule by mouth once daily. , Disp: , Rfl:   nitroGLYCERIN (NITROSTAT) 0.3 MG SL tablet, Place 1 tablet (0.3 mg total) under the tongue every 5 (five) minutes as needed for Chest pain., Disp: 30 tablet, Rfl: 11  nystatin-triamcinolone (MYCOLOG II) cream, Apply topically 4 (four) times daily., Disp: 60 g, Rfl: 2  omeprazole (PRILOSEC) 20 MG capsule, Take 20 mg by mouth once daily., Disp: , Rfl:   phenazopyridine (PYRIDIUM) 100 MG tablet, Take 200 mg by mouth 3 (three) times daily as needed for Pain., Disp: , Rfl:   polyethylene glycol (GLYCOLAX) 17 gram PwPk, Take 17 g by mouth once daily., Disp: 30 packet, Rfl: 0  polyethylene glycol (GLYCOLAX) 17 gram/dose powder, , Disp: , Rfl:   sodium bicarbonate 650 MG tablet, Take 1 tablet (650 mg total) by mouth 2 (two) times daily., Disp: 60 tablet, Rfl: 11  tramadol (ULTRAM) 50 mg tablet, , Disp: , Rfl:   vitamin D 1000 units Tab, Take 2,000 Units by mouth once daily. , Disp: , Rfl:         Review of Systems   Constitutional: Negative.  Negative for activity change, appetite change and fever.   HENT: Negative.  Negative for facial swelling and trouble swallowing.    Eyes: Negative.    Respiratory: Negative.  Negative for shortness of breath.    Cardiovascular: Negative.  Negative for chest pain and palpitations.   Gastrointestinal: Negative for abdominal pain, constipation, diarrhea, nausea and vomiting.   Genitourinary: Positive for difficulty urinating. Negative for dysuria, enuresis, flank pain, frequency, genital sores, hematuria, nocturia, penile pain,  scrotal swelling, testicular pain and urgency.        Stevens draining well.     Musculoskeletal: Negative for back pain, gait problem and neck stiffness.   Skin: Positive for wound.        Dsg have been changed.  Not much drainage from leg.     Neurological: Negative for dizziness, tremors, seizures, syncope, speech difficulty, light-headedness and headaches.   Hematological: Does not bruise/bleed easily.   Psychiatric/Behavioral: Negative for confusion and hallucinations. The patient is not nervous/anxious.        Objective:      Physical Exam   Nursing note and vitals reviewed.  Constitutional: He is oriented to person, place, and time. Vital signs are normal. He appears well-developed and well-nourished. He is active and cooperative.  Non-toxic appearance. He does not have a sickly appearance.   HENT:   Head: Normocephalic and atraumatic.   Right Ear: External ear normal.   Left Ear: External ear normal.   Nose: Nose normal.   Mouth/Throat: Mucous membranes are normal.   Eyes: Conjunctivae and lids are normal. No scleral icterus.   Neck: Trachea normal, normal range of motion and full passive range of motion without pain. Neck supple. No JVD present. No tracheal deviation present.   Cardiovascular: Normal rate, regular rhythm, S1 normal and S2 normal.    Pulmonary/Chest: Effort normal and breath sounds normal. No respiratory distress. He exhibits no tenderness.   Abdominal: Soft. Normal appearance and bowel sounds are normal. There is no hepatosplenomegaly. There is no tenderness. There is no rebound, no guarding and no CVA tenderness.   Genitourinary: Testes normal and penis normal. No penile tenderness.         Musculoskeletal: Normal range of motion.   Neurological: He is alert and oriented to person, place, and time. He has normal strength.   Skin: Skin is warm, dry and intact.     Psychiatric: He has a normal mood and affect. His behavior is normal. Judgment and thought content normal.       Assessment:        1. Abscess of right groin        Plan:         I spent 30 minutes with the patient of which more than half was spent in direct consultation with the patient in regards to our treatment and plan.    Education and recommendations of today's plan of care including home remedies.  I did not see drainage from site.  I probed site with sterile qtip; no drainage received; placed sterile iodoform packing to site but unable to absorb fluid; some bloody drainage noted.  Drainage dsg sent to lab;  Continue home care  Will discuss with Dr. Abraham next step; imaging needed?  Voiced understanding

## 2017-04-12 NOTE — MR AVS SNAPSHOT
[facility]  [address]  [facility] [address]  Phone: [phone]                  Humphrey Machado   2017 2:40 PM   Office Visit    Description:  Male : 1931   Provider:  Sowmya Rincon NP   Department:  [facility]           Reason for Visit     perineal abcess           Diagnoses this Visit        Comments    Abscess of right groin    -  Primary            To Do List           Future Appointments        Provider Department Dept Phone    2017 1:20 PM Brit Ramirez NP [facility] [phone]    2017 1:30 PM LAB, [facility] [phone]      Goals (5 Years of Data)     None      Follow-Up and Disposition     Return if symptoms worsen or fail to improve.      [facility]     [facility] -  Assistance  Unless otherwise directed by your provider, please contact [facility], our nurse care line that is available for  assistance.     Registered nurses in the [facility] provide: appointment scheduling, clinical advisement, health education, and other advisory services.  Call: [phone] (toll free)               Medications           Message regarding Medications     Verify the changes and/or additions to your medication regime listed below are the same as discussed with your clinician today.  If any of these changes or additions are incorrect, please notify your healthcare provider.        STOP taking these medications     polyethylene glycol (GLYCOLAX) 17 gram/dose powder            Verify that the below list of medications is an accurate representation of the medications you are currently taking.  If none reported, the list may be blank. If incorrect, please contact your healthcare provider. Carry this list with you in case of emergency.           Current Medications     allopurinol (ZYLOPRIM) 100 MG tablet TAKE ONE TABLET BY MOUTH EVERY DAY    aspirin 81  "MG Chew Take 1 tablet (81 mg total) by mouth once daily.    atorvastatin (LIPITOR) 80 MG tablet Take 1 tablet (80 mg total) by mouth once daily.    azelastine (ASTELIN) 137 mcg (0.1 %) nasal spray 1 spray (137 mcg total) by Nasal route 2 (two) times daily.    calcitRIOL (ROCALTROL) 0.5 MCG Cap Take 0.5 mcg by mouth once daily.    carvedilol (COREG) 3.125 MG tablet Take 1 tablet (3.125 mg total) by mouth 2 (two) times daily with meals.    catheter 16-16 Fr-" Misc 1 Units by Misc.(Non-Drug; Combo Route) route once daily.    collagenase (SANTYL) ointment Apply topically once daily. Apply to wound daily as directed.    ferrous sulfate 324 mg (65 mg iron) TbEC Take 1 tablet (325 mg total) by mouth 3 (three) times daily.    folic acid-vit B6-vit B12 (FOLBEE) 2.5-25-1 mg Tab Take 1 tablet by mouth once daily.    gabapentin (NEURONTIN) 300 MG capsule Take 1 capsule (300 mg total) by mouth 2 (two) times daily.    hydrocodone-acetaminophen 10-325mg (NORCO)  mg Tab Take 1 tablet by mouth 4 (four) times daily as needed.    hydroxychloroquine (PLAQUENIL) 200 mg tablet Take 1 tablet (200 mg total) by mouth 2 (two) times daily.    LACTOBACILLUS ACIDOPHILUS (PROBIOTIC ORAL) Take 1 capsule by mouth once daily.     nitroGLYCERIN (NITROSTAT) 0.3 MG SL tablet Place 1 tablet (0.3 mg total) under the tongue every 5 (five) minutes as needed for Chest pain.    nystatin-triamcinolone (MYCOLOG II) cream Apply topically 4 (four) times daily.    omeprazole (PRILOSEC) 20 MG capsule Take 20 mg by mouth once daily.    omeprazole (PRILOSEC) 40 MG capsule     polyethylene glycol (GLYCOLAX) 17 gram PwPk Take 17 g by mouth once daily.    sodium bicarbonate 650 MG tablet Take 1 tablet (650 mg total) by mouth 2 (two) times daily.    triamcinolone acetonide 0.1% (KENALOG) 0.1 % cream Apply topically 2 (two) times daily. Apply to affected area twice daily as directed.    vitamin D 1000 units Tab Take 2,000 Units by mouth once daily.          " "  Clinical Reference Information           Your Vitals Were     BP Pulse Resp Height Weight BMI    132/78 (BP Location: Right arm, Patient Position: Sitting, BP Method: Automatic) 78 16 5' 8" (1.727 m) 81.6 kg (180 lb) 27.37 kg/m2      Blood Pressure          Most Recent Value    BP  132/78      Allergies as of 4/12/2017     Ditropan [Oxybutynin Chloride]    Keflex [Cephalexin]    Macrobid [Nitrofurantoin Monohyd/m-cryst]      Immunizations Administered on Date of Encounter - 4/12/2017     None      Orders Placed During Today's Visit     Future Labs/Procedures Expected by Expires    Creatinine, Peritoneal, Pleural Fluid or TOSIN Drainage, In-House Peritoneal Fluid  4/12/2017 6/11/2018      Smoking Cessation     If you would like to quit smoking:   You may be eligible for free services if you are a [place] resident and started smoking cigarettes before September 1, 1988.  Call the Smoking Cessation [employer] ([facility]) toll free at [phone] [phone] or [phone].   Call 1-800-QUIT-NOW if you do not meet the above criteria.   Contact us via email: [email or web address]   View our website for more information: [email or web address]        Language Assistance Services     ATTENTION: Language assistance services are available, free of charge. Please call [phone].      ATENCIÓN: Si habla español, tiene a lawrence disposición servicios gratuitos de asistencia lingüística. Llame al [phone].     CHÚ Ý: N?u b?n nói Ti?ng Vi?t, có các d?ch v? h? tr? ngôn ng? mi?n phí dành cho b?n. G?i s? [phone].         [facility] complies with applicable Federal civil rights laws and does not discriminate on the basis of race, color, national origin, age, disability, or sex.        "

## 2017-04-17 ENCOUNTER — TELEPHONE (OUTPATIENT)
Dept: INTERNAL MEDICINE | Facility: CLINIC | Age: 82
End: 2017-04-17

## 2017-04-17 NOTE — TELEPHONE ENCOUNTER
----- Message from Mickie Sanchez MA sent at 4/17/2017  8:24 AM CDT -----  Contact: Andrew calles/[facility]-[phone]  Please advise Pt missed Home Health appt on 4/12/17 because of Wound Care and Andrew is request to speak with the Staff. Please call. Thanks!

## 2017-04-26 ENCOUNTER — TELEPHONE (OUTPATIENT)
Dept: INTERNAL MEDICINE | Facility: CLINIC | Age: 82
End: 2017-04-26

## 2017-04-26 NOTE — TELEPHONE ENCOUNTER
----- Message from Kay Robles sent at 4/26/2017  1:09 PM CDT -----  Contact: [facility]/[phone]  Type: Home Health (orders, updates, clarifications, etc.)    Home Health Agency[facility]Nurse:[facility]    Phone number: [phone]    Reason for call: Report on patient     Comments: Andrew is calling in regards to needing to let the doctor know that patient is complaining about increase in pain in the pelvis and urethra. Please call and advise.         Thank you

## 2017-05-01 ENCOUNTER — TELEPHONE (OUTPATIENT)
Dept: INTERNAL MEDICINE | Facility: CLINIC | Age: 82
End: 2017-05-01

## 2017-05-01 ENCOUNTER — PATIENT MESSAGE (OUTPATIENT)
Dept: INTERNAL MEDICINE | Facility: CLINIC | Age: 82
End: 2017-05-01

## 2017-05-01 ENCOUNTER — OFFICE VISIT (OUTPATIENT)
Dept: UROLOGY | Facility: CLINIC | Age: 82
End: 2017-05-01
Payer: MEDICARE

## 2017-05-01 ENCOUNTER — TELEPHONE (OUTPATIENT)
Dept: UROLOGY | Facility: CLINIC | Age: 82
End: 2017-05-01

## 2017-05-01 VITALS — DIASTOLIC BLOOD PRESSURE: 68 MMHG | SYSTOLIC BLOOD PRESSURE: 149 MMHG | HEIGHT: 70 IN

## 2017-05-01 DIAGNOSIS — L02.214 ABSCESS OF RIGHT GROIN: Primary | ICD-10-CM

## 2017-05-01 DIAGNOSIS — Z92.3 HISTORY OF RADIATION THERAPY: ICD-10-CM

## 2017-05-01 DIAGNOSIS — R33.9 URINARY RETENTION: ICD-10-CM

## 2017-05-01 DIAGNOSIS — Z97.8 CHRONIC INDWELLING FOLEY CATHETER: ICD-10-CM

## 2017-05-01 DIAGNOSIS — C61 PROSTATE CANCER: ICD-10-CM

## 2017-05-01 DIAGNOSIS — N39.45 CONTINUOUS LEAKAGE OF URINE: ICD-10-CM

## 2017-05-01 PROCEDURE — 99999 PR PBB SHADOW E&M-EST. PATIENT-LVL IV: CPT | Mod: PBBFAC,,, | Performed by: NURSE PRACTITIONER

## 2017-05-01 PROCEDURE — 99214 OFFICE O/P EST MOD 30 MIN: CPT | Mod: PBBFAC | Performed by: NURSE PRACTITIONER

## 2017-05-01 PROCEDURE — 99214 OFFICE O/P EST MOD 30 MIN: CPT | Mod: S$PBB,,, | Performed by: NURSE PRACTITIONER

## 2017-05-01 NOTE — TELEPHONE ENCOUNTER
Call pt number on five but was not able to leave a  because it was not set up. i have scheduled an appt per  on tomorrow for 11:30

## 2017-05-01 NOTE — TELEPHONE ENCOUNTER
----- Message from Jeff Carranza sent at 5/1/2017  9:24 AM CDT -----  Contact: Pt at [phone]  Pt said he osteomyelitis has become more painful. Script scripthydrocodone-acetaminophen 10-325mg (NORCO)  mg Tab is not working for him.

## 2017-05-01 NOTE — PROGRESS NOTES
Subjective:       Patient ID: Humphrey Machado is a 85 y.o. male.    Chief Complaint: Recurrent Skin Infections and Urinary Retention (chronic montano)    HPI Comments: Humphrey Machado is a 85 y.o. male with history of prostate CA, kidney CA (s/p (L) nephrectomy), urethral strictures urinary incontinence s/p radiation therapy several years ago.  He has history of CKD IV, AS, chronic UTI, chronic venous insufficiency, and inflammatory arthritis (on long term plaquenil and prednisone)   He is being followed by ID for Chronic osteomyelitis of the left hip/symphysis pubis. IR cultures of bone were negative. Pathology showed chronic osteomyelitis.   He was diagnosed with Perineal abscess by his PCP.  12/27/2017 seen in clinic and I placed packing in draining right thigh abscess.  Aerobic Bacterial Culture KLEBSIELLA PNEUMONIAE ESBL Moderate  This believed to be colonized.    He saw ID and wound care on 02/15/2017;  Groin abscess had healed up.  On 3/10/2017 at visit  he noticed some clear drainage coming from top part of his right leg.   Home health came today to change dressing and noticed it; now drainage has yellow tint from AZO  He denies any pustular or pain from site.    He had been having the packing changed by HH and unable to collect fluid from site.  04/12/2017 last clinic visit was unable to collect fluid.    Here today with c/o for discomfort on the inside.   Discomfort/raw feeling inside groin and penis.  His montano was changed ~ 10 days ago.  He tells me today home health was able to collect some fluid he was told it was fluid.  I have not received a report from HH.    Today he states he is now ready for intervention;   He would like to meet with dr. Abraham to discuss options.            He was seen in clinic with Dr. Abraham on 09/30/2016 for evaluation.  He was admitted for I&D, IVAB and released 10/02/2016.    Extensive discussion had with patient.   Given the small caliber catheter in place, we recommend suprapubic  catheter placement versus urethral dilation with upsize of urethral montano to allow for healing of fistula.   Currently inadequate drainage of bladder.   He would like to defer more definitive management until he has had further consultation.   He understands that if he leaves prior to urethral dilation with larger montano placement or suprapubic catheter insertion that this will be against medical advice.   He will be sent home with home health.    10/06/2016 returned to ER with development of two new abscesses & (+) blood culture.  He reported in ER that he was scheduled with Dr. Zuhair Jefferson at [facility] for f/u of I&D as well as SPT placement.  He reports Dr. Jefferson placed SPT (unsure of date) and the next day he went back in due to pain to lower abdomen/pelvic pain  He has not had post op visit with original SPT change.    Seen on 11/23/2016 after his SPT came out;  He went to [facility] ER and had unsuccessful attempts of replacing SPT.  They placed 14fr montano instead and was told to f/u with Dr. Jefferson which he has not.  He is awaiting the appt.              We sent for old records but have not yet received anything.          Past Medical History:    *Atrial fibrillation                                          Acute appendicitis                              2/19/2015     Anemia                                                        Anxiety                                                       Arthritis                                                       Comment:generalized    Basal cell carcinoma                            01/2013         Comment:right temple    BCC (basal cell carcinoma)                                      Comment:right mid forearm    Bladder stone                                   6/28/2016     Blood transfusion                                             Chronic urethral stricture                      10/14/2015    Chronic venous insufficiency                    7/8/2013      CKD (chronic kidney  disease) stage 3, GFR 30-5* 9/6/2012      Coronary artery disease                                       Elevated PSA                                                  Essential hypertension                          7/30/2015     Hematuria, gross                                              History of Left Nephrectomy (~2006)             1/29/2014       Comment:Done at [facility].     Hypertension                                                  Inflammatory arthritis                          8/14/2012     Kidney stone                                                  Long-term use of Plaquenil                      6/4/2015      Mixed incontinence urge and stress              4/11/2014     Nonrheumatic aortic valve stenosis              5/30/2016     NSTEMI (non-ST elevated myocardial infarction)  5/30/2016     Olecranon bursitis                              1/14/2013     Osteopenia                                      8/14/2012     Personal history of kidney cancer               4/11/2014     Prostate cancer                                               Radiation                                                       Comment:treatment for prostate cancer    Recurrent UTI                                   7/8/2013      S/P radiation therapy                           4/11/2014     Skin cancer of arm                                              Comment:left leg (malignant)    Solitary kidney                                 7/15/2013     Venous insufficiency of leg                     7/12/2012     Venous stasis ulcers                            7/6/2012      Vitamin D deficiency disease                    5/8/2013      Past Surgical History:    TONSILLECTOMY                                                  NEPHRECTOMY                                      2006-07?        Comment:Removal of left kidney    CYSTOSCOPY                                                       Comment:with dialation    Review of patient's family  history indicates:    Cancer                         Mother                    Heart disease                  Father                    Urolithiasis                   Father                    Mental illness                 Daughter                  Cancer                         Brother                     Comment: prostate cancer, (Ervin) removed by                surgery    Cancer                         Brother                     Comment: prostate cancer    Cancer                         Maternal Aunt             Melanoma                       Neg Hx                    Lupus                          Neg Hx                    Rheum arthritis                Neg Hx                      Social History    Marital status:              Spouse name: Emy               Years of education:                 Number of children: 4             Occupational History  Occupation          Employer            Comment               Retired                                                       retired 1998            Social History Main Topics    Smoking status: Current Every Day Smoker                                                     Packs/day: 0.00      Years: 40.00          Types: Cigars    Smokeless status: Current User                      Comment: pt smokes 3 cigars a day pt will make up his             mind when his ready- did give up smoking              cigarettes at age 35yrs smoked from 18 - 35              years    Alcohol use: Yes           0.6 oz/week       1 Glasses of wine per week       Comment: stop drinking 09/2009. 1 glass of wine at                bedtime     Drug use: No                 Comment: smokes 3 cigars per day    Sexual activity: No                   Other Topics            Concern    None on file    Social History Narrative    None on file        Allergies:  Ditropan (oxybutynin chloride); Keflex (cephalexin); and Macrobid (nitrofurantoin monohyd/m-cryst)    Medications:  Current  "Outpatient Prescriptions:   allopurinol (ZYLOPRIM) 100 MG tablet, TAKE ONE TABLET BY MOUTH EVERY DAY, Disp: 30 tablet, Rfl: 4  aspirin 81 MG Chew, Take 1 tablet (81 mg total) by mouth once daily., Disp: , Rfl: 0  atorvastatin (LIPITOR) 80 MG tablet, Take 1 tablet (80 mg total) by mouth once daily., Disp: 30 tablet, Rfl: 11  calcitRIOL (ROCALTROL) 0.5 MCG Cap, Take 0.5 mcg by mouth once daily., Disp: , Rfl:   carvedilol (COREG) 3.125 MG tablet, Take 1 tablet (3.125 mg total) by mouth 2 (two) times daily with meals., Disp: 60 tablet, Rfl: 11  catheter 16-16 Fr-" Misc, 1 Units by Misc.(Non-Drug; Combo Route) route once daily., Disp: 100 each, Rfl: 11  collagenase (SANTYL) ointment, Apply topically once daily. Apply to wound daily as directed., Disp: 90 g, Rfl: 0  folic acid-vit B6-vit B12 (FOLBEE) 2.5-25-1 mg Tab, Take 1 tablet by mouth once daily., Disp: 30 each, Rfl: 5  gabapentin (NEURONTIN) 300 MG capsule, Take 1 capsule (300 mg total) by mouth 2 (two) times daily., Disp: 60 capsule, Rfl: 3  hydrALAZINE (APRESOLINE) 50 MG tablet, Take 0.5 tablets (25 mg total) by mouth 2 (two) times daily., Disp: 90 tablet, Rfl: 11  hydrocodone-acetaminophen 10-325mg (NORCO)  mg Tab, Take 1 tablet by mouth 4 (four) times daily as needed., Disp: 120 tablet, Rfl: 0  hydroxychloroquine (PLAQUENIL) 200 mg tablet, Take 1 tablet (200 mg total) by mouth 2 (two) times daily., Disp: 60 tablet, Rfl: 2  LACTOBACILLUS ACIDOPHILUS (PROBIOTIC ORAL), Take 1 capsule by mouth once daily. , Disp: , Rfl:   nitroGLYCERIN (NITROSTAT) 0.3 MG SL tablet, Place 1 tablet (0.3 mg total) under the tongue every 5 (five) minutes as needed for Chest pain., Disp: 30 tablet, Rfl: 11  nystatin-triamcinolone (MYCOLOG II) cream, Apply topically 4 (four) times daily., Disp: 60 g, Rfl: 2  omeprazole (PRILOSEC) 20 MG capsule, Take 20 mg by mouth once daily., Disp: , Rfl:   omeprazole (PRILOSEC) 40 MG capsule, , Disp: , Rfl:   polyethylene glycol " (GLYCOLAX) 17 gram PwPk, Take 17 g by mouth once daily., Disp: 30 packet, Rfl: 0  polyethylene glycol (GLYCOLAX) 17 gram/dose powder, , Disp: , Rfl:   sodium bicarbonate 650 MG tablet, Take 1 tablet (650 mg total) by mouth 2 (two) times daily., Disp: 60 tablet, Rfl: 11  vitamin D 1000 units Tab, Take 2,000 Units by mouth once daily. , Disp: , Rfl:             Past Medical History:    *Atrial fibrillation                                          Acute appendicitis                              2/19/2015     Anemia                                                        Anxiety                                                       Arthritis                                                       Comment:generalized    Basal cell carcinoma                            01/2013         Comment:right temple    BCC (basal cell carcinoma)                                      Comment:right mid forearm    Bladder stone                                   6/28/2016     Blood transfusion                                             Chronic urethral stricture                      10/14/2015    Chronic venous insufficiency                    7/8/2013      CKD (chronic kidney disease) stage 3, GFR 30-5* 9/6/2012      Coronary artery disease                                       Elevated PSA                                                  Essential hypertension                          7/30/2015     Hematuria, gross                                              History of Left Nephrectomy (~2006)             1/29/2014       Comment:Done at [facility].     Hypertension                                                  Inflammatory arthritis                          8/14/2012     Kidney stone                                                  Long-term use of Plaquenil                      6/4/2015      Mixed incontinence urge and stress              4/11/2014     Nonrheumatic aortic valve stenosis              5/30/2016     NSTEMI (non-ST  elevated myocardial infarction)  5/30/2016     Olecranon bursitis                              1/14/2013     Osteopenia                                      8/14/2012     Personal history of kidney cancer               4/11/2014     Prostate cancer                                               Radiation                                                       Comment:treatment for prostate cancer    Recurrent UTI                                   7/8/2013      S/P radiation therapy                           4/11/2014     Skin cancer of arm                                              Comment:left leg (malignant)    Solitary kidney                                 7/15/2013     Venous insufficiency of leg                     7/12/2012     Venous stasis ulcers                            7/6/2012      Vitamin D deficiency disease                    5/8/2013      Past Surgical History:    TONSILLECTOMY                                                  NEPHRECTOMY                                      2006-07?        Comment:Removal of left kidney    CYSTOSCOPY                                                       Comment:with dialation    Review of patient's family history indicates:    Cancer                         Mother                    Heart disease                  Father                    Urolithiasis                   Father                    Mental illness                 Daughter                  Cancer                         Brother                     Comment: prostate cancer, (Ervin) removed by                surgery    Cancer                         Brother                     Comment: prostate cancer    Cancer                         Maternal Aunt             Melanoma                       Neg Hx                    Lupus                          Neg Hx                    Rheum arthritis                Neg Hx                      Social History    Marital status:              Spouse name: Emy     "           Years of education:                 Number of children: 4             Occupational History  Occupation          Employer            Comment               Retired                                                       retired 1998            Social History Main Topics    Smoking status: Current Every Day Smoker                                                     Packs/day: 0.00      Years: 40.00          Types: Cigars    Smokeless status: Current User                      Comment: pt smokes 3 cigars a day pt will make up his             mind when his ready- did give up smoking              cigarettes at age 35yrs smoked from 18 - 35              years    Alcohol use: Yes           0.6 oz/week       1 Glasses of wine per week       Comment: stop drinking 09/2009. 1 glass of wine at                bedtime     Drug use: No                 Comment: smokes 3 cigars per day    Sexual activity: No                   Other Topics            Concern    None on file    Social History Narrative    None on file        Allergies:  Ditropan (oxybutynin chloride); Keflex (cephalexin); and Macrobid (nitrofurantoin monohyd/m-cryst)    Medications:  Current Outpatient Prescriptions:   allopurinol (ZYLOPRIM) 100 MG tablet, TAKE ONE TABLET BY MOUTH EVERY DAY, Disp: 30 tablet, Rfl: 4  amoxicillin-clavulanate 500-125mg (AUGMENTIN) 500-125 mg Tab, , Disp: , Rfl:   aspirin 81 MG Chew, Take 1 tablet (81 mg total) by mouth once daily., Disp: , Rfl: 0  atorvastatin (LIPITOR) 80 MG tablet, Take 1 tablet (80 mg total) by mouth once daily., Disp: 30 tablet, Rfl: 11  calcitRIOL (ROCALTROL) 0.5 MCG Cap, Take 0.5 mcg by mouth once daily., Disp: , Rfl:   carvedilol (COREG) 3.125 MG tablet, Take 1 tablet (3.125 mg total) by mouth 2 (two) times daily with meals., Disp: 60 tablet, Rfl: 11  catheter 16-16 Fr-" Misc, 1 Units by Misc.(Non-Drug; Combo Route) route once daily., Disp: 100 each, Rfl: 11  fluconazole (DIFLUCAN) 100 MG tablet, , " Disp: , Rfl:   fluconazole (DIFLUCAN) 200 MG Tab, , Disp: , Rfl:   folic acid-vit B6-vit B12 (FOLBEE) 2.5-25-1 mg Tab, Take 1 tablet by mouth once daily., Disp: 30 each, Rfl: 5  gabapentin (NEURONTIN) 300 MG capsule, Take 1 capsule (300 mg total) by mouth 2 (two) times daily., Disp: 60 capsule, Rfl: 3  hydrALAZINE (APRESOLINE) 50 MG tablet, Take 0.5 tablets (25 mg total) by mouth 2 (two) times daily., Disp: 90 tablet, Rfl: 11  hydrocodone-acetaminophen 10-325mg (NORCO)  mg Tab, Take 1 tablet by mouth 4 (four) times daily as needed., Disp: 120 tablet, Rfl: 0  hydroxychloroquine (PLAQUENIL) 200 mg tablet, Take 1 tablet (200 mg total) by mouth 2 (two) times daily., Disp: 60 tablet, Rfl: 2  INVANZ 1 gram injection, , Disp: , Rfl:   LACTOBACILLUS ACIDOPHILUS (PROBIOTIC ORAL), Take 1 capsule by mouth once daily. , Disp: , Rfl:   nitroGLYCERIN (NITROSTAT) 0.3 MG SL tablet, Place 1 tablet (0.3 mg total) under the tongue every 5 (five) minutes as needed for Chest pain., Disp: 30 tablet, Rfl: 11  nystatin-triamcinolone (MYCOLOG II) cream, Apply topically 4 (four) times daily., Disp: 60 g, Rfl: 2  omeprazole (PRILOSEC) 20 MG capsule, Take 20 mg by mouth once daily., Disp: , Rfl:   phenazopyridine (PYRIDIUM) 100 MG tablet, Take 200 mg by mouth 3 (three) times daily as needed for Pain., Disp: , Rfl:   polyethylene glycol (GLYCOLAX) 17 gram PwPk, Take 17 g by mouth once daily., Disp: 30 packet, Rfl: 0  polyethylene glycol (GLYCOLAX) 17 gram/dose powder, , Disp: , Rfl:   sodium bicarbonate 650 MG tablet, Take 1 tablet (650 mg total) by mouth 2 (two) times daily., Disp: 60 tablet, Rfl: 11  tramadol (ULTRAM) 50 mg tablet, , Disp: , Rfl:   vitamin D 1000 units Tab, Take 2,000 Units by mouth once daily. , Disp: , Rfl:         Review of Systems   Constitutional: Negative.  Negative for activity change, appetite change and fever.   HENT: Negative.  Negative for facial swelling and trouble swallowing.    Eyes:  Negative.    Respiratory: Negative.  Negative for shortness of breath.    Cardiovascular: Negative.  Negative for chest pain and palpitations.   Gastrointestinal: Negative for abdominal pain, constipation, diarrhea, nausea and vomiting.   Genitourinary: Positive for difficulty urinating, dysuria, penile pain and urgency. Negative for enuresis, flank pain, frequency, genital sores, hematuria, nocturia, scrotal swelling and testicular pain.        Stevens draining;   Groin pain     Musculoskeletal: Positive for arthralgias. Negative for back pain, gait problem and neck stiffness.   Skin: Positive for wound.        Right groin wound leaking urine.     Neurological: Negative for dizziness, tremors, seizures, syncope, speech difficulty, light-headedness and headaches.   Hematological: Does not bruise/bleed easily.   Psychiatric/Behavioral: Negative for confusion and hallucinations. The patient is not nervous/anxious.        Objective:      Physical Exam   Nursing note and vitals reviewed.  Constitutional: He is oriented to person, place, and time. Vital signs are normal. He appears well-developed and well-nourished. He is cooperative.  Non-toxic appearance. He does not have a sickly appearance.   HENT:   Head: Normocephalic and atraumatic.   Right Ear: External ear normal.   Left Ear: External ear normal.   Nose: Nose normal.   Mouth/Throat: Mucous membranes are normal.   Eyes: Conjunctivae and lids are normal. No scleral icterus.   Neck: Trachea normal, normal range of motion and full passive range of motion without pain. Neck supple. No JVD present. No tracheal deviation present.   Cardiovascular: Normal rate, regular rhythm, S1 normal and S2 normal.    Pulmonary/Chest: Effort normal and breath sounds normal. No respiratory distress. He exhibits no tenderness.   Abdominal: Soft. Normal appearance and bowel sounds are normal. There is no hepatosplenomegaly. There is no tenderness. There is no rebound, no guarding and no  "CVA tenderness.   Genitourinary: Testes normal and penis normal.       Right testis shows no mass, no swelling and no tenderness. Left testis shows no mass, no swelling and no tenderness. No penile erythema or penile tenderness. No discharge found.   Genitourinary Comments: ~1"x~1/2" area of hypergranulation tissue. No surrounding erythema/induration noted. No drainage illicite with palpation, standing or with preventing montano from draining.  (see pic in media section)     Musculoskeletal: Normal range of motion.   Neurological: He is alert and oriented to person, place, and time. He has normal strength.   Skin: Skin is warm, dry and intact.     Psychiatric: He has a normal mood and affect. His behavior is normal. Judgment and thought content normal.       Assessment:       1. Abscess of right groin    2. Urinary retention    3. Prostate cancer    4. History of radiation therapy    5. Continuous leakage of urine    6. Chronic indwelling Montano catheter        Plan:         I spent 35 minutes with the patient of which more than half was spent in direct consultation with the patient in regards to our treatment and plan.    Education and recommendations of today's plan of care including home remedies.  Attempted to collect fluid from site but unsuccessful  Covered with dsg  Have him f/u with Dr. Abraham tomorrow.      "

## 2017-05-01 NOTE — TELEPHONE ENCOUNTER
----- Message from Bell Resendiz LPN sent at 5/1/2017  2:12 PM CDT -----  Call me when you can [phone] can offer 1pm tomorrow , but he has a wound care  clara't. So i booked him next Friday at 1020am,   ----- Message -----     From: Sowmya Rincon NP     Sent: 5/1/2017   1:50 PM       To: Bell Resendiz LPN    Anyway he could be put in your urgent care spot?  This abscess/fistula getting worse.  He wants Dr. Abraham.  He said did not fire him!

## 2017-05-01 NOTE — MR AVS SNAPSHOT
[facility] Urolog 4th Floor  [address]  [facility] [address]  Phone: [phone]                  Humphrey Machado   2017 1:40 PM   Office Visit    Description:  Male : 1931   Provider:  Sowmya Rincon NP   Department:  [facility] Urolog 4th Floor           Reason for Visit     Recurrent Skin Infections     Urinary Retention           Diagnoses this Visit        Comments    Abscess of right groin    -  Primary     Urinary retention         Prostate cancer         History of radiation therapy         Continuous leakage of urine         Chronic indwelling Stevens catheter                To Do List           Future Appointments        Provider Department Dept Phone    2017 1:00 PM Seth Abraham MD [facility] 4th Floor [phone]    5/3/2017 11:30 AM Andrew Murray MD [facility] 100 [phone]    2017 10:20 AM Seth Abraham MD [facility] 4th Floor [phone]    2017 2:00 PM Brit Ramirez NP [facility] [phone]    2017 1:30 PM LAB, [facility] [phone]      Goals (5 Years of Data)     None      Follow-Up and Disposition     Return in about 1 day (around 2017).      [facility]     [facility] Nurse Care Line - 24/ Assistance  Unless otherwise directed by your provider, please contact [facility], our nurse care line that is available for  assistance.     Registered nurses in the [facility] provide: appointment scheduling, clinical advisement, health education, and other advisory services.  Call: [phone] (toll free)               Medications           Message regarding Medications     Verify the changes and/or additions to your medication regime listed below are the same as discussed with your clinician today.  If any of these changes or additions are incorrect, please notify your healthcare provider.             Verify that the below  "list of medications is an accurate representation of the medications you are currently taking.  If none reported, the list may be blank. If incorrect, please contact your healthcare provider. Carry this list with you in case of emergency.           Current Medications     allopurinol (ZYLOPRIM) 100 MG tablet Take 1 tablet (100 mg total) by mouth once daily.    aspirin 81 MG Chew Take 1 tablet (81 mg total) by mouth once daily.    atorvastatin (LIPITOR) 80 MG tablet Take 1 tablet (80 mg total) by mouth once daily.    azelastine (ASTELIN) 137 mcg (0.1 %) nasal spray 1 spray (137 mcg total) by Nasal route 2 (two) times daily.    calcitRIOL (ROCALTROL) 0.5 MCG Cap Take 0.5 mcg by mouth once daily.    carvedilol (COREG) 3.125 MG tablet Take 1 tablet (3.125 mg total) by mouth 2 (two) times daily with meals.    catheter 16-16 Fr-" Misc 1 Units by Misc.(Non-Drug; Combo Route) route once daily.    collagenase (SANTYL) ointment Apply topically once daily. Apply to wound daily as directed.    ferrous sulfate 324 mg (65 mg iron) TbEC Take 1 tablet (325 mg total) by mouth 3 (three) times daily.    folic acid-vit B6-vit B12 (FOLBEE) 2.5-25-1 mg Tab Take 1 tablet by mouth once daily.    gabapentin (NEURONTIN) 300 MG capsule Take 1 capsule (300 mg total) by mouth 2 (two) times daily.    hydrocodone-acetaminophen 10-325mg (NORCO)  mg Tab Take 1 tablet by mouth 4 (four) times daily as needed.    hydroxychloroquine (PLAQUENIL) 200 mg tablet Take 1 tablet (200 mg total) by mouth 2 (two) times daily.    LACTOBACILLUS ACIDOPHILUS (PROBIOTIC ORAL) Take 1 capsule by mouth once daily.     nitroGLYCERIN (NITROSTAT) 0.3 MG SL tablet Place 1 tablet (0.3 mg total) under the tongue every 5 (five) minutes as needed for Chest pain.    nystatin-triamcinolone (MYCOLOG II) cream Apply topically 4 (four) times daily.    omeprazole (PRILOSEC) 20 MG capsule Take 20 mg by mouth once daily.    omeprazole (PRILOSEC) 40 MG capsule     polyethylene " "glycol (GLYCOLAX) 17 gram PwPk Take 17 g by mouth once daily.    sodium bicarbonate 650 MG tablet Take 1 tablet (650 mg total) by mouth 2 (two) times daily.    triamcinolone acetonide 0.1% (KENALOG) 0.1 % cream Apply topically 2 (two) times daily. Apply to affected area twice daily as directed.    vitamin D 1000 units Tab Take 2,000 Units by mouth once daily.            Clinical Reference Information           Your Vitals Were     BP Height                149/68 5' 10" (1.778 m)          Blood Pressure          Most Recent Value    BP  (!)  149/68      Allergies as of 5/1/2017     Ditropan [Oxybutynin Chloride]    Keflex [Cephalexin]    Macrobid [Nitrofurantoin Monohyd/m-cryst]      Immunizations Administered on Date of Encounter - 5/1/2017     None      Smoking Cessation     If you would like to quit smoking:   You may be eligible for free services if you are a [place] resident and started smoking cigarettes before September 1, 1988.  Call the Smoking Cessation Trust ([facility]) toll free at [phone] or [phone].   Call 1-800-QUIT-NOW if you do not meet the above criteria.   Contact us via email: [email or web address]   View our website for more information: [email or web address]        Language Assistance Services     ATTENTION: Language assistance services are available, free of charge. Please call [phone].      ATENCIÓN: Si habla español, tiene a lawrence disposición servicios gratuitos de asistencia lingüística. Llame al [phone].     CHÚ Ý: N?u b?n nói Ti?ng Vi?t, có các d?ch v? h? tr? ngôn ng? mi?n phí dành cho b?n. G?i s? [phone].         [facility] 4th Floor complies with applicable Federal civil rights laws and does not discriminate on the basis of race, color, national origin, age, disability, or sex.        "

## 2017-05-02 ENCOUNTER — TELEPHONE (OUTPATIENT)
Dept: WOUND CARE | Facility: CLINIC | Age: 82
End: 2017-05-02

## 2017-05-02 ENCOUNTER — OFFICE VISIT (OUTPATIENT)
Dept: UROLOGY | Facility: CLINIC | Age: 82
End: 2017-05-02
Payer: MEDICARE

## 2017-05-02 ENCOUNTER — HOSPITAL ENCOUNTER (OUTPATIENT)
Dept: CARDIOLOGY | Facility: CLINIC | Age: 82
Discharge: HOME | End: 2017-05-02
Payer: MEDICARE

## 2017-05-02 ENCOUNTER — TELEPHONE (OUTPATIENT)
Dept: UROLOGY | Facility: CLINIC | Age: 82
End: 2017-05-02

## 2017-05-02 VITALS
HEIGHT: 70 IN | HEART RATE: 71 BPM | BODY MASS INDEX: 25.77 KG/M2 | WEIGHT: 180 LBS | DIASTOLIC BLOOD PRESSURE: 59 MMHG | SYSTOLIC BLOOD PRESSURE: 145 MMHG

## 2017-05-02 DIAGNOSIS — N36.0 URETHROCUTANEOUS FISTULA IN MALE: ICD-10-CM

## 2017-05-02 DIAGNOSIS — N36.0 URETHRAL FISTULA: ICD-10-CM

## 2017-05-02 DIAGNOSIS — Z90.5 S/P NEPHRECTOMY: ICD-10-CM

## 2017-05-02 DIAGNOSIS — N18.30 CKD (CHRONIC KIDNEY DISEASE) STAGE 3, GFR 30-59 ML/MIN: ICD-10-CM

## 2017-05-02 DIAGNOSIS — R31.29 HEMATURIA, MICROSCOPIC: ICD-10-CM

## 2017-05-02 DIAGNOSIS — N36.0 URETHROCUTANEOUS FISTULA IN MALE: Primary | ICD-10-CM

## 2017-05-02 DIAGNOSIS — N36.0 URETHRAL FISTULA: Primary | ICD-10-CM

## 2017-05-02 DIAGNOSIS — N18.4 CHRONIC KIDNEY DISEASE, STAGE 4, SEVERELY DECREASED GFR: ICD-10-CM

## 2017-05-02 PROCEDURE — 93010 ELECTROCARDIOGRAM REPORT: CPT | Mod: S$PBB,,, | Performed by: INTERNAL MEDICINE

## 2017-05-02 PROCEDURE — 99215 OFFICE O/P EST HI 40 MIN: CPT | Mod: 57,S$PBB,, | Performed by: UROLOGY

## 2017-05-02 PROCEDURE — 93005 ELECTROCARDIOGRAM TRACING: CPT | Mod: PBBFAC | Performed by: INTERNAL MEDICINE

## 2017-05-02 PROCEDURE — 99999 PR PBB SHADOW E&M-EST. PATIENT-LVL IV: CPT | Mod: 25,PBBFAC,, | Performed by: UROLOGY

## 2017-05-02 RX ORDER — ALLOPURINOL 100 MG/1
100 TABLET ORAL DAILY
Qty: 30 TABLET | Refills: 4 | Status: ON HOLD | OUTPATIENT
Start: 2017-05-02 | End: 2017-06-01 | Stop reason: HOSPADM

## 2017-05-02 RX ORDER — CIPROFLOXACIN 500 MG/1
500 TABLET ORAL DAILY
Qty: 14 TABLET | Refills: 0 | Status: ON HOLD | OUTPATIENT
Start: 2017-05-02 | End: 2017-05-17

## 2017-05-02 RX ORDER — OXYCODONE AND ACETAMINOPHEN 5; 325 MG/1; MG/1
1 TABLET ORAL EVERY 6 HOURS PRN
Qty: 30 TABLET | Refills: 0 | Status: ON HOLD | OUTPATIENT
Start: 2017-05-02 | End: 2017-06-01 | Stop reason: HOSPADM

## 2017-05-02 NOTE — TELEPHONE ENCOUNTER
----- Message from Estefania Graham sent at 5/1/2017  4:23 PM CDT -----  Contact: self  Pt states he would like to know if he can be seen earlier (11:00 to 11:30) or later(2:15) due to him having an emergency apt with urology @ 1:00pm.Please call Humphrey back @ [phone].Thank you.

## 2017-05-02 NOTE — MR AVS SNAPSHOT
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       These Medications        Disp Refills Start End    ciprofloxacin HCl (CIPRO) 500 MG tablet 14 tablet 0 2017    Take 1 tablet (500 mg total) by mouth once daily at 6am. - Oral    Pharmacy: [id number] [address] Ph #: [phone]         [facility]     [facility] Nurse Care Line -  Assistance  Unless otherwise directed by your provider, please contact [facility], our nurse care line that is available for  assistance.     Registered nurses in the [facility] provide: appointment scheduling, clinical advisement, health education, and other advisory services.  Call: [phone] (toll free)               Medications           Message regarding Medications     Verify the changes and/or additions to your medication regime listed below are the same as discussed with your clinician today.  If any of these changes or additions are incorrect, please notify your healthcare provider.        START taking these NEW medications      "   Refills    ciprofloxacin HCl (CIPRO) 500 MG tablet 0    Sig: Take 1 tablet (500 mg total) by mouth once daily at 6am.    Class: Normal    Route: Oral           Verify that the below list of medications is an accurate representation of the medications you are currently taking.  If none reported, the list may be blank. If incorrect, please contact your healthcare provider. Carry this list with you in case of emergency.           Current Medications     allopurinol (ZYLOPRIM) 100 MG tablet Take 1 tablet (100 mg total) by mouth once daily.    aspirin 81 MG Chew Take 1 tablet (81 mg total) by mouth once daily.    atorvastatin (LIPITOR) 80 MG tablet Take 1 tablet (80 mg total) by mouth once daily.    azelastine (ASTELIN) 137 mcg (0.1 %) nasal spray 1 spray (137 mcg total) by Nasal route 2 (two) times daily.    calcitRIOL (ROCALTROL) 0.5 MCG Cap Take 0.5 mcg by mouth once daily.    carvedilol (COREG) 3.125 MG tablet Take 1 tablet (3.125 mg total) by mouth 2 (two) times daily with meals.    catheter 16-16 Fr-" Misc 1 Units by Misc.(Non-Drug; Combo Route) route once daily.    ciprofloxacin HCl (CIPRO) 500 MG tablet Take 1 tablet (500 mg total) by mouth once daily at 6am.    collagenase (SANTYL) ointment Apply topically once daily. Apply to wound daily as directed.    ferrous sulfate 324 mg (65 mg iron) TbEC Take 1 tablet (325 mg total) by mouth 3 (three) times daily.    folic acid-vit B6-vit B12 (FOLBEE) 2.5-25-1 mg Tab Take 1 tablet by mouth once daily.    gabapentin (NEURONTIN) 300 MG capsule Take 1 capsule (300 mg total) by mouth 2 (two) times daily.    hydrocodone-acetaminophen 10-325mg (NORCO)  mg Tab Take 1 tablet by mouth 4 (four) times daily as needed.    hydroxychloroquine (PLAQUENIL) 200 mg tablet Take 1 tablet (200 mg total) by mouth 2 (two) times daily.    LACTOBACILLUS ACIDOPHILUS (PROBIOTIC ORAL) Take 1 capsule by mouth once daily.     nitroGLYCERIN (NITROSTAT) 0.3 MG SL tablet Place 1 tablet (0.3 mg " "total) under the tongue every 5 (five) minutes as needed for Chest pain.    nystatin-triamcinolone (MYCOLOG II) cream Apply topically 4 (four) times daily.    omeprazole (PRILOSEC) 20 MG capsule Take 20 mg by mouth once daily.    omeprazole (PRILOSEC) 40 MG capsule     polyethylene glycol (GLYCOLAX) 17 gram PwPk Take 17 g by mouth once daily.    sodium bicarbonate 650 MG tablet Take 1 tablet (650 mg total) by mouth 2 (two) times daily.    triamcinolone acetonide 0.1% (KENALOG) 0.1 % cream Apply topically 2 (two) times daily. Apply to affected area twice daily as directed.    vitamin D 1000 units Tab Take 2,000 Units by mouth once daily.            Clinical Reference Information           Your Vitals Were     BP Pulse Height Weight BMI    145/59 (BP Location: Right arm, Patient Position: Sitting, BP Method: Automatic) 71 5' 10" (1.778 m) 81.6 kg (180 lb) 25.83 kg/m2      Blood Pressure          Most Recent Value    BP  (!)  145/59      Allergies as of 5/2/2017     Ditropan [Oxybutynin Chloride]    Keflex [Cephalexin]    Macrobid [Nitrofurantoin Monohyd/m-cryst]      Immunizations Administered on Date of Encounter - 5/2/2017     None      Orders Placed During Today's Visit     Future Labs/Procedures Expected by Expires    CBC auto differential  5/2/2017 5/2/2018    Comprehensive metabolic panel  5/2/2017 5/2/2018      Smoking Cessation     If you would like to quit smoking:   You may be eligible for free services if you are a [place] resident and started smoking cigarettes before September 1, 1988.  Call the Smoking Cessation [employer] ([facility]) toll free at [phone] or [phone] [phone].   Call 4-334-QUIT-NOW if you do not meet the above criteria.   Contact us via email: [email or web address]   View our website for more information: [email or web address]        Language Assistance Services     ATTENTION: Language assistance services are available, free of charge. Please call [phone].      ATENCIÓN: Si " habla español, tiene a lawrence disposición servicios gratuitos de asistencia lingüística. Angelica al [phone].     JHONATAN Ý: N?u b?n nói Ti?ng Vi?t, có các d?ch v? h? tr? ngôn ng? mi?n phí dành cho b?n. G?i s? [phone].         [facility] 4th [facility] complies with applicable Federal civil rights laws and does not discriminate on the basis of race, color, national origin, age, disability, or sex.

## 2017-05-02 NOTE — PROGRESS NOTES
CC: fistula    Humphrey Machado is a 85 y.o. man who is here for the evaluation of recurrent fistula and pain.  pt was referred by Sowmya Rincon for recurrent fistula and severe pain.  C/o recurrent urine leak thru right inner groin area.    history of prostate CA, kidney CA (s/p (L) nephrectomy), urethral strictures urinary incontinence s/p radiation therapy several years ago.  He has history of CKD IV, AS, chronic UTI, chronic venous insufficiency, and inflammatory arthritis (on long term plaquenil and prednisone)   He is being followed by ID for Chronic osteomyelitis of the left hip/symphysis pubis. IR cultures of bone were negative. Pathology showed chronic osteomyelitis.   He was diagnosed with Perineal abscess by his PCP.  12/27/2017 seen in clinic and I placed packing in draining right thigh abscess.  Aerobic Bacterial Culture KLEBSIELLA PNEUMONIAE ESBL Moderate  This believed to be colonized.     He saw ID and wound care on 02/15/2017;  Groin abscess had healed up.  On 3/10/2017 at visit he noticed some clear drainage coming from top part of his right leg.   Home health came today to change dressing and noticed it; now drainage has yellow tint from AZO  He denies any pustular or pain from site.     He had been having the packing changed by HH and unable to collect fluid from site.  04/12/2017 last clinic visit was unable to collect fluid.     Here today with c/o for discomfort on the inside.   Discomfort/raw feeling inside groin and penis.  His montano was changed ~ 10 days ago.  He tells me today home health was able to collect some fluid he was told it was fluid.    Denies fever or chills.  Has an indwelling montano which was changed 2 wks ago.     Past Medical History:   Diagnosis Date    *Atrial fibrillation     Acute appendicitis 2/19/2015    Anemia     Anxiety     Arthritis     generalized    Basal cell carcinoma 01/2013    right temple    BCC (basal cell carcinoma)     right mid forearm    Bladder  stone 6/28/2016    Blood transfusion     Chronic urethral stricture 10/14/2015    Chronic venous insufficiency 7/8/2013    CKD (chronic kidney disease) stage 3, GFR 30-59 ml/min 9/6/2012    Coronary artery disease     Elevated PSA     Essential hypertension 7/30/2015    Hematuria, gross     History of Left Nephrectomy (~2006) 1/29/2014    Done at [facility].     Hypertension     Inflammatory arthritis 8/14/2012    Kidney stone     Long-term use of Plaquenil 6/4/2015    Mixed incontinence urge and stress 4/11/2014    Nonrheumatic aortic valve stenosis 5/30/2016    NSTEMI (non-ST elevated myocardial infarction) 5/30/2016    Olecranon bursitis 1/14/2013    Osteopenia 8/14/2012    Personal history of kidney cancer 4/11/2014    Prostate cancer     Radiation     treatment for prostate cancer    Recurrent UTI 7/8/2013    S/P radiation therapy 4/11/2014    Skin cancer of arm     left leg (malignant)    Solitary kidney 7/15/2013    Venous insufficiency of leg 7/12/2012    Venous stasis ulcers 7/6/2012    Vitamin D deficiency disease 5/8/2013     Past Surgical History:   Procedure Laterality Date    CYSTOSCOPY      with dialation    NEPHRECTOMY  2006-07?    Removal of left kidney    TONSILLECTOMY       Social History   Substance Use Topics    Smoking status: Current Every Day Smoker     Years: 40.00     Types: Cigars    Smokeless tobacco: Current User      Comment: pt smokes 3 cigars a day pt will make up his mind when his ready- did give up smoking cigarettes at age 35yrs smoked from 18 - 35 years    Alcohol use 0.6 oz/week     1 Glasses of wine per week      Comment: stop drinking 09/2009. 1 glass of wine at bedtime      Family History   Problem Relation Age of Onset    Cancer Mother     Heart disease Father     Urolithiasis Father     Mental illness Daughter     Cancer Brother      prostate cancer, (Ervin) removed by surgery    Cancer Brother      prostate cancer    Cancer Maternal  "Aunt     Melanoma Neg Hx     Lupus Neg Hx     Rheum arthritis Neg Hx      Allergy:  Review of patient's allergies indicates:   Allergen Reactions    Ditropan [oxybutynin chloride]      Unable to describe side effect other than "felt strange"     Keflex [cephalexin] Rash     Morbilliform  Has tolerated multiple cephalosporins since 2013.    Macrobid [nitrofurantoin monohyd/m-cryst] Hives and Rash     Outpatient Encounter Prescriptions as of 5/2/2017   Medication Sig Dispense Refill    allopurinol (ZYLOPRIM) 100 MG tablet Take 1 tablet (100 mg total) by mouth once daily. 30 tablet 4    aspirin 81 MG Chew Take 1 tablet (81 mg total) by mouth once daily.  0    atorvastatin (LIPITOR) 80 MG tablet Take 1 tablet (80 mg total) by mouth once daily. 30 tablet 11    azelastine (ASTELIN) 137 mcg (0.1 %) nasal spray 1 spray (137 mcg total) by Nasal route 2 (two) times daily. 30 mL 1    calcitRIOL (ROCALTROL) 0.5 MCG Cap Take 0.5 mcg by mouth once daily.      carvedilol (COREG) 3.125 MG tablet Take 1 tablet (3.125 mg total) by mouth 2 (two) times daily with meals. 60 tablet 11    catheter 16-16 Fr-" Misc 1 Units by Misc.(Non-Drug; Combo Route) route once daily. 100 each 11    collagenase (SANTYL) ointment Apply topically once daily. Apply to wound daily as directed. 90 g 0    ferrous sulfate 324 mg (65 mg iron) TbEC Take 1 tablet (325 mg total) by mouth 3 (three) times daily.  0    folic acid-vit B6-vit B12 (FOLBEE) 2.5-25-1 mg Tab Take 1 tablet by mouth once daily. 30 each 5    gabapentin (NEURONTIN) 300 MG capsule Take 1 capsule (300 mg total) by mouth 2 (two) times daily. 60 capsule 3    hydrocodone-acetaminophen 10-325mg (NORCO)  mg Tab Take 1 tablet by mouth 4 (four) times daily as needed. 120 tablet 0    hydroxychloroquine (PLAQUENIL) 200 mg tablet Take 1 tablet (200 mg total) by mouth 2 (two) times daily. 60 tablet 2    LACTOBACILLUS ACIDOPHILUS (PROBIOTIC ORAL) Take 1 capsule by mouth once daily. "       nitroGLYCERIN (NITROSTAT) 0.3 MG SL tablet Place 1 tablet (0.3 mg total) under the tongue every 5 (five) minutes as needed for Chest pain. 30 tablet 11    nystatin-triamcinolone (MYCOLOG II) cream Apply topically 4 (four) times daily. 60 g 2    omeprazole (PRILOSEC) 20 MG capsule Take 20 mg by mouth once daily.      omeprazole (PRILOSEC) 40 MG capsule       polyethylene glycol (GLYCOLAX) 17 gram PwPk Take 17 g by mouth once daily. 30 packet 0    sodium bicarbonate 650 MG tablet Take 1 tablet (650 mg total) by mouth 2 (two) times daily. 60 tablet 11    triamcinolone acetonide 0.1% (KENALOG) 0.1 % cream Apply topically 2 (two) times daily. Apply to affected area twice daily as directed. 454 g 0    vitamin D 1000 units Tab Take 2,000 Units by mouth once daily.       ciprofloxacin HCl (CIPRO) 500 MG tablet Take 1 tablet (500 mg total) by mouth once daily at 6am. 14 tablet 0    oxycodone-acetaminophen (PERCOCET) 5-325 mg per tablet Take 1 tablet by mouth every 6 (six) hours as needed for Pain. 30 tablet 0     No facility-administered encounter medications on file as of 5/2/2017.      Review of Systems   General ROS: GENERAL:  No weight gain or loss  SKIN:  No rashes or lacerations  HEAD:  No headaches  EYES:  No exophthalmos, jaundice or blindness  EARS:  No dizziness, tinnitus or hearing loss  NOSE:  No changes in smell  MOUTH & THROAT:  No dyskinetic movements or obvious goiter  CHEST:  No shortness of breath, hyperventilation or cough  CARDIOVASCULAR:  No tachycardia or chest pain  ABDOMEN:  No nausea, vomiting, pain, constipation or diarrhea  URINARY:  No frequency, dysuria or sexual dysfunction  ENDOCRINE:  No polydipsia, polyuria  MUSCULOSKELETAL:  No pain or stiffness of the joints  NEUROLOGIC:  No weakness, sensory changes, seizures, confusion, memory loss, tremor or other abnormal movements  Physical Exam     Vitals:    05/02/17 1310   BP: (!) 145/59   Pulse: 71     General Appearance:  Alert,  cooperative, no distress, appears stated age   Head:  Normocephalic, without obvious abnormality, atraumatic   Eyes:  PERRL, conjunctiva/corneas clear, EOM's intact, fundi benign, both eyes   Ears:  Normal TM's and external ear canals, both ears   Nose: Nares normal, septum midline, mucosa normal, no drainage or sinus tenderness   Throat: Lips, mucosa, and tongue normal; teeth and gums normal   Neck: Supple, symmetrical, trachea midline, no adenopathy, thyroid: not enlarged, symmetric, no tenderness/mass/nodules, no carotid bruit or JVD   Back:   Symmetric, no curvature, ROM normal, no CVA tenderness   Lungs:   Clear to auscultation bilaterally, respirations unlabored   Chest Wall:  No tenderness or deformity   Heart:  Regular rate and rhythm, S1, S2 normal, no murmur, rub or gallop   Abdomen:   Soft, non-tender, bowel sounds active all four quadrants,  no masses, no organomegaly of liver and spleen  No hernia noted   Genitalia:  Scrotum: no rash or lesion  Normal symmetric epididymis without masses  Normal vas palpated  Normal size, symmetric testicles with no masses   Normal urethral meatus with no discharge  Normal circumcised penis with no lesion  16 F Stevens catheter in place.  Overgrown Granulation tissues noted in the right upper inner thigh near the groin area, where some urine leaks noted.  No cellulitis noted.   Rectal:  Normal perineum and anus upon inspection.  Normal tone, no masses or tenderness;   Extremities: Extremities normal, atraumatic, no cyanosis or edema   Pulses: 2+ and symmetric   Skin: Skin color, texture, turgor normal, no rashes or lesions   Lymph nodes: Cervical, supraclavicular, and axillary nodes normal   Neurologic: Normal         LABS:  Lab Results   Component Value Date    PSA 1.8 05/27/2016    PSA 1.35 09/15/2012    PSA 1.52 06/13/2012    PSA 0.95 09/15/2010    PSA 1.2 07/22/2010    PSA 0.90 04/15/2009    PSA 0.3 02/11/2008    PSA 0.4 04/30/2007    PSA 0.4 01/22/2007    PSADIAG 1.4  04/11/2014     Results for orders placed or performed in visit on 04/11/14   Prostate Specific Antigen, Diagnostic   Result Value Ref Range    PSA DIAGNOSTIC 1.4 0.00 - 4.00 ng/mL   Results for orders placed or performed in visit on 06/13/12   Prostate Specific Antigen, Diagnostic   Result Value Ref Range    PSA, SCREEN 1.52 0 - 4 ng/ml     *Note: Due to a large number of results and/or encounters for the requested time period, some results have not been displayed. A complete set of results can be found in Results Review.     Lab Results   Component Value Date    CREATININE 2.5 (H) 02/21/2017    CREATININE 2.5 (H) 02/21/2017    CREATININE 3.0 (H) 11/07/2016     No results found. However, due to the size of the patient record, not all encounters were searched. Please check Results Review for a complete set of results.  Urine Culture, Routine   Date Value Ref Range Status   10/01/2016   Final    Multiple organisms isolated. None in predominance.  Repeat if   10/01/2016 clinically necessary.  Final     Assessment and Plan:  Diagnoses and all orders for this visit:    Urethral fistula  -     APTT; Future  -     Protime-INR; Future  -     EKG 12-lead; Future  -     oxycodone-acetaminophen (PERCOCET) 5-325 mg per tablet; Take 1 tablet by mouth every 6 (six) hours as needed for Pain.    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  -     CBC auto differential; Future  -     Comprehensive metabolic panel; Future    Hematuria, microscopic  -     APTT; Future  -     Protime-INR; Future    S/p nephrectomy    CKD (chronic kidney disease) stage 3, GFR 30-59 ml/min    Chronic kidney disease, stage 4, severely decreased GFR    s/pleft nephrectomy.    despite indwelling urethral catheter, he is still leaking urine thru the right inner thigh.  Not sure where his fistula is involved at present time.  Will cover him with daily cipro based on  his previous culture.  OK to use pain meds and stool softners.  Schedule cystoscopy, RUG, Cystogram, RPG, fistulogram to evaluate his fistula.  He may need right nephrostomy tube placement in order to divert his urine until his fistula heals.  I spent 40 minutes with the patient of which more than half was spent in direct consultation with the patient in regards to our treatment and plan.  Stop ASA 1 wk before    Follow-up:  Return for cystoscopy, RUG, Cystogram, RPG, fistulogram to evaluate his fistula..

## 2017-05-03 ENCOUNTER — ANESTHESIA EVENT (OUTPATIENT)
Dept: SURGERY | Facility: HOSPITAL | Age: 82
DRG: 871 | End: 2017-05-03
Payer: MEDICARE

## 2017-05-03 ENCOUNTER — TELEPHONE (OUTPATIENT)
Dept: INTERNAL MEDICINE | Facility: CLINIC | Age: 82
End: 2017-05-03

## 2017-05-03 ENCOUNTER — OFFICE VISIT (OUTPATIENT)
Dept: INTERNAL MEDICINE | Facility: CLINIC | Age: 82
End: 2017-05-03
Payer: MEDICARE

## 2017-05-03 VITALS
BODY MASS INDEX: 25.41 KG/M2 | SYSTOLIC BLOOD PRESSURE: 116 MMHG | HEART RATE: 73 BPM | DIASTOLIC BLOOD PRESSURE: 76 MMHG | HEIGHT: 70 IN | WEIGHT: 177.5 LBS

## 2017-05-03 DIAGNOSIS — D63.1 ANEMIA, CHRONIC RENAL FAILURE, STAGE 4 (SEVERE): ICD-10-CM

## 2017-05-03 DIAGNOSIS — N18.4 ANEMIA, CHRONIC RENAL FAILURE, STAGE 4 (SEVERE): ICD-10-CM

## 2017-05-03 DIAGNOSIS — S31.109D WOUND OF RIGHT GROIN, SUBSEQUENT ENCOUNTER: Primary | ICD-10-CM

## 2017-05-03 DIAGNOSIS — I35.0 NONRHEUMATIC AORTIC VALVE STENOSIS: ICD-10-CM

## 2017-05-03 DIAGNOSIS — R10.2 CHRONIC PELVIC PAIN IN MALE: ICD-10-CM

## 2017-05-03 DIAGNOSIS — N18.4 CKD (CHRONIC KIDNEY DISEASE), STAGE 4 (SEVERE): ICD-10-CM

## 2017-05-03 DIAGNOSIS — G89.29 CHRONIC PELVIC PAIN IN MALE: ICD-10-CM

## 2017-05-03 DIAGNOSIS — I87.2 CHRONIC VENOUS INSUFFICIENCY: ICD-10-CM

## 2017-05-03 DIAGNOSIS — M86.652: ICD-10-CM

## 2017-05-03 DIAGNOSIS — I25.10 CORONARY ARTERY DISEASE INVOLVING NATIVE CORONARY ARTERY OF NATIVE HEART WITHOUT ANGINA PECTORIS: ICD-10-CM

## 2017-05-03 PROCEDURE — 99999 PR PBB SHADOW E&M-EST. PATIENT-LVL III: CPT | Mod: PBBFAC,,, | Performed by: INTERNAL MEDICINE

## 2017-05-03 PROCEDURE — 99214 OFFICE O/P EST MOD 30 MIN: CPT | Mod: S$PBB,,, | Performed by: INTERNAL MEDICINE

## 2017-05-03 PROCEDURE — 99213 OFFICE O/P EST LOW 20 MIN: CPT | Mod: PBBFAC,PO | Performed by: INTERNAL MEDICINE

## 2017-05-03 NOTE — MR AVS SNAPSHOT
[facility] [address]  [address]  [address]  [facility] [address]  Phone: [phone]                  Humphrey Machado   5/3/2017 11:30 AM   Office Visit    Description:  Male : 1931   Provider:  Andrew Murray MD   Department:  [facility] [address]           Reason for Visit     Pre-op Exam           Diagnoses this Visit        Comments    Wound of right groin, subsequent encounter    -  Primary     Chronic pelvic pain in male         CKD (chronic kidney disease), stage 4 (severe)         Anemia, chronic renal failure, stage 4 (severe)         Chronic osteomyelitis of pelvic region, left         Nonrheumatic aortic valve stenosis         Chronic venous insufficiency         Coronary artery disease involving native coronary artery of native heart without angina pectoris                To Do List           Future Appointments        Provider Department Dept Phone    2017 9:00 AM Esha Simon MD [facility] - Pre Op Consult [phone]    2017 10:20 AM Seth Abraham MD [facility] - Urology 4th Floor [phone]    2017 2:00 PM Brit Ramirez NP [facility] - Wound Care [phone]    2017 1:30 PM [facility] [phone]      Your Future Surgeries/Procedures     May 17, 2017   Surgery with Seth Abraham MD   [facility])    [address] [facility] [address] [phone]              Goals (5 Years of Data)     None      [facility]     [facility] Nurse Care Line - 24/ Assistance  Unless otherwise directed by your provider, please contact [facility], our nurse care line that is available for 24/ assistance.     Registered nurses in the [facility] provide: appointment scheduling, clinical advisement, health education, and other advisory services.  Call: [phone] (toll free)               Medications  "          Message regarding Medications     Verify the changes and/or additions to your medication regime listed below are the same as discussed with your clinician today.  If any of these changes or additions are incorrect, please notify your healthcare provider.             Verify that the below list of medications is an accurate representation of the medications you are currently taking.  If none reported, the list may be blank. If incorrect, please contact your healthcare provider. Carry this list with you in case of emergency.           Current Medications     allopurinol (ZYLOPRIM) 100 MG tablet Take 1 tablet (100 mg total) by mouth once daily.    aspirin 81 MG Chew Take 1 tablet (81 mg total) by mouth once daily.    atorvastatin (LIPITOR) 80 MG tablet Take 1 tablet (80 mg total) by mouth once daily.    calcitRIOL (ROCALTROL) 0.5 MCG Cap Take 0.5 mcg by mouth once daily.    carvedilol (COREG) 3.125 MG tablet Take 1 tablet (3.125 mg total) by mouth 2 (two) times daily with meals.    catheter 16-16 Fr-" Misc 1 Units by Misc.(Non-Drug; Combo Route) route once daily.    ciprofloxacin HCl (CIPRO) 500 MG tablet Take 1 tablet (500 mg total) by mouth once daily at 6am.    ferrous sulfate 324 mg (65 mg iron) TbEC Take 1 tablet (325 mg total) by mouth 3 (three) times daily.    folic acid-vit B6-vit B12 (FOLBEE) 2.5-25-1 mg Tab Take 1 tablet by mouth once daily.    gabapentin (NEURONTIN) 300 MG capsule Take 1 capsule (300 mg total) by mouth 2 (two) times daily.    hydrocodone-acetaminophen 10-325mg (NORCO)  mg Tab Take 1 tablet by mouth 4 (four) times daily as needed.    hydroxychloroquine (PLAQUENIL) 200 mg tablet Take 1 tablet (200 mg total) by mouth 2 (two) times daily.    LACTOBACILLUS ACIDOPHILUS (PROBIOTIC ORAL) Take 1 capsule by mouth once daily.     nitroGLYCERIN (NITROSTAT) 0.3 MG SL tablet Place 1 tablet (0.3 mg total) under the tongue every 5 (five) minutes as needed for Chest pain.    omeprazole " "(PRILOSEC) 40 MG capsule     oxycodone-acetaminophen (PERCOCET) 5-325 mg per tablet Take 1 tablet by mouth every 6 (six) hours as needed for Pain.    sodium bicarbonate 650 MG tablet Take 1 tablet (650 mg total) by mouth 2 (two) times daily.    triamcinolone acetonide 0.1% (KENALOG) 0.1 % cream Apply topically 2 (two) times daily. Apply to affected area twice daily as directed.    vitamin D 1000 units Tab Take 2,000 Units by mouth once daily.            Clinical Reference Information           Your Vitals Were     BP Pulse Height Weight BMI    116/76 73 5' 10" (1.778 m) 80.5 kg (177 lb 8 oz) 25.47 kg/m2      Blood Pressure          Most Recent Value    BP  116/76      Allergies as of 5/3/2017     Ditropan [Oxybutynin Chloride]    Keflex [Cephalexin]    Macrobid [Nitrofurantoin Monohyd/m-cryst]      Immunizations Administered on Date of Encounter - 5/3/2017     None      Smoking Cessation     If you would like to quit smoking:   You may be eligible for free services if you are a [place] resident and started smoking cigarettes before September 1, 1988.  Call the Smoking Cessation [employer] ([facility]) toll free at ([phone] or [phone] [phone].   Call 1-800-QUIT-NOW if you do not meet the above criteria.   Contact us via email: [email or web address]   View our website for more information: [email or web address]        Language Assistance Services     ATTENTION: Language assistance services are available, free of charge. Please call [phone].      ATENCIÓN: Si habla español, tiene a lawrence disposición servicios gratuitos de asistencia lingüística. Llame al [phone].     [facility] Ý: N?u b?n nói Ti?ng Vi?t, có các d?ch v? h? tr? ngôn ng? mi?n phí dành cho b?n. G?i s? [phone].         [facility] [address] complies with applicable Federal civil rights laws and does not discriminate on the basis of race, color, national origin, age, disability, or sex.        "

## 2017-05-03 NOTE — PROGRESS NOTES
REASON FOR VISIT:  This is an 85-year-old male.  This appointment was   established because of not seeing him for a while and he feels that his current   pain medications are not helping for his chronic pelvic pain.  He is on   hydrocodone.  Initially, he was on hydrocodone and acetaminophen 5/325 mg 3-4 a   day, then in December was changed to 10/325 mg, but he also lately finds that he   needs to take at least four maybe six to help with the pain.  He has chronic   pelvic pain from having osteomyelitis involving his left pelvic pubic symphysis   area as well as chronic groin abscess fistula on the right.  His circumstance is   that he has a chronic draining abscess or wound in his right inner groin near   the pubic region, which is draining urine.  It is suspected that there may be a   fistula to the bladder.  He has a chronic indwelling Stevens and has some leakage   outside the catheter.  He previously had this groin perineal abscess incised and   drained.    PAST MEDICAL HISTORY:  Hypertension.  Coronary artery disease with previous non-ST MI.  Chronic kidney disease stage IV.  Moderate aortic stenosis.  Chronic venous insufficiency of the legs.  Inflammatory arthritis.  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  Renal cell carcinoma with left nephrectomy.  Chronic osteomyelitis involving the left pelvic bone, treated with antibiotics.   Last indium scan 11/2016 was negative  Episode of a GI bleed in August 2016 and developed acute anemia.  He has had a previous suprapubic catheter that came out.  Decubitus ulcerations that involves the right lateral calf, right lateral ankle,   right lateral foot, and left heel being followed by the Wound Care, Melani Ramirez and being wrapped every few days a week.    MEDICATIONS:  Allopurinol 100 mg a day.  Aspirin 81 mg a day.  Atorvastatin 80 mg a day.  Calcitriol 0.5 mcg once a day.  Carvedilol  3.125 mg twice a day.  Ferrous sulfate 324 mg twice a day.  Folbee once a day.  Gabapentin 300 mg twice a day,  Plaquenil 200 mg twice a day.  Probiotic.  Omeprazole 40 mg a day.  Sodium bicarbonate 650 mg twice a day.  Vitamin D 2000 units a day.  And then yesterday, doctor prescribed oxycodone and acetaminophen 5/325 mg every   6 hours and a prescription for ciprofloxacin was given.    Review of studies yesterday, electrocardiogram revealed a normal sinus rhythm   with first-degree AV block, LVH voltage criteria and inferior infarct pattern,   no change from August 2016. Other than that, the nonspecific T-wave abnormality   is no longer evident in anterior leads.  Last 2D echo May 2016 shows ejection   fraction 55%, diastolic dysfunction and moderate aortic stenosis with a valve   area of 1.19.  Labs yesterday, PT, PTT was normal.  White count 11.69,   hematocrit 28.4, a little bit better compared to four months ago.  Chemistry was   normal other than creatinine 2.7, CO2 19, albumin 2.8.g    REVIEW OF SYSTEMS:  He reports no pains in the chest will have some dyspnea at   times, specifically moving.  No abdominal pain other than sensitivity in his   lower abdomen.  He does have constipation, but he takes a stool softener and at   times he could have a bowel movement almost every day or every other day.  He   does not see rectal bleeding.    PHYSICAL EXAMINATION:  GENERAL:  Today, he stands up and sits down a lot because of the pain in his   pelvis.  His weight today is recorded at 177 pounds, pulse rate 72, blood   pressure taken by me 120/72, and there was a pulse ox of 96%.  LUNGS:  Clear.  HEART:  Regular rate and rhythm, II/VI systolic murmur at the base that radiates   to carotids and apex.  Normal S2.  ABDOMEN:  Standing up was soft, nontender.  RECTAL:  Digital rectal exam.  Stool was brown, but heme-positive.  No external   lesions.  EXTREMITIES:  He does have edema involving his lower extremities, but the  legs   are wrapped all the way to the knees.    IMPRESSION:  1.  Chronic perineal wound with possible fistula.  2.  Chronic pelvic pain.  3.  Chronic kidney disease, stage IV, bordering on stage V.  4.  Anemia of chronic disease.  5.  Aortic stenosis.  6.  Hypertension.  7.  Chronic venous insufficiency.  8.  Coronary artery disease.    PLAN:    His surgery is scheduled on May 17th.  He actually has an appointment at the   [facility] on May 12th, which I feel is appropriate, and which at that time, I   am going to recommend while he is there to get a chest x-ray, or we can do it   today, to repeat a CBC, basic metabolic profile, and consider getting a BNP.    He has already received oxycodone 5/325 and Percocet for pain and he will   continue with that.    On the morning of surgery, he can take his carvedilol and sodium bicarbonate and   resume the other ones postop.   He can tell that there is a difference in the pain and tingling in his legs if   he does not take his gabapentin, so I am going to recommend that he try taking   one three times a day.          /areli [id number] review        STACEY/JEFF  dd: 05/03/2017 12:41:48 (CDT)  td: 05/04/2017 06:27:18 (CDT)  Doc ID   #[id number]  Job ID #[id number]    CC:

## 2017-05-03 NOTE — TELEPHONE ENCOUNTER
SHARAN Morales with [facility] stated they went to do the pt wound care at the pt home  but wasn't able to because he had multiple  appts on Monday wasn't available

## 2017-05-03 NOTE — TELEPHONE ENCOUNTER
----- Message from Gabriel Polanco sent at 5/3/2017 12:40 PM CDT -----  Contact: Andrew/ [facility] [employer] [facility]/ [phone] cell  Monday the pt had multiple doctors appts and was not available for his home health session.  Please call and advise.    Thank you

## 2017-05-03 NOTE — ANESTHESIA PREPROCEDURE EVALUATION
Anesthesia Assessment: Preoperative EQUATION    Planned Procedure: Procedure(s) (LRB):  CYSTOSCOPY/CYSTOGRAM (N/A)  URETHROGRAM-RETROGRADE (N/A)  FISTULOGRAM (N/A)  Requested Anesthesia Type:General  Surgeon: Seth Abraham MD  Service: Urology  Known or anticipated Date of Surgery:5/17/2017    Surgeon notes: reviewed    Triage considerations:     The patient has no apparent active cardiac condition (No unstable coronary Syndrome such as severe unstable angina or recent [<1 month] myocardial infarction, decompensated CHF, severe valvular   disease or significant arrhythmia)    Previous anesthesia records:GETA Grade I (Per [facility]); Complicating Factors: Poor neck/head extension    Last PCP note: within 1 month , within [facility]   Subspecialty notes: Hematology/Oncology, Rheumatology, infectious disease    Other important co-morbidities:      Tests already available:  5/2/17 PT/INR, CMP,CBC, EKG          Instructions given. (See in Nurse's note)    Optimization:  Anesthesia Preop Clinic Assessment  Indicated    Medical Opinion Indicated  DR OLEARY         Plan:        Consultation PERIOPERATIVE INTERNAL MEDICINE     Patient  has previously scheduled Medical Appointment:5/3 AWAITING CLEARANCE FROM DR CRUZ    Navigation: Tests Scheduled.              Consults scheduled.             Results will be tracked by Preop Clinic.                 Straight Line to surgery.               No tests, anesthesia preop clinic visit,                                                                                                         05/03/2017  Humphrey Machado is a 85 y.o., male.    Anesthesia Evaluation    I have reviewed the Patient Summary Reports.    I have reviewed the Nursing Notes.      Review of Systems  Anesthesia Hx:  No problems with previous Anesthesia   Denies Personal Hx of Anesthesia complications.   Social:  Smoker, No Alcohol Use    Hematology/Oncology:         -- Anemia: Iron Deficiency Anemia --  Cancer  in past history: s/p radiation therapy   Oncology Comments: Basal cell CA, prostate, renal     Cardiovascular:   Hypertension, well controlled Valvular problems/Murmurs (moderate, VARSHA= 1.06 cm2 (5/28/16)), AS Past MI (NSTEMI) CAD   PVD ECG has been reviewed.  Carotoid Artery Disease    Pulmonary:   Shortness of breath    Renal/:   Chronic Renal Disease renal calculi S/p nephrectomy Kidney Function/Disease, Single Kidney, Chronic Kidney Disease (CKD) , CKD Stage III (GFR 30-59)    Hepatic/GI:  Fistula   Musculoskeletal:   Arthritis     Dermatological:   Venous stasis ulcer Skin Symptoms/Problems: Ulcer of calf, R.foot,ankle, due to venous insufficency  Psych:   anxiety  Anxiety Disorder and Acute Anxiety.          Physical Exam  General:  Well nourished    Airway/Jaw/Neck:  Airway Findings: Mouth Opening: Normal Tongue: Normal  Mallampati: III  TM Distance: Normal, at least 6 cm  Jaw/Neck Findings:  Neck ROM: Extension Decreased, Mod., Extension Painful  Neck Findings:      Dental:  Dental Findings: In tact, molar caps        Mental Status:  Mental Status Findings:  Alert and Oriented, Cooperative     DELFINO BARRIOS 5/4/17    Anesthesia Plan  Type of Anesthesia, risks & benefits discussed:  Anesthesia Type:  general, MAC  Patient's Preference:   Intra-op Monitoring Plan: standard ASA monitors  Intra-op Monitoring Plan Comments:   Post Op Pain Control Plan:   Post Op Pain Control Plan Comments:   Induction:   IV  Beta Blocker:  Patient is on a Beta-Blocker and has received one dose within the past 24 hours (No further documentation required).       Informed Consent: Patient understands risks and agrees with Anesthesia plan.  Questions answered. Anesthesia consent signed with patient.  ASA Score: 3     Day of Surgery Review of History & Physical:    H&P update referred to the surgeon.         Ready For Surgery From Anesthesia Perspective.

## 2017-05-11 DIAGNOSIS — N18.4 CHRONIC KIDNEY DISEASE, STAGE 4, SEVERELY DECREASED GFR: Primary | ICD-10-CM

## 2017-05-11 RX ORDER — DOCUSATE SODIUM 100 MG/1
CAPSULE, LIQUID FILLED ORAL 2 TIMES DAILY
COMMUNITY

## 2017-05-12 ENCOUNTER — INITIAL CONSULT (OUTPATIENT)
Dept: INTERNAL MEDICINE | Facility: CLINIC | Age: 82
End: 2017-05-12
Payer: MEDICARE

## 2017-05-12 ENCOUNTER — OFFICE VISIT (OUTPATIENT)
Dept: UROLOGY | Facility: CLINIC | Age: 82
End: 2017-05-12
Payer: MEDICARE

## 2017-05-12 VITALS — DIASTOLIC BLOOD PRESSURE: 61 MMHG | SYSTOLIC BLOOD PRESSURE: 130 MMHG | HEART RATE: 68 BPM

## 2017-05-12 VITALS
WEIGHT: 179.25 LBS | BODY MASS INDEX: 25.66 KG/M2 | HEART RATE: 80 BPM | DIASTOLIC BLOOD PRESSURE: 67 MMHG | TEMPERATURE: 100 F | OXYGEN SATURATION: 95 % | SYSTOLIC BLOOD PRESSURE: 122 MMHG | HEIGHT: 70 IN

## 2017-05-12 DIAGNOSIS — Z90.5 S/P NEPHRECTOMY: ICD-10-CM

## 2017-05-12 DIAGNOSIS — D63.8 ANEMIA OF OTHER CHRONIC DISEASE: Primary | Chronic | ICD-10-CM

## 2017-05-12 DIAGNOSIS — N36.0 URETHROCUTANEOUS FISTULA IN MALE: Primary | ICD-10-CM

## 2017-05-12 DIAGNOSIS — I10 ESSENTIAL HYPERTENSION: ICD-10-CM

## 2017-05-12 DIAGNOSIS — M86.69 OTHER CHRONIC OSTEOMYELITIS, MULTIPLE SITES: ICD-10-CM

## 2017-05-12 DIAGNOSIS — N39.0 RECURRENT UTI: Chronic | ICD-10-CM

## 2017-05-12 DIAGNOSIS — N18.4 CHRONIC KIDNEY DISEASE, STAGE 4, SEVERELY DECREASED GFR: ICD-10-CM

## 2017-05-12 DIAGNOSIS — Z72.0 TOBACCO USE: ICD-10-CM

## 2017-05-12 DIAGNOSIS — R77.0 ABNORMAL ALBUMIN: ICD-10-CM

## 2017-05-12 DIAGNOSIS — K59.00 CONSTIPATION, UNSPECIFIED CONSTIPATION TYPE: ICD-10-CM

## 2017-05-12 PROBLEM — M86.9 OSTEOMYELITIS OF MULTIPLE SITES: Status: ACTIVE | Noted: 2017-05-12

## 2017-05-12 PROCEDURE — 99215 OFFICE O/P EST HI 40 MIN: CPT | Mod: 57,S$PBB,, | Performed by: UROLOGY

## 2017-05-12 PROCEDURE — 99999 PR PBB SHADOW E&M-EST. PATIENT-LVL III: CPT | Mod: PBBFAC,,, | Performed by: HOSPITALIST

## 2017-05-12 PROCEDURE — 99212 OFFICE O/P EST SF 10 MIN: CPT | Mod: PBBFAC,27 | Performed by: UROLOGY

## 2017-05-12 PROCEDURE — 99999 PR PBB SHADOW E&M-EST. PATIENT-LVL II: CPT | Mod: PBBFAC,,, | Performed by: UROLOGY

## 2017-05-12 PROCEDURE — 99214 OFFICE O/P EST MOD 30 MIN: CPT | Mod: S$PBB,,, | Performed by: HOSPITALIST

## 2017-05-12 RX ORDER — CALCITRIOL 0.5 UG/1
0.5 CAPSULE ORAL DAILY
OUTPATIENT
Start: 2017-05-12

## 2017-05-12 NOTE — MR AVS SNAPSHOT
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    -  Primary     Chronic kidney disease, stage 4, severely decreased GFR         Solitary kidney         Other chronic osteomyelitis, multiple sites         Abnormal albumin                To Do List           Future Appointments        Provider Department Dept Phone    2017 1:30 PM LAB, [facility] [phone]      Your Future Surgeries/Procedures     May 17, 2017   Surgery with Seth Abraham MD   [facility] ([facility])    [address] [facility] [address] [phone]              Goals (5 Years of Data)     None      Follow-Up and Disposition     Return in about 5 days (around 2017) for RUG, cystogram, cysto RPG, poss ureteral stent, poss. neph tube, replacement of urethral catheter.      [facility]     [facility] Nurse Care Line -  Assistance  Unless otherwise directed by your provider, please contact [facility], our nurse care line that is available for  assistance.     Registered nurses in the [facility] provide: appointment scheduling, clinical advisement, health education, and other advisory services.  Call: [phone] (toll free)               Medications           Message regarding Medications     Verify the changes and/or additions to your medication regime listed below are the same as discussed with your clinician today.  If any of these changes or additions are incorrect, please notify your healthcare provider.             Verify that the below list of medications is an accurate representation of the  "medications you are currently taking.  If none reported, the list may be blank. If incorrect, please contact your healthcare provider. Carry this list with you in case of emergency.           Current Medications     allopurinol (ZYLOPRIM) 100 MG tablet Take 1 tablet (100 mg total) by mouth once daily.    aspirin 81 MG Chew Take 1 tablet (81 mg total) by mouth once daily.    atorvastatin (LIPITOR) 80 MG tablet Take 1 tablet (80 mg total) by mouth once daily.    calcitRIOL (ROCALTROL) 0.5 MCG Cap Take 0.5 mcg by mouth every evening.     carvedilol (COREG) 3.125 MG tablet Take 1 tablet (3.125 mg total) by mouth 2 (two) times daily with meals.    catheter 16-16 Fr-" Misc 1 Units by Misc.(Non-Drug; Combo Route) route once daily.    ciprofloxacin HCl (CIPRO) 500 MG tablet Take 1 tablet (500 mg total) by mouth once daily at 6am.    docusate sodium (COLACE) 100 MG capsule Take by mouth 2 (two) times daily.    ferrous sulfate 324 mg (65 mg iron) TbEC Take 1 tablet (325 mg total) by mouth 3 (three) times daily.    folic acid-vit B6-vit B12 (FOLBEE) 2.5-25-1 mg Tab Take 1 tablet by mouth once daily.    gabapentin (NEURONTIN) 300 MG capsule Take 1 capsule (300 mg total) by mouth 2 (two) times daily.    hydrocodone-acetaminophen 10-325mg (NORCO)  mg Tab Take 1 tablet by mouth 4 (four) times daily as needed.    hydroxychloroquine (PLAQUENIL) 200 mg tablet Take 1 tablet (200 mg total) by mouth 2 (two) times daily.    LACTOBACILLUS ACIDOPHILUS (PROBIOTIC ORAL) Take 1 capsule by mouth every morning.     nitroGLYCERIN (NITROSTAT) 0.3 MG SL tablet Place 1 tablet (0.3 mg total) under the tongue every 5 (five) minutes as needed for Chest pain.    omeprazole (PRILOSEC) 40 MG capsule Take 40 mg by mouth every evening.     oxycodone-acetaminophen (PERCOCET) 5-325 mg per tablet Take 1 tablet by mouth every 6 (six) hours as needed for Pain.    sodium bicarbonate 650 MG tablet Take 1 tablet (650 mg total) by mouth 2 (two) times daily. "    triamcinolone acetonide 0.1% (KENALOG) 0.1 % cream Apply topically 2 (two) times daily. Apply to affected area twice daily as directed.    vitamin D 1000 units Tab Take 2,000 Units by mouth every morning.            Clinical Reference Information           Your Vitals Were     BP Pulse                130/61 68          Blood Pressure          Most Recent Value    BP  130/61      Allergies as of 5/12/2017     Ditropan [Oxybutynin Chloride]    Keflex [Cephalexin]    Macrobid [Nitrofurantoin Monohyd/m-cryst]      Immunizations Administered on Date of Encounter - 5/12/2017     None      Smoking Cessation     If you would like to quit smoking:   You may be eligible for free services if you are a [place] resident and started smoking cigarettes before September 1, 1988.  Call the Smoking Cessation [employer] ([facility]) toll free at [phone] or [phone].   Call 1-800-QUIT-NOW if you do not meet the above criteria.   Contact us via email: [email or web address]   View our website for more information: [email or web address]        Language Assistance Services     ATTENTION: Language assistance services are available, free of charge. Please call [phone].      ATENCIÓN: Si habla español, tiene a lawrence disposición servicios gratuitos de asistencia lingüística. Llame al [phone].     CHÚ Ý: N?u b?n nói Ti?ng Vi?t, có các d?ch v? h? tr? ngôn ng? mi?n phí dành cho b?n. G?i s? [phone].         [facility] 4th [facility] complies with applicable Federal civil rights laws and does not discriminate on the basis of race, color, national origin, age, disability, or sex.

## 2017-05-12 NOTE — PROGRESS NOTES
Chief complaint-Preoperative evaluation , Perioperative Medical management, complication reduction plan     Date of Evaluation- 05/12/2017    PCP-  Andrew Murray MD    Future cases for Humphrey Machado [[id number]]     Case ID Status Date Time Mark Procedure Provider Location    [id number] [facility] 5/17/2017  9:45 AM 75 CYSTOSCOPY/CYSTOGRAM Seth Abraham MD [[id number]] [facility] OR 1ST [facility]          History of present illness- I had the pleasure of meeting this pleasant 85 y.o. gentleman in the pre op clinic prior to his elective Urological surgery. The patient is new to me .    I have obtained the history by speaking to the patient and by reviewing the electronic health records.    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  He has had a recurring abscess of right thigh     Has indwelling urethral catheter despite which he is still leaking urine through the right inner thigh.It is unclear where his fistula is involved at present time and is planned to have  cystoscopy, RUG, Cystogram, RPG, fistulogram to evaluate his fistula.He may need right nephrostomy tube placement in order to divert his urine until his fistula heals.     No much pain from this .No aggravating factors. 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    Recurrent UTI   No recent problem      Solitary kidney ,  S/p left nephrectomy per patient     Anemia of other chronic disease   Had hematology evaluation      Ulcer of both leg     Venous insufficiency of leg          Varicose veins   Getting wound care   Gets home health at the house       Chronic kidney disease, stage 4, severely decreased GFR   Most recent creatinine stable   Suggested avoidance of Chronic NSAID    Hypertension ,On medication  Home  machine readings -    Usual BP readings about 120-130/70    Rheumatoid arthritis   Under control     Moderate  aortic valve stenosis    As per cardiology note admitted 5/27-5/30/16 with right-shoulder pain and ruled in for NSTEMI. Troponin 0.024 -> 0.118 -> 0.100. He was treated with hep gtt for 48h loaded with plavix 300mg. Echo unchanged from prior study. PET stress showed small to moderate sized inferolateral ischemia. Discussed options with patient; in light of no recurrent chest pain and high risk of IGNACIO with CKD and solitary kidney, patient opted to pursue medical therapy with ASA, plavix, lipitor, and coreg (low dose 2/2 bradycardia   No chest pain  Based on certain movement , has Rt shoulder pain,Neck- seldom happens  Had Rt shoulder pain last night and took Nitro that relieved it - none for several months     No CVA,TIA    Active cardiac conditions- none    Revised cardiac risk index predictors- coronary artery disease and creatinine more than 2     Functional capacity -Examples of physical activity - walks in the house ,  can take a flight of stairs holding on to the railing----- He can undertake all the above activities without  chest pain,chest tightness, Shortness of breath ,dizziness,lightheadedness making his exercise tolerance mores  than 4 Mets.   Winded on exertion, not new ,stable over time     Review of symptoms    ROS    Constitutional - No significant weight changes ,low grade fever  Attributes to Norco  Eyes- No new vision changes  ENT- STOP BANG - elevated blood pressure, age over 50, neck circumference over 40 cm and male sex  Body mass index is 25.72 kg/(m^2).  Cardiac-As above   Respiratory-mild clear phlegm- had cold for some time  GI- Bowel movements  diarrhea,  constipation and no recent change in bowel habit   Iron- black stool   No overt GI/blood losses  Urinary bleeding   -No dysuria  MS-As above  Neurologic-No unilateral weakness   Psychiatric- No depression,Anxiety and  no suicidal, homicidal ideation   Endocrine-  Prednisone use for over 3 weeks -no  Hematological/Lymphatic-No spontaneous bruising, bleeding and easy bruising     Past Medical history- reviewed in EPIC    Pertinent negatives-   DVT-  Pulmonary embolism-  Vascular stenting -    Past Surgical history - reviewed in EPIC    Most recent surgery / procedure- EGD Aug 2016   No Anaesthetic,Bleeding ,Cardiac problems with previous surgeries-  No history of delirium -  No history of post operative  nausea, vomiting -    Family history- reviewed in EPIC    Heart disease-father - age of onset -80  Thrombosis- None-  Anaesthetic complications- None-  Diabetes Mellitus - none-    Social history- reviewed in EPIC    Lives with wife .Maid comes 6 days  A week   Home health - 3 days a week  Help available post op     Medications and Allergies - reviewed in EPIC    Exam    Physical Exam  Constitutional- Vitals - Body mass index is 25.72 kg/(m^2).,   Vitals:    05/12/17 0855   BP: 122/67   Pulse: 80   Temp: 99.5 °F (37.5 °C)   sat 95 %  General appearance-Conscious,Coherent  Eyes- No conjunctival icterus,pupils  round  and reactive to light   ENT-Oral cavity- moist  , Hearing grossly normal   Neck- No thyromegaly ,Trachea -central, No jugular venous distension,   No Carotid Bruit   Cardiovascular -Heart Sounds- Normal ,  systolic murmur aortic area ,  systolic murmur pulmonary area , systolic murmur tricuspid area ,  systolic murmur mitral area and  systolic murmur axillary area   , No gallop rhythm   Respiratory - Normal Respiratory Effort, Normal breath sounds and  no wheeze    Peripheral pitting pedal edema-- mild, no calf pain   Gastrointestinal -Soft abdomen, No palpable masses, Non Tender,Liver,Spleen  not palpable. No-- free fluid and shifting dullness  Musculoskeletal- No finger Clubbing. Strength grossly normal   Lymphatic-No Palpable cervical, axillary,Inguinal lymphadenopathy   Psychiatric - normal effect,Orientation  Rt Dorsalis pedis pulses-palpable    Lt Dorsalis pedis pulses- palpable   Rt Posterior tibial pulses -palpable   Left posterior tibial pulses -palpable   Miscellaneous - examined on chair,  no renal bruit and  bowel sounds positive     Investigations    Review of Medicine tests    EKG- I had independently reviewed the EKG from--5/2/2017  It was reported to be showing     Normal sinus rhythm with 1st degree A-V block  Moderate voltage criteria for LVH, may be normal variant  Inferior infarct (cited on or before 08-AUG-2016)  Abnormal ECG  When compared with ECG of 08-AUG-2016 11:18,  Nonspecific T wave abnormality no longer evident in Anterior leads    Stress test 5/30/2016    1. There is a small to moderate sized moderate ischemia in the base to mid inferolateral wall. This defect comprises 12 % of the left ventricular myocardium.   2. Resting wall motion is physiologic. Stress wall motion is physiologic.   3. LV function is normal at rest and stress.  (normal is >= 51%)  4. The ventricular volumes are normal at rest and stress.   5. The extracardiac distribution of radioactivity is normal.   6. There was no previous study available to compare.    May 2016     1 - Normal left ventricular systolic function (EF 55-60%).     2 - Normal right ventricular systolic function .     3 - Left ventricular diastolic dysfunction GRADE I-II.     4 - The estimated PA systolic pressure is 30 mmHg.     5 - Moderate left atrial enlargement.     6 - Moderate aortic stenosis, VARSHA = 1.06 cm2, peak velocity = 3.28 m/s, mean gradient = 20.0 mmHg.   unchanged from the prior study    Review of clinical lab tests-Date--- 5/2/2017 Creatinine-2.7  Date--5/2/2017 Hemoglobin-8.6- Platelet count--222    Review of old records-  Was done and information gathered regards to events leading to surgery and health conditions of significance in the perioperative period        Assessment and plan    New problems to me      Preoperative  risk assessment    Cardiac    Revised cardiac risk index ( RCRI ) predictors- 2---.Functional capacity  Is more than 4 Mets. He will be undergoing a Urological procedure that carries a intermediate risk     The estimated risk of the rate of adverse cardiac outcomes   6.6 %  No further cardiac work up is indicated prior to proceeding with the surgery     American Society of Anesthesiologists Physical status classification ( ASA ) class- - 3    Postoperative pulmonary complication risk assessment    ARISCAT ( Canet) risk index- risk class -  Intermediate,   if duration of surgery is under 3 hours   Kyle Respiratory failure index- percentage risk of respiratory failure- 10.1 %         Perioperative Medical management / Optimization    Tobacco use-I  Informed about risk of wound healing problem ,infection,lung complications,thrombosis with tobacco use - I suggested to consider stopping  smoking tobacco for its benefits in the adonis operative period and in the long term    Hypertension-  Blood pressure is acceptable . I suggest continuation of -Coreg - during the entire perioperative period. .I suggest addressing pain control as uncontrolled pain can increased blood pressure     Opioid related constipation  Constipation- I suggest giving laxative regimen as opioid use,reduced ambulation  can increase the constipation     History of gout-  -I suggest continuation of the maintenance treatment for gout and to keep the patient adequately hydrated.Please note that surgical stress ,dehydration can precipitate acute gout     ASA use- Cardiac reasons  Last took 4-5 days ago    HLD-I  suggest continuation of statin during the entire perioperative period.    Anemia of other chronic disease    I  suggest monitoring renal  function, in put and out put status adonis-operatively. I  suggest avoiding nephrotoxic medication including NSAIDs, COX2 inhibitors, intravenous contrast agent,avoiding hypotension to prevent further renal impairment.     GERD-  I suggest continuation of the Proton pump inhibitor in the perioperative period . I suggest aspiration precautions      Venous insufficiency of leg     Varicose veins   Getting wound care   Risk of thrombosis    Moderate  aortic valve stenosis  No suggestion of cardiac decompensation    CAD- Stable   On Beta blocker   Had Rt shoulder pain last night similar to the previous cardiac event   Does not want to go to Cardiology pre op , EKG stating that his shoulder pain is predictable on laying down   If acceptable , to continue ASA adonis op   Medical management for CAD    Edema- I suggested avoidance of added salt,avoidance of NSAID's ( Except ASA ) and suggested Limb elevation and laury hose use      Preventive perioperative care    Thromboembolic prophylaxis:  His risk factors for thrombosis include venois insuffeciency, surgical procedure, age and reduced mobility.I suggest  thromboembolic prophylaxis ( mechanical/pharmacological, weighing the risk benefits of pharmacological agent use considering adonis procedural bleeding )  during the perioperative period.I suggested being active in the post operative period.     Postoperative pulmonary complication prophylaxis-Risk factors for post operative pulmonary complications include  age over 65  years  and  ASA class >2- I suggest incentive Spirometry use ,  early ambulation  and  end tidal carbon dioxide  Monitoring       Renal complication prophylaxis-Risk factors for renal complications include pre-existing renal disease, age and Hypertension . I suggest keeping him well hydrated and avoidance/ minimizing the use of  NSAID's,NUÑEZ 2 Inhibitors ,IV contrast if possible in the perioperative period.I suggested drinking 2 litre's of water a day      Surgical site Infection Prophylaxis-I  suggest appropriate antibiotic for Prophylaxis against Surgical site infections    Delirium prophylaxis-Risk factors - Advanced age and  opioid use  - I suggest avoidance / minimizing the use of  Benzodiazepines ( unless the patient has been taking it on a regular basis ),Anticholinergic medication,Antihistamines ( like  Benadryl).I suggest minimizing the use of opioid medication and use of IV tylenol,if it is appropriate. I suggest using the lowest possible dose of opioids for the shortest duration possible in the perioperative period. I suggest to Keep shades/blinds open during the day, lights off and shades closed at night to encourage normal sleep/wake cycle.I encourage the presence of the family member with the patient at all times, if at all possible as mental status changes can be picked up early by the family members and they help with reorientation. I encouraged the presence of family to help with orientation in the perioperative period. Benadryl avoidance suggested     In view of  urological procedure  the patient  is at risk of postoperative urinary retention.  I suggest avoidance / minimizing the of  Benzodiazepines,Anticholinergic medication,antihistamines ( Benadryl) , if possible in the perioperative period. I suggest using the minimum possible use of opioids for the minimum period of time in the perioperative period. Benadryl avoidance suggested       This visit was focussed on Preoperative evaluation , Perioperative Medical management, complication reduction plans and I suggest that the patient follows up with the primary care ,relevant sub specialists for on going health care      I appreciate the opportunity to be involved in this  pleasant  gentleman's care.Please feel free to contact me if there were any questions about this consultation     Patient is optimized  for the planned surgery    Dr XAVIER Simon MD [facility] ( ),[facility]   [facility]  [facility]  [facility]   Pager [phone], Cell [phone]  --------------------------    5/12- 1808     Corresponded to Dr Abraham   He can stay on ASA for his surgery    Called and spoke to patient   Suggested to Restart ASA 81 mg po daily   -----------------------------------  5/15    18 52     Called to follow up on temperature   Unable to speak / leave a message  -------------------------------    5/16- 12 57     Called to follow up   Unable to speak to patient / leave a message  Unable to speak to spouse   --------------------------------    5/16-19 34     Called to follow up - unable to speak / leave a message

## 2017-05-12 NOTE — PROGRESS NOTES
CC: fistula    Humphrey Machado is a 85 y.o. man who is here for the evaluation of recurrent fistula and pain.  I saw him on 5/2/17 as a consult from Sowmya Rincon for recurrent fistula.  He would like to discuss more regarding his upcoming surgery.    C/o recurrent urine leak thru right inner groin area.  C/o moderate to severe pain mainly from osteomyelitis involving his pubic bone, bilateral hips.  Has been followed by ID and has been on abx fo osteomyelitis.  I gave him cipro and he has been taking cipro for his fistula.  No fever or chills.  Has an indwelling catheter but still leaking urine thru the fistula of the right inner groin area.    history of prostate CA, kidney CA (s/p (L) nephrectomy), urethral strictures urinary incontinence s/p radiation therapy several years ago.  He has history of CKD IV, AS, chronic UTI, chronic venous insufficiency, and inflammatory arthritis (on long term plaquenil and prednisone)   He is being followed by ID for Chronic osteomyelitis of the left hip/symphysis pubis. IR cultures of bone were negative. Pathology showed chronic osteomyelitis.   He was diagnosed with Perineal abscess by his PCP.  12/27/2017 seen in clinic and I placed packing in draining right thigh abscess.  Aerobic Bacterial Culture KLEBSIELLA PNEUMONIAE ESBL Moderate  This believed to be colonized.     He saw ID and wound care on 02/15/2017;  Groin abscess had healed up.  On 3/10/2017 at visit he noticed some clear drainage coming from top part of his right leg.   Home health came today to change dressing and noticed it; now drainage has yellow tint from AZO  He denies any pustular or pain from site.     He had been having the packing changed by HH and unable to collect fluid from site.  04/12/2017 last clinic visit was unable to collect fluid.     Here today with c/o for discomfort on the inside.   Discomfort/raw feeling inside groin and penis.  His montano was changed ~ 10 days ago.  He tells me today home  health was able to collect some fluid he was told it was fluid.    Denies fever or chills.  Has an indwelling montano which was changed 2 wks ago.     Past Medical History:   Diagnosis Date    *Atrial fibrillation     Acute appendicitis 2/19/2015    Anemia     Anxiety     Arthritis     generalized    Basal cell carcinoma 01/2013    right temple    BCC (basal cell carcinoma)     right mid forearm    Bladder stone 6/28/2016    Blood transfusion     Chronic urethral stricture 10/14/2015    Chronic venous insufficiency 7/8/2013    CKD (chronic kidney disease) stage 3, GFR 30-59 ml/min 9/6/2012    Coronary artery disease     Elevated PSA     Essential hypertension 7/30/2015    Hematuria, gross     History of Left Nephrectomy (~2006) 1/29/2014    [facility].     Hypertension     Inflammatory arthritis 8/14/2012    Kidney stone     Long-term use of Plaquenil 6/4/2015    Mixed incontinence urge and stress 4/11/2014    Nonrheumatic aortic valve stenosis 5/30/2016    NSTEMI (non-ST elevated myocardial infarction) 5/30/2016    Olecranon bursitis 1/14/2013    Osteopenia 8/14/2012    Personal history of kidney cancer 4/11/2014    Prostate cancer     Radiation     treatment for prostate cancer    Recurrent UTI 7/8/2013    S/P radiation therapy 4/11/2014    Skin cancer of arm     left leg (malignant)    Solitary kidney 7/15/2013    Venous insufficiency of leg 7/12/2012    Venous stasis ulcers 7/6/2012    Vitamin D deficiency disease 5/8/2013     Past Surgical History:   Procedure Laterality Date    CYSTOSCOPY      with dialation    NEPHRECTOMY  2006-07?    Removal of left kidney    TONSILLECTOMY       Social History   Substance Use Topics    Smoking status: Current Every Day Smoker     Years: 40.00     Types: Cigars    Smokeless tobacco: Never Used      Comment: pt smokes 3 cigars a day pt will make up his mind when his ready- did give up smoking cigarettes at age 35yrs smoked from 18 -  "35 years    Alcohol use 0.6 oz/week     1 Glasses of wine per week      Comment: stop drinking 09/2009. 1 glass of wine at bedtime      Family History   Problem Relation Age of Onset    Cancer Mother      bone     Heart disease Father     Urolithiasis Father     Mental illness Daughter     Cancer Brother      prostate cancer, (Ervin) removed by surgery    Cancer Brother      prostate cancer    Cancer Maternal Aunt     Melanoma Neg Hx     Lupus Neg Hx     Rheum arthritis Neg Hx      Allergy:  Review of patient's allergies indicates:   Allergen Reactions    Ditropan [oxybutynin chloride]      Unable to describe side effect other than "felt strange"     Keflex [cephalexin] Rash     Morbilliform  Has tolerated multiple cephalosporins since 2013.    Macrobid [nitrofurantoin monohyd/m-cryst] Hives and Rash     Outpatient Encounter Prescriptions as of 5/12/2017   Medication Sig Dispense Refill    allopurinol (ZYLOPRIM) 100 MG tablet Take 1 tablet (100 mg total) by mouth once daily. (Patient taking differently: Take 100 mg by mouth every morning. ) 30 tablet 4    aspirin 81 MG Chew Take 1 tablet (81 mg total) by mouth once daily. (Patient taking differently: Take 81 mg by mouth every morning. )  0    atorvastatin (LIPITOR) 80 MG tablet Take 1 tablet (80 mg total) by mouth once daily. (Patient taking differently: Take by mouth every evening. ) 30 tablet 11    calcitRIOL (ROCALTROL) 0.5 MCG Cap Take 0.5 mcg by mouth every evening.       carvedilol (COREG) 3.125 MG tablet Take 1 tablet (3.125 mg total) by mouth 2 (two) times daily with meals. 60 tablet 11    catheter 16-16 Fr-" Misc 1 Units by Misc.(Non-Drug; Combo Route) route once daily. 100 each 11    ciprofloxacin HCl (CIPRO) 500 MG tablet Take 1 tablet (500 mg total) by mouth once daily at 6am. 14 tablet 0    docusate sodium (COLACE) 100 MG capsule Take by mouth 2 (two) times daily.      ferrous sulfate 324 mg (65 mg iron) TbEC Take 1 tablet (325 " mg total) by mouth 3 (three) times daily. (Patient taking differently: Take 325 mg by mouth 2 (two) times daily. )  0    folic acid-vit B6-vit B12 (FOLBEE) 2.5-25-1 mg Tab Take 1 tablet by mouth once daily. (Patient taking differently: Take 1 tablet by mouth every morning. ) 30 each 5    gabapentin (NEURONTIN) 300 MG capsule Take 1 capsule (300 mg total) by mouth 2 (two) times daily. 60 capsule 3    hydrocodone-acetaminophen 10-325mg (NORCO)  mg Tab Take 1 tablet by mouth 4 (four) times daily as needed. 120 tablet 0    hydroxychloroquine (PLAQUENIL) 200 mg tablet Take 1 tablet (200 mg total) by mouth 2 (two) times daily. 60 tablet 2    LACTOBACILLUS ACIDOPHILUS (PROBIOTIC ORAL) Take 1 capsule by mouth every morning.       nitroGLYCERIN (NITROSTAT) 0.3 MG SL tablet Place 1 tablet (0.3 mg total) under the tongue every 5 (five) minutes as needed for Chest pain. 30 tablet 11    omeprazole (PRILOSEC) 40 MG capsule Take 40 mg by mouth every evening.       oxycodone-acetaminophen (PERCOCET) 5-325 mg per tablet Take 1 tablet by mouth every 6 (six) hours as needed for Pain. 30 tablet 0    sodium bicarbonate 650 MG tablet Take 1 tablet (650 mg total) by mouth 2 (two) times daily. 60 tablet 11    triamcinolone acetonide 0.1% (KENALOG) 0.1 % cream Apply topically 2 (two) times daily. Apply to affected area twice daily as directed. 454 g 0    vitamin D 1000 units Tab Take 2,000 Units by mouth every morning.        No facility-administered encounter medications on file as of 5/12/2017.      Review of Systems   General ROS: GENERAL:  No weight gain or loss  SKIN:  No rashes or lacerations  HEAD:  No headaches  EYES:  No exophthalmos, jaundice or blindness  EARS:  No dizziness, tinnitus or hearing loss  NOSE:  No changes in smell  MOUTH & THROAT:  No dyskinetic movements or obvious goiter  CHEST:  No shortness of breath, hyperventilation or cough  CARDIOVASCULAR:  No tachycardia or chest pain  ABDOMEN:  No nausea,  vomiting, pain, constipation or diarrhea  URINARY:  No frequency, dysuria or sexual dysfunction  ENDOCRINE:  No polydipsia, polyuria  MUSCULOSKELETAL:  No pain or stiffness of the joints  NEUROLOGIC:  No weakness, sensory changes, seizures, confusion, memory loss, tremor or other abnormal movements  Physical Exam     Vitals:    05/12/17 1054   BP: 130/61   Pulse: 68     General Appearance:  Alert, cooperative, no distress, appears stated age   Head:  Normocephalic, without obvious abnormality, atraumatic   Eyes:  PERRL, conjunctiva/corneas clear, EOM's intact, fundi benign, both eyes   Ears:  Normal TM's and external ear canals, both ears   Nose: Nares normal, septum midline, mucosa normal, no drainage or sinus tenderness   Throat: Lips, mucosa, and tongue normal; teeth and gums normal   Neck: Supple, symmetrical, trachea midline, no adenopathy, thyroid: not enlarged, symmetric, no tenderness/mass/nodules, no carotid bruit or JVD   Back:   Symmetric, no curvature, ROM normal, no CVA tenderness   Lungs:   Clear to auscultation bilaterally, respirations unlabored   Chest Wall:  No tenderness or deformity   Heart:  Regular rate and rhythm, S1, S2 normal, no murmur, rub or gallop   Abdomen:   Soft, non-tender, bowel sounds active all four quadrants,  no masses, no organomegaly of liver and spleen  No hernia noted   Genitalia:  Scrotum: no rash or lesion  Normal symmetric epididymis without masses  Normal vas palpated  Normal size, symmetric testicles with no masses   Normal urethral meatus with no discharge  Normal circumcised penis with no lesion  16 F Stevens catheter in place.  Overgrown Granulation tissues noted in the right upper inner thigh near the groin area, where some urine leaks noted.  No cellulitis noted.   Rectal:  Normal perineum and anus upon inspection.  Normal tone, no masses or tenderness;   Extremities: Extremities normal, atraumatic, no cyanosis or edema   Pulses: 2+ and symmetric   Skin: Skin color,  texture, turgor normal, no rashes or lesions   Lymph nodes: Cervical, supraclavicular, and axillary nodes normal   Neurologic: Normal         LABS:  Lab Results   Component Value Date    PSA 1.8 05/27/2016    PSA 1.35 09/15/2012    PSA 1.52 06/13/2012    PSA 0.95 09/15/2010    PSA 1.2 07/22/2010    PSA 0.90 04/15/2009    PSA 0.3 02/11/2008    PSA 0.4 04/30/2007    PSA 0.4 01/22/2007    PSADIAG 1.4 04/11/2014     Results for orders placed or performed in visit on 04/11/14   Prostate Specific Antigen, Diagnostic   Result Value Ref Range    PSA DIAGNOSTIC 1.4 0.00 - 4.00 ng/mL   Results for orders placed or performed in visit on 06/13/12   Prostate Specific Antigen, Diagnostic   Result Value Ref Range    PSA, SCREEN 1.52 0 - 4 ng/ml     *Note: Due to a large number of results and/or encounters for the requested time period, some results have not been displayed. A complete set of results can be found in Results Review.     Lab Results   Component Value Date    CREATININE 2.7 (H) 05/02/2017    CREATININE 2.5 (H) 02/21/2017    CREATININE 2.5 (H) 02/21/2017     No results found. However, due to the size of the patient record, not all encounters were searched. Please check Results Review for a complete set of results.  Urine Culture, Routine   Date Value Ref Range Status   10/01/2016   Final    Multiple organisms isolated. None in predominance.  Repeat if   10/01/2016 clinically necessary.  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    despite indwelling urethral catheter, he is still leaking urine thru the right inner thigh.  Not sure where his fistula is involved at present time.  Will cover him with daily cipro based on his previous culture.  OK to use pain meds and stool  softners.  Schedule cystoscopy, RUG, Cystogram, RPG, fistulogram to evaluate his fistula.  Poss cysto and fulguration of fistula will be done.  In addition, he may need right nephrostomy tube placement in order to divert his urine until his fistula heals.  I spent 40 minutes with the patient of which more than half was spent in direct consultation with the patient in regards to our treatment and plan.  Stop ASA 1 wk before    Follow-up:  Return in about 5 days (around 5/17/2017) for RUG, cystogram, cysto RPG, poss ureteral stent, poss. neph tube, replacement of urethral catheter. cysto and fulguration of fistula

## 2017-05-12 NOTE — LETTER
May 12, 2017      Seth Abraham MD  [address]  [place] [address]           [facility] Katy - Pre Op Consult  [address] [facility] [address]  Phone: [phone]          Patient: Humphrey Machado   MR Number: [id number]   YOB: 1931   Date of Visit: 5/12/2017       Dear Dr. Seth Abraham:    Thank you for referring Humphrey Machado to me for evaluation. Attached you will find relevant portions of my assessment and plan of care.    If you have questions, please do not hesitate to call me. I look forward to following Humphrey Machado along with you.    Sincerely,    Esha Simon MD    Enclosure  CC:  No Recipients    If you would like to receive this communication electronically, please contact [email or web address] or [phone] to request more information on [facility] access.    For providers and/or their staff who would like to refer a patient to [facility], please contact us through our one-stop-shop provider referral line, [facility], at [phone].    If you feel you have received this communication in error or would no longer like to receive these types of communications, please e-mail [email or web address]

## 2017-05-15 ENCOUNTER — TELEPHONE (OUTPATIENT)
Dept: INTERNAL MEDICINE | Facility: CLINIC | Age: 82
End: 2017-05-15

## 2017-05-15 NOTE — TELEPHONE ENCOUNTER
Pt is requesting a refill on oxycodone but it was filled on 5/2/17 , i informed the pt that it was to early for it to be refilled , pt stated  gave him the 30day medication take 1 by mouth every six hours as needed for pain but pt stated he is taking differently. He is taking  One in the morning , then one every 4 hours , so about four a day and that is only about  7/8 days of script.     Please advise

## 2017-05-15 NOTE — TELEPHONE ENCOUNTER
----- Message from Jeff Carranza sent at 5/15/2017 12:30 PM CDT -----  Contact: Pt at [phone]  RX request - refill or new RX.  Is this a refill or new RX:  refill  RX name and strength: oxycodone-acetaminophen (PERCOCET) 5-325 mg per tablet  Directions:   Is this a 30 day or 90 day RX:    Pharmacy name and phone #: Pt  at [facility]  Comments:

## 2017-05-16 ENCOUNTER — TELEPHONE (OUTPATIENT)
Dept: UROLOGY | Facility: CLINIC | Age: 82
End: 2017-05-16

## 2017-05-17 ENCOUNTER — ANESTHESIA (OUTPATIENT)
Dept: SURGERY | Facility: HOSPITAL | Age: 82
DRG: 871 | End: 2017-05-17
Payer: MEDICARE

## 2017-05-17 ENCOUNTER — SURGERY (OUTPATIENT)
Age: 82
End: 2017-05-17

## 2017-05-17 PROBLEM — L98.8 FISTULA: Status: ACTIVE | Noted: 2017-05-17

## 2017-05-17 PROCEDURE — BT0BYZZ PLAIN RADIOGRAPHY OF BLADDER AND URETHRA USING OTHER CONTRAST: ICD-10-PCS | Performed by: UROLOGY

## 2017-05-17 PROCEDURE — 0T778DZ DILATION OF LEFT URETER WITH INTRALUMINAL DEVICE, VIA NATURAL OR ARTIFICIAL OPENING ENDOSCOPIC: ICD-10-PCS | Performed by: UROLOGY

## 2017-05-17 PROCEDURE — D9220A PRA ANESTHESIA: Mod: CRNA,,, | Performed by: NURSE ANESTHETIST, CERTIFIED REGISTERED

## 2017-05-17 PROCEDURE — 02H633Z INSERTION OF INFUSION DEVICE INTO RIGHT ATRIUM, PERCUTANEOUS APPROACH: ICD-10-PCS | Performed by: UROLOGY

## 2017-05-17 PROCEDURE — D9220A PRA ANESTHESIA: Mod: ANES,,, | Performed by: ANESTHESIOLOGY

## 2017-05-17 RX ORDER — FENTANYL CITRATE 50 UG/ML
INJECTION, SOLUTION INTRAMUSCULAR; INTRAVENOUS
Status: DISCONTINUED | OUTPATIENT
Start: 2017-05-17 | End: 2017-05-17

## 2017-05-17 RX ORDER — ETOMIDATE 2 MG/ML
INJECTION INTRAVENOUS
Status: DISCONTINUED | OUTPATIENT
Start: 2017-05-17 | End: 2017-05-17

## 2017-05-17 RX ORDER — PROPOFOL 10 MG/ML
VIAL (ML) INTRAVENOUS
Status: DISCONTINUED | OUTPATIENT
Start: 2017-05-17 | End: 2017-05-17

## 2017-05-17 RX ORDER — MIDAZOLAM HYDROCHLORIDE 1 MG/ML
INJECTION, SOLUTION INTRAMUSCULAR; INTRAVENOUS
Status: DISCONTINUED | OUTPATIENT
Start: 2017-05-17 | End: 2017-05-17

## 2017-05-17 RX ORDER — KETAMINE HYDROCHLORIDE 100 MG/ML
INJECTION, SOLUTION INTRAMUSCULAR; INTRAVENOUS
Status: DISCONTINUED | OUTPATIENT
Start: 2017-05-17 | End: 2017-05-17

## 2017-05-17 RX ADMIN — ETOMIDATE 6 MG: 2 INJECTION, SOLUTION INTRAVENOUS at 10:05

## 2017-05-17 RX ADMIN — FENTANYL CITRATE 25 MCG: 50 INJECTION, SOLUTION INTRAMUSCULAR; INTRAVENOUS at 10:05

## 2017-05-17 RX ADMIN — PROPOFOL 30 MG: 10 INJECTION, EMULSION INTRAVENOUS at 10:05

## 2017-05-17 RX ADMIN — FENTANYL CITRATE 50 MCG: 50 INJECTION, SOLUTION INTRAMUSCULAR; INTRAVENOUS at 10:05

## 2017-05-17 RX ADMIN — DEXMEDETOMIDINE HYDROCHLORIDE 0.7 MCG/KG/HR: 100 INJECTION, SOLUTION, CONCENTRATE INTRAVENOUS at 10:05

## 2017-05-17 RX ADMIN — MIDAZOLAM HYDROCHLORIDE 0.5 MG: 1 INJECTION, SOLUTION INTRAMUSCULAR; INTRAVENOUS at 10:05

## 2017-05-17 RX ADMIN — FLUCONAZOLE IN SODIUM CHLORIDE 400 MG: 2 INJECTION, SOLUTION INTRAVENOUS at 10:05

## 2017-05-17 RX ADMIN — KETAMINE HYDROCHLORIDE 20 MG: 100 INJECTION, SOLUTION, CONCENTRATE INTRAMUSCULAR; INTRAVENOUS at 10:05

## 2017-05-17 RX ADMIN — GENTAMICIN SULFATE 244 MG: 40 INJECTION, SOLUTION INTRAMUSCULAR; INTRAVENOUS at 10:05

## 2017-05-17 RX ADMIN — IOTHALAMATE MEGLUMINE 250 ML: 172 INJECTION URETERAL at 10:05

## 2017-05-17 RX ADMIN — PROPOFOL 10 MG: 10 INJECTION, EMULSION INTRAVENOUS at 10:05

## 2017-05-17 NOTE — ANESTHESIA POSTPROCEDURE EVALUATION
"Anesthesia Post Evaluation    Patient: Humphrey Machado    Procedure(s) Performed: Procedure(s) (LRB):  CYSTOGRAM (N/A)  URETHROGRAM-RETROGRADE (N/A)  FISTULOGRAM (N/A)  CYSTOSCOPY (N/A)  REMOVAL-FOREIGN BODY-URINARY (N/A)  CATHETERIZATION-URETERAL (Right)    Final Anesthesia Type: general  Patient location during evaluation: PACU  Patient participation: Yes- Able to Participate  Level of consciousness: awake and alert  Post-procedure vital signs: reviewed and stable  Pain management: adequate  Airway patency: patent  PONV status at discharge: No PONV  Anesthetic complications: no      Cardiovascular status: blood pressure returned to baseline  Respiratory status: unassisted  Follow-up not needed.        Visit Vitals    BP (!) 133/49    Pulse 77    Temp 36.5 °C (97.7 °F) (Temporal)    Resp 18    Ht 5' 10" (1.778 m)    Wt 79.4 kg (175 lb)    SpO2 100%    BMI 25.11 kg/m2       Pain/Kurt Score: Pain Assessment Performed: Yes (5/17/2017 11:35 AM)  Presence of Pain: complains of pain/discomfort (5/17/2017 11:35 AM)  Pain Rating Prior to Med Admin: 8 (5/17/2017 11:55 AM)  Pain Rating Post Med Admin: 0 (asleep) (5/17/2017 12:17 PM)  Kurt Score: 10 (5/17/2017 11:35 AM)      "

## 2017-05-17 NOTE — ANESTHESIA RELEASE NOTE
"Anesthesia Release from PACU Note    Patient: Humphrey Machado    Procedure(s) Performed: Procedure(s) (LRB):  CYSTOGRAM (N/A)  URETHROGRAM-RETROGRADE (N/A)  FISTULOGRAM (N/A)  CYSTOSCOPY (N/A)  REMOVAL-FOREIGN BODY-URINARY (N/A)  CATHETERIZATION-URETERAL (Right)    Anesthesia type:General  Post pain: Adequate analgesia    Post assessment: no apparent anesthetic complications    Last Vitals:   Visit Vitals    BP (!) 148/75    Pulse 85    Temp 36.5 °C (97.7 °F) (Temporal)    Resp 18    Ht 5' 10" (1.778 m)    Wt 79.4 kg (175 lb)    SpO2 100%    BMI 25.11 kg/m2       Post vital signs: stable    Level of consciousness: awake    Nausea/Vomiting: no nausea/no vomiting    Complications: none    Airway Patency: patent    Respiratory: unassisted    Cardiovascular: stable and blood pressure at baseline    Hydration: euvolemic  "

## 2017-05-17 NOTE — TRANSFER OF CARE
"Anesthesia Transfer of Care Note    Patient: Humphrey Machado    Procedure(s) Performed: Procedure(s) (LRB):  CYSTOGRAM (N/A)  URETHROGRAM-RETROGRADE (N/A)  FISTULOGRAM (N/A)  CYSTOSCOPY (N/A)  REMOVAL-FOREIGN BODY-URINARY (N/A)  CATHETERIZATION-URETERAL (Right)    Patient location: PACU    Anesthesia Type: general    Transport from OR: Transported from OR on 6-10 L/min O2 by face mask with adequate spontaneous ventilation    Post pain: adequate analgesia    Post assessment: no apparent anesthetic complications    Post vital signs: stable    Level of consciousness: sedated    Nausea/Vomiting: no nausea/vomiting    Complications: none          Last vitals:   Visit Vitals    /64 (BP Location: Left arm, Patient Position: Lying, BP Method: Automatic)    Pulse 63    Temp 36.8 °C (98.2 °F) (Temporal)    Resp 20    Ht 5' 10" (1.778 m)    Wt 79.4 kg (175 lb)    SpO2 97%    BMI 25.11 kg/m2     "

## 2017-05-19 ENCOUNTER — TELEPHONE (OUTPATIENT)
Dept: INTERNAL MEDICINE | Facility: CLINIC | Age: 82
End: 2017-05-19

## 2017-05-19 ENCOUNTER — HOSPITAL ENCOUNTER (INPATIENT)
Facility: HOSPITAL | Age: 82
LOS: 14 days | Discharge: HOSPICE | DRG: 871 | End: 2017-06-02
Attending: EMERGENCY MEDICINE | Admitting: INTERNAL MEDICINE
Payer: MEDICARE

## 2017-05-19 DIAGNOSIS — Z71.89 GOALS OF CARE, COUNSELING/DISCUSSION: ICD-10-CM

## 2017-05-19 DIAGNOSIS — B96.1 BACTEREMIA DUE TO KLEBSIELLA PNEUMONIAE: ICD-10-CM

## 2017-05-19 DIAGNOSIS — N18.9 CKD (CHRONIC KIDNEY DISEASE), UNSPECIFIED STAGE: ICD-10-CM

## 2017-05-19 DIAGNOSIS — B96.89 URINARY TRACT INFECTION DUE TO EXTENDED-SPECTRUM BETA LACTAMASE (ESBL)-PRODUCING KLEBSIELLA: ICD-10-CM

## 2017-05-19 DIAGNOSIS — R78.81 BACTEREMIA DUE TO KLEBSIELLA PNEUMONIAE: ICD-10-CM

## 2017-05-19 DIAGNOSIS — I48.91 ATRIAL FIBRILLATION WITH RVR: ICD-10-CM

## 2017-05-19 DIAGNOSIS — I21.3 STEMI (ST ELEVATION MYOCARDIAL INFARCTION): ICD-10-CM

## 2017-05-19 DIAGNOSIS — I25.5 ISCHEMIC CARDIOMYOPATHY: ICD-10-CM

## 2017-05-19 DIAGNOSIS — R07.9 CHEST PAIN: ICD-10-CM

## 2017-05-19 DIAGNOSIS — R52 PAIN: ICD-10-CM

## 2017-05-19 DIAGNOSIS — N36.0 URETHROCUTANEOUS FISTULA IN MALE: ICD-10-CM

## 2017-05-19 DIAGNOSIS — I21.3 ST ELEVATION MYOCARDIAL INFARCTION (STEMI), UNSPECIFIED ARTERY: ICD-10-CM

## 2017-05-19 DIAGNOSIS — Z51.5 PALLIATIVE CARE ENCOUNTER: ICD-10-CM

## 2017-05-19 DIAGNOSIS — N18.9 ACUTE KIDNEY INJURY SUPERIMPOSED ON CKD: ICD-10-CM

## 2017-05-19 DIAGNOSIS — I21.19 ST ELEVATION MYOCARDIAL INFARCTION (STEMI) INVOLVING OTHER CORONARY ARTERY OF INFERIOR WALL: ICD-10-CM

## 2017-05-19 DIAGNOSIS — Z79.899 ENCOUNTER FOR LONG-TERM (CURRENT) USE OF OTHER MEDICATIONS: ICD-10-CM

## 2017-05-19 DIAGNOSIS — R53.81 DEBILITY: ICD-10-CM

## 2017-05-19 DIAGNOSIS — R13.12 OROPHARYNGEAL DYSPHAGIA: ICD-10-CM

## 2017-05-19 DIAGNOSIS — M19.90 INFLAMMATORY ARTHRITIS: ICD-10-CM

## 2017-05-19 DIAGNOSIS — R06.03 RESPIRATORY DISTRESS: ICD-10-CM

## 2017-05-19 DIAGNOSIS — G89.29 OTHER CHRONIC PAIN: ICD-10-CM

## 2017-05-19 DIAGNOSIS — E83.39 HYPERPHOSPHATEMIA: ICD-10-CM

## 2017-05-19 DIAGNOSIS — N17.9 ACUTE KIDNEY INJURY SUPERIMPOSED ON CKD: ICD-10-CM

## 2017-05-19 DIAGNOSIS — I87.2 VENOUS INSUFFICIENCY OF BOTH LOWER EXTREMITIES: Primary | ICD-10-CM

## 2017-05-19 DIAGNOSIS — I73.9 PERIPHERAL VASCULAR DISEASE, UNSPECIFIED: Chronic | ICD-10-CM

## 2017-05-19 DIAGNOSIS — N39.0 URINARY TRACT INFECTION DUE TO EXTENDED-SPECTRUM BETA LACTAMASE (ESBL)-PRODUCING KLEBSIELLA: ICD-10-CM

## 2017-05-19 DIAGNOSIS — L97.212 ULCER OF CALF, RIGHT, WITH FAT LAYER EXPOSED: ICD-10-CM

## 2017-05-19 DIAGNOSIS — I21.3 ST ELEVATION MYOCARDIAL INFARCTION (STEMI): ICD-10-CM

## 2017-05-19 DIAGNOSIS — S71.101D COMPLICATED OPEN WOUND OF RIGHT THIGH, SUBSEQUENT ENCOUNTER: ICD-10-CM

## 2017-05-19 LAB
ABO + RH BLD: NORMAL
ALBUMIN SERPL BCP-MCNC: 2.3 G/DL
ALP SERPL-CCNC: 108 U/L
ALT SERPL W/O P-5'-P-CCNC: 26 U/L
AMPHET+METHAMPHET UR QL: NEGATIVE
ANION GAP SERPL CALC-SCNC: 14 MMOL/L
ANION GAP SERPL CALC-SCNC: 15 MMOL/L
ANISOCYTOSIS BLD QL SMEAR: SLIGHT
APTT BLDCRRT: 23.9 SEC
APTT BLDCRRT: 24.1 SEC
AST SERPL-CCNC: 74 U/L
BACTERIA #/AREA URNS AUTO: ABNORMAL /HPF
BARBITURATES UR QL SCN>200 NG/ML: NEGATIVE
BASOPHILS # BLD AUTO: 0.02 K/UL
BASOPHILS NFR BLD: 0 %
BASOPHILS NFR BLD: 0.1 %
BENZODIAZ UR QL SCN>200 NG/ML: NORMAL
BILIRUB SERPL-MCNC: 0.7 MG/DL
BILIRUB UR QL STRIP: NEGATIVE
BLD GP AB SCN CELLS X3 SERPL QL: NORMAL
BNP SERPL-MCNC: 1785 PG/ML
BUN SERPL-MCNC: 55 MG/DL
BUN SERPL-MCNC: 57 MG/DL
BZE UR QL SCN: NEGATIVE
CALCIUM SERPL-MCNC: 9 MG/DL
CALCIUM SERPL-MCNC: 9.8 MG/DL
CANNABINOIDS UR QL SCN: NEGATIVE
CHLORIDE SERPL-SCNC: 109 MMOL/L
CHLORIDE SERPL-SCNC: 111 MMOL/L
CHOLEST/HDLC SERPL: 2.8 {RATIO}
CLARITY UR REFRACT.AUTO: ABNORMAL
CO2 SERPL-SCNC: 15 MMOL/L
CO2 SERPL-SCNC: 15 MMOL/L
COLOR UR AUTO: YELLOW
CREAT SERPL-MCNC: 3.2 MG/DL (ref 0.5–1.4)
CREAT SERPL-MCNC: 3.5 MG/DL
CREAT SERPL-MCNC: 3.8 MG/DL
CREAT UR-MCNC: 61 MG/DL
DIASTOLIC DYSFUNCTION: YES
DIFFERENTIAL METHOD: ABNORMAL
DIFFERENTIAL METHOD: ABNORMAL
EOSINOPHIL # BLD AUTO: 0 K/UL
EOSINOPHIL NFR BLD: 0 %
EOSINOPHIL NFR BLD: 0 %
ERYTHROCYTE [DISTWIDTH] IN BLOOD BY AUTOMATED COUNT: 15.8 %
ERYTHROCYTE [DISTWIDTH] IN BLOOD BY AUTOMATED COUNT: 16 %
EST. GFR  (AFRICAN AMERICAN): 15.7 ML/MIN/1.73 M^2
EST. GFR  (AFRICAN AMERICAN): 17.4 ML/MIN/1.73 M^2
EST. GFR  (NON AFRICAN AMERICAN): 13.6 ML/MIN/1.73 M^2
EST. GFR  (NON AFRICAN AMERICAN): 15 ML/MIN/1.73 M^2
FACT X PPP CHRO-ACNC: 0.45 IU/ML
GLOBAL PERICARDIAL EFFUSION: ABNORMAL
GLUCOSE SERPL-MCNC: 121 MG/DL
GLUCOSE SERPL-MCNC: 131 MG/DL
GLUCOSE UR QL STRIP: NEGATIVE
HCT VFR BLD AUTO: 27.4 %
HCT VFR BLD AUTO: 30.8 %
HDL/CHOLESTEROL RATIO: 35.2 %
HDLC SERPL-MCNC: 105 MG/DL
HDLC SERPL-MCNC: 37 MG/DL
HGB BLD-MCNC: 8.7 G/DL
HGB BLD-MCNC: 9.8 G/DL
HGB UR QL STRIP: ABNORMAL
HYALINE CASTS UR QL AUTO: 0 /LPF
INR PPP: 1.1
KETONES UR QL STRIP: NEGATIVE
LDLC SERPL CALC-MCNC: 54.6 MG/DL
LEUKOCYTE ESTERASE UR QL STRIP: ABNORMAL
LYMPHOCYTES # BLD AUTO: 0.4 K/UL
LYMPHOCYTES NFR BLD: 1 %
LYMPHOCYTES NFR BLD: 2.1 %
MCH RBC QN AUTO: 26.9 PG
MCH RBC QN AUTO: 27.3 PG
MCHC RBC AUTO-ENTMCNC: 31.8 %
MCHC RBC AUTO-ENTMCNC: 31.8 %
MCV RBC AUTO: 85 FL
MCV RBC AUTO: 86 FL
METHADONE UR QL SCN>300 NG/ML: NEGATIVE
MICROSCOPIC COMMENT: ABNORMAL
MITRAL VALVE MOBILITY: NORMAL
MITRAL VALVE REGURGITATION: ABNORMAL
MONOCYTES # BLD AUTO: 1.2 K/UL
MONOCYTES NFR BLD: 1 %
MONOCYTES NFR BLD: 5.8 %
NEUTROPHILS # BLD AUTO: 18.8 K/UL
NEUTROPHILS NFR BLD: 91.5 %
NEUTROPHILS NFR BLD: 98 %
NITRITE UR QL STRIP: NEGATIVE
NONHDLC SERPL-MCNC: 68 MG/DL
OPIATES UR QL SCN: NORMAL
OVALOCYTES BLD QL SMEAR: ABNORMAL
PCP UR QL SCN>25 NG/ML: NEGATIVE
PH UR STRIP: 5 [PH] (ref 5–8)
PLATELET # BLD AUTO: 210 K/UL
PLATELET # BLD AUTO: 296 K/UL
PLATELET BLD QL SMEAR: ABNORMAL
PMV BLD AUTO: 10 FL
PMV BLD AUTO: 9.4 FL
POIKILOCYTOSIS BLD QL SMEAR: SLIGHT
POLYCHROMASIA BLD QL SMEAR: ABNORMAL
POTASSIUM SERPL-SCNC: 3.8 MMOL/L
POTASSIUM SERPL-SCNC: 4.1 MMOL/L
PROT SERPL-MCNC: 7.5 G/DL
PROT UR QL STRIP: ABNORMAL
PROTHROMBIN TIME: 11.6 SEC
RBC # BLD AUTO: 3.24 M/UL
RBC # BLD AUTO: 3.59 M/UL
RBC #/AREA URNS AUTO: >100 /HPF (ref 0–4)
RETIRED EF AND QEF - SEE NOTES: 30 (ref 55–65)
SAMPLE: ABNORMAL
SODIUM SERPL-SCNC: 139 MMOL/L
SODIUM SERPL-SCNC: 140 MMOL/L
SP GR UR STRIP: 1.01 (ref 1–1.03)
SQUAMOUS #/AREA URNS AUTO: 26 /HPF
TOXICOLOGY INFORMATION: NORMAL
TRIGL SERPL-MCNC: 67 MG/DL
TROPONIN I SERPL DL<=0.01 NG/ML-MCNC: 34.55 NG/ML
TROPONIN I SERPL DL<=0.01 NG/ML-MCNC: 8.1 NG/ML
URN SPEC COLLECT METH UR: ABNORMAL
UROBILINOGEN UR STRIP-ACNC: NEGATIVE EU/DL
WBC # BLD AUTO: 20.52 K/UL
WBC # BLD AUTO: 27.24 K/UL
WBC #/AREA URNS AUTO: >100 /HPF (ref 0–5)
WBC CLUMPS UR QL AUTO: ABNORMAL

## 2017-05-19 PROCEDURE — 94761 N-INVAS EAR/PLS OXIMETRY MLT: CPT

## 2017-05-19 PROCEDURE — 84484 ASSAY OF TROPONIN QUANT: CPT | Mod: 91

## 2017-05-19 PROCEDURE — 96365 THER/PROPH/DIAG IV INF INIT: CPT

## 2017-05-19 PROCEDURE — 63600175 PHARM REV CODE 636 W HCPCS: Performed by: STUDENT IN AN ORGANIZED HEALTH CARE EDUCATION/TRAINING PROGRAM

## 2017-05-19 PROCEDURE — 63600175 PHARM REV CODE 636 W HCPCS: Performed by: EMERGENCY MEDICINE

## 2017-05-19 PROCEDURE — 25000003 PHARM REV CODE 250: Performed by: STUDENT IN AN ORGANIZED HEALTH CARE EDUCATION/TRAINING PROGRAM

## 2017-05-19 PROCEDURE — 80061 LIPID PANEL: CPT

## 2017-05-19 PROCEDURE — 63600175 PHARM REV CODE 636 W HCPCS: Performed by: HOSPITALIST

## 2017-05-19 PROCEDURE — 82565 ASSAY OF CREATININE: CPT

## 2017-05-19 PROCEDURE — 86900 BLOOD TYPING SEROLOGIC ABO: CPT

## 2017-05-19 PROCEDURE — 99291 CRITICAL CARE FIRST HOUR: CPT | Mod: ,,, | Performed by: EMERGENCY MEDICINE

## 2017-05-19 PROCEDURE — 87088 URINE BACTERIA CULTURE: CPT

## 2017-05-19 PROCEDURE — 27000190 HC CPAP FULL FACE MASK W/VALVE

## 2017-05-19 PROCEDURE — 85730 THROMBOPLASTIN TIME PARTIAL: CPT | Mod: 91

## 2017-05-19 PROCEDURE — 87040 BLOOD CULTURE FOR BACTERIA: CPT

## 2017-05-19 PROCEDURE — 87205 SMEAR GRAM STAIN: CPT

## 2017-05-19 PROCEDURE — 25000003 PHARM REV CODE 250: Performed by: EMERGENCY MEDICINE

## 2017-05-19 PROCEDURE — 93010 ELECTROCARDIOGRAM REPORT: CPT | Mod: ,,, | Performed by: INTERNAL MEDICINE

## 2017-05-19 PROCEDURE — 86901 BLOOD TYPING SEROLOGIC RH(D): CPT

## 2017-05-19 PROCEDURE — 83880 ASSAY OF NATRIURETIC PEPTIDE: CPT

## 2017-05-19 PROCEDURE — 85730 THROMBOPLASTIN TIME PARTIAL: CPT

## 2017-05-19 PROCEDURE — 93010 ELECTROCARDIOGRAM REPORT: CPT | Mod: 76,,, | Performed by: INTERNAL MEDICINE

## 2017-05-19 PROCEDURE — 85007 BL SMEAR W/DIFF WBC COUNT: CPT

## 2017-05-19 PROCEDURE — 85025 COMPLETE CBC W/AUTO DIFF WBC: CPT

## 2017-05-19 PROCEDURE — 93307 TTE W/O DOPPLER COMPLETE: CPT | Mod: 26,,, | Performed by: INTERNAL MEDICINE

## 2017-05-19 PROCEDURE — 81001 URINALYSIS AUTO W/SCOPE: CPT

## 2017-05-19 PROCEDURE — 25000003 PHARM REV CODE 250: Performed by: INTERNAL MEDICINE

## 2017-05-19 PROCEDURE — 99291 CRITICAL CARE FIRST HOUR: CPT | Mod: 25

## 2017-05-19 PROCEDURE — 25000003 PHARM REV CODE 250

## 2017-05-19 PROCEDURE — 84484 ASSAY OF TROPONIN QUANT: CPT

## 2017-05-19 PROCEDURE — 87186 SC STD MICRODIL/AGAR DIL: CPT | Mod: 59

## 2017-05-19 PROCEDURE — 96361 HYDRATE IV INFUSION ADD-ON: CPT

## 2017-05-19 PROCEDURE — 25000003 PHARM REV CODE 250: Performed by: HOSPITALIST

## 2017-05-19 PROCEDURE — 20000000 HC ICU ROOM

## 2017-05-19 PROCEDURE — 85027 COMPLETE CBC AUTOMATED: CPT

## 2017-05-19 PROCEDURE — 27000221 HC OXYGEN, UP TO 24 HOURS

## 2017-05-19 PROCEDURE — 63600175 PHARM REV CODE 636 W HCPCS

## 2017-05-19 PROCEDURE — 93307 TTE W/O DOPPLER COMPLETE: CPT

## 2017-05-19 PROCEDURE — 85520 HEPARIN ASSAY: CPT

## 2017-05-19 PROCEDURE — 87086 URINE CULTURE/COLONY COUNT: CPT

## 2017-05-19 PROCEDURE — 96375 TX/PRO/DX INJ NEW DRUG ADDON: CPT

## 2017-05-19 PROCEDURE — 99900035 HC TECH TIME PER 15 MIN (STAT)

## 2017-05-19 PROCEDURE — 94660 CPAP INITIATION&MGMT: CPT

## 2017-05-19 PROCEDURE — 80048 BASIC METABOLIC PNL TOTAL CA: CPT

## 2017-05-19 PROCEDURE — 99222 1ST HOSP IP/OBS MODERATE 55: CPT | Mod: AI,GC,, | Performed by: INTERNAL MEDICINE

## 2017-05-19 PROCEDURE — 80053 COMPREHEN METABOLIC PANEL: CPT

## 2017-05-19 PROCEDURE — 85610 PROTHROMBIN TIME: CPT

## 2017-05-19 PROCEDURE — 82570 ASSAY OF URINE CREATININE: CPT

## 2017-05-19 PROCEDURE — 87077 CULTURE AEROBIC IDENTIFY: CPT

## 2017-05-19 RX ORDER — FUROSEMIDE 10 MG/ML
80 INJECTION INTRAMUSCULAR; INTRAVENOUS ONCE
Status: COMPLETED | OUTPATIENT
Start: 2017-05-19 | End: 2017-05-19

## 2017-05-19 RX ORDER — DOCUSATE SODIUM 100 MG/1
100 CAPSULE, LIQUID FILLED ORAL 2 TIMES DAILY
Status: DISCONTINUED | OUTPATIENT
Start: 2017-05-19 | End: 2017-06-02 | Stop reason: HOSPADM

## 2017-05-19 RX ORDER — ALLOPURINOL 100 MG/1
100 TABLET ORAL DAILY
Status: DISCONTINUED | OUTPATIENT
Start: 2017-05-19 | End: 2017-06-02

## 2017-05-19 RX ORDER — PANTOPRAZOLE SODIUM 40 MG/1
40 TABLET, DELAYED RELEASE ORAL DAILY
Status: DISCONTINUED | OUTPATIENT
Start: 2017-05-19 | End: 2017-05-20

## 2017-05-19 RX ORDER — HEPARIN SODIUM,PORCINE/D5W 25000/250
17 INTRAVENOUS SOLUTION INTRAVENOUS CONTINUOUS
Status: DISCONTINUED | OUTPATIENT
Start: 2017-05-19 | End: 2017-05-26

## 2017-05-19 RX ORDER — SODIUM CHLORIDE 0.9 % (FLUSH) 0.9 %
3 SYRINGE (ML) INJECTION EVERY 8 HOURS
Status: DISCONTINUED | OUTPATIENT
Start: 2017-05-19 | End: 2017-05-24

## 2017-05-19 RX ORDER — GABAPENTIN 300 MG/1
300 CAPSULE ORAL 2 TIMES DAILY
Status: DISCONTINUED | OUTPATIENT
Start: 2017-05-19 | End: 2017-05-20

## 2017-05-19 RX ORDER — CEFEPIME HYDROCHLORIDE 1 G/50ML
1 INJECTION, SOLUTION INTRAVENOUS EVERY 24 HOURS
Status: DISCONTINUED | OUTPATIENT
Start: 2017-05-20 | End: 2017-05-21

## 2017-05-19 RX ORDER — FUROSEMIDE 10 MG/ML
INJECTION INTRAMUSCULAR; INTRAVENOUS
Status: DISPENSED
Start: 2017-05-19 | End: 2017-05-20

## 2017-05-19 RX ORDER — NITROGLYCERIN 0.4 MG/1
0.4 TABLET SUBLINGUAL EVERY 5 MIN PRN
Status: COMPLETED | OUTPATIENT
Start: 2017-05-19 | End: 2017-05-26

## 2017-05-19 RX ORDER — NITROGLYCERIN 0.3 MG/1
0.3 TABLET SUBLINGUAL EVERY 5 MIN PRN
Status: DISCONTINUED | OUTPATIENT
Start: 2017-05-19 | End: 2017-05-19

## 2017-05-19 RX ORDER — NITROGLYCERIN 0.4 MG/1
0.4 TABLET SUBLINGUAL
Status: COMPLETED | OUTPATIENT
Start: 2017-05-19 | End: 2017-05-19

## 2017-05-19 RX ORDER — ASPIRIN 325 MG
325 TABLET ORAL
Status: COMPLETED | OUTPATIENT
Start: 2017-05-19 | End: 2017-05-19

## 2017-05-19 RX ORDER — FUROSEMIDE 10 MG/ML
20 INJECTION INTRAMUSCULAR; INTRAVENOUS ONCE
Status: COMPLETED | OUTPATIENT
Start: 2017-05-19 | End: 2017-05-19

## 2017-05-19 RX ORDER — CHOLECALCIFEROL (VITAMIN D3) 25 MCG
2000 TABLET ORAL EVERY MORNING
Status: DISCONTINUED | OUTPATIENT
Start: 2017-05-19 | End: 2017-05-20

## 2017-05-19 RX ORDER — SODIUM CHLORIDE 9 MG/ML
3 INJECTION, SOLUTION INTRAVENOUS CONTINUOUS
Status: ACTIVE | OUTPATIENT
Start: 2017-05-19 | End: 2017-05-19

## 2017-05-19 RX ORDER — CARVEDILOL 3.12 MG/1
3.12 TABLET ORAL 2 TIMES DAILY WITH MEALS
Status: DISCONTINUED | OUTPATIENT
Start: 2017-05-19 | End: 2017-05-22

## 2017-05-19 RX ORDER — SODIUM BICARBONATE 650 MG/1
650 TABLET ORAL 2 TIMES DAILY
Status: DISCONTINUED | OUTPATIENT
Start: 2017-05-19 | End: 2017-05-27

## 2017-05-19 RX ORDER — CLOPIDOGREL BISULFATE 75 MG/1
75 TABLET ORAL DAILY
Status: DISCONTINUED | OUTPATIENT
Start: 2017-05-19 | End: 2017-06-02 | Stop reason: HOSPADM

## 2017-05-19 RX ORDER — CALCITRIOL 0.25 UG/1
0.5 CAPSULE ORAL NIGHTLY
Status: DISCONTINUED | OUTPATIENT
Start: 2017-05-19 | End: 2017-05-20

## 2017-05-19 RX ORDER — ATORVASTATIN CALCIUM 20 MG/1
80 TABLET, FILM COATED ORAL DAILY
Status: DISCONTINUED | OUTPATIENT
Start: 2017-05-19 | End: 2017-06-02 | Stop reason: HOSPADM

## 2017-05-19 RX ORDER — FERROUS SULFATE 325(65) MG
325 TABLET, DELAYED RELEASE (ENTERIC COATED) ORAL 3 TIMES DAILY
Status: DISCONTINUED | OUTPATIENT
Start: 2017-05-19 | End: 2017-05-20

## 2017-05-19 RX ORDER — NAPROXEN SODIUM 220 MG/1
81 TABLET, FILM COATED ORAL DAILY
Status: DISCONTINUED | OUTPATIENT
Start: 2017-05-19 | End: 2017-06-02 | Stop reason: HOSPADM

## 2017-05-19 RX ORDER — FUROSEMIDE 10 MG/ML
80 INJECTION INTRAMUSCULAR; INTRAVENOUS
Status: COMPLETED | OUTPATIENT
Start: 2017-05-19 | End: 2017-05-19

## 2017-05-19 RX ORDER — SODIUM CHLORIDE 0.9 % (FLUSH) 0.9 %
3 SYRINGE (ML) INJECTION EVERY 8 HOURS
Status: DISCONTINUED | OUTPATIENT
Start: 2017-05-19 | End: 2017-06-02 | Stop reason: HOSPADM

## 2017-05-19 RX ORDER — CLOPIDOGREL 300 MG/1
600 TABLET, FILM COATED ORAL ONCE
Status: COMPLETED | OUTPATIENT
Start: 2017-05-19 | End: 2017-05-19

## 2017-05-19 RX ORDER — CLOPIDOGREL BISULFATE 75 MG/1
75 TABLET ORAL DAILY
Status: DISCONTINUED | OUTPATIENT
Start: 2017-05-19 | End: 2017-05-19

## 2017-05-19 RX ORDER — SODIUM CHLORIDE 9 MG/ML
500 INJECTION, SOLUTION INTRAVENOUS
Status: COMPLETED | OUTPATIENT
Start: 2017-05-19 | End: 2017-05-19

## 2017-05-19 RX ORDER — MORPHINE SULFATE 2 MG/ML
1 INJECTION, SOLUTION INTRAMUSCULAR; INTRAVENOUS EVERY 4 HOURS PRN
Status: DISCONTINUED | OUTPATIENT
Start: 2017-05-19 | End: 2017-05-20

## 2017-05-19 RX ORDER — HYDROXYCHLOROQUINE SULFATE 200 MG/1
200 TABLET, FILM COATED ORAL 2 TIMES DAILY
Status: DISCONTINUED | OUTPATIENT
Start: 2017-05-19 | End: 2017-06-02 | Stop reason: HOSPADM

## 2017-05-19 RX ORDER — LORAZEPAM 2 MG/ML
0.5 INJECTION INTRAMUSCULAR EVERY 4 HOURS PRN
Status: DISCONTINUED | OUTPATIENT
Start: 2017-05-19 | End: 2017-05-20

## 2017-05-19 RX ORDER — ASPIRIN 325 MG
325 TABLET ORAL ONCE
Status: DISCONTINUED | OUTPATIENT
Start: 2017-05-19 | End: 2017-05-19

## 2017-05-19 RX ADMIN — ASPIRIN 325 MG ORAL TABLET 325 MG: 325 PILL ORAL at 09:05

## 2017-05-19 RX ADMIN — LORAZEPAM 0.5 MG: 2 INJECTION INTRAMUSCULAR; INTRAVENOUS at 08:05

## 2017-05-19 RX ADMIN — FUROSEMIDE 20 MG: 10 INJECTION, SOLUTION INTRAVENOUS at 02:05

## 2017-05-19 RX ADMIN — SODIUM CHLORIDE 250 ML: 0.9 INJECTION, SOLUTION INTRAVENOUS at 09:05

## 2017-05-19 RX ADMIN — VANCOMYCIN HYDROCHLORIDE 1250 MG: 100 INJECTION, POWDER, LYOPHILIZED, FOR SOLUTION INTRAVENOUS at 06:05

## 2017-05-19 RX ADMIN — SODIUM CHLORIDE 3 ML/KG/HR: 0.9 INJECTION, SOLUTION INTRAVENOUS at 09:05

## 2017-05-19 RX ADMIN — FUROSEMIDE 10 MG/HR: 10 INJECTION, SOLUTION INTRAMUSCULAR; INTRAVENOUS at 05:05

## 2017-05-19 RX ADMIN — FUROSEMIDE 20 MG: 10 INJECTION, SOLUTION INTRAVENOUS at 09:05

## 2017-05-19 RX ADMIN — FUROSEMIDE 80 MG: 10 INJECTION, SOLUTION INTRAVENOUS at 04:05

## 2017-05-19 RX ADMIN — NITROGLYCERIN 0.4 MG: 0.4 TABLET SUBLINGUAL at 09:05

## 2017-05-19 RX ADMIN — SODIUM CHLORIDE 500 ML: 0.9 INJECTION, SOLUTION INTRAVENOUS at 10:05

## 2017-05-19 RX ADMIN — CLOPIDOGREL BISULFATE 600 MG: 300 TABLET, FILM COATED ORAL at 09:05

## 2017-05-19 RX ADMIN — CHLOROTHIAZIDE SODIUM 250 MG: 500 INJECTION, POWDER, LYOPHILIZED, FOR SOLUTION INTRAVENOUS at 08:05

## 2017-05-19 RX ADMIN — HEPARIN SODIUM AND DEXTROSE 17 UNITS/KG/HR: 10000; 5 INJECTION INTRAVENOUS at 10:05

## 2017-05-19 RX ADMIN — MORPHINE SULFATE 1 MG: 2 INJECTION, SOLUTION INTRAMUSCULAR; INTRAVENOUS at 08:05

## 2017-05-19 NOTE — PROGRESS NOTES
Received pt from ER on 6LNC and placed back on BiPAP on previous settings. NARN. Vital signs are stable. Will continue to monitor.

## 2017-05-19 NOTE — PROGRESS NOTES
Called to bedside pt SOB and complaining of chest pain. Placed on bipap on settings per md and tolerating well. Will continue to monitor.

## 2017-05-19 NOTE — PLAN OF CARE
Problem: Patient Care Overview  Goal: Plan of Care Review  Outcome: Ongoing (interventions implemented as appropriate)  Pt VSS at this time, pt on 60% BiPAP, BP stable at this time, only 40 cc UO since total of 100 mg of lasix given this afternoon, pt has 2 BMs this shift, pt AAOx4, GOC issues explained with family extensively this shift along with prognosis, family still wants to keep patient full code, MD Farias will discuss possibility for CRRT this evening.

## 2017-05-19 NOTE — PLAN OF CARE
05/19/17 1456   Discharge Assessment   Assessment Type Discharge Planning Assessment   Confirmed/corrected address and phone number on facesheet? Yes   Assessment information obtained from? Medical Record   Expected Length of Stay (days) 5   Prior to hospitilization cognitive status: Alert/Oriented   Prior to hospitalization functional status: Assistive Equipment;Needs Assistance   Current cognitive status: Unable to Assess   Current Functional Status: Completely Dependent   Arrived From admitted as an inpatient   Lives With spouse   Able to Return to Prior Arrangements unable to determine at this time (comments)   Is patient able to care for self after discharge? Unable to determine at this time (comments)   How many people do you have in your home that can help with your care after discharge? 1   Who are your caregiver(s) and their phone number(s)? spouse Emy Machado [phone]   Patient's perception of discharge disposition admitted as an inpatient   Readmission Within The Last 30 Days no previous admission in last 30 days   Patient currently being followed by outpatient case management? No   Patient currently receives home health services? No   Patient previously received home health services and would like to resume services if necessary? Yes   If yes, name of home health provider: [facility]   Does the patient currently use HME? Yes   Name and contact number for HME provider: unknown   Patient currently receives private duty nursing? (unknown)   Patient currently receives any other outside agency services? Yes  (Per MD office visit notes the patient has a  possible private duty nursing aid)   How many hours a day does the patient receive services? (unknown)   Name and contact number of agency or person providing outside services [place] per MD notes   Is it the patient/care giver preference to resume care with the current outside agency? Other (comment)  (unknown)   Equipment Currently  Used at Home walker, standard;shower chair;grab bar;commode   Discharge Plan A Home with family;Home Health   Discharge Plan B Skilled Nursing Facility   Patient/Family In Agreement With Plan unable to assess     Case manger to bedside. The patient is refusing treatment for NSTEMI w/cath lab procedure d/t high risk for possible need for HD afterwards because the patient has CKD IV and the use of IV dye could cause THEO. The patient is also refusing central line placement at this time. He is currently on Bipap. The patient is clinically unstable however will more than likely require Home Health vs SNF vs Rehab upon discharge.

## 2017-05-19 NOTE — CONSULTS
ED Fellow Brief Note    84 yo M with PMHx of HTN,HLD, NSTEMI abnormal PET stress with refused LHC after abnormal 5/2016, CKD stage IV (baseline 2.5), moderate AS, chart hx AF, venous insufficiency, cirrhosis, chronic osteomyelitis of the pelvic region on cefpodoxime, suspected inflammatory arthritis on HCQ, neuromuscular disorder with peripheral neuropathy with recurrent right foot ulcer, prostate cancer s/p radiation, RCC s/p left nephrectomy, skin cancer of the hand, urethral stricture, urinary incontinence BIBEMS with c/o substernal, radiating, 10/10 chest pain which started an hour ago a/w SOB and EKG concerning for STEMI with afib with q waves in inferior/anterior-septal leads with resolution of chest pain with SL NTG. Patient was evaluated in the ED and recommended to be taken to cath lab considering his clinical picture but he refused to proceed with angiogram due to risk of hemodialysis possibility and being admitted to cardiac ICU for ACS medical management.      A/P  Acute coronary syndrome - ST elevation myocardial infarction with refusal to proceed with coronary angiogram.  Bedside Echo- Anterior wall/posterior wall akinetic - PSL axis  Admit to cardiac ICU  Initiate ACS protocol including statin/ASA/Plavix/heparin gtt.  I personally explained the patient, his wife and son about risks of not proceeding with angiogram and PCI which can be fatal but patient wish to proceed with conservative management and doesn't want to proceed with invasive procedure currently.   Stat echo/CXR PA/LA.  Patient refusing an central line currently  Will re-discuss code status -  He is at high mortality risk.      Kyle Duval MD  PGY-6  Cardiology

## 2017-05-19 NOTE — ED PROVIDER NOTES
"Encounter Date: 5/19/2017       History     Chief Complaint   Patient presents with    Shortness of Breath     Review of patient's allergies indicates:   Allergen Reactions    Aspirin Other (See Comments)     Stomach      Ditropan [oxybutynin chloride]      Unable to describe side effect other than "felt strange"     Keflex [cephalexin] Rash     Morbilliform  Has tolerated multiple cephalosporins since 2013.    Macrobid [nitrofurantoin monohyd/m-cryst] Hives and Rash     HPI Comments: 85y M with HTN, CKD presents with acute onset chest pain and shortness of breath. Pain is severe, has been constant for several hours. It is associated with shortness of breath. Says he is unable to catch his breath. Speaking is difficult because of the shortness of breath. No medications taken prior to arrival.     The history is provided by the spouse and the patient. The history is limited by the condition of the patient.     Past Medical History:   Diagnosis Date    *Atrial fibrillation     Acute appendicitis 2/19/2015    Anemia     Anxiety     Arthritis     generalized    Basal cell carcinoma 01/2013    right temple    BCC (basal cell carcinoma)     right mid forearm    Bladder stone 6/28/2016    Blood transfusion     Chronic urethral stricture 10/14/2015    Chronic venous insufficiency 7/8/2013    CKD (chronic kidney disease) stage 3, GFR 30-59 ml/min 9/6/2012    Coronary artery disease     Elevated PSA     Essential hypertension 7/30/2015    Hematuria, gross     History of Left Nephrectomy (~2006) 1/29/2014    Done at [facility].     Hypertension     Inflammatory arthritis 8/14/2012    Kidney stone     Long-term use of Plaquenil 6/4/2015    Mixed incontinence urge and stress 4/11/2014    Nonrheumatic aortic valve stenosis 5/30/2016    NSTEMI (non-ST elevated myocardial infarction) 5/30/2016    Olecranon bursitis 1/14/2013    Osteopenia 8/14/2012    Personal history of kidney cancer 4/11/2014    " Prostate cancer     Radiation     treatment for prostate cancer    Recurrent UTI 7/8/2013    S/P radiation therapy 4/11/2014    Skin cancer of arm     left leg (malignant)    Solitary kidney 7/15/2013    Venous insufficiency of leg 7/12/2012    Venous stasis ulcers 7/6/2012    Vitamin D deficiency disease 5/8/2013     Past Surgical History:   Procedure Laterality Date    APPENDECTOMY      CYSTOSCOPY      with dialation    NEPHRECTOMY  2006-07?    Removal of left kidney    TONSILLECTOMY       Family History   Problem Relation Age of Onset    Cancer Mother      bone     Heart disease Father     Urolithiasis Father     Mental illness Daughter     Cancer Brother      prostate cancer, (Ervin) removed by surgery    Cancer Brother      prostate cancer    Cancer Maternal Aunt     Melanoma Neg Hx     Lupus Neg Hx     Rheum arthritis Neg Hx      Social History   Substance Use Topics    Smoking status: Current Every Day Smoker     Years: 40.00     Types: Cigars    Smokeless tobacco: Never Used      Comment: pt smokes 3 cigars a day pt will make up his mind when his ready- did give up smoking cigarettes at age 35yrs smoked from 18 - 35 years    Alcohol use 0.6 oz/week     1 Glasses of wine per week      Comment: stop drinking 09/2009. 1 glass of wine at bedtime      Review of Systems   Unable to perform ROS: Severe respiratory distress       Physical Exam   Initial Vitals   BP Pulse Resp Temp SpO2   05/19/17 0858 05/19/17 0858 05/19/17 0858 -- 05/19/17 0858   91/54 118 35  91 %     Physical Exam    Vitals reviewed.  Constitutional: He appears well-developed and well-nourished. He is diaphoretic. He appears ill.   hypotensive   HENT:   Head: Normocephalic and atraumatic.   Mouth/Throat: Oropharynx is clear and moist.   Eyes: Conjunctivae and EOM are normal. Pupils are equal, round, and reactive to light.   Neck: Trachea normal and normal range of motion. Neck supple.   Cardiovascular: Normal heart  sounds and normal pulses. An irregularly irregular rhythm present. Tachycardia present.    Pulmonary/Chest: Accessory muscle usage present. Tachypnea noted. He is in respiratory distress. He has decreased breath sounds. He has rales.   Abdominal: Soft. Normal appearance and bowel sounds are normal. There is no tenderness.   Genitourinary:   Genitourinary Comments: Stevens catheter    Musculoskeletal: Normal range of motion. He exhibits edema.   Neurological: He is alert and oriented to person, place, and time.   Skin: Skin is warm.         ED Course   Procedures  Labs Reviewed   CBC W/ AUTO DIFFERENTIAL   COMPREHENSIVE METABOLIC PANEL   PROTIME-INR   APTT   TROPONIN I   B-TYPE NATRIURETIC PEPTIDE   URINALYSIS   DRUG SCREEN PANEL, URINE EMERGENCY   TYPE & SCREEN   ISTAT CHEM8     EKG Readings: (Independently Interpreted)   Initial Reading: STEMI. Rhythm: Atrial Fibrillation. Heart Rate: 122. ST Segment Elevation: III and AVF. ST Segment Depression: V5 and V6. [facility] ED EKG2 - Other Findings: Q waves.       X-Rays:   Independently Interpreted Readings:   Chest X-Ray: Cardiomegaly present.  Increased vascular markings consistent with CHF are present.     Medical Decision Making:   History:   I obtained history from: someone other than patient.  Old Medical Records: I decided to obtain old medical records.  Initial Assessment:   Emergent evaluation of chest pain and shortness of breath. My initial concerns include ACS, pulmonary edema, electrolyte abnormality. Patient evaluated immediately upon arrival. Initial EKG concerning for STEMI. Code STEMI activated. Patient appeared to be in respiratory distress. BIPAP initiated on arrival. ASA given. Cardiology consulted.   Independently Interpreted Test(s):   I have ordered and independently interpreted X-rays - see prior notes.  I have ordered and independently interpreted EKG Reading(s) - see prior notes  Clinical Tests:   Lab Tests: Reviewed and Ordered  Radiological Study:  Ordered and Reviewed  Medical Tests: Ordered and Reviewed  Other:   I have discussed this case with another health care provider.    Additional MDM:   Myocardial Infarction (STEMI): The EKG was read as an acute ST elevation MI (STEMI).  STEMI activation was done.  Cardiology was immediately consulted. Medications given: Aspirin and NTG SL. The patient's condition was felt to be potentially life-threatening. The patient refused cath lab.            Attending Attestation:         Attending Critical Care:   Critical Care Times:   Direct Patient Care (initial evaluation, reassessments, and time considering the case)................................................................35 minutes.   Additional History from reviewing old medical records or taking additional history from the family, EMS, PCP, etc.......................10 minutes.   Ordering, Reviewing, and Interpreting Diagnostic Studies...............................................................................................................5 minutes.   Documentation..................................................................................................................................................................................5 minutes.   Consultation with other Physicians. .................................................................................................................................................5 minutes.   Consultation with the patient's family directly relating to the patient's condition, care, and DNR status (when patient unable)......10 minutes.   ==============================================================  · Total Critical Care Time - exclusive of procedural time: 70 minutes.  ==============================================================  Critical care was necessary to treat or prevent imminent or life-threatening deterioration of the following conditions: myocardial infarction.       Attending ED Notes:    Creatinine slightly above baseline. Discussion held with family and cardiology and the patient has elevated not to go to the cath lab. Risk and benefits explained, the patient has the capacity to make this decision and was encouraged to go to the cath lab despite elevated CR by myself and cardiology. He has decided to purse medical management. Will be admitted by CICU.          ED Course     Clinical Impression:   The primary encounter diagnosis was Venous insufficiency of both lower extremities. Diagnoses of ST elevation myocardial infarction (STEMI) involving other coronary artery of inferior wall, Atrial fibrillation with RVR, Respiratory distress, CKD (chronic kidney disease), unspecified stage, Ulcer of calf, right, with fat layer exposed, and Chest pain were also pertinent to this visit.    Disposition:   Disposition: Admitted  Condition: Serious       Marlineari Marjorie Nuñez MD  05/19/17 0707

## 2017-05-19 NOTE — ED NOTES
Patient placed on cardiac monitor, pulse ox and automatic blood pressure cuff. Respiratory called.

## 2017-05-19 NOTE — TELEPHONE ENCOUNTER
----- Message from Leatha Granados sent at 5/19/2017  2:13 PM CDT -----  Contact: Andrew with [facility]  [phone]  Reporting miss visit today. Patient is not at home but should be in the ER for difficulty breathing.

## 2017-05-19 NOTE — Clinical Note
"History          Chief Complaint   Patient presents with    Shortness of Breath            Review of patient's allergies indicates:   Allergen Reactions    Aspirin Other (See Comments)       Stomach    Ditropan [oxybutynin chloride]         Unable to describe side effect other than "felt strange"     Keflex [cephalexin] Rash       Morbilliform  Has tolerated multiple cephalosporins since 2013.    Macrobid [nitrofurantoin monohyd/m-cryst] Hives and Rash      HPI       Past Medical History:   Diagnosis Date    *Atrial fibrillation      Acute appendicitis 2/19/2015    Anemia      Anxiety      Arthritis       generalized    Basal cell carcinoma 01/2013     right temple    BCC (basal cell carcinoma)       right mid forearm    Bladder stone 6/28/2016    Blood transfusion      Chronic urethral stricture 10/14/2015    Chronic venous insufficiency 7/8/2013    CKD (chronic kidney disease) stage 3, GFR 30-59 ml/min 9/6/2012    Coronary artery disease      Elevated PSA      Essential hypertension 7/30/2015    Hematuria, gross      History of Left Nephrectomy (~2006) 1/29/2014     [facility].     Hypertension      Inflammatory arthritis 8/14/2012    Kidney stone      Long-term use of Plaquenil 6/4/2015    Mixed incontinence urge and stress 4/11/2014    Nonrheumatic aortic valve stenosis 5/30/2016    NSTEMI (non-ST elevated myocardial infarction) 5/30/2016    Olecranon bursitis 1/14/2013    Osteopenia 8/14/2012    Personal history of kidney cancer 4/11/2014    Prostate cancer      Radiation       treatment for prostate cancer    Recurrent UTI 7/8/2013    S/P radiation therapy 4/11/2014    Skin cancer of arm       left leg (malignant)    Solitary kidney 7/15/2013    Venous insufficiency of leg 7/12/2012    Venous stasis ulcers 7/6/2012    Vitamin D deficiency disease 5/8/2013            Past Surgical History:   Procedure Laterality Date    APPENDECTOMY        CYSTOSCOPY         with " dialation    NEPHRECTOMY   2006-07?     Removal of left kidney    TONSILLECTOMY                 Family History   Problem Relation Age of Onset    Cancer Mother         bone     Heart disease Father      Urolithiasis Father      Mental illness Daughter      Cancer Brother         prostate cancer, (Ervin) removed by surgery    Cancer Brother         prostate cancer    Cancer Maternal Aunt      Melanoma Neg Hx      Lupus Neg Hx      Rheum arthritis Neg Hx                Social History   Substance Use Topics    Smoking status: Current Every Day Smoker       Years: 40.00       Types: Cigars    Smokeless tobacco: Never Used         Comment: pt smokes 3 cigars a day pt will make up his mind when his ready- did give up smoking cigarettes at age 35yrs smoked from 18 - 35 years    Alcohol use 0.6 oz/week        1 Glasses of wine per week          Comment: stop drinking 09/2009. 1 glass of wine at bedtime       Review of Systems   All other systems reviewed and are negative.                Physical Exam          Initial Vitals   BP Pulse Resp Temp SpO2   05/19/17 0858 05/19/17 0858 05/19/17 0858 -- 05/19/17 0858   91/54 118 35   91 %      Physical Exam  Constitutional: He appears well-developed and well-nourished.   hypotensive   HENT:   Head: Normocephalic and atraumatic.   Mouth/Throat: Oropharynx is clear and moist.   Eyes: Conjunctivae and EOM are normal. Pupils are equal, round, and reactive to light.   Neck: Trachea normal and normal range of motion. Neck supple.   Cardiovascular: Normal heart sounds and normal pulses. An irregularly irregular rhythm present. Tachycardia present.   Pulmonary/Chest: Accessory muscle usage present. Tachypnea noted. He is in respiratory distress. He has decreased breath sounds. He has rales.   Abdominal: Soft. Normal appearance and bowel sounds are normal. There is no tenderness.   Genitourinary:   Genitourinary Comments: Stevens catheter    Musculoskeletal: Normal range of  motion.   Neurological: He is alert and oriented to person, place, and time.   Skin: Skin is warm and dry.         ED Course   Procedures  Labs Reviewed   CBC W/ AUTO DIFFERENTIAL   COMPREHENSIVE METABOLIC PANEL   PROTIME-INR   APTT   TROPONIN I   B-TYPE NATRIURETIC PEPTIDE   URINALYSIS   DRUG SCREEN PANEL, URINE EMERGENCY   TYPE & SCREEN   ISTAT CHEM8      EKG Readings: (Independently Interpreted)   Initial Reading: STEMI. Rhythm: Atrial Fibrillation. Heart Rate: 122. ST Segment Elevation: III and AVF. ST Segment Depression: V5 and V6. [facility] ED EKG2 - Other Findings: Q waves.     86 yo M with PMHx of HTN,HLD, NSTEMI abnormal PET stress with refused LHC after abnormal 5/2016, CKD stage IV (baseline 2.5), moderate AS, chart hx AF, venous insufficiency, cirrhosis, chronic osteomyelitis of the pelvic region on cefpodoxime, suspected inflammatory arthritis on HCQ, neuromuscular disorder with peripheral neuropathy with recurrent right foot ulcer, prostate cancer s/p radiation, RCC s/p left nephrectomy, skin cancer of the hand, urethral stricture, urinary incontinence BIBEMS with c/o substernal, radiating, 10/10 chest pain which started an hour ago a/w SOB and EKG concerning for STEMI with afib with q waves in inferior/anterior-septal leads with resolution of chest pain with SL NTG. Patient was evaluated in the ED and recommended to be taken to cath lab considering his clinical picture but he refused to proceed with angiogram due to risk of hemodialysis possibility and being admitted to cardiac ICU for ACS medical management.        A/P  Acute coronary syndrome - ST elevation myocardial infarction with refusal to proceed with coronary angiogram.  Bedside Echo- Anterior wall/posterior wall akinetic - PSL axis  Admit to cardiac ICU  Initiate ACS protocol including statin/ASA/Plavix/heparin gtt.  I personally explained the patient, his wife and son about risks of not proceeding with angiogram and PCI which can be fatal but  patient wish to proceed with conservative management and doesn't want to proceed with invasive procedure currently.   Stat echo/CXR PA/LA.  Patient refusing an central line currently  Will re-discuss code status - He is at high mortality risk.

## 2017-05-19 NOTE — ED NOTES
Patient identifiers verified and correct for Humphrey Machado.    LOC: The patient is awake, alert and aware of environment with an appropriate affect, the patient is oriented x 3 and speaking appropriately.  APPEARANCE: Patient resting comfortably and in no acute distress, patient is clean and well groomed, patient's clothing is properly fastened.  SKIN: The skin is warm and dry, color consistent with ethnicity, patient has normal skin turgor and moist mucus membranes, skin intact, bruising noted.  MUSCULOSKELETAL: Patient moving all extremities spontaneously,  obvious swelling  Noted to the lower extremities.  RESPIRATORY: Airway is open and patent, tachypnea noted,   accessory muscle use noted, adventitious breath sounds noted throughout the chest.  CARDIAC: STEMI per cardiology, periphreal edema noted, capillary refill < 3 seconds.  ABDOMEN: Soft and non tender to palpation, no distention noted, normoactive bowel sounds present in all four quadrants.

## 2017-05-19 NOTE — ED TRIAGE NOTES
Patient brought into the ER by EMS (NO), with a c/o SOB. Per EMS, patient wife states that the patient was gasping for air.

## 2017-05-19 NOTE — PROGRESS NOTES
MD Lozada talked with family at bedside about transitioning to comfort care, family was in agreement however MD Farias thinks patient will be stable overnight and should reassess tomorrow morning, family now does not want to transition to comfort care until patient is in obvious discomfort with current treatment.

## 2017-05-20 LAB
ANION GAP SERPL CALC-SCNC: 15 MMOL/L
BUN SERPL-MCNC: 66 MG/DL
CALCIUM SERPL-MCNC: 9.1 MG/DL
CHLORIDE SERPL-SCNC: 107 MMOL/L
CO2 SERPL-SCNC: 18 MMOL/L
CREAT SERPL-MCNC: 4.3 MG/DL
EST. GFR  (AFRICAN AMERICAN): 13.5 ML/MIN/1.73 M^2
EST. GFR  (NON AFRICAN AMERICAN): 11.7 ML/MIN/1.73 M^2
FACT X PPP CHRO-ACNC: 0.62 IU/ML
GLUCOSE SERPL-MCNC: 111 MG/DL
GRAM STN SPEC: NORMAL
GRAM STN SPEC: NORMAL
POTASSIUM SERPL-SCNC: 4.1 MMOL/L
SODIUM SERPL-SCNC: 140 MMOL/L
TROPONIN I SERPL DL<=0.01 NG/ML-MCNC: >50 NG/ML
TROPONIN I SERPL DL<=0.01 NG/ML-MCNC: >50 NG/ML
VANCOMYCIN SERPL-MCNC: 17 UG/ML

## 2017-05-20 PROCEDURE — 25000003 PHARM REV CODE 250: Performed by: STUDENT IN AN ORGANIZED HEALTH CARE EDUCATION/TRAINING PROGRAM

## 2017-05-20 PROCEDURE — 80202 ASSAY OF VANCOMYCIN: CPT

## 2017-05-20 PROCEDURE — 87040 BLOOD CULTURE FOR BACTERIA: CPT | Mod: 59

## 2017-05-20 PROCEDURE — 94761 N-INVAS EAR/PLS OXIMETRY MLT: CPT

## 2017-05-20 PROCEDURE — 63600175 PHARM REV CODE 636 W HCPCS: Performed by: HOSPITALIST

## 2017-05-20 PROCEDURE — 87077 CULTURE AEROBIC IDENTIFY: CPT

## 2017-05-20 PROCEDURE — C9113 INJ PANTOPRAZOLE SODIUM, VIA: HCPCS | Performed by: HOSPITALIST

## 2017-05-20 PROCEDURE — 63600175 PHARM REV CODE 636 W HCPCS: Performed by: STUDENT IN AN ORGANIZED HEALTH CARE EDUCATION/TRAINING PROGRAM

## 2017-05-20 PROCEDURE — 25000003 PHARM REV CODE 250: Performed by: INTERNAL MEDICINE

## 2017-05-20 PROCEDURE — 84484 ASSAY OF TROPONIN QUANT: CPT | Mod: 91

## 2017-05-20 PROCEDURE — 80048 BASIC METABOLIC PNL TOTAL CA: CPT

## 2017-05-20 PROCEDURE — 85520 HEPARIN ASSAY: CPT

## 2017-05-20 PROCEDURE — 25000003 PHARM REV CODE 250: Performed by: HOSPITALIST

## 2017-05-20 PROCEDURE — 20600001 HC STEP DOWN PRIVATE ROOM

## 2017-05-20 PROCEDURE — 87088 URINE BACTERIA CULTURE: CPT

## 2017-05-20 PROCEDURE — 87086 URINE CULTURE/COLONY COUNT: CPT

## 2017-05-20 PROCEDURE — 84484 ASSAY OF TROPONIN QUANT: CPT

## 2017-05-20 PROCEDURE — 99900035 HC TECH TIME PER 15 MIN (STAT)

## 2017-05-20 PROCEDURE — 87186 SC STD MICRODIL/AGAR DIL: CPT

## 2017-05-20 PROCEDURE — 99232 SBSQ HOSP IP/OBS MODERATE 35: CPT | Mod: GC,,, | Performed by: INTERNAL MEDICINE

## 2017-05-20 PROCEDURE — 97802 MEDICAL NUTRITION INDIV IN: CPT

## 2017-05-20 PROCEDURE — 36415 COLL VENOUS BLD VENIPUNCTURE: CPT

## 2017-05-20 PROCEDURE — 93010 ELECTROCARDIOGRAM REPORT: CPT | Mod: ,,, | Performed by: INTERNAL MEDICINE

## 2017-05-20 PROCEDURE — 93005 ELECTROCARDIOGRAM TRACING: CPT

## 2017-05-20 RX ORDER — MORPHINE SULFATE 2 MG/ML
2 INJECTION, SOLUTION INTRAMUSCULAR; INTRAVENOUS
Status: DISCONTINUED | OUTPATIENT
Start: 2017-05-20 | End: 2017-05-20

## 2017-05-20 RX ORDER — BISACODYL 10 MG
10 SUPPOSITORY, RECTAL RECTAL DAILY PRN
Status: DISCONTINUED | OUTPATIENT
Start: 2017-05-20 | End: 2017-06-02 | Stop reason: HOSPADM

## 2017-05-20 RX ORDER — HYDROMORPHONE HYDROCHLORIDE 1 MG/ML
0.5 INJECTION, SOLUTION INTRAMUSCULAR; INTRAVENOUS; SUBCUTANEOUS
Status: DISCONTINUED | OUTPATIENT
Start: 2017-05-20 | End: 2017-06-02 | Stop reason: HOSPADM

## 2017-05-20 RX ORDER — ONDANSETRON 2 MG/ML
4 INJECTION INTRAMUSCULAR; INTRAVENOUS EVERY 8 HOURS PRN
Status: DISCONTINUED | OUTPATIENT
Start: 2017-05-20 | End: 2017-05-31

## 2017-05-20 RX ORDER — PANTOPRAZOLE SODIUM 40 MG/10ML
40 INJECTION, POWDER, LYOPHILIZED, FOR SOLUTION INTRAVENOUS DAILY
Status: DISCONTINUED | OUTPATIENT
Start: 2017-05-20 | End: 2017-05-22

## 2017-05-20 RX ORDER — DEXTROSE MONOHYDRATE 100 MG/ML
INJECTION, SOLUTION INTRAVENOUS CONTINUOUS
Status: DISCONTINUED | OUTPATIENT
Start: 2017-05-20 | End: 2017-05-21

## 2017-05-20 RX ADMIN — Medication 3 ML: at 01:05

## 2017-05-20 RX ADMIN — LORAZEPAM 0.5 MG: 2 INJECTION INTRAMUSCULAR; INTRAVENOUS at 03:05

## 2017-05-20 RX ADMIN — LORAZEPAM 0.5 MG: 2 INJECTION INTRAMUSCULAR; INTRAVENOUS at 07:05

## 2017-05-20 RX ADMIN — FUROSEMIDE 20 MG/HR: 10 INJECTION, SOLUTION INTRAMUSCULAR; INTRAVENOUS at 07:05

## 2017-05-20 RX ADMIN — Medication 3 ML: at 02:05

## 2017-05-20 RX ADMIN — Medication 3 ML: at 06:05

## 2017-05-20 RX ADMIN — MORPHINE SULFATE 2 MG: 2 INJECTION, SOLUTION INTRAMUSCULAR; INTRAVENOUS at 10:05

## 2017-05-20 RX ADMIN — CHLOROTHIAZIDE SODIUM 250 MG: 500 INJECTION, POWDER, LYOPHILIZED, FOR SOLUTION INTRAVENOUS at 09:05

## 2017-05-20 RX ADMIN — HEPARIN SODIUM AND DEXTROSE 17 UNITS/KG/HR: 10000; 5 INJECTION INTRAVENOUS at 03:05

## 2017-05-20 RX ADMIN — Medication 3 ML: at 10:05

## 2017-05-20 RX ADMIN — FERROUS SULFATE TAB EC 325 MG (65 MG FE EQUIVALENT) 325 MG: 325 (65 FE) TABLET DELAYED RESPONSE at 06:05

## 2017-05-20 RX ADMIN — PANTOPRAZOLE SODIUM 40 MG: 40 INJECTION, POWDER, FOR SOLUTION INTRAVENOUS at 04:05

## 2017-05-20 RX ADMIN — MORPHINE SULFATE 1 MG: 2 INJECTION, SOLUTION INTRAMUSCULAR; INTRAVENOUS at 03:05

## 2017-05-20 RX ADMIN — MORPHINE SULFATE 1 MG: 2 INJECTION, SOLUTION INTRAMUSCULAR; INTRAVENOUS at 07:05

## 2017-05-20 RX ADMIN — FUROSEMIDE 20 MG/HR: 10 INJECTION, SOLUTION INTRAMUSCULAR; INTRAVENOUS at 05:05

## 2017-05-20 RX ADMIN — MORPHINE SULFATE 2 MG: 2 INJECTION, SOLUTION INTRAMUSCULAR; INTRAVENOUS at 01:05

## 2017-05-20 RX ADMIN — DEXTROSE: 10 SOLUTION INTRAVENOUS at 09:05

## 2017-05-20 RX ADMIN — CEFEPIME 1 G: 1 INJECTION, POWDER, FOR SOLUTION INTRAMUSCULAR; INTRAVENOUS at 09:05

## 2017-05-20 RX ADMIN — DEXTROSE MONOHYDRATE 250 MG: 5 INJECTION, SOLUTION INTRAVENOUS at 10:05

## 2017-05-20 NOTE — PROGRESS NOTES
Assumed care of patient from CCU team.     Verbal handoff given by Dr. Lozada and reviewed EMR notes.     Patient seen at bedside, patients spouse, brother, and nephew are at bedside.     He has increased somnolence per family this afternoon, has been receiving IV morphine as palliative/comfort measure.     On brief exam, pt is arousable to physical stimuli, opens eyes able to state his name, is somnolent and returns to sleep, unclear if he can follow commands, when asked if he is in pain patient responded yes, but does not localize any pain on evaluation.     He has a montano catheter draining pink/red clear urine.  Review of VS shows that patient febrile this morning, and RN reported to be that blood cultures from 5/19 are positive for gram negative rods.     Summarized my understanding of patient's care with family, informed that that we will continue plan laid out by CCU.  Will keep pt DNR, will not escalate care to the ICU, will continue acute care for STEMI, bacteremia and THEO as clinical status allows.  If patient remains somnolent, he likely will not be able to take any oral medications.     Plan  -Continue ACS/STEMI care  -Continue empiric Abx - IV cefepime and intermittent vancomycin   >UTI is likely source.  Add on urine gram stain and urine culture ordered  -Place PICC consult for dual lumen picc to aid in Medication administration and reduce lab sticks in patient with partial comfort measures  -Change IV morphine to dilaudid 0.5mg due to renal dysfunction.  Will consult palliative care service to evaluate pt for Monday.    -PRN ativan placed but will d/c at this time as patient was noted to be alert awake and talking yesterday and in no acute distress at this time.  RN can call MD for possible doses, as we are not full comfort care at this time, more No escalation of care.           Dave Bro M.D.  Attending Physician  [facility] Medicine Dept.  Pager: [phone]      18:00 addendum   I spoke with  patients sons and multiple family members who were not present at first conversation I had with family today.    >I explained reasoning for switching to hydromorphone.  Son mentioned that another family member is a cardiac surgeon and had mentioned use of fentanyl patch for pain.  I described why this was not indicated in patient at this time.   >I had placed PICC order, my reasoning was to have a more permanent IV that would also allow lab draws and reduce IV sticks and IV removals in patient with possible difficult peripheral access  >I explained and summarized rationale for current antibiotic therapy.  Family informed me that he has had recent urologic procedure on Wednesday, he is also on chronic antibiotics for chronic osteomyelitis, since we have bacterial growth on blood and likely will have on urine cultures, should be able to identify and appropriately treat infectious pathogen  >Patient's son who was leading conversation was most concerned with the issue of nutrition.  I described that if a patient was not taking nutriton orally secondary to an acute encephalopathy,  An NG route was typically our next step, multiple family members wary of this and they don't seem interested at this time. (described that aspiration/discomfort/agitation and need for restraints possible with NG)  I described giving some IVF and dextrose, also described that parenteral nutrition is most aggressive option.  I stated that I would not recommend this if we are considering palliative or end of life care.   No firm conclusion reached on what the plan for patient's nutrition will be   >It seems family wants to continue current medical care with possible NG vs IV nutrition to see if  patient will make a clinical improvement in coming days.  For this reason I have placed Dietary  Consultations for both tube feeding and IV feeding recommendations in the event we have to enact  This   >I also explained that if a patient is so  encephalopathic that it is not safe to feed them, that is often a  consideration that has to be accepted if true palliative and comfort measures are decided on,  however at this time I would characterize our care plan as _ No escalation of care - will not transfer  to the ICU, pt is DNR/DNI.  Palliative Care consult placed to assist in management and recs for any  other comfort measures they may deem helpful for patient at this time     Further conversation tomorrow.

## 2017-05-20 NOTE — PLAN OF CARE
Problem: Patient Care Overview  Goal: Plan of Care Review  Outcome: Ongoing (interventions implemented as appropriate)  Pt and family collectively decided to place DNR orders at beginning of shift. Ativan and morphine given for discomfort PRN. Increased urine output t 100cc/hr after diuril administration. Pt confused and disoriented at beginning of shift, PO meds not given d/t risk of aspiration. Pt comfort maintained. Plan of care discussed with pt and family at bedside regarding comfort care measures and continued monitoring. All questions answered, continuing to provide support.

## 2017-05-20 NOTE — PLAN OF CARE
Problem: Patient Care Overview  Goal: Plan of Care Review  Outcome: Ongoing (interventions implemented as appropriate)  Pt remain free of falls and injury throughout the shift. Generalized skin remains CDI with mild generalized edema noted; VSS. Heparin and lasix gtt infused throughout the shift per order. Blood cultures drawn yesterday resulted positive. Orders for repeat blood cultures and gram stain today. PICC consulted for line placement for long term IV abx. Pt stepped down from CMICU today to be placed on comfort measures. DNR order completed by attending MD. Pain controlled with IV analgesics. Family updated on plan of care and verbalize acceptance. Will continue to monitor.

## 2017-05-20 NOTE — CONSULTS
"RD received consult for "NPO."  Noted comfort measures in place.  Will place order for bereavement basket.    Thanks,  KAVITA Montenegro, LDN  [id number]    "

## 2017-05-20 NOTE — CONSULTS
Patient transitioned to comfort care. Please call with any questions.    MARILEE Lassiter, DEE-BC  Nephrology  Pager:  [phone]

## 2017-05-20 NOTE — PROGRESS NOTES
Cardiology Progress Note     Interval History:  Nephrology consulted overnight due to low urine output and did see a need for urgent HD. Patient's family decided last night that they would like to pursue comfort measures as patient has declined aggressive interventions. This morning, patient was satting well on BiPAP but having chest discomfort and some shortness of breath.      HPI:   86 yo M with PMHx of HTN,HLD, NSTEMI previously refused [facility] 5/2016, CKD stage IV (baseline 2.7), moderate AS, AF, venous insufficiency, cirrhosis, chronic osteomyelitis of the pelvic region on cefpodoxime, suspected inflammatory arthritis on HCQ, neuromuscular disorder with peripheral neuropathy with recurrent right foot ulcer, prostate cancer s/p radiation, RCC s/p left nephrectomy, skin cancer of the hand, urethral stricture, urinary incontinence p/w SOB which started an hour prior to admission and EKG concerning for STEMI with afib with q waves in inferior/anterior-septal leads with resolution of chest pain with SL NTG. Patient was evaluated in the ED by Dr Duval and recommended to be taken to cath lab considering his clinical picture but he refused to proceed with angiogram due to risk of hemodialysis possibility he will be admitted fort mdical treatment in CCU.    Currently he is very SOB with fluid overload and aneuric.looks like not responding to lasix his son is his POA and they are discussing code status and if they will proceed with more procedures including CRRT.    PMH:     Past Medical History:   Diagnosis Date    *Atrial fibrillation     Acute appendicitis 2/19/2015    Anemia     Anxiety     Arthritis     generalized    Basal cell carcinoma 01/2013    right temple    BCC (basal cell carcinoma)     right mid forearm    Bladder stone 6/28/2016    Blood transfusion     Chronic urethral stricture 10/14/2015    Chronic venous insufficiency 7/8/2013    CKD (chronic kidney disease) stage 3, GFR 30-59 ml/min  "9/6/2012    Coronary artery disease     Elevated PSA     Essential hypertension 7/30/2015    Hematuria, gross     History of Left Nephrectomy (~2006) 1/29/2014    [facility].     Hypertension     Inflammatory arthritis 8/14/2012    Kidney stone     Long-term use of Plaquenil 6/4/2015    Mixed incontinence urge and stress 4/11/2014    Nonrheumatic aortic valve stenosis 5/30/2016    NSTEMI (non-ST elevated myocardial infarction) 5/30/2016    Olecranon bursitis 1/14/2013    Osteopenia 8/14/2012    Personal history of kidney cancer 4/11/2014    Prostate cancer     Radiation     treatment for prostate cancer    Recurrent UTI 7/8/2013    Respiratory distress     S/P radiation therapy 4/11/2014    Skin cancer of arm     left leg (malignant)    Solitary kidney 7/15/2013    Venous insufficiency of leg 7/12/2012    Venous stasis ulcers 7/6/2012    Vitamin D deficiency disease 5/8/2013     Past Surgical History:   Procedure Laterality Date    APPENDECTOMY      CYSTOSCOPY      with dialation    NEPHRECTOMY  2006-07?    Removal of left kidney    TONSILLECTOMY       Allergies:     Review of patient's allergies indicates:   Allergen Reactions    Aspirin Other (See Comments)     Stomach      Ditropan [oxybutynin chloride]      Unable to describe side effect other than "felt strange"     Keflex [cephalexin] Rash     Morbilliform  Has tolerated multiple cephalosporins since 2013.    Macrobid [nitrofurantoin monohyd/m-cryst] Hives and Rash     Medications:     No current facility-administered medications on file prior to encounter.      Current Outpatient Prescriptions on File Prior to Encounter   Medication Sig Dispense Refill    allopurinol (ZYLOPRIM) 100 MG tablet Take 1 tablet (100 mg total) by mouth once daily. (Patient taking differently: Take 100 mg by mouth every morning. ) 30 tablet 4    aspirin 81 MG Chew Take 1 tablet (81 mg total) by mouth once daily. (Patient taking differently: Take " "81 mg by mouth every morning. )  0    atorvastatin (LIPITOR) 80 MG tablet Take 1 tablet (80 mg total) by mouth once daily. (Patient taking differently: Take by mouth every evening. ) 30 tablet 11    calcitRIOL (ROCALTROL) 0.5 MCG Cap Take 0.5 mcg by mouth every evening.       carvedilol (COREG) 3.125 MG tablet Take 1 tablet (3.125 mg total) by mouth 2 (two) times daily with meals. 60 tablet 11    catheter 16-16 Fr-" Misc 1 Units by Misc.(Non-Drug; Combo Route) route once daily. 100 each 11    ciprofloxacin HCl (CIPRO) 500 MG tablet Take 0.5 tablets (250 mg total) by mouth once daily at 6am. 14 tablet 0    docusate sodium (COLACE) 100 MG capsule Take by mouth 2 (two) times daily.      ferrous sulfate 324 mg (65 mg iron) TbEC Take 1 tablet (325 mg total) by mouth 3 (three) times daily. (Patient taking differently: Take 325 mg by mouth 2 (two) times daily. )  0    folic acid-vit B6-vit B12 (FOLBEE) 2.5-25-1 mg Tab Take 1 tablet by mouth once daily. (Patient taking differently: Take 1 tablet by mouth every morning. ) 30 each 5    gabapentin (NEURONTIN) 300 MG capsule Take 1 capsule (300 mg total) by mouth 2 (two) times daily. 60 capsule 3    hydrocodone-acetaminophen 10-325mg (NORCO)  mg Tab Take 1 tablet by mouth 4 (four) times daily as needed. 120 tablet 0    hydroxychloroquine (PLAQUENIL) 200 mg tablet Take 1 tablet (200 mg total) by mouth 2 (two) times daily. 60 tablet 2    LACTOBACILLUS ACIDOPHILUS (PROBIOTIC ORAL) Take 1 capsule by mouth every morning.       mirabegron (MYRBETRIQ) 25 mg Tb24 ER tablet Take 1 tablet (25 mg total) by mouth once daily. 30 tablet 11    nitroGLYCERIN (NITROSTAT) 0.3 MG SL tablet Place 1 tablet (0.3 mg total) under the tongue every 5 (five) minutes as needed for Chest pain. 30 tablet 11    omeprazole (PRILOSEC) 40 MG capsule Take 40 mg by mouth every evening.       oxycodone-acetaminophen (PERCOCET) 5-325 mg per tablet Take 1 tablet by mouth every 6 (six) hours as " needed for Pain. 30 tablet 0    sodium bicarbonate 650 MG tablet Take 1 tablet (650 mg total) by mouth 2 (two) times daily. 60 tablet 11    triamcinolone acetonide 0.1% (KENALOG) 0.1 % cream Apply topically 2 (two) times daily. Apply to affected area twice daily as directed. 454 g 0    vitamin D 1000 units Tab Take 2,000 Units by mouth every morning.          Inpatient Medications   Continuous Infusions:   furosemide (LASIX) 1 mg/mL infusion (non-titrating) 20 mg/hr (05/20/17 0919)    heparin (porcine) in D5W 17 Units/kg/hr (05/20/17 0919)     Scheduled Meds:   allopurinol  100 mg Oral Daily    aspirin  81 mg Oral Daily    atorvastatin  80 mg Oral Daily    calcitRIOL  0.5 mcg Oral QHS    carvedilol  3.125 mg Oral BID WM    ceFEPime (MAXIPIME) IVPB  1 g Intravenous Daily    chlorothiazide (DIURIL) IVPB  250 mg Intravenous Q12H    clopidogrel  75 mg Oral Daily    docusate sodium  100 mg Oral BID    ferrous sulfate  325 mg Oral TID    folic acid-vit B6-vit B12 2.5-25-2 mg  1 tablet Oral Daily    gabapentin  300 mg Oral BID    hydroxychloroquine  200 mg Oral BID    mirabegron  25 mg Oral Daily    pantoprazole  40 mg Oral Daily    sodium bicarbonate  650 mg Oral BID    sodium chloride 0.9%  3 mL Intravenous Q8H    sodium chloride 0.9%  3 mL Intravenous Q8H    vitamin D  2,000 Units Oral QAM     PRN Meds:heparin (PORCINE), lorazepam, morphine, nitroGLYCERIN     Social History:     Social History   Substance Use Topics    Smoking status: Current Every Day Smoker     Years: 40.00     Types: Cigars    Smokeless tobacco: Never Used      Comment: pt smokes 3 cigars a day pt will make up his mind when his ready- did give up smoking cigarettes at age 35yrs smoked from 18 - 35 years    Alcohol use 0.6 oz/week     1 Glasses of wine per week      Comment: stop drinking 09/2009. 1 glass of wine at bedtime      Family History:     Family History   Problem Relation Age of Onset    Cancer Mother      bone      Heart disease Father     Urolithiasis Father     Mental illness Daughter     Cancer Brother      prostate cancer, (Ervin) removed by surgery    Cancer Brother      prostate cancer    Cancer Maternal Aunt     Melanoma Neg Hx     Lupus Neg Hx     Rheum arthritis Neg Hx      Physical Exam:     Vitals:  Temp:  [97.5 °F (36.4 °C)-101 °F (38.3 °C)]   Pulse:  [69-93]   Resp:  [15-34]   BP: ()/(56-78)   SpO2:  [92 %-100 %]  on BIPAP I/O's:    Intake/Output Summary (Last 24 hours) at 05/20/17 1332  Last data filed at 05/20/17 1100   Gross per 24 hour   Intake           999.01 ml   Output             1695 ml   Net          -695.99 ml        Constitutional: Appearing dyspneic  HEENT: Sclera anicteric, PERRLA, EOMI  Neck: 10-12 cm JVD, no carotid bruits  CV: Irregularly irregular,   Pulm: CTAB, no wheezes, rales, or ronchi  GI: Abdomen soft, NTND, +BS  Extremities: No LE edema, warm and well perfused  Skin: No ecchymosis, erythema, or ulcers  Psych: AOx3, appropriate affect  Neuro: CNII-XII intact, no focal deficits    Labs:       Recent Labs  Lab 05/19/17  0900 05/19/17  1708 05/20/17  0319    140 140   K 3.8 4.1 4.1    111* 107   CO2 15* 15* 18*   BUN 55* 57* 66*   CREATININE 3.5* 3.8* 4.3*   ANIONGAP 15 14 15       Recent Labs  Lab 05/19/17  0900   AST 74*   ALT 26   ALKPHOS 108   BILITOT 0.7   ALBUMIN 2.3*       Recent Labs  Lab 05/19/17  0900 05/19/17  1708 05/20/17  0319 05/20/17  0826   TROPONINI 8.102* 34.554* >50.000* >50.000*   BNP 1785*  --   --   --       Recent Labs  Lab 05/19/17  0900 05/19/17  1048   WBC 27.24* 20.52*   HGB 9.8* 8.7*   HCT 30.8* 27.4*    210   GRAN 98.0* 91.5*  18.8*       Recent Labs  Lab 05/19/17  0900   INR 1.1     Lab Results   Component Value Date    CHOL 105 (L) 05/19/2017    HDL 37 (L) 05/19/2017    LDLCALC 54.6 (L) 05/19/2017    TRIG 67 05/19/2017     Lab Results   Component Value Date    HGBA1C 4.8 05/29/2016        Micro:  Blood Cultures  Lab  Results   Component Value Date    LABBLOO Gram stain nnamdi bottle: Gram negative rods  05/19/2017    LABBLOO  05/19/2017     Results called to and read back by:Radhika Wolf RN 05/20/2017  12:39    LABBLOO No growth after 5 days. 11/07/2016    LABBLOO No growth after 5 days. 10/07/2016    LABBLOO No growth after 5 days. 10/07/2016     Urine Cultures  Lab Results   Component Value Date    LABURIN  10/01/2016     Multiple organisms isolated. None in predominance.  Repeat if    LABURIN clinically necessary. 10/01/2016    LABURIN No significant growth 07/15/2016    LABURIN CANDIDA ALBICANS  10,000 - 49,999 cfu/ml   07/15/2016    LABURIN PSEUDOMONAS AERUGINOSA  50,000 - 99,999 cfu/ml   06/28/2016    LABURIN ENTEROCOCCUS FAECALIS  > 100,000 cfu/ml   06/28/2016       Imaging:         EF   Date Value Ref Range Status   05/19/2017 30 (A) 55 - 65    05/28/2016 55 55 - 65    09/24/2015 55 55 - 65    02/07/2013  60         EKG: Afib with ST elevation AVR , II,III      Assessment&Plan   85 y.o critically sick patient with multiple co morbidities admitted for STEMI and THEO/CKD currently on BIPAP and aneuric family still discussing code status meanwhile he is being medicaly terated with no more invasive procedures currently based on patient and family wishes:      STEMI:  --ACS protocol  --Aspirin +plavix+heparin gtt   --High intensity statins   --Bedside Echo- Anterior wall/posterior wall akinetic EF around 30-35%  --Will resume BB when BP improves  --Avoid ACE with worsening kidney function   --Comfort measures initiated by family as patient has declined aggressive interventions. Continue IV morphine and ativan     THEO/CKD:  --Worsening kidney function in the setting of STEMI  --Patient now making some urine after IV diuril was started last night  --Continue lasix gtt 10/hr  --Nephrology fellow on-call consulted overnight. No need for acute HD.     Sepsis  --SIRS 4/4 with possible urinary source  --UA with >100 WBC, >100 RBC,  moderate bacteria  --Continue IV vanc and IV cefepime   --Follow-up on blood and urine cultures     Afib:  --Rate controlled   --On heparin gtt     Anemia:  -Anemia of chronic disease CKD   -Stable H/H  -Will monitor     Code: DNR  Diet: Regular    Dispo: Comfort measures. Continue BS abx. Step down to floor      Attending addendum to follow     Hill Garrison DO  PGY1 Internal Medicine  Pager: [phone]

## 2017-05-20 NOTE — NURSING TRANSFER
Nursing Transfer Note      5/20/2017     Transfer To: [id number]    Transfer via bed    Transfer with NC to O2, cardiac monitoring    Transported by RN and PCT   Medicines sent: Heparin and lasix gtt    Chart send with patient: Yes    Notified: spouse and son at bedside    Patient reassessed at: time of arrival by accepting Radhika BARRIOS   Upon arrival to floor: cardiac monitor applied, patient oriented to room, call bell in reach and bed in lowest position

## 2017-05-20 NOTE — PROGRESS NOTES
Dr Lozada and myself Had a family discussion with family about goals of care. Family and patient are indecisive whether to pursue aggressive measures or comfort care measures. They are asking whether dialysis will help.  I consulted nephrology and spoke with Dr Martinez. No indication for urgent HD tonight. Will increase lasix to 20 mgs/ hr and add diurel and watch UOP overnight.     Fortunato Farias MD

## 2017-05-20 NOTE — PROGRESS NOTES
Patient and family wishes to be DNR and comfort care, will make code status DNR and give morphine and ativan for comfort.     Fortunato Farias MD

## 2017-05-20 NOTE — RESIDENT HANDOFF
Handoff     Primary Team: Networked reference to record PCT  Room Number: 3096/3096 A     Patient Name: Humphrey Machado MRN: [id number]     Date of Birth: [id number] Allergies: Aspirin; Ditropan [oxybutynin chloride]; Keflex [cephalexin]; and Macrobid [nitrofurantoin monohyd/m-cryst]     Age: 85 y.o. Admit Date: 5/19/2017     Sex: male  BMI: Body mass index is 24.61 kg/(m^2).     Code Status: DNR        Illness Level (current clinical status): Watcher - No    Reason for Admission: ST elevation myocardial infarction (STEMI)    Brief HPI:   86 yo M with PMHx of HTN,HLD, NSTEMI previously refused [facility] 5/2016, CKD stage IV (baseline 2.7), moderate AS, AF, venous insufficiency, cirrhosis, chronic osteomyelitis of the pelvic region on cefpodoxime, suspected inflammatory arthritis on HCQ, neuromuscular disorder with peripheral neuropathy with recurrent right foot ulcer, prostate cancer s/p radiation, RCC s/p left nephrectomy, skin cancer of the hand, urethral stricture, urinary incontinence p/w SOB which started an hour prior to admission and EKG concerning for STEMI with afib with q waves in inferior/anterior-septal leads with resolution of chest pain with SL NTG. Patient was evaluated in the ED by Dr Duval and recommended to be taken to cath lab considering his clinical picture but he refused to proceed with angiogram due to risk of hemodialysis possibility he will be admitted fort mdical treatment in CCU.     Currently he is very SOB with fluid overload and aneuric.looks like not responding to lasix his son is his POA and they are discussing code status and if they will proceed with more procedures including CRRT.    Hospital Course:  Patient was transferred to CCU for medical management of STEMI. He was started on broad spectrum antibiotics in the setting of fever, leukocytosis, and hypotension. Patient's family initially wanted to discuss regarding initiating CRRT given that patient was aneuric, but urine output improved with  initiation of lasix drip and IV diuril. Nephrology did not see the need for urgent dialysis as urine output had improved. Patient family decided that they would like to pursue comfort measures.    Tasks:   1. Continue BS antibiotics. Follow up cultures.  1. Continue comfort care. Patient is DNR.  2. If patient's clinical status improves, discuss with family about inpatient hospice vs home with hospice.     Discharge Disposition: Still a Patient

## 2017-05-21 PROBLEM — R13.12 OROPHARYNGEAL DYSPHAGIA: Status: ACTIVE | Noted: 2017-05-21

## 2017-05-21 PROBLEM — R07.9 CHEST PAIN: Status: RESOLVED | Noted: 2017-05-19 | Resolved: 2017-05-21

## 2017-05-21 PROBLEM — G89.29 CHRONIC PAIN: Status: ACTIVE | Noted: 2017-05-21

## 2017-05-21 PROBLEM — N17.9 ACUTE KIDNEY INJURY SUPERIMPOSED ON CKD: Status: ACTIVE | Noted: 2017-05-21

## 2017-05-21 PROBLEM — R78.81 BACTEREMIA DUE TO GRAM-NEGATIVE BACTERIA: Status: ACTIVE | Noted: 2017-05-21

## 2017-05-21 PROBLEM — I25.5 ISCHEMIC CARDIOMYOPATHY: Status: ACTIVE | Noted: 2017-05-21

## 2017-05-21 PROBLEM — R06.03 RESPIRATORY DISTRESS: Status: RESOLVED | Noted: 2017-05-19 | Resolved: 2017-05-21

## 2017-05-21 PROBLEM — N18.9 ACUTE KIDNEY INJURY SUPERIMPOSED ON CKD: Status: ACTIVE | Noted: 2017-05-21

## 2017-05-21 LAB
ANION GAP SERPL CALC-SCNC: 16 MMOL/L
BUN SERPL-MCNC: 75 MG/DL
CALCIUM SERPL-MCNC: 8.8 MG/DL
CHLORIDE SERPL-SCNC: 102 MMOL/L
CO2 SERPL-SCNC: 18 MMOL/L
CREAT SERPL-MCNC: 4.7 MG/DL
EST. GFR  (AFRICAN AMERICAN): 12.2 ML/MIN/1.73 M^2
EST. GFR  (NON AFRICAN AMERICAN): 10.5 ML/MIN/1.73 M^2
FACT X PPP CHRO-ACNC: 0.31 IU/ML
GLUCOSE SERPL-MCNC: 100 MG/DL
POTASSIUM SERPL-SCNC: 3.2 MMOL/L
SODIUM SERPL-SCNC: 136 MMOL/L
VANCOMYCIN SERPL-MCNC: 16.8 UG/ML

## 2017-05-21 PROCEDURE — 27000221 HC OXYGEN, UP TO 24 HOURS

## 2017-05-21 PROCEDURE — 25000003 PHARM REV CODE 250: Performed by: HOSPITALIST

## 2017-05-21 PROCEDURE — 97802 MEDICAL NUTRITION INDIV IN: CPT

## 2017-05-21 PROCEDURE — 20600001 HC STEP DOWN PRIVATE ROOM

## 2017-05-21 PROCEDURE — 25000003 PHARM REV CODE 250: Performed by: INTERNAL MEDICINE

## 2017-05-21 PROCEDURE — G8996 SWALLOW CURRENT STATUS: HCPCS | Mod: CK

## 2017-05-21 PROCEDURE — 92610 EVALUATE SWALLOWING FUNCTION: CPT

## 2017-05-21 PROCEDURE — 63600175 PHARM REV CODE 636 W HCPCS: Performed by: HOSPITALIST

## 2017-05-21 PROCEDURE — 80048 BASIC METABOLIC PNL TOTAL CA: CPT

## 2017-05-21 PROCEDURE — 85520 HEPARIN ASSAY: CPT

## 2017-05-21 PROCEDURE — 80202 ASSAY OF VANCOMYCIN: CPT

## 2017-05-21 PROCEDURE — 25000003 PHARM REV CODE 250: Performed by: STUDENT IN AN ORGANIZED HEALTH CARE EDUCATION/TRAINING PROGRAM

## 2017-05-21 PROCEDURE — 94660 CPAP INITIATION&MGMT: CPT

## 2017-05-21 PROCEDURE — G8997 SWALLOW GOAL STATUS: HCPCS | Mod: CI

## 2017-05-21 PROCEDURE — C9113 INJ PANTOPRAZOLE SODIUM, VIA: HCPCS | Performed by: HOSPITALIST

## 2017-05-21 PROCEDURE — 63600175 PHARM REV CODE 636 W HCPCS: Performed by: STUDENT IN AN ORGANIZED HEALTH CARE EDUCATION/TRAINING PROGRAM

## 2017-05-21 PROCEDURE — 27000190 HC CPAP FULL FACE MASK W/VALVE

## 2017-05-21 PROCEDURE — 99233 SBSQ HOSP IP/OBS HIGH 50: CPT | Mod: ,,, | Performed by: HOSPITALIST

## 2017-05-21 RX ORDER — MEROPENEM AND SODIUM CHLORIDE 500 MG/50ML
500 INJECTION, SOLUTION INTRAVENOUS
Status: DISCONTINUED | OUTPATIENT
Start: 2017-05-21 | End: 2017-05-24

## 2017-05-21 RX ORDER — POTASSIUM CHLORIDE 7.45 MG/ML
10 INJECTION INTRAVENOUS
Status: COMPLETED | OUTPATIENT
Start: 2017-05-21 | End: 2017-05-21

## 2017-05-21 RX ORDER — IBUPROFEN 200 MG
16 TABLET ORAL
Status: DISCONTINUED | OUTPATIENT
Start: 2017-05-21 | End: 2017-06-02 | Stop reason: HOSPADM

## 2017-05-21 RX ORDER — GLUCAGON 1 MG
1 KIT INJECTION
Status: DISCONTINUED | OUTPATIENT
Start: 2017-05-21 | End: 2017-06-02 | Stop reason: HOSPADM

## 2017-05-21 RX ORDER — HYDROMORPHONE HYDROCHLORIDE 2 MG/1
2 TABLET ORAL
Status: DISCONTINUED | OUTPATIENT
Start: 2017-05-21 | End: 2017-05-22

## 2017-05-21 RX ORDER — IBUPROFEN 200 MG
24 TABLET ORAL
Status: DISCONTINUED | OUTPATIENT
Start: 2017-05-21 | End: 2017-06-02 | Stop reason: HOSPADM

## 2017-05-21 RX ORDER — DOXYLAMINE SUCCINATE 25 MG
TABLET ORAL 2 TIMES DAILY
Status: DISCONTINUED | OUTPATIENT
Start: 2017-05-21 | End: 2017-06-02 | Stop reason: HOSPADM

## 2017-05-21 RX ADMIN — FUROSEMIDE 20 MG/HR: 10 INJECTION, SOLUTION INTRAMUSCULAR; INTRAVENOUS at 08:05

## 2017-05-21 RX ADMIN — POTASSIUM CHLORIDE 10 MEQ: 10 INJECTION, SOLUTION INTRAVENOUS at 05:05

## 2017-05-21 RX ADMIN — Medication 3 ML: at 06:05

## 2017-05-21 RX ADMIN — CLOPIDOGREL 75 MG: 75 TABLET, FILM COATED ORAL at 08:05

## 2017-05-21 RX ADMIN — CEFEPIME 1 G: 1 INJECTION, POWDER, FOR SOLUTION INTRAMUSCULAR; INTRAVENOUS at 11:05

## 2017-05-21 RX ADMIN — DOCUSATE SODIUM 100 MG: 100 CAPSULE, LIQUID FILLED ORAL at 08:05

## 2017-05-21 RX ADMIN — CHLOROTHIAZIDE SODIUM 250 MG: 500 INJECTION, POWDER, LYOPHILIZED, FOR SOLUTION INTRAVENOUS at 08:05

## 2017-05-21 RX ADMIN — HYDROMORPHONE HYDROCHLORIDE 0.5 MG: 1 INJECTION, SOLUTION INTRAMUSCULAR; INTRAVENOUS; SUBCUTANEOUS at 09:05

## 2017-05-21 RX ADMIN — CARVEDILOL 3.12 MG: 3.12 TABLET, FILM COATED ORAL at 08:05

## 2017-05-21 RX ADMIN — HYDROMORPHONE HYDROCHLORIDE 0.5 MG: 1 INJECTION, SOLUTION INTRAMUSCULAR; INTRAVENOUS; SUBCUTANEOUS at 05:05

## 2017-05-21 RX ADMIN — MICONAZOLE NITRATE: 20 CREAM TOPICAL at 08:05

## 2017-05-21 RX ADMIN — MEROPENEM AND SODIUM CHLORIDE 500 MG: 500 INJECTION, SOLUTION INTRAVENOUS at 05:05

## 2017-05-21 RX ADMIN — HYDROXYCHLOROQUINE SULFATE 200 MG: 200 TABLET, FILM COATED ORAL at 08:05

## 2017-05-21 RX ADMIN — HEPARIN SODIUM AND DEXTROSE 17 UNITS/KG/HR: 10000; 5 INJECTION INTRAVENOUS at 04:05

## 2017-05-21 RX ADMIN — ALLOPURINOL 100 MG: 100 TABLET ORAL at 08:05

## 2017-05-21 RX ADMIN — HYDROMORPHONE HYDROCHLORIDE 0.5 MG: 1 INJECTION, SOLUTION INTRAMUSCULAR; INTRAVENOUS; SUBCUTANEOUS at 11:05

## 2017-05-21 RX ADMIN — HYDROMORPHONE HYDROCHLORIDE 0.5 MG: 1 INJECTION, SOLUTION INTRAMUSCULAR; INTRAVENOUS; SUBCUTANEOUS at 08:05

## 2017-05-21 RX ADMIN — ASPIRIN 81 MG CHEWABLE TABLET 81 MG: 81 TABLET CHEWABLE at 08:05

## 2017-05-21 RX ADMIN — ATORVASTATIN CALCIUM 80 MG: 20 TABLET, FILM COATED ORAL at 08:05

## 2017-05-21 RX ADMIN — Medication 3 ML: at 10:05

## 2017-05-21 RX ADMIN — CARVEDILOL 3.12 MG: 3.12 TABLET, FILM COATED ORAL at 05:05

## 2017-05-21 RX ADMIN — SODIUM BICARBONATE 650 MG: 650 TABLET ORAL at 08:05

## 2017-05-21 RX ADMIN — PANTOPRAZOLE SODIUM 40 MG: 40 INJECTION, POWDER, FOR SOLUTION INTRAVENOUS at 08:05

## 2017-05-21 RX ADMIN — POTASSIUM CHLORIDE 10 MEQ: 10 INJECTION, SOLUTION INTRAVENOUS at 03:05

## 2017-05-21 RX ADMIN — HEPARIN SODIUM AND DEXTROSE 17 UNITS/KG/HR: 10000; 5 INJECTION INTRAVENOUS at 11:05

## 2017-05-21 RX ADMIN — HYDROMORPHONE HYDROCHLORIDE 0.5 MG: 1 INJECTION, SOLUTION INTRAMUSCULAR; INTRAVENOUS; SUBCUTANEOUS at 01:05

## 2017-05-21 NOTE — PLAN OF CARE
Problem: Patient Care Overview  Goal: Plan of Care Review  Outcome: Ongoing (interventions implemented as appropriate)  Pt remain free of falls, injury and complaints throughout the shift. Lasix and heparin gtt infused per order; anti Xa therapeutic; will recheck with am labs. Infused iv abx per order. Pt evaluated by SLP and dietitian today. Pt tolerated small bites of mechanical soft food and thickened liquids. Pt tolerating plan of care.

## 2017-05-21 NOTE — PT/OT/SLP EVAL
Speech Language Pathology  Evaluation    Humphrey Machado   MRN: [id number]   Admitting Diagnosis: ST elevation myocardial infarction (STEMI)    Diet recommendations: Solid Diet Level: Dental Soft  Liquid Diet Level: Nectar Thick Feed only when awake/alert, No straws, HOB to 90 degrees, Alternating bites/sips, 1 bite/sip at a time, Check for pocketing/oral residue, Remain upright 30 minutes post meal, Meds crushed in puree, Eliminate distractions, Assistance with meals and Assistance with thickening liquids  Continue to monitor for signs and symptoms of aspiration and discontinue oral feeding should you notice any of the following: watery eyes, reddened facial area, wet vocal quality, increased work of breathing, change in respiratory status, increased congestion, coughing, fever, etc.    To achieve nectar-thick consistency, use 1 thickener packet per 6 oz. fluid.     SLP Treatment Date: 05/21/17  Speech Start Time: 1035     Speech Stop Time: 1100     Speech Total (min): 25 min       TREATMENT BILLABLE MINUTES:  Eval Swallow and Oral Function 25 and Total Time 25    Diagnosis: ST elevation myocardial infarction (STEMI)  The primary encounter diagnosis was Venous insufficiency of both lower extremities. Diagnoses of ST elevation myocardial infarction (STEMI) involving other coronary artery of inferior wall, Atrial fibrillation with RVR, Respiratory distress, CKD (chronic kidney disease), unspecified stage, Ulcer of calf, right, with fat layer exposed, Chest pain, Inflammatory arthritis, Peripheral vascular disease, unspecified, Encounter for long-term (current) use of other medications, STEMI (ST elevation myocardial infarction), and ST elevation myocardial infarction (STEMI) were also pertinent to this visit.    CXR 5/19/2017:  Some right lung consolidation     Past Medical History:   Diagnosis Date    *Atrial fibrillation     Acute appendicitis 2/19/2015    Anemia     Anxiety     Arthritis     generalized    Basal  "cell carcinoma 01/2013    right temple    BCC (basal cell carcinoma)     right mid forearm    Bladder stone 6/28/2016    Blood transfusion     Chronic urethral stricture 10/14/2015    Chronic venous insufficiency 7/8/2013    CKD (chronic kidney disease) stage 3, GFR 30-59 ml/min 9/6/2012    Coronary artery disease     Elevated PSA     Essential hypertension 7/30/2015    Hematuria, gross     History of Left Nephrectomy (~2006) 1/29/2014    [facility].     Hypertension     Inflammatory arthritis 8/14/2012    Kidney stone     Long-term use of Plaquenil 6/4/2015    Mixed incontinence urge and stress 4/11/2014    Nonrheumatic aortic valve stenosis 5/30/2016    NSTEMI (non-ST elevated myocardial infarction) 5/30/2016    Olecranon bursitis 1/14/2013    Osteopenia 8/14/2012    Personal history of kidney cancer 4/11/2014    Prostate cancer     Radiation     treatment for prostate cancer    Recurrent UTI 7/8/2013    Respiratory distress     S/P radiation therapy 4/11/2014    Skin cancer of arm     left leg (malignant)    Solitary kidney 7/15/2013    Venous insufficiency of leg 7/12/2012    Venous stasis ulcers 7/6/2012    Vitamin D deficiency disease 5/8/2013     Past Surgical History:   Procedure Laterality Date    APPENDECTOMY      CYSTOSCOPY      with dialation    NEPHRECTOMY  2006-07?    Removal of left kidney    TONSILLECTOMY         Has the patient been evaluated by SLP for swallowing? : Yes  Keep patient NPO?: No   General Precautions: Standard, aspiration, nectar thick, fall, respiratory    Current Respiratory Status: nasal cannula     Social Hx: Patient lives with spouse in [place].    Prior diet: Wife reports regular diet, thin liquids, prior to admit then explains only "soft meats and stewed, tender meats mainly." .    Occupational/hobbies/homemaking: Retired,( [employer], [employer]     Subjective:  SLP communicated with nurse Garrido prior to session, Nurse explains Patient " "not on comfort care and Patient with c/o severe pain and not due for additional medications for at least an hour.   Patient presents fatigued, with severe L arm pain.  Patient says, "You have to take your time when you sit me up."  Patient's son says, "He had some apple sauce earlier."  His wife shares, "He uses the straw because of the hand [tremor.]  Patient goals: "I like things like the apple butter and the pudding."    Pain Ratin10  Location - Side: Left  Location - Orientation: generalized  Location: arm  Pain Addressed: Reposition, Nurse notified, Pre-medicate for activity  Pain Rating Post-Intervention: 9/10    Objective:   Patient found with: blood pressure cuff, pulse ox (continuous), telemetry, peripheral IV, oxygen, asleep, reclined in bed with spouse and son at bedside. HOB elevated and Patient wakes to simple verbal/tacitle cues from SLP. At end of session, Patient remained with HOB elevated and son at bedside, assisting feeding Patient additional bites of puree (pudding.)     Oral Musculature Evaluation  Oral Musculature: general weakness  Dentition: present and adequate  Mucosal Quality: dry  Mandibular Strength and Mobility: other (see comments) (generalized weakness noted with ROM/rotary chew)  Oral Labial Strength and Mobility: impaired retraction, impaired pursing  Lingual Strength and Mobility: impaired depression, functional protrusion, functional lateral movement  Buccal Strength and Mobility: flaccid  Volitional Cough: present and productive  Volitional Swallow: timely  Voice Prior to PO Intake: clear, mildly hoarse with decreased intensity     Bedside Swallow Eval:  Consistencies Assessed: Thin liquids ice chip x1, tsp sips x2, cup sips x2, Nectar thick liquids straw sips x4, Puree tsp bites apple sauce x2, tsp bites pudding x3 and Solids bite of cracker x1  Oral Phase: anterior loss and dry mouth  Pharyngeal Phase: coughing/choking and throat clearing  Patient with L anterior spillage " with cup sips thin liquids and immediate throat clears on 2/2 cup sips thin liquids. He then demonstrates delayed (1 minute) cough on cup sip thin liquids. He demonstrates prolonged mastication and a-p transit with bite of cracker.  No difficulty or overt S/S aspiration observed with presentations of nectar-thickened liquids or purees. Patient requires assistance with all meals for safety 2/2 decreased UE mobility. REC: dental soft diet with nectar-thickened liquids, medications crushed in puree.  Note: He should only be presented with PO intake when alert and awake, sitting upright at 90 degree angle, provided small bites/sips, one at a time, with attention to ea bite in a limiting distracting environment. ST to continue to monitor tolerance. Continue to monitor for signs and symptoms of aspiration and discontinue oral feeding should you notice any of the following: watery eyes, reddened facial area, wet vocal quality, increased work of breathing, change in respiratory status, increased congestion, coughing, fever, etc.    Additional Treatment:  Patient, Spouse and Son educated on SLP role, S/S aspiration, safe swallow/aspiraiton precautions and diet recommendations. Spouse requests clarifications of nectar-thick consistency.  No further questions noted. Whiteboard updated. Findings/recommendations discussed with nurse Garrido and Dr. Bro.      FIM:  Social Interaction: 5 Supervision--The patient requires supervision (e.g., monitoring, verbal control, cueing, or coaxing) only under stressful or unfamiliar conditions, but less than 10% of the time.  The patient may require encouragement to initiate participation.     Assessment:  Humphrey Machado is a 85 y.o. male with a medical diagnosis of ST elevation myocardial infarction (STEMI) and presents with S/S Pharyngeal Dysphagia.  He requires assistance with all PO intake for safety.  SLP to continue to monitor safety and tolerance of diet.     Do you have any  cultural, spiritual, Cheondoism conflicts, given your current situation?: none noted     Discharge recommendations: Discharge Facility/Level Of Care Needs: home health speech therapy (pending Patietn progress and OT/PT recs)     Goals:    SLP Goals        Problem: SLP Goal    Goal Priority Disciplines Outcome   SLP Goal     SLP Ongoing (interventions implemented as appropriate)   Description:  Speech Language Pathology Goals  Goals expected to be met by 5/28/2017  1. Pt will tolerate dental soft diet with nectar-thickened liquids w/o overt S/S aspiration, MIN A, to improve swallow safety  2. Pt will tolerate trials of thin liquids w/o overt S/S aspiration, MIN A, to improve swallow safety  3. Educate Patient and family on safe swallow strategies and aspiration precautions                       Plan:   Patient to be seen Therapy Frequency: 5 x/week   Plan of Care expires: 06/20/17  Plan of Care reviewed with: patient, spouse, son  SLP Follow-up?: Yes         SLP G-Codes  Functional Assessment Tool Used: NOMS  Score: 4  Functional Limitations: Swallowing  Swallow Current Status (): CK  Swallow Goal Status (): ROYAL Sewell, CCC-SLP  Speech-Language Pathology  Pager: [phone]  5/21/2017

## 2017-05-21 NOTE — CONSULTS
"  [facility]  Adult Nutrition  Consult Note    SUMMARY     Recommendations    Recommendation/Intervention:   Most appropriate recommendation will depend on palliative care feedback and plan of care.     If TF is desired by family and if medically warranted, TF may be initiated however note that if pt is unable to sustain angle =/>30 degrees during feedings, this puts pt at risk for aspiration.     Recommendation for TF as requested: Isosource 1.5 @ 45 ml/hr (start at 15 mL and advance 10 mL q4 hours until goal rate is achieved). This provides 1620 calories, 73 g protein, 1032 mL fluid.     2. Preferred recommendation:  maximize po intake with dental soft diet as tolerated. Order Boost Plus and encourage patient to consume at least 2 daily.     3. RD to monitor    Goals: Pt to receive most appropriate form of nutrition according to patient status  Nutrition Goal Status: new  Communication of RD Recs: reviewed with RN    Reason for Assessment    Reason for Assessment: physician consult  Diagnosis:  (venous insufficiency, A-fib, Respiratory distress, STEMI)  Relevent Medical History: afib, acute appendicitis, anemia, arthritis,CKD stg 3, HTN, prostate ca     Interdisciplinary Rounds: did not attend     General Information Comments: RD received consult re: need for both TPN and TF recommendations pending clinical decisions for possible feeding methods. Pt not appropriate for TPN despite PICC placement due to increased risk for infection and pt with functioning gut.  RN noted that pt is unable to sustain a =/>30 degree angle without being in pain. Therefore TF will be difficult without increasing risk for aspiration. Noted "comfort care" however per RN pt is not officially on "comfort measures only." Pt took bite of apple sauce while in room. RD recommended to RN to consult SLP. SLP passed pt on dental soft. Noted pt on DNR. Will follow up with status as to appropriateness and feasiblity for nutrition " support    Nutrition Discharge Planning: Unable to assess at this time    Nutrition Prescription Ordered    Current Diet Order: Dental soft  Nutrition Order Comments: Nectar thick liquids     Evaluation of Received Nutrients/Fluid Intake   IV Fluid (mL): 455       Nutrition Risk Screen     Nutrition Risk Screen: no indicators present    Nutrition/Diet History    Patient Reported Diet/Restrictions/Preferences: general  Typical Food/Fluid Intake: poor po intake at this time  Food Preferences: no cultural or [employer] needs identified        Factors Affecting Nutritional Intake: pain, chewing difficulties/inability to chew food, difficulty/impaired swallowing     Labs/Tests/Procedures/Meds     Pertinent Labs Reviewed: reviewed     Pertinent Medications Reviewed: reviewed     Physical Findings    Overall Physical Appearance: weak   Skin: non-healing wound(s) (wounds x 2 - ulceration)    Anthropometrics     Height (inches): 70 in     Weight (kg): 77.8 kg  Ideal Body Weight (IBW), Male: 166 lb     % Ideal Body Weight, Male (lb): 103.33 lb     BMI (kg/m2): 24.61  BMI Grade: 18.5-24.9 - normal     Estimated/Assessed Needs    Weight Used For Calorie Calculations: 77.8 kg (171 lb 8.3 oz)   Height (cm): 177.8 cm     Energy Need Method: Kcal/kg (0806-0868 kcal (20-25 kcal/kg))   RMR (Bessemer-St. Jeor Equation): 1475.27        Weight Used For Protein Calculations: 77.8 kg (171 lb 8.3 oz)  Protein Requirements:  g (1.0-1.3 g/kg)    Fluid Need Method: RDA Method     Assessment and Plan    No new Assessment & Plan notes have been filed under this hospital service since the last note was generated.  Service: Nutrition    Nutrition Diagnosis  Inadequate energy intake related to pain and weakness along with chewing/biting and swallowing difficulty as evidenced by pt unable to sit upright >30 degrees without being in pain leading to poor po intake    Status: new    Monitor and Evaluation    Food and Nutrient Intake: energy  intake, food and beverage intake, enteral nutrition intake, parenteral nutrition intake  Food and Nutrient Adminstration: diet order, enteral and parenteral nutrition administration        Anthropometric Measurements: weight change, body mass index, weight  Biochemical Data, Medical Tests and Procedures: electrolyte and renal panel, glucose/endocrine profile, lipid profile  Nutrition-Focused Physical Findings: overall appearance  % Intake of Estimated Energy Needs: 0 - 25 %  % Meal Intake: 0%    Nutrition Risk    Level of Risk: other (see comments) (1x/week)    Nutrition Follow-Up    RD Follow-up?: Yes

## 2017-05-21 NOTE — PLAN OF CARE
Problem: Patient Care Overview  Goal: Plan of Care Review  Outcome: Ongoing (interventions implemented as appropriate)  Plan of care discussed with pt. Heparin drip infusing. Pt Afib on tele. HR 80-90s. Lasix infusing @ 20ml/hr. Diuril added. Stevens in place-draining zuleika/pinkish urine. Will monitor urine output.  D10W continuous infusion started. Unable to give scheduled PO meds due to aspiration risk and patient being very drowsy during shift. Blood cx=gram negative rods in anaerobic bottle.  PICC, dietary, and Palliative care consulted. BIPAP on patient. VSS & NADN. Pt denies CP, SOB, or pain/discomfort at this time. Wife and son @ bedside. All questions addressed.  Will continue to monitor.

## 2017-05-21 NOTE — PROGRESS NOTES
Patient converted back to Afib on tele monitor. MD notified. MD Yoel order for  EKG. Orders placed.    2250- EKG placed in chart=afib

## 2017-05-21 NOTE — HPI
85-year-old man with HTN, HLD, NSTEMI previously refused C 5/2016, CKD stage IV (baseline 2.7), moderate AS, AF, venous insufficiency, cirrhosis, Ureterocutaneous fistula, chronic osteomyelitis of the pelvic region on cefpodoxime, suspected inflammatory arthritis on HCQ, neuromuscular disorder with peripheral neuropathy with recurrent right foot ulcer, prostate cancer s/p radiation, RCC s/p left nephrectomy, skin cancer of the hand, urethral stricture, urinary incontinence p/w SOB which started an hour prior to admission and EKG concerning for STEMI with afib with q waves in inferior/anterior-septal leads with resolution of chest pain with SL NTG. Patient was evaluated in the ED by Dr Duval and recommended to be taken to cath lab considering his clinical picture but he refused to proceed with angiogram due to risk of hemodialysis possibility he will be admitted fort mdical treatment in CCU.     Currently he is very SOB with fluid overload and aneuric.looks like not responding to lasix his son is his POA and they are discussing code status and if they will proceed with more procedures including CRRT.    Patient with recent cystoscopy/cystogram/fistulogram by Dr. 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 Abraham's  Operative noted.

## 2017-05-21 NOTE — PLAN OF CARE
Problem: SLP Goal  Goal: SLP Goal  Speech Language Pathology Goals  Goals expected to be met by 5/28/2017  1. Pt will tolerate dental soft diet with nectar-thickened liquids w/o overt S/S aspiration, MIN A, to improve swallow safety  2. Pt will tolerate trials of thin liquids w/o overt S/S aspiration, MIN A, to improve swallow safety  3. Educate Patient and family on safe swallow strategies and aspiration precautions      Outcome: Ongoing (interventions implemented as appropriate)  Bedside Swallow Study completed. Patient presents with S/S pharyngeal Dysphagia. See note for full details. REC: Dental soft diet with nectar-thickened liquids, medications crushed in pudding, with strict aspiration precautions.  Continue to monitor for signs and symptoms of aspiration and discontinue oral feeding should you notice any of the following: watery eyes, reddened facial area, wet vocal quality, increased work of breathing, change in respiratory status, increased congestion, coughing, fever, etc.  ST to continue to follow to monitor tolerance. Findings and recommendations discussed with Nurse Garrido and Dr. Paul.  Thank you.     DAVID Crowe., [facility]-SLP  Speech-Language Pathology  Pager: [phone]  5/21/2017

## 2017-05-21 NOTE — PLAN OF CARE
Problem: Patient Care Overview  Goal: Plan of Care Review  Recommendations     Recommendation/Intervention:   Most appropriate recommendation will depend on palliative care feedback and plan of care.      If TF is desired by family and if medically warranted, TF may be initiated however note that if pt is unable to sustain angle =/>30 degrees during feedings, this puts pt at risk for aspiration.      Recommendation for TF as requested: Isosource 1.5 @ 45 ml/hr (start at 15 mL and advance 10 mL q4 hours until goal rate is achieved). This provides 1620 calories, 73 g protein, 1032 mL fluid.      2. Preferred recommendation:  maximize po intake with dental soft diet as tolerated. Order Boost Plus and encourage patient to consume at least 2 daily.      3. RD to monitor     Goals: Pt to receive most appropriate form of nutrition according to patient status  Nutrition Goal Status: new  Communication of RD Recs: reviewed with RN

## 2017-05-22 DIAGNOSIS — N18.1 CKD (CHRONIC KIDNEY DISEASE) STAGE 1, GFR 90 ML/MIN OR GREATER: Primary | ICD-10-CM

## 2017-05-22 PROBLEM — B96.1 BACTEREMIA DUE TO KLEBSIELLA PNEUMONIAE: Status: ACTIVE | Noted: 2017-05-21

## 2017-05-22 PROBLEM — E83.39 HYPERPHOSPHATEMIA: Status: ACTIVE | Noted: 2017-05-22

## 2017-05-22 LAB
ALBUMIN SERPL BCP-MCNC: 1.9 G/DL
ALP SERPL-CCNC: 102 U/L
ALT SERPL W/O P-5'-P-CCNC: 62 U/L
ANION GAP SERPL CALC-SCNC: 17 MMOL/L
APTT BLDCRRT: 58.8 SEC
AST SERPL-CCNC: 124 U/L
BACTERIA BLD CULT: NORMAL
BACTERIA UR CULT: NORMAL
BASOPHILS # BLD AUTO: 0.01 K/UL
BASOPHILS NFR BLD: 0.2 %
BILIRUB DIRECT SERPL-MCNC: 0.2 MG/DL
BILIRUB SERPL-MCNC: 0.4 MG/DL
BUN SERPL-MCNC: 86 MG/DL
CALCIUM SERPL-MCNC: 8.9 MG/DL
CHLORIDE SERPL-SCNC: 96 MMOL/L
CHLORIDE UR-SCNC: 110 MMOL/L
CO2 SERPL-SCNC: 19 MMOL/L
CREAT SERPL-MCNC: 5.1 MG/DL
CREAT UR-MCNC: 19 MG/DL
CREAT UR-MCNC: 19 MG/DL
DIFFERENTIAL METHOD: ABNORMAL
EOSINOPHIL # BLD AUTO: 0.1 K/UL
EOSINOPHIL NFR BLD: 2.6 %
ERYTHROCYTE [DISTWIDTH] IN BLOOD BY AUTOMATED COUNT: 15.9 %
EST. GFR  (AFRICAN AMERICAN): 11 ML/MIN/1.73 M^2
EST. GFR  (NON AFRICAN AMERICAN): 9.5 ML/MIN/1.73 M^2
FACT X PPP CHRO-ACNC: 0.45 IU/ML
FACT X PPP CHRO-ACNC: 0.45 IU/ML
GLUCOSE SERPL-MCNC: 101 MG/DL
HCT VFR BLD AUTO: 24.4 %
HGB BLD-MCNC: 7.8 G/DL
INR PPP: 1
LYMPHOCYTES # BLD AUTO: 0.6 K/UL
LYMPHOCYTES NFR BLD: 13.4 %
MAGNESIUM SERPL-MCNC: 1.6 MG/DL
MCH RBC QN AUTO: 26.8 PG
MCHC RBC AUTO-ENTMCNC: 32 %
MCV RBC AUTO: 84 FL
MONOCYTES # BLD AUTO: 0.4 K/UL
MONOCYTES NFR BLD: 10.3 %
NEUTROPHILS # BLD AUTO: 3.1 K/UL
NEUTROPHILS NFR BLD: 73 %
PHOSPHATE SERPL-MCNC: 6.9 MG/DL
PLATELET # BLD AUTO: 133 K/UL
PMV BLD AUTO: 10.4 FL
POCT GLUCOSE: 112 MG/DL (ref 70–110)
POCT GLUCOSE: 116 MG/DL (ref 70–110)
POCT GLUCOSE: 117 MG/DL (ref 70–110)
POTASSIUM SERPL-SCNC: 3 MMOL/L
PROT SERPL-MCNC: 6.6 G/DL
PROT UR-MCNC: 36 MG/DL
PROT/CREAT RATIO, UR: 1.89
PROTHROMBIN TIME: 11 SEC
RBC # BLD AUTO: 2.91 M/UL
SODIUM SERPL-SCNC: 132 MMOL/L
SODIUM UR-SCNC: 98 MMOL/L
TROPONIN I SERPL DL<=0.01 NG/ML-MCNC: 29.11 NG/ML
URATE UR-MCNC: 6 MG/DL
UUN UR-MCNC: 152 MG/DL
VANCOMYCIN SERPL-MCNC: 13.3 UG/ML
WBC # BLD AUTO: 4.18 K/UL

## 2017-05-22 PROCEDURE — 85610 PROTHROMBIN TIME: CPT

## 2017-05-22 PROCEDURE — 85730 THROMBOPLASTIN TIME PARTIAL: CPT

## 2017-05-22 PROCEDURE — 63600175 PHARM REV CODE 636 W HCPCS: Performed by: HOSPITALIST

## 2017-05-22 PROCEDURE — 80202 ASSAY OF VANCOMYCIN: CPT

## 2017-05-22 PROCEDURE — 25000003 PHARM REV CODE 250: Performed by: HOSPITALIST

## 2017-05-22 PROCEDURE — 84560 ASSAY OF URINE/URIC ACID: CPT

## 2017-05-22 PROCEDURE — 99233 SBSQ HOSP IP/OBS HIGH 50: CPT | Mod: ,,, | Performed by: INTERNAL MEDICINE

## 2017-05-22 PROCEDURE — 20600001 HC STEP DOWN PRIVATE ROOM

## 2017-05-22 PROCEDURE — 84300 ASSAY OF URINE SODIUM: CPT

## 2017-05-22 PROCEDURE — 25000003 PHARM REV CODE 250: Performed by: INTERNAL MEDICINE

## 2017-05-22 PROCEDURE — 99222 1ST HOSP IP/OBS MODERATE 55: CPT | Mod: ,,, | Performed by: INTERNAL MEDICINE

## 2017-05-22 PROCEDURE — 84484 ASSAY OF TROPONIN QUANT: CPT

## 2017-05-22 PROCEDURE — 82436 ASSAY OF URINE CHLORIDE: CPT

## 2017-05-22 PROCEDURE — 83735 ASSAY OF MAGNESIUM: CPT

## 2017-05-22 PROCEDURE — 85520 HEPARIN ASSAY: CPT

## 2017-05-22 PROCEDURE — 80048 BASIC METABOLIC PNL TOTAL CA: CPT

## 2017-05-22 PROCEDURE — 99233 SBSQ HOSP IP/OBS HIGH 50: CPT | Mod: ,,, | Performed by: HOSPITALIST

## 2017-05-22 PROCEDURE — 85025 COMPLETE CBC W/AUTO DIFF WBC: CPT

## 2017-05-22 PROCEDURE — 84540 ASSAY OF URINE/UREA-N: CPT

## 2017-05-22 PROCEDURE — 99223 1ST HOSP IP/OBS HIGH 75: CPT | Mod: S$PBB,,, | Performed by: CLINICAL NURSE SPECIALIST

## 2017-05-22 PROCEDURE — 82570 ASSAY OF URINE CREATININE: CPT

## 2017-05-22 PROCEDURE — 99232 SBSQ HOSP IP/OBS MODERATE 35: CPT | Mod: GC,,, | Performed by: INTERNAL MEDICINE

## 2017-05-22 PROCEDURE — C9113 INJ PANTOPRAZOLE SODIUM, VIA: HCPCS | Performed by: HOSPITALIST

## 2017-05-22 PROCEDURE — 80076 HEPATIC FUNCTION PANEL: CPT

## 2017-05-22 PROCEDURE — 84100 ASSAY OF PHOSPHORUS: CPT

## 2017-05-22 PROCEDURE — 92526 ORAL FUNCTION THERAPY: CPT

## 2017-05-22 PROCEDURE — 25000003 PHARM REV CODE 250: Performed by: STUDENT IN AN ORGANIZED HEALTH CARE EDUCATION/TRAINING PROGRAM

## 2017-05-22 RX ORDER — SEVELAMER CARBONATE 800 MG/1
800 TABLET, FILM COATED ORAL
Status: DISCONTINUED | OUTPATIENT
Start: 2017-05-22 | End: 2017-05-23

## 2017-05-22 RX ORDER — CALCITRIOL 0.5 UG/1
0.5 CAPSULE ORAL NIGHTLY
OUTPATIENT
Start: 2017-05-22

## 2017-05-22 RX ORDER — HYDROMORPHONE HYDROCHLORIDE 2 MG/1
2 TABLET ORAL EVERY 6 HOURS
Status: DISCONTINUED | OUTPATIENT
Start: 2017-05-22 | End: 2017-05-24

## 2017-05-22 RX ORDER — MAGNESIUM SULFATE HEPTAHYDRATE 40 MG/ML
2 INJECTION, SOLUTION INTRAVENOUS ONCE
Status: COMPLETED | OUTPATIENT
Start: 2017-05-22 | End: 2017-05-22

## 2017-05-22 RX ADMIN — PANTOPRAZOLE SODIUM 40 MG: 40 INJECTION, POWDER, FOR SOLUTION INTRAVENOUS at 08:05

## 2017-05-22 RX ADMIN — HYDROXYCHLOROQUINE SULFATE 200 MG: 200 TABLET, FILM COATED ORAL at 08:05

## 2017-05-22 RX ADMIN — DOCUSATE SODIUM 100 MG: 100 CAPSULE, LIQUID FILLED ORAL at 08:05

## 2017-05-22 RX ADMIN — ASPIRIN 81 MG CHEWABLE TABLET 81 MG: 81 TABLET CHEWABLE at 08:05

## 2017-05-22 RX ADMIN — ALLOPURINOL 100 MG: 100 TABLET ORAL at 08:05

## 2017-05-22 RX ADMIN — Medication 6.25 MG: at 09:05

## 2017-05-22 RX ADMIN — HYDROMORPHONE HYDROCHLORIDE 0.5 MG: 1 INJECTION, SOLUTION INTRAMUSCULAR; INTRAVENOUS; SUBCUTANEOUS at 12:05

## 2017-05-22 RX ADMIN — Medication 3 ML: at 01:05

## 2017-05-22 RX ADMIN — SODIUM BICARBONATE 650 MG: 650 TABLET ORAL at 08:05

## 2017-05-22 RX ADMIN — DOCUSATE SODIUM 100 MG: 100 CAPSULE, LIQUID FILLED ORAL at 09:05

## 2017-05-22 RX ADMIN — MICONAZOLE NITRATE: 20 CREAM TOPICAL at 09:05

## 2017-05-22 RX ADMIN — HYDROMORPHONE HYDROCHLORIDE 2 MG: 2 TABLET ORAL at 03:05

## 2017-05-22 RX ADMIN — MAGNESIUM SULFATE IN WATER 2 G: 40 INJECTION, SOLUTION INTRAVENOUS at 01:05

## 2017-05-22 RX ADMIN — POTASSIUM BICARBONATE 25 MEQ: 25 TABLET, EFFERVESCENT ORAL at 09:05

## 2017-05-22 RX ADMIN — HYDROMORPHONE HYDROCHLORIDE 0.5 MG: 1 INJECTION, SOLUTION INTRAMUSCULAR; INTRAVENOUS; SUBCUTANEOUS at 11:05

## 2017-05-22 RX ADMIN — MEROPENEM AND SODIUM CHLORIDE 500 MG: 500 INJECTION, SOLUTION INTRAVENOUS at 03:05

## 2017-05-22 RX ADMIN — HYDROMORPHONE HYDROCHLORIDE 0.5 MG: 1 INJECTION, SOLUTION INTRAMUSCULAR; INTRAVENOUS; SUBCUTANEOUS at 10:05

## 2017-05-22 RX ADMIN — HYDROMORPHONE HYDROCHLORIDE 0.5 MG: 1 INJECTION, SOLUTION INTRAMUSCULAR; INTRAVENOUS; SUBCUTANEOUS at 03:05

## 2017-05-22 RX ADMIN — ATORVASTATIN CALCIUM 80 MG: 20 TABLET, FILM COATED ORAL at 08:05

## 2017-05-22 RX ADMIN — Medication 3 ML: at 10:05

## 2017-05-22 RX ADMIN — HYDROMORPHONE HYDROCHLORIDE 2 MG: 2 TABLET ORAL at 08:05

## 2017-05-22 RX ADMIN — HYDROXYCHLOROQUINE SULFATE 200 MG: 200 TABLET, FILM COATED ORAL at 09:05

## 2017-05-22 RX ADMIN — MICONAZOLE NITRATE: 20 CREAM TOPICAL at 10:05

## 2017-05-22 RX ADMIN — SEVELAMER CARBONATE 800 MG: 800 TABLET, FILM COATED ORAL at 05:05

## 2017-05-22 RX ADMIN — SEVELAMER CARBONATE 800 MG: 800 TABLET, FILM COATED ORAL at 11:05

## 2017-05-22 RX ADMIN — CLOPIDOGREL 75 MG: 75 TABLET, FILM COATED ORAL at 08:05

## 2017-05-22 RX ADMIN — SODIUM BICARBONATE 650 MG: 650 TABLET ORAL at 09:05

## 2017-05-22 RX ADMIN — Medication 3 ML: at 02:05

## 2017-05-22 NOTE — ASSESSMENT & PLAN NOTE
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   -Cr rising to 4.7, on Lasix 20mg/hr and diuril,  D/c q12hr diuril dosing  -nephrology re-consulted

## 2017-05-22 NOTE — SUBJECTIVE & OBJECTIVE
"Past Medical History:   Diagnosis Date    *Atrial fibrillation     Acute appendicitis 2/19/2015    Anemia     Anxiety     Arthritis     generalized    Basal cell carcinoma 01/2013    right temple    BCC (basal cell carcinoma)     right mid forearm    Bladder stone 6/28/2016    Blood transfusion     Chronic urethral stricture 10/14/2015    Chronic venous insufficiency 7/8/2013    CKD (chronic kidney disease) stage 3, GFR 30-59 ml/min 9/6/2012    Coronary artery disease     Elevated PSA     Essential hypertension 7/30/2015    Hematuria, gross     History of Left Nephrectomy (~2006) 1/29/2014    Done at [facility].     Hypertension     Inflammatory arthritis 8/14/2012    Kidney stone     Long-term use of Plaquenil 6/4/2015    Mixed incontinence urge and stress 4/11/2014    Nonrheumatic aortic valve stenosis 5/30/2016    NSTEMI (non-ST elevated myocardial infarction) 5/30/2016    Olecranon bursitis 1/14/2013    Osteopenia 8/14/2012    Personal history of kidney cancer 4/11/2014    Prostate cancer     Radiation     treatment for prostate cancer    Recurrent UTI 7/8/2013    Respiratory distress     S/P radiation therapy 4/11/2014    Skin cancer of arm     left leg (malignant)    Solitary kidney 7/15/2013    Venous insufficiency of leg 7/12/2012    Venous stasis ulcers 7/6/2012    Vitamin D deficiency disease 5/8/2013       Past Surgical History:   Procedure Laterality Date    APPENDECTOMY      CYSTOSCOPY      with dialation    NEPHRECTOMY  2006-07?    Removal of left kidney    TONSILLECTOMY         Review of patient's allergies indicates:   Allergen Reactions    Aspirin Other (See Comments)     Stomach      Ditropan [oxybutynin chloride]      Unable to describe side effect other than "felt strange"     Keflex [cephalexin] Rash     Morbilliform  Has tolerated multiple cephalosporins since 2013.    Macrobid [nitrofurantoin monohyd/m-cryst] Hives and Rash " "      Medications:  Prescriptions Prior to Admission   Medication Sig    allopurinol (ZYLOPRIM) 100 MG tablet Take 1 tablet (100 mg total) by mouth once daily. (Patient taking differently: Take 100 mg by mouth every morning. )    aspirin 81 MG Chew Take 1 tablet (81 mg total) by mouth once daily. (Patient taking differently: Take 81 mg by mouth every morning. )    atorvastatin (LIPITOR) 80 MG tablet Take 1 tablet (80 mg total) by mouth once daily. (Patient taking differently: Take by mouth every evening. )    calcitRIOL (ROCALTROL) 0.5 MCG Cap Take 0.5 mcg by mouth every evening.     carvedilol (COREG) 3.125 MG tablet Take 1 tablet (3.125 mg total) by mouth 2 (two) times daily with meals.    catheter 16-16 Fr-" Misc 1 Units by Misc.(Non-Drug; Combo Route) route once daily.    ciprofloxacin HCl (CIPRO) 500 MG tablet Take 0.5 tablets (250 mg total) by mouth once daily at 6am.    docusate sodium (COLACE) 100 MG capsule Take by mouth 2 (two) times daily.    ferrous sulfate 324 mg (65 mg iron) TbEC Take 1 tablet (325 mg total) by mouth 3 (three) times daily. (Patient taking differently: Take 325 mg by mouth 2 (two) times daily. )    folic acid-vit B6-vit B12 (FOLBEE) 2.5-25-1 mg Tab Take 1 tablet by mouth once daily. (Patient taking differently: Take 1 tablet by mouth every morning. )    gabapentin (NEURONTIN) 300 MG capsule Take 1 capsule (300 mg total) by mouth 2 (two) times daily.    hydrocodone-acetaminophen 10-325mg (NORCO)  mg Tab Take 1 tablet by mouth 4 (four) times daily as needed.    hydroxychloroquine (PLAQUENIL) 200 mg tablet Take 1 tablet (200 mg total) by mouth 2 (two) times daily.    LACTOBACILLUS ACIDOPHILUS (PROBIOTIC ORAL) Take 1 capsule by mouth every morning.     mirabegron (MYRBETRIQ) 25 mg Tb24 ER tablet Take 1 tablet (25 mg total) by mouth once daily.    nitroGLYCERIN (NITROSTAT) 0.3 MG SL tablet Place 1 tablet (0.3 mg total) under the tongue every 5 (five) minutes as needed " for Chest pain.    omeprazole (PRILOSEC) 40 MG capsule Take 40 mg by mouth every evening.     oxycodone-acetaminophen (PERCOCET) 5-325 mg per tablet Take 1 tablet by mouth every 6 (six) hours as needed for Pain.    sodium bicarbonate 650 MG tablet Take 1 tablet (650 mg total) by mouth 2 (two) times daily.    triamcinolone acetonide 0.1% (KENALOG) 0.1 % cream Apply topically 2 (two) times daily. Apply to affected area twice daily as directed.    vitamin D 1000 units Tab Take 2,000 Units by mouth every morning.      Antibiotics     Start     Stop Route Frequency Ordered    05/21/17 1600  meropenem-0.9% sodium chloride 500 mg/50 mL IVPB      -- IV Every 12 hours (non-standard times) 05/21/17 1455        Antifungals     Start     Stop Route Frequency Ordered    05/21/17 2100  miconazole 2 % cream      -- Top 2 times daily 05/21/17 1849        Antivirals     None           Immunization History   Administered Date(s) Administered    Influenza 10/12/2009, 09/22/2010, 01/23/2012    Influenza - High Dose 10/18/2013, 10/23/2014    Zoster 07/11/2014       Family History     Problem Relation (Age of Onset)    Cancer Mother, Brother, Brother, Maternal Aunt    Heart disease Father    Mental illness Daughter    Urolithiasis Father        Social History     Social History    Marital status:      Spouse name: Emy    Number of children: 4    Years of education: N/A     Occupational History    Retired      Retired 1998     Social History Main Topics    Smoking status: Current Every Day Smoker     Years: 40.00     Types: Cigars    Smokeless tobacco: Never Used      Comment: pt smokes 3 cigars a day pt will make up his mind when his ready- did give up smoking cigarettes at age 35yrs smoked from 18 - 35 years    Alcohol use 0.6 oz/week     1 Glasses of wine per week      Comment: stop drinking 09/2009. 1 glass of wine at bedtime     Drug use: No      Comment: smokes 3 cigars per day    Sexual activity: No      Other Topics Concern    None     Social History Narrative    None     Review of Systems   Unable to perform ROS: Mental status change     Objective:     Vital Signs (Most Recent):  Temp: 97.6 °F (36.4 °C) (05/22/17 0800)  Pulse: 96 (05/22/17 1100)  Resp: (!) 22 (05/22/17 0800)  BP: (!) 90/57 (05/22/17 0800)  SpO2: 99 % (05/22/17 0800) Vital Signs (24h Range):  Temp:  [97.4 °F (36.3 °C)-98.2 °F (36.8 °C)] 97.6 °F (36.4 °C)  Pulse:  [66-96] 96  Resp:  [16-22] 22  SpO2:  [95 %-99 %] 99 %  BP: ()/(50-60) 90/57     Weight: 77.8 kg (171 lb 8.3 oz)  Body mass index is 24.61 kg/m².    Estimated Creatinine Clearance: 10.9 mL/min (based on Cr of 5.1).    Physical Exam   Constitutional: He appears well-developed. No distress.   HENT:   Head: Normocephalic and atraumatic.   Cardiovascular: Normal rate and regular rhythm.    Pulmonary/Chest: Effort normal. He has no rales.   Abdominal: Soft. Bowel sounds are normal.   Genitourinary:   Genitourinary Comments: +montano, fistula in right thigh crease   Musculoskeletal: Normal range of motion.   Vitals reviewed.      Significant Labs:   Blood Culture:   Recent Labs  Lab 05/19/17  1737 05/20/17  1325 05/20/17  1337   LABBLOO Gram stain nnamdi bottle: Gram negative rods   Results called to and read back by:Radhika Wolf RN 05/20/2017  12:39  KLEBSIELLA PNEUMONIAE ESBL No Growth to date  No Growth to date No Growth to date  No Growth to date     Urine Culture:   Recent Labs  Lab 05/19/17  1737 05/20/17  1607   LABURIN KLEBSIELLA PNEUMONIAE ESBL>100,000 cfu/ml GRAM NEGATIVE KELSEA>100,000 cfu/mlIdentification and susceptibility pending       Significant Imaging: I have reviewed all pertinent imaging results/findings within the past 24 hours.

## 2017-05-22 NOTE — PHYSICIAN QUERY
PT Name: Humphrey Machado  MR #: [id number]     Physician Query Form - Diagnosis Clarification      CDS/: Rafael Galvan Jr, RN              Contact information:[phone]    This form is a permanent document in the medical record.     Query Date: May 22, 2017    By submitting this query, we are merely seeking further clarification of documentation.  Please utilize your independent clinical judgment when addressing the question(s) below.     The medical record contains the following:      Findings Supporting Clinical Information Location in Medical Record   Sepsis WBC=27.24-->20.52    Temp=97.9---->100.9---->100.7  RR=34---->35--->34  BP=78/50---->82/49---->103/67  UN=193---->92---->82    vancomycin (VANCOCIN) 1,250 mg IVPB once          cefepime 1 gram IVPB Daily           Meropenem 500 mg IVPB     Sepsis   --SIRS 4/4 with possible urinary source   --UA with >100 WBC, >100 RBC, moderate bacteria     Patient is more awake and alert, likely related to toxic encephalopathy    Bacteremia due to Gram-negative bacteria -empiric coverage for hx of ESBL Klebsiella      5/19 Labs    5/19-5/20 VS Flowsheet  5/19-5/20 VS Flowsheet  5/19-5/20 VS Flowsheet  5/19-5/20 VS Flowsheet    5/19 MAR    5/20-5/21 MAR    5/21-5/22 MAR    5/20 Hospital Medicine Progress Note      5/21 Hospital Medicine Progress Note    5/21 Hospital Medicine Progress Note         Please clarify if the              Sepsis             diagnosis has been:    [ X ] Ruled In, Now Resolved  [  ] Resolved Prior to My Assessment  [  ] Ruled Out  [  ] Clinically undetermined  [  ] Other/Clarification of findings (please specify)_______________________________    Please document in your progress notes daily for the duration of treatment, until resolved, and include in your discharge summary.

## 2017-05-22 NOTE — HOSPITAL COURSE
Hospital course as above, while in the CCU patient was medically managed for his STEMI, and for his AMIRA.  CCU team discussed comfort vs aggressive measures, patient's code status changed to DNR and due to concern that patient was anuric with developing AMIRA that may require HD. Discussion per EMR with family revealed they would not want to pursue dialysis. He was started on Lasix drip @ 20mg/hr and IV Diuril q12 hrs and had improved urine output on this regimen.  PRN Morphine and Ativan orders had been placed for patient comfort    Patient stepped down to IM-C casiano service on 5/20.  Patient was acutely encephalopathic, developed fevers and notified by RN blood cultures from admission were growing GNR.  Surveillance cultures sent patient was on Vancomycin and Cefepime.  He was having urine output on the lasix and diuril.  Extensive family discussion held (see progress notes for detail)  Family was not universally ready for comfort care, care plan more no escalation of care, given his altered mentation, I suspected morphine and ativan in Amira contributing, opiates switched to dilaudid and ativan d/c.   Discussed NG tube placement for oral med administration and nutrition but was held off.     5/21 - Patient is more awake and alert, likely related to toxic encephalopathy, remains hemodynamically stable, but was having more pain complaints, was receiving IV dilaudid  0.5mg q2hrs.   SLP evaluated patient and he is safe for dental soft nectar thick liquids.   His heart rhythm converted to atrial fibrillation with controlled rate in the 80-90s.   Will need repeat cardiology recs on management in AM.     Patient with nearly 2L UOP x 24hrs, family with more questions about possible HD or what course his Amira may take.  Nephrology re-contacted to follow the patient.  Diuril will be stopped this evening to monitor how pts UOp  And Cr does.    I reviewed more of recent urologic history and patient has a urinary source of his  bacteremia, however with complicated chronic fistula and wound we may not have source control. Discussed with patient and will consult urology to assist in any other management for source control and chronic urethrocutaneous fistula that may be needed - Urology consult placed for mon Am.     Reviewed prior culture data and patient with hx of Klebsiella ESBL and Pseudomonas in urine, both not sensitive to empiric cefepime so ID approval obtained to change to renal dosed Meropenem     Patient says pain control is improving but is still seems he has chronic pain issues from multiple sites, adding po opiates to pain regimen.     5/22 - Additional consultants have evaluated the patient  Cardiology - no additional med management recs at this time, patient has completed 48hrs of heparin for ACS, however with atrial fibrillation, will continue for now.  Coreg change to low dose metoprolol due to hypotension    Urology - evaluated patient and removed ureteral catheter, no further recs for chronic urtherocutaneous fistula, local wound care.   Wound care evaluated the patient and placed barrier cream in right groin with soft dressing to help absorb urinary leak from fistula, I was at bedside and pt did not feel ready to turn for assessment of buttocks.     Nephrology - evaluated the patient and no acute needs for acute HD, they will continue to follow, did inform pt that nephrology feels initiating HD would not improve overall morbidity/mortality from his co-morbid conditions and HD initiation comes with its own risks and invasive procedures required.     Palliative care - evaluated pt to assist in overall care planning.  MD staff to assist with pain management not available this week.     5/23- patient complained of discomfort. He is still unsure of what he wants for medical management. He is still considering hemodialysis as an option. His renal function continued to worsen but his main concern was his discomfort.    5/24-  "patient unable to voice his wishes nor does he want to have a goals of care discussion. He has poor insight into his disease process. He is verbally abusive to his providers and nursing staff. Discussed with his wife and grandson scheduling a family meeting to establish therapeutic plan.     5/25- he continues to be abusive to medical personnel. When trying to assess his pain he becomes abusive and screams that he can't tell.     5/26-Family meeting today to establish therapeutic plan. Patient has agreed to hemodialysis and oral anticoagulation. He also agreed to PT and OT however he has refused SNF. He has developed some mental "foggyness"     5/27-Underwent HD.     5/28- overnight patient with hypotension. Narcotic analgesics held and volume replaced by overnight physician. Moderate to severe pain on evaluation and analgesics resumed. Metoprolol dose further adjusted.    5/29- Patient could not be dialyzed due to hypotension. Options discussed by Nephrology service. Plan to hold metoprolol for now and hold narcotic analgesics 4 hours prior to HD tomorrow. If patient unable to tolerate then will need hospice care.  "

## 2017-05-22 NOTE — PROGRESS NOTES
Wound care consulted for urethrocutaneous fistula, stage 2 buttock.  The fistula is located on the right groin and draining large amounts of clear fluid.  The adonis-fistula area is macerated/tender.  The area was cleaned with normal saline after patient had pain medication.  Critic aid barrier paste was applied and a megasorb dressing placed over the fistula to absorb fluid or blue pads can be used to wick fluid away.  The patient did not allow assessment of his buttocks.  Nursing will apply barrier paste BID and prn cleaning.  Nursing to continue care. Recommend a GOYO overlay mattress for comfort.  Patient understands wound care instructions.      05/22/17 1050       Wound 05/22/17 1050 Fistula groin   Date First Assessed/Time First Assessed: 05/22/17 1050   Pre-existing: Yes  Wound Type: Fistula  Side: Right  Location: groin   Wound WDL ex   Dressing Appearance no dressing   Drainage Amount large   Drainage Characteristics/Odor clear   Wound Base white;moist  (stoma)   Periwound Area macerated   Wound Edges fixed   Wound Length (cm) 0.5   Wound Width (cm) 0.5   Cleansed W/ sterile normal saline   Interventions barrier applied  (critic aid barrier paste)   Dressing Dressing applied;other (see comments)  (megasorb)   Consent was received from the patient for the wound photograph.  Right groin

## 2017-05-22 NOTE — ASSESSMENT & PLAN NOTE
-ACS protocol  -Aspirin, plavix, heparin gtt   -High intensity statins   -ECHO reveals ischemic cardiomyopathy  -Coreg for beta blockade.  ACEi/ARB held due to Amira  -Check AM troponin to see if downtrending

## 2017-05-22 NOTE — ASSESSMENT & PLAN NOTE
-empiric coverage for hx of ESBL Klebsiella and Pseudomonas with renal dosed Meropenem 500mg IV q12hrs.   -source is likely  due to patient's chronic urologic complications  -Surveillance culture drawn 5/20, obtain second surveillance in AM.

## 2017-05-22 NOTE — PROGRESS NOTES
"[facility]  Nephrology  Progress Note    Patient Name: Humphrey Machado  MRN: [id number]  Admission Date: 5/19/2017  Hospital Length of Stay: 3 days  Attending Provider: Dave Bro MD   Primary Care Physician: Andrew Murray MD  Principal Problem:ST elevation myocardial infarction (STEMI)    Subjective:     HPI: 86 yo M with PMHx of HTN,HLD, NSTEMI previously refused [facility] 5/2016, CKD stage IV (baseline 2.7), moderate AS, AF, venous insufficiency, cirrhosis, Ureterocutaneous fistula, chronic osteomyelitis of the pelvic region on cefpodoxime, suspected inflammatory arthritis on HCQ, neuromuscular disorder with peripheral neuropathy with recurrent right foot ulcer, prostate cancer s/p radiation, RCC s/p left nephrectomy, skin cancer of the hand, urethral stricture, urinary incontinence p/w SOB which started an hour prior to admission and EKG concerning for STEMI with afib with q waves in inferior/anterior-septal leads with resolution of chest pain with SL NTG. Patient was evaluated in the ED by Dr Duval and recommended to be taken to cath lab considering his clinical picture but he refused to proceed with angiogram due to risk of hemodialysis possibility     Renal consulted for worsening THEO on CKD     Interval History: Pt feeling better in terms of chest pain and SOB. Making good amt of urine. Creat and BUN still trending up.     Review of patient's allergies indicates:   Allergen Reactions    Aspirin Other (See Comments)     Stomach      Ditropan [oxybutynin chloride]      Unable to describe side effect other than "felt strange"     Keflex [cephalexin] Rash     Morbilliform  Has tolerated multiple cephalosporins since 2013.    Macrobid [nitrofurantoin monohyd/m-cryst] Hives and Rash     Current Facility-Administered Medications   Medication Frequency    allopurinol tablet 100 mg Daily    aspirin chewable tablet 81 mg Daily    atorvastatin tablet 80 mg Daily    bisacodyl suppository 10 " mg Daily PRN    carvedilol tablet 3.125 mg BID WM    clopidogrel tablet 75 mg Daily    dextrose 50% injection 12.5 g PRN    dextrose 50% injection 25 g PRN    docusate sodium capsule 100 mg BID    glucagon (human recombinant) injection 1 mg PRN    glucose chewable tablet 16 g PRN    glucose chewable tablet 24 g PRN    heparin 25,000 units in dextrose 5% 250 mL (100 units/mL) bolus from bag; ADDITIONAL PRN BOLUS PRN    heparin 25,000 units in dextrose 5% 250 mL (100 units/mL) infusion Continuous    hydromorphone injection 0.5 mg Q2H PRN    HYDROmorphone tablet 2 mg Q3H PRN    hydroxychloroquine tablet 200 mg BID    meropenem-0.9% sodium chloride 500 mg/50 mL IVPB Q12H    miconazole 2 % cream BID    nitroGLYCERIN SL tablet 0.4 mg Q5 Min PRN    ondansetron injection 4 mg Q8H PRN    pantoprazole injection 40 mg Daily    sevelamer carbonate tablet 800 mg TID WM    sodium bicarbonate tablet 650 mg BID    sodium chloride 0.9% flush 3 mL Q8H    sodium chloride 0.9% flush 3 mL Q8H       Objective:     Vital Signs (Most Recent):  Temp: 97.6 °F (36.4 °C) (05/22/17 0800)  Pulse: 96 (05/22/17 1100)  Resp: (!) 22 (05/22/17 0800)  BP: (!) 90/57 (05/22/17 0800)  SpO2: 99 % (05/22/17 0800)  O2 Device (Oxygen Therapy): nasal cannula (05/22/17 0800) Vital Signs (24h Range):  Temp:  [97.4 °F (36.3 °C)-98.2 °F (36.8 °C)] 97.6 °F (36.4 °C)  Pulse:  [66-96] 96  Resp:  [16-22] 22  SpO2:  [95 %-99 %] 99 %  BP: ()/(50-60) 90/57     Weight: 77.8 kg (171 lb 8.3 oz) (05/19/17 1125)  Body mass index is 24.61 kg/m².  Body surface area is 1.96 meters squared.    I/O last 3 completed shifts:  In: 1522.5 [P.O.:150; I.V.:1272.5; IV Piggyback:100]  Out: 3725 [Urine:3725]    Physical Exam   Constitutional: He is oriented to person, place, and time. He appears well-developed and well-nourished.   HENT:   Head: Normocephalic and atraumatic.   Eyes: Conjunctivae and EOM are normal.   Neck: Neck supple.   Cardiovascular: Normal  rate, regular rhythm and normal heart sounds.    Pulmonary/Chest: Effort normal and breath sounds normal.   Abdominal: Soft. Bowel sounds are normal.   Neurological: He is alert and oriented to person, place, and time.       Significant Labs:  CBC:   Recent Labs  Lab 05/22/17  0440   WBC 4.18   RBC 2.91*   HGB 7.8*   HCT 24.4*   *   MCV 84   MCH 26.8*   MCHC 32.0     CMP:   Recent Labs  Lab 05/22/17  0440      CALCIUM 8.9   ALBUMIN 1.9*   PROT 6.6   *   K 3.0*   CO2 19*   CL 96   BUN 86*   CREATININE 5.1*   ALKPHOS 102   ALT 62*   *   BILITOT 0.4       Recent Labs  Lab 05/19/17  0940   COLORU Yellow   SPECGRAV 1.010   PHUR 5.0   PROTEINUA 2+*   BACTERIA Occasional   NITRITE Negative   LEUKOCYTESUR 3+*   UROBILINOGEN Negative   HYALINECASTS 0        Significant Imaging:  Labs: Reviewed    Assessment/Plan:     Acute kidney injury superimposed on CKD     Pt k/c/o CKD stage 3-4 2/2 HTN, solitary kidney s/p nephrectomy due to RCC, recurrent UTIs   - baseline creat 2.3 to 2.7   - THEO may be due to cardiorenal syndrome 2/2 NSTEMI   - creat BUN trending up 5.1/86 at this time   - pt with good urine output 2.1 L overnight   - no acute indication for dialysis but if labs continue to worsen we may have to initiate HD   - Pt with several co morbidities and benefits of dialysis questionable at this time   - Pt explained dialysis would not improve mortality or morbidity at this time   - pt still indecisive about choosing dialysis if and when required.   - will continue to follow         Hyperphosphatemia    - 2/2 to THEO on CKD   - recommend low phos diet   - started on sevelamer 800 TID today           Diya Vega MD  Nephrology  [facility]

## 2017-05-22 NOTE — PLAN OF CARE
Problem: SLP Goal  Goal: SLP Goal  Speech Language Pathology Goals  Goals expected to be met by 5/28/2017  1. Pt will tolerate dental soft diet with nectar-thickened liquids w/o overt S/S aspiration, MIN A, to improve swallow safety  2. Pt will tolerate trials of thin liquids w/o overt S/S aspiration, MIN A, to improve swallow safety  3. Educate Patient and family on safe swallow strategies and aspiration precautions       Outcome: Ongoing (interventions implemented as appropriate)  Pt with minimal participation with SLP this service day, accepts two tsp sips nectar-thickened liquids then refuses additional PO intake. Ongoing education for swallow precautions, thickener guidelines, aspiration precautions reinforced; however, patient refuses teach back. SLP to continue to follow to monitor tolerance of diet. Thank you.     DAVID Crowe., [facility]-SLP  Speech-Language Pathology  Pager: [phone]  5/22/2017

## 2017-05-22 NOTE — ASSESSMENT & PLAN NOTE
Humphrey Machado is a 85 y.o. male with hx of prostate cancer treated radiation, now with recurrent UTIs and urethrocutaneous fistula.     - Due to patient's current clinical condition would not recommend aggressive intervention for this fistula  - Patient is currently DNR/DNI, palliative care consulted, family is uncertain how much therapy they wish to pursue at this time  - Recommend treatment of current infection per ID recs  - Also recommend conservative management of fistula with urethral montano for maximal drainage as well as wound care for drainage control from groin wound

## 2017-05-22 NOTE — ASSESSMENT & PLAN NOTE
-receiving IV dilaudid 0.5mg q2hrs  -Pain related to chronic  issues, also noted to complain of extremity pain in LUE  -adding PO dilaudid to medications, want to avoid any basal ER opiates with Amira.  PCA may be better option if patient remains poorly controlled.   -Palliative Care consulted to assist in symptom management for patient and further goals of care discussions that will be necessary.

## 2017-05-22 NOTE — PHYSICIAN QUERY
"PT Name: Humphrey Machado  MR #: [id number]    Physician Query Form - Heart  Condition Clarification     CDS/: Rafael Galvan Jr  RN          Contact information: ext [phone]  This form is a permanent document in the medical record.     Query Date: May 22, 2017    By submitting this query, we are merely seeking further clarification of documentation. Please utilize your independent clinical judgment when addressing the question(s) below.    The medical record contains the following   Indicators     Supporting Clinical Findings Location in Medical Record   x BNP 1785 5/19 Labs    EF     x Radiology findings Independently Interpreted Readings:   Chest X-Ray: Cardiomegaly present.  Increased vascular markings consistent with CHF are present.  5/19 ED Provider Note   x Echo Results Moderately depressed left ventricular systolic function (EF 30-35%).     Impaired LV relaxation, elevated LAP (grade 2 diastolic dysfunction).  5/19 2D Echo      5/19 2D Echo    "Ascites" documented     x "SOB" or "SIMON" documented Currently he is very SOB with fluid overload and aneuric.looks like not responding to lasix  5/21 Hospital Medicine Progress Note    "Hypoxia" documented      Heart Failure documented      "Edema" documented     x Diuretics/Meds furosemide (LASIX) 250 mg infusion   furosemide injection 20 mg Once  furosemide injection 80 mg Once  furosemide injection 80 mg Once  chlorothiazide (DIURIL) 250 mg IVPB 5/19-5/22 MAR  5/19 MAR  5/19 MAR  5/19 MAR  5/19-5/21 MAR    Treatment:      Other:          Provider, please specify diagnosis or diagnoses associated with above clinical findings.                               [  ] Acute Systolic Heart Failure ( EF < 40)*  [X  ] Acute Combined Systolic and Diastolic Heart Failure  [  ] Other (please specify): ___________________________________  [  ] Clinically Undetermined            *American Heart Association                                                                         "                                  Please document in your progress notes daily for the duration of treatment until resolved and include in your discharge summary.

## 2017-05-22 NOTE — PROGRESS NOTES
Cardiology Brief Progress Note    I was asked by Dr Camp to re evaluate patient for any further recommendations from cardiology stand point. On exam patient looks the same with some improvement in his mental status buts he still oliguric with very poor UOP and looks like his Cr keep rising.    -From cardiology stand point he can be off Heparin gtt for ACS as its more than 48 H   -He has Hx of Afib and looks like he is in NSR at 90's now per his tele   -No role of cardiac intervention at this point  -Continue medical management  -Further management per primary team       Attending addendum to follow     Harjit Lozada MD  Cardiology Fellow  Pager: [phone]

## 2017-05-22 NOTE — CONSULTS
[facility]  Infectious Disease  Consult Note    Patient Name: Humphrey Machado  MRN: [id number]  Admission Date: 5/19/2017  Hospital Length of Stay: 3 days  Attending Physician: Dave Bro MD  Primary Care Provider: Andrew Murray MD     Isolation Status: Contact    Patient information was obtained from patient and past medical records.      Consults  Assessment/Plan:     Recurrent UTI    85 y.o. male admitted with MI on 5/19. He has a PMHx of HTN, HLD, CKD, prostate cancer, RCC s/p left nephrectomy, urethral stricture, and recurrent UTI.     Cure of his UTI is unlikely at this point given his source control issue and chronically positive urine cx. Was febrile with leukocytosis earlier in the admission but improved (prior to meropenem).     -continue treatment for UTI x 10 days  -continue meropenem until cx final, if no pseudomonas whould switch to ertapenem  -ID will follow along            Thank you for your consult. I will follow-up with patient. Please contact us if you have any additional questions.    Rashard Munguia MD  Infectious Disease  [facility]    Subjective:     Principal Problem: ST elevation myocardial infarction (STEMI)    HPI: No notes on file    Past Medical History:   Diagnosis Date    *Atrial fibrillation     Acute appendicitis 2/19/2015    Anemia     Anxiety     Arthritis     generalized    Basal cell carcinoma 01/2013    right temple    BCC (basal cell carcinoma)     right mid forearm    Bladder stone 6/28/2016    Blood transfusion     Chronic urethral stricture 10/14/2015    Chronic venous insufficiency 7/8/2013    CKD (chronic kidney disease) stage 3, GFR 30-59 ml/min 9/6/2012    Coronary artery disease     Elevated PSA     Essential hypertension 7/30/2015    Hematuria, gross     History of Left Nephrectomy (~2006) 1/29/2014    Done [facility].     Hypertension     Inflammatory arthritis 8/14/2012    Kidney stone     Long-term  "use of Plaquenil 6/4/2015    Mixed incontinence urge and stress 4/11/2014    Nonrheumatic aortic valve stenosis 5/30/2016    NSTEMI (non-ST elevated myocardial infarction) 5/30/2016    Olecranon bursitis 1/14/2013    Osteopenia 8/14/2012    Personal history of kidney cancer 4/11/2014    Prostate cancer     Radiation     treatment for prostate cancer    Recurrent UTI 7/8/2013    Respiratory distress     S/P radiation therapy 4/11/2014    Skin cancer of arm     left leg (malignant)    Solitary kidney 7/15/2013    Venous insufficiency of leg 7/12/2012    Venous stasis ulcers 7/6/2012    Vitamin D deficiency disease 5/8/2013       Past Surgical History:   Procedure Laterality Date    APPENDECTOMY      CYSTOSCOPY      with dialation    NEPHRECTOMY  2006-07?    Removal of left kidney    TONSILLECTOMY         Review of patient's allergies indicates:   Allergen Reactions    Aspirin Other (See Comments)     Stomach      Ditropan [oxybutynin chloride]      Unable to describe side effect other than "felt strange"     Keflex [cephalexin] Rash     Morbilliform  Has tolerated multiple cephalosporins since 2013.    Macrobid [nitrofurantoin monohyd/m-cryst] Hives and Rash       Medications:  Prescriptions Prior to Admission   Medication Sig    allopurinol (ZYLOPRIM) 100 MG tablet Take 1 tablet (100 mg total) by mouth once daily. (Patient taking differently: Take 100 mg by mouth every morning. )    aspirin 81 MG Chew Take 1 tablet (81 mg total) by mouth once daily. (Patient taking differently: Take 81 mg by mouth every morning. )    atorvastatin (LIPITOR) 80 MG tablet Take 1 tablet (80 mg total) by mouth once daily. (Patient taking differently: Take by mouth every evening. )    calcitRIOL (ROCALTROL) 0.5 MCG Cap Take 0.5 mcg by mouth every evening.     carvedilol (COREG) 3.125 MG tablet Take 1 tablet (3.125 mg total) by mouth 2 (two) times daily with meals.    catheter 16-16 Fr-" Misc 1 Units by " Misc.(Non-Drug; Combo Route) route once daily.    ciprofloxacin HCl (CIPRO) 500 MG tablet Take 0.5 tablets (250 mg total) by mouth once daily at 6am.    docusate sodium (COLACE) 100 MG capsule Take by mouth 2 (two) times daily.    ferrous sulfate 324 mg (65 mg iron) TbEC Take 1 tablet (325 mg total) by mouth 3 (three) times daily. (Patient taking differently: Take 325 mg by mouth 2 (two) times daily. )    folic acid-vit B6-vit B12 (FOLBEE) 2.5-25-1 mg Tab Take 1 tablet by mouth once daily. (Patient taking differently: Take 1 tablet by mouth every morning. )    gabapentin (NEURONTIN) 300 MG capsule Take 1 capsule (300 mg total) by mouth 2 (two) times daily.    hydrocodone-acetaminophen 10-325mg (NORCO)  mg Tab Take 1 tablet by mouth 4 (four) times daily as needed.    hydroxychloroquine (PLAQUENIL) 200 mg tablet Take 1 tablet (200 mg total) by mouth 2 (two) times daily.    LACTOBACILLUS ACIDOPHILUS (PROBIOTIC ORAL) Take 1 capsule by mouth every morning.     mirabegron (MYRBETRIQ) 25 mg Tb24 ER tablet Take 1 tablet (25 mg total) by mouth once daily.    nitroGLYCERIN (NITROSTAT) 0.3 MG SL tablet Place 1 tablet (0.3 mg total) under the tongue every 5 (five) minutes as needed for Chest pain.    omeprazole (PRILOSEC) 40 MG capsule Take 40 mg by mouth every evening.     oxycodone-acetaminophen (PERCOCET) 5-325 mg per tablet Take 1 tablet by mouth every 6 (six) hours as needed for Pain.    sodium bicarbonate 650 MG tablet Take 1 tablet (650 mg total) by mouth 2 (two) times daily.    triamcinolone acetonide 0.1% (KENALOG) 0.1 % cream Apply topically 2 (two) times daily. Apply to affected area twice daily as directed.    vitamin D 1000 units Tab Take 2,000 Units by mouth every morning.      Antibiotics     Start     Stop Route Frequency Ordered    05/21/17 1600  meropenem-0.9% sodium chloride 500 mg/50 mL IVPB      -- IV Every 12 hours (non-standard times) 05/21/17 1455        Antifungals     Start      Stop Route Frequency Ordered    05/21/17 2100  miconazole 2 % cream      -- Top 2 times daily 05/21/17 1849        Antivirals     None           Immunization History   Administered Date(s) Administered    Influenza 10/12/2009, 09/22/2010, 01/23/2012    Influenza - High Dose 10/18/2013, 10/23/2014    Zoster 07/11/2014       Family History     Problem Relation (Age of Onset)    Cancer Mother, Brother, Brother, Maternal Aunt    Heart disease Father    Mental illness Daughter    Urolithiasis Father        Social History     Social History    Marital status:      Spouse name: Emy    Number of children: 4    Years of education: N/A     Occupational History    Retired      Retired 1998     Social History Main Topics    Smoking status: Current Every Day Smoker     Years: 40.00     Types: Cigars    Smokeless tobacco: Never Used      Comment: pt smokes 3 cigars a day pt will make up his mind when his ready- did give up smoking cigarettes at age 35yrs smoked from 18 - 35 years    Alcohol use 0.6 oz/week     1 Glasses of wine per week      Comment: stop drinking 09/2009. 1 glass of wine at bedtime     Drug use: No      Comment: smokes 3 cigars per day    Sexual activity: No     Other Topics Concern    None     Social History Narrative    None     Review of Systems   Unable to perform ROS: Mental status change     Objective:     Vital Signs (Most Recent):  Temp: 97.6 °F (36.4 °C) (05/22/17 0800)  Pulse: 96 (05/22/17 1100)  Resp: (!) 22 (05/22/17 0800)  BP: (!) 90/57 (05/22/17 0800)  SpO2: 99 % (05/22/17 0800) Vital Signs (24h Range):  Temp:  [97.4 °F (36.3 °C)-98.2 °F (36.8 °C)] 97.6 °F (36.4 °C)  Pulse:  [66-96] 96  Resp:  [16-22] 22  SpO2:  [95 %-99 %] 99 %  BP: ()/(50-60) 90/57     Weight: 77.8 kg (171 lb 8.3 oz)  Body mass index is 24.61 kg/m².    Estimated Creatinine Clearance: 10.9 mL/min (based on Cr of 5.1).    Physical Exam   Constitutional: He appears well-developed. No distress.   HENT:    Head: Normocephalic and atraumatic.   Cardiovascular: Normal rate and regular rhythm.    Pulmonary/Chest: Effort normal. He has no rales.   Abdominal: Soft. Bowel sounds are normal.   Genitourinary:   Genitourinary Comments: +montano, fistula in right thigh crease   Musculoskeletal: Normal range of motion.   Vitals reviewed.      Significant Labs:   Blood Culture:   Recent Labs  Lab 05/19/17  1737 05/20/17  1325 05/20/17  1337   LABBLOO Gram stain nnamdi bottle: Gram negative rods   Results called to and read back by:Radhika Wolf RN 05/20/2017  12:39  KLEBSIELLA PNEUMONIAE ESBL No Growth to date  No Growth to date No Growth to date  No Growth to date     Urine Culture:   Recent Labs  Lab 05/19/17  1737 05/20/17  1607   LABURIN KLEBSIELLA PNEUMONIAE ESBL>100,000 cfu/ml GRAM NEGATIVE KELSEA>100,000 cfu/mlIdentification and susceptibility pending       Significant Imaging: I have reviewed all pertinent imaging results/findings within the past 24 hours.

## 2017-05-22 NOTE — SUBJECTIVE & OBJECTIVE
"Past Medical History:   Diagnosis Date    *Atrial fibrillation     Acute appendicitis 2/19/2015    Anemia     Anxiety     Arthritis     generalized    Basal cell carcinoma 01/2013    right temple    BCC (basal cell carcinoma)     right mid forearm    Bladder stone 6/28/2016    Blood transfusion     Chronic urethral stricture 10/14/2015    Chronic venous insufficiency 7/8/2013    CKD (chronic kidney disease) stage 3, GFR 30-59 ml/min 9/6/2012    Coronary artery disease     Elevated PSA     Essential hypertension 7/30/2015    Hematuria, gross     History of Left Nephrectomy (~2006) 1/29/2014    [facility] at [facility].     Hypertension     Inflammatory arthritis 8/14/2012    Kidney stone     Long-term use of Plaquenil 6/4/2015    Mixed incontinence urge and stress 4/11/2014    Nonrheumatic aortic valve stenosis 5/30/2016    NSTEMI (non-ST elevated myocardial infarction) 5/30/2016    Olecranon bursitis 1/14/2013    Osteopenia 8/14/2012    Personal history of kidney cancer 4/11/2014    Prostate cancer     Radiation     treatment for prostate cancer    Recurrent UTI 7/8/2013    Respiratory distress     S/P radiation therapy 4/11/2014    Skin cancer of arm     left leg (malignant)    Solitary kidney 7/15/2013    Venous insufficiency of leg 7/12/2012    Venous stasis ulcers 7/6/2012    Vitamin D deficiency disease 5/8/2013       Past Surgical History:   Procedure Laterality Date    APPENDECTOMY      CYSTOSCOPY      with dialation    NEPHRECTOMY  2006-07?    Removal of left kidney    TONSILLECTOMY         Review of patient's allergies indicates:   Allergen Reactions    Aspirin Other (See Comments)     Stomach      Ditropan [oxybutynin chloride]      Unable to describe side effect other than "felt strange"     Keflex [cephalexin] Rash     Morbilliform  Has tolerated multiple cephalosporins since 2013.    Macrobid [nitrofurantoin monohyd/m-cryst] Hives and Rash       Family History     " Problem Relation (Age of Onset)    Cancer Mother, Brother, Brother, Maternal Aunt    Heart disease Father    Mental illness Daughter    Urolithiasis Father          Social History Main Topics    Smoking status: Current Every Day Smoker     Years: 40.00     Types: Cigars    Smokeless tobacco: Never Used      Comment: pt smokes 3 cigars a day pt will make up his mind when his ready- did give up smoking cigarettes at age 35yrs smoked from 18 - 35 years    Alcohol use 0.6 oz/week     1 Glasses of wine per week      Comment: stop drinking 09/2009. 1 glass of wine at bedtime     Drug use: No      Comment: smokes 3 cigars per day    Sexual activity: No       Review of Systems   Constitutional: Negative for chills and fever.   HENT: Positive for trouble swallowing.    Respiratory: Negative for cough and shortness of breath.    Cardiovascular: Negative for chest pain and palpitations.   Gastrointestinal: Negative for nausea and vomiting.   Genitourinary: Positive for genital sores and penile pain. Negative for flank pain and hematuria.   Neurological: Positive for weakness.       Objective:     Temp:  [97.4 °F (36.3 °C)-98.2 °F (36.8 °C)] 97.6 °F (36.4 °C)  Pulse:  [66-96] 96  Resp:  [16-22] 22  SpO2:  [95 %-99 %] 99 %  BP: ()/(50-60) 90/57     Body mass index is 24.61 kg/m².            Drains     Drain                 Urethral Catheter 05/19/17 1200 3 days                Physical Exam   Constitutional: He is oriented to person, place, and time. He appears cachectic. He has a sickly appearance. He appears ill. No distress.   HENT:   Head: Normocephalic and atraumatic.   Eyes: No scleral icterus.   Neck: No JVD present.   Pulmonary/Chest:   Increased work of breathing. On NC.    Abdominal: Soft. He exhibits no distension. There is no tenderness. There is no rebound and no guarding.   Genitourinary: Uncircumcised.   Genitourinary Comments: 18 Fr montano catheter in place draining clear yellow. 5 fr catheter hanging  from meatus.     There is a fistula present within the crease of the right groin, actively draining.    Neurological: He is alert and oriented to person, place, and time.   Skin: He is not diaphoretic. There is pallor.     Psychiatric: He has a normal mood and affect. His behavior is normal.       Significant Labs:    BMP:    Recent Labs  Lab 05/20/17  0319 05/21/17  0337 05/22/17  0440    136 132*   K 4.1 3.2* 3.0*    102 96   CO2 18* 18* 19*   BUN 66* 75* 86*   CREATININE 4.3* 4.7* 5.1*   CALCIUM 9.1 8.8 8.9       CBC:    Recent Labs  Lab 05/19/17  0900 05/19/17  1048 05/22/17  0440   WBC 27.24* 20.52* 4.18   HGB 9.8* 8.7* 7.8*   HCT 30.8* 27.4* 24.4*    210 133*       Blood Culture:   Recent Labs  Lab 05/19/17  1737 05/20/17  1325 05/20/17  1337   LABBLOO Gram stain nnamdi bottle: Gram negative rods   Results called to and read back by:Radhika Wolf RN 05/20/2017  12:39  KLEBSIELLA PNEUMONIAE ESBL No Growth to date  No Growth to date No Growth to date  No Growth to date     Urine Culture:   Recent Labs  Lab 05/19/17  1737 05/20/17  1607   LABURIN KLEBSIELLA PNEUMONIAE ESBL>100,000 cfu/ml GRAM NEGATIVE KELSEA>100,000 cfu/mlIdentification and susceptibility pending       Significant Imaging:    RUG 5/17/17:  Urethrocutaneous fistula to inner right thigh, a right physical ureteral reflux.  Contrast also noted within the soft tissue of the left inguinal region.    US Retroperitoneal 4/29/16:   Left kidney absent.   Right kidney with no stones, hydro or solid masses.

## 2017-05-22 NOTE — ASSESSMENT & PLAN NOTE
-Urology consultation  -wound care consultation  -miconazole powder to prevent moisture incontinence injury

## 2017-05-22 NOTE — HPI
Humphrey Machado is a 85 y.o. male admitted with MI on 5/19. He has a PMHx of HTN, HLD, CKD, prostate cancer, RCC s/p left nephrectomy, urethral stricture, recurrent UTI, osteomyelitis secondary to sacral decubitus ulcer.     His prostate cancer was treated with XRT many years ago. 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 He underwent cystoscopy with Dr. Abraham for this on 5/17 and was noted to have a fistula from the bulbar urethra to the right thigh as well as friable and non-viable prostate tissue. Stevens was left in place along with 5 fr right ureteral catheter at that time.     He was brought to the hospital on 5/19 with SOB and found to have a STEMI. Family refused aggressive management and patient was made comfort care, DNR, DNI with conservative management. He has since been stepped down to the floor and his clinic picture has slightly improved. His kidney function has continued to worsen during hospitalization now with Cr up to 5.1. Urine and blood cultures from admission growing ESBL Klebsiella, multi-drug resistant.     Palliative care has been consulted for goals of care. The family is unsure at this time how aggressive they wish to be with his care. Urology consulted for source control regarding his urethrocutaneous fistula.

## 2017-05-22 NOTE — HPI
84 yo M with PMHx of HTN,HLD, NSTEMI previously refused C 5/2016, CKD stage IV (baseline 2.7), moderate AS, AF, venous insufficiency, cirrhosis, Ureterocutaneous fistula, chronic osteomyelitis of the pelvic region on cefpodoxime, suspected inflammatory arthritis on HCQ, neuromuscular disorder with peripheral neuropathy with recurrent right foot ulcer, prostate cancer s/p radiation, RCC s/p left nephrectomy, skin cancer of the hand, urethral stricture, urinary incontinence p/w SOB which started an hour prior to admission and EKG concerning for STEMI with afib with q waves in inferior/anterior-septal leads with resolution of chest pain with SL NTG. Patient was evaluated in the ED by Dr Duval and recommended to be taken to cath lab considering his clinical picture but he refused to proceed with angiogram due to risk of hemodialysis possibility     Renal consulted for worsening THEO on CKD

## 2017-05-22 NOTE — CONSULTS
Palliative Care Acknowledgement of Consult - .date 5/22/17 8 AM     Consult received.  Will touch base with team prior to seeing patient. Chart reviewed and patient discussed with  Dr. Bro   Full consult to follow.    Thank you for allowing us to be a part of the care of this patient.

## 2017-05-22 NOTE — ASSESSMENT & PLAN NOTE
Pt k/c/o CKD stage 3-4 2/2 HTN, solitary kidney s/p nephrectomy due to RCC, recurrent UTIs   - baseline creat 2.3 to 2.7   - THEO may be due to cardiorenal syndrome 2/2 NSTEMI   - creat BUN trending up 5.1/86 at this time   - pt with good urine output 2.1 L overnight   - no acute indication for dialysis but if labs continue to worsen we may have to initiate HD   - Pt with several co morbidities and benefits of dialysis questionable at this time   - Pt explained dialysis would not improve mortality or morbidity at this time   - pt still indecisive about choosing dialysis if and when required.   - will continue to follow

## 2017-05-22 NOTE — PROGRESS NOTES
[facility] Hospital Medicine  Progress Note    Patient Name: Humphrey Machado  MRN: [id number]  Patient Class: IP- Inpatient   Admission Date: 5/19/2017  Length of Stay: 2 days  Attending Physician: Dave Bro MD  Primary Care Provider: Andrew Murray MD    [facility] Medicine Team: [facility] HOSP MED C Dave Bro MD    Subjective:     Principal Problem:ST elevation myocardial infarction (STEMI)    HPI:   86 yo M with PMHx of HTN,HLD, NSTEMI previously refused [facility] 5/2016, CKD stage IV (baseline 2.7), moderate AS, AF, venous insufficiency, cirrhosis, Ureterocutaneous fistula, chronic osteomyelitis of the pelvic region on cefpodoxime, suspected inflammatory arthritis on HCQ, neuromuscular disorder with peripheral neuropathy with recurrent right foot ulcer, prostate cancer s/p radiation, RCC s/p left nephrectomy, skin cancer of the hand, urethral stricture, urinary incontinence p/w SOB which started an hour prior to admission and EKG concerning for STEMI with afib with q waves in inferior/anterior-septal leads with resolution of chest pain with SL NTG. Patient was evaluated in the ED by Dr Duval and recommended to be taken to cath lab considering his clinical picture but he refused to proceed with angiogram due to risk of hemodialysis possibility he will be admitted fort mdical treatment in CCU.     Currently he is very SOB with fluid overload and aneuric.looks like not responding to lasix his son is his POA and they are discussing code status and if they will proceed with more procedures including CRRT.    Patient with recent cystoscopy/cystogram/fistulogram by Dr. 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 Abraham's  Operative noted.     Hospital Course:   Hospital course as above, while in the  CCU patient was medically managed for his STEMI, and for his AIMRA.  CCU team discussed comfort vs aggressive measures, patient's code status changed to DNR and due to concern that patient was anuric with developing AMIRA that may require HD. Discussion per EMR with family revealed they would not want to pursue dialysis. He was started on Lasix drip @ 20mg/hr and IV Diuril q12 hrs and had improved urine output on this regimen.  PRN Morphine and Ativan orders had been placed for patient comfort    Patient stepped down to IM-C casiano service on 5/20.  Patient was acutely encephalopathic, developed fevers and notified by RN blood cultures from admission were growing GNR.  Surveillance cultures sent patient was on Vancomycin and Cefepime.  He was having urine output on the lasix and diuril.  Extensive family discussion held (see progress notes for detail)  Family was not universally ready for comfort care, care plan more no escalation of care, given his altered mentation, I suspected morphine and ativan in Amira contributing, opiates switched to dilaudid and ativan d/c.   Discussed NG tube placement for oral med administration and nutrition but was held off.     5/21 - Patient is more awake and alert, likely related to toxic encephalopathy, remains hemodynamically stable, but was having more pain complaints, was receiving IV dilaudid  0.5mg q2hrs.   SLP evaluated patient and he is safe for dental soft nectar thick liquids.   His heart rhythm converted to atrial fibrillation with controlled rate in the 80-90s.   Will need repeat cardiology recs on management in AM.     Patient with nearly 2L UOP x 24hrs, family with more questions about possible HD or what course his Amira may take.  Nephrology re-contacted to follow the patient.  Diuril will be stopped this evening to monitor how pts UOp  And Cr does.    I reviewed more of recent urologic history and patient has a urinary source of his bacteremia, however with complicated chronic  fistula and wound we may not have source control. Discussed with patient and will consult urology to assist in any other management for source control and chronic urethrocutaneous fistula that may be needed - Urology consult placed for mon Am.     Reviewed prior culture data and patient with hx of Klebsiella ESBL and Pseudomonas in urine, both not sensitive to empiric cefepime so ID approval obtained to change to renal dosed Meropenem     Patient says pain control is improving but is still seems he has chronic pain issues from multiple sites, adding po opiates to pain regimen        Interval History:  Patient awake this AM,  He is oriented to person/place/time situation, president.     He is describing ongoing pain complaints, speech is slowed but fluent.  Spend 30-40min at bedside with patient and family summarizing recent events as due to encephalopathy likely medication related patient not aware of prior discussions I had with family.     Patient and wife seem to state that they would want more information if dialysis is an absolute contraindication.     Review of Systems   Constitutional: Positive for activity change and fever.   HENT: Negative for sore throat.    Respiratory: Negative for cough and shortness of breath.    Cardiovascular: Negative for chest pain, palpitations and leg swelling.   Gastrointestinal: Positive for constipation. Negative for abdominal pain, diarrhea, nausea and vomiting.   Genitourinary: Negative for penile pain, penile swelling and scrotal swelling.   Musculoskeletal:        Buttock pain     Objective:     Vital Signs (Most Recent):  Temp: 98.2 °F (36.8 °C) (05/21/17 1600)  Pulse: 79 (05/21/17 1600)  Resp: 20 (05/21/17 1600)  BP: 101/60 (05/21/17 1600)  SpO2: 95 % (05/21/17 1600) Vital Signs (24h Range):  Temp:  [97.5 °F (36.4 °C)-98.3 °F (36.8 °C)] 98.2 °F (36.8 °C)  Pulse:  [70-86] 79  Resp:  [18-32] 20  SpO2:  [95 %-99 %] 95 %  BP: (101-123)/(57-68) 101/60     Weight: 77.8 kg (171  lb 8.3 oz)  Body mass index is 24.61 kg/m².    Intake/Output Summary (Last 24 hours) at 05/21/17 1906  Last data filed at 05/21/17 1400   Gross per 24 hour   Intake              150 ml   Output             2725 ml   Net            -2575 ml      Physical Exam   Constitutional: He is oriented to person, place, and time. He is easily aroused. He has a sickly appearance. He appears ill. No distress.   HENT:   Head: Normocephalic and atraumatic.   Mouth/Throat: Oropharynx is clear and moist.   Eyes: Pupils are equal, round, and reactive to light.   Neck: Neck supple.   Cardiovascular:   Irregular rhythm, rate 80s, holosystolic murmur present   Pulmonary/Chest: No respiratory distress.   On anterior exam clear to auscultation in upper fields.   Abdominal: Soft. Bowel sounds are normal. He exhibits no distension.   Genitourinary:   Genitourinary Comments: Stevens cathter to gravity drainage, additional ureteral catheter present.   Unable to fully examine patient right thigh fistula in front of all family   Lymphadenopathy:     He has no cervical adenopathy.   Neurological: He is alert, oriented to person, place, and time and easily aroused.   Skin: Skin is warm.   Scattered ecchymoses over upper extremities, venous stasis dermatitis in LE       Significant Labs:   BMP:   Recent Labs  Lab 05/21/17  0337         K 3.2*      CO2 18*   BUN 75*   CREATININE 4.7*   CALCIUM 8.8     Microbiology Results (last 7 days)     Procedure Component Value Units Date/Time    Blood culture [[id number]] Collected:  05/20/17 1325    Order Status:  Completed Specimen:  Blood from Peripheral, Lower Arm, Left Updated:  05/21/17 1612     Blood Culture, Routine No Growth to date     Blood Culture, Routine No Growth to date    Narrative:       Left Peripheral    Blood culture [[id number]] Collected:  05/20/17 1337    Order Status:  Completed Specimen:  Blood from Peripheral, Lower Arm, Right Updated:  05/21/17 1612     Blood Culture,  Routine No Growth to date     Blood Culture, Routine No Growth to date    Narrative:       Right Peripheral    Blood culture [[id number]] Collected:  05/19/17 1737    Order Status:  Completed Specimen:  Blood from Peripheral, Antecubital, Left Updated:  05/21/17 0844     Blood Culture, Routine Gram stain nnamdi bottle: Gram negative rods      Blood Culture, Routine Results called to and read back by:Radhika Wolf RN 05/20/2017  12:39     Blood Culture, Routine --     GRAM NEGATIVE KELSEA  Identification and susceptibility pending      Gram stain [[id number]] Collected:  05/19/17 1737    Order Status:  Completed Specimen:  Urine from Urine, Clean Catch Updated:  05/20/17 1613     Gram Stain Result Many WBC's      Rare Gram negative rods    Narrative:       Add on per Dave Bro MD    Urine culture [[id number]] Collected:  05/20/17 1607    Order Status:  Sent Specimen:  Urine from Urine, Catheterized Updated:  05/20/17 1609    Blood culture [[id number]] Collected:  05/20/17 1452    Order Status:  Sent Specimen:  Blood from Peripheral, Forearm, Right Updated:  05/20/17 1452    Gram stain [[id number]]     Order Status:  Completed Specimen:  Urine from Urine     Urine culture [[id number]]     Order Status:  Completed Specimen:  Urine from Urine, Clean Catch     Urine culture [[id number]] Collected:  05/19/17 1737    Order Status:  Completed Specimen:  Urine from Urine, Clean Catch Updated:  05/20/17 1347     Urine Culture, Routine --     GRAM NEGATIVE KELSEA  >100,000 cfu/ml  Identification and susceptibility pending      Culture, Respiratory with Gram Stain [[id number]]     Order Status:  No result Specimen:  Respiratory from Sputum, Induced           Significant Imaging: I have reviewed all pertinent imaging results/findings within the past 24 hours.     ECHO (5/20/17)  CONCLUSIONS   Difficult to acquire images with patient being on BIPAP.     1 - Moderately depressed left ventricular systolic function (EF 30-35%).     2 -  Impaired LV relaxation, elevated LAP (grade 2 diastolic dysfunction).     3 - Right ventricular enlargement with low normal to mildly depressed systolic function.     4 - Mild mitral regurgitation.     5 - Trivial pericardial effusion.     6 - Mild left atrial enlargement.     7- Aortic valve sclerosis with restricted leaflet motion, stenosis severity could not be assessed due inability to acquire short axis or spectral doppler     8- Inadeqaute TR jet to assess PA pressure.     Assessment/Plan:      * ST elevation myocardial infarction (STEMI)    -ACS protocol  -Aspirin, plavix, heparin gtt   -High intensity statins   -ECHO reveals ischemic cardiomyopathy  -Coreg for beta blockade.  ACEi/ARB held due to Amira  -Check AM troponin to see if downtrending          Acute kidney injury superimposed on CKD    -Suspect intrinsic AMIRA secondary to poor perfusion from STEMI vs  pre-renal.   -Cr rising to 4.7, on Lasix 20mg/hr and diuril,  D/c q12hr diuril dosing  -nephrology re-consulted          Ischemic cardiomyopathy    -EF 35%, mangagement as above          Atrial fibrillation with RVR    -new onset atrial fibrillation secondary to STEMI, patient is rate controlled  -cardiology comanage consultation for further management recs          Bacteremia due to Gram-negative bacteria    -empiric coverage for hx of ESBL Klebsiella and Pseudomonas with renal dosed Meropenem 500mg IV q12hrs.   -source is likely  due to patient's chronic urologic complications  -Surveillance culture drawn 5/20, obtain second surveillance in AM.           Recurrent UTI    -management as above          Chronic pain    -receiving IV dilaudid 0.5mg q2hrs  -Pain related to chronic  issues, also noted to complain of extremity pain in LUE  -adding PO dilaudid to medications, want to avoid any basal ER opiates with Amira.  PCA may be better option if patient remains poorly controlled.   -Palliative Care consulted to assist in symptom management for patient  and further goals of care discussions that will be necessary.           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             VTE Risk Mitigation         Ordered     Medium Risk of VTE  Once      05/19/17 0945     Reason for No Pharmacological VTE Prophylaxis  Once      05/19/17 0945          Dave Bro MD  Department of Hospital Medicine   [facility]

## 2017-05-22 NOTE — SUBJECTIVE & OBJECTIVE
Interval History:  Patient awake this AM,  He is oriented to person/place/time situation, president.     He is describing ongoing pain complaints, speech is slowed but fluent.  Spend 30-40min at bedside with patient and family summarizing recent events as due to encephalopathy likely medication related patient not aware of prior discussions I had with family.     Patient and wife seem to state that they would want more information if dialysis is an absolute contraindication.     Review of Systems   Constitutional: Positive for activity change and fever.   HENT: Negative for sore throat.    Respiratory: Negative for cough and shortness of breath.    Cardiovascular: Negative for chest pain, palpitations and leg swelling.   Gastrointestinal: Positive for constipation. Negative for abdominal pain, diarrhea, nausea and vomiting.   Genitourinary: Negative for penile pain, penile swelling and scrotal swelling.   Musculoskeletal:        Buttock pain     Objective:     Vital Signs (Most Recent):  Temp: 98.2 °F (36.8 °C) (05/21/17 1600)  Pulse: 79 (05/21/17 1600)  Resp: 20 (05/21/17 1600)  BP: 101/60 (05/21/17 1600)  SpO2: 95 % (05/21/17 1600) Vital Signs (24h Range):  Temp:  [97.5 °F (36.4 °C)-98.3 °F (36.8 °C)] 98.2 °F (36.8 °C)  Pulse:  [70-86] 79  Resp:  [18-32] 20  SpO2:  [95 %-99 %] 95 %  BP: (101-123)/(57-68) 101/60     Weight: 77.8 kg (171 lb 8.3 oz)  Body mass index is 24.61 kg/m².    Intake/Output Summary (Last 24 hours) at 05/21/17 1906  Last data filed at 05/21/17 1400   Gross per 24 hour   Intake              150 ml   Output             2725 ml   Net            -2575 ml      Physical Exam   Constitutional: He is oriented to person, place, and time. He is easily aroused. He has a sickly appearance. He appears ill. No distress.   HENT:   Head: Normocephalic and atraumatic.   Mouth/Throat: Oropharynx is clear and moist.   Eyes: Pupils are equal, round, and reactive to light.   Neck: Neck supple.   Cardiovascular:    Irregular rhythm, rate 80s, holosystolic murmur present   Pulmonary/Chest: No respiratory distress.   On anterior exam clear to auscultation in upper fields.   Abdominal: Soft. Bowel sounds are normal. He exhibits no distension.   Genitourinary:   Genitourinary Comments: Stevens cathter to gravity drainage, additional ureteral catheter present.   Unable to fully examine patient right thigh fistula in front of all family   Lymphadenopathy:     He has no cervical adenopathy.   Neurological: He is alert, oriented to person, place, and time and easily aroused.   Skin: Skin is warm.   Scattered ecchymoses over upper extremities, venous stasis dermatitis in LE       Significant Labs:   BMP:   Recent Labs  Lab 05/21/17  0337         K 3.2*      CO2 18*   BUN 75*   CREATININE 4.7*   CALCIUM 8.8     Microbiology Results (last 7 days)     Procedure Component Value Units Date/Time    Blood culture [[id number]] Collected:  05/20/17 1325    Order Status:  Completed Specimen:  Blood from Peripheral, Lower Arm, Left Updated:  05/21/17 1612     Blood Culture, Routine No Growth to date     Blood Culture, Routine No Growth to date    Narrative:       Left Peripheral    Blood culture [[id number]] Collected:  05/20/17 1337    Order Status:  Completed Specimen:  Blood from Peripheral, Lower Arm, Right Updated:  05/21/17 1612     Blood Culture, Routine No Growth to date     Blood Culture, Routine No Growth to date    Narrative:       Right Peripheral    Blood culture [[id number]] Collected:  05/19/17 1737    Order Status:  Completed Specimen:  Blood from Peripheral, Antecubital, Left Updated:  05/21/17 0844     Blood Culture, Routine Gram stain nnamdi bottle: Gram negative rods      Blood Culture, Routine Results called to and read back by:Radhika Wolf RN 05/20/2017  12:39     Blood Culture, Routine --     GRAM NEGATIVE KELSEA  Identification and susceptibility pending      Gram stain [[id number]] Collected:  05/19/17 4367     Order Status:  Completed Specimen:  Urine from Urine, Clean Catch Updated:  05/20/17 1613     Gram Stain Result Many WBC's      Rare Gram negative rods    Narrative:       Add on per Dave Bro MD    Urine culture [[id number]] Collected:  05/20/17 1607    Order Status:  Sent Specimen:  Urine from Urine, Catheterized Updated:  05/20/17 1609    Blood culture [[id number]] Collected:  05/20/17 1452    Order Status:  Sent Specimen:  Blood from Peripheral, Forearm, Right Updated:  05/20/17 1452    Gram stain [[id number]]     Order Status:  Completed Specimen:  Urine from Urine     Urine culture [[id number]]     Order Status:  Completed Specimen:  Urine from Urine, Clean Catch     Urine culture [[id number]] Collected:  05/19/17 1737    Order Status:  Completed Specimen:  Urine from Urine, Clean Catch Updated:  05/20/17 1347     Urine Culture, Routine --     GRAM NEGATIVE KELSEA  >100,000 cfu/ml  Identification and susceptibility pending      Culture, Respiratory with Gram Stain [[id number]]     Order Status:  No result Specimen:  Respiratory from Sputum, Induced           Significant Imaging: I have reviewed all pertinent imaging results/findings within the past 24 hours.     ECHO (5/20/17)  CONCLUSIONS   Difficult to acquire images with patient being on BIPAP.     1 - Moderately depressed left ventricular systolic function (EF 30-35%).     2 - Impaired LV relaxation, elevated LAP (grade 2 diastolic dysfunction).     3 - Right ventricular enlargement with low normal to mildly depressed systolic function.     4 - Mild mitral regurgitation.     5 - Trivial pericardial effusion.     6 - Mild left atrial enlargement.     7- Aortic valve sclerosis with restricted leaflet motion, stenosis severity could not be assessed due inability to acquire short axis or spectral doppler     8- Inadeqaute TR jet to assess PA pressure.

## 2017-05-22 NOTE — PT/OT/SLP PROGRESS
"Speech Language Pathology  Treatment    Humphrey Machado   MRN: [id number]   Admitting Diagnosis: ST elevation myocardial infarction (STEMI)    Diet recommendations: Solid Diet Level: Dental Soft  Liquid Diet Level: Nectar Thick Feed only when awake/alert, HOB to 90 degrees, Small bites/sips, 1 bite/sip at a time, Check for pocketing/oral residue, Remain upright 30 minutes post meal, Meds crushed in puree, Eliminate distractions, Assistance with meals and Assistance with thickening liquids  Continue to monitor for signs and symptoms of aspiration and discontinue oral feeding should you notice any of the following: watery eyes, reddened facial area, wet vocal quality, increased work of breathing, change in respiratory status, increased congestion, coughing, fever, etc.    SLP Treatment Date: 17  Speech Start Time:      Speech Stop Time: 114     Speech Total (min): 10 min       TREATMENT BILLABLE MINUTES:  Treatment Swallowing Dysfunction 10    Has the patient been evaluated by SLP for swallowing? : Yes  Keep patient NPO?: No   General Precautions: Standard, aspiration, nectar thick, fall, respiratory  Current Respiratory Status: nasal cannula       Subjective:  Prior to session, SLP reviewed Patient with nurse Garrido, nurse explains Patient consumed approx. 50% breakfast tray.   Prior to session, SLP reviewed patient with Dr. Bro, explain Patient with improved appetite  Patient presents fatigued, with increased frustration  He reports intense L -side generalized pain, nurse aware and   Pain Ratin/10  Location - Side: Left  Location - Orientation: generalized  Location: other (see comments) (pt reports "everywhere on the left")  Pain Addressed: Reposition, Pre-medicate for activity  Pain Rating Post-Intervention: 10    Objective:   Patient found with: blood pressure cuff, pulse ox (continuous), telemetry, peripheral IV, oxygen, pressure relief boots, reclined in bed with son and friends at bedside. " "SLP notes partically completed breakfast meal tray at bedside. Son and friends step out of room as SLP initiates session. HOB elevated. Patient accepts 2 tsp sips nectar-thickened liquids fed to Patient then refuses additional PO intake presented by SLP secondary to pain. No overt S/S aspiration noted with tsp presentations of nectar-thickened liquids. Patient refuses teachback of aspiration precautions or thickener guidelines discussed day prior.  Spouse and family friend walk in room during session. SLP reviews ongoing thickener guidelines, dietary modifications, aspiration precautions (HOB elevated to 90 degree angle, small bites/sips only when awake, pacing bites/sips, etc.) and Patient grows increasingly agitated and states "Excuse me but I can not deal anymore with this, excuse me."  Wife explains she will ask nurse if she has questions about thickener and apologizes. Session discontinued. Whiteboard current with recommendations.     Assessment:  Humphrey Machado is a 85 y.o. male with a medical diagnosis of ST elevation myocardial infarction (STEMI) and presents with Oropharyngeal Dysphagia. ST to continue to monitor tolerance of diet.    Discharge recommendations: Discharge Facility/Level Of Care Needs: home health speech therapy (pending Patietn progress and OT/PT recs)     Goals:    SLP Goals        Problem: SLP Goal    Goal Priority Disciplines Outcome   SLP Goal     SLP Ongoing (interventions implemented as appropriate)   Description:  Speech Language Pathology Goals  Goals expected to be met by 5/28/2017  1. Pt will tolerate dental soft diet with nectar-thickened liquids w/o overt S/S aspiration, MIN A, to improve swallow safety  2. Pt will tolerate trials of thin liquids w/o overt S/S aspiration, MIN A, to improve swallow safety  3. Educate Patient and family on safe swallow strategies and aspiration precautions                         Plan:   Patient to be seen Therapy Frequency: 5 x/week   Plan of Care " expires: 06/20/17  Plan of Care reviewed with: patient, spouse, son  SLP Follow-up?: Yes       ROYAL Crowe, [facility]-SLP  Speech-Language Pathology  Pager: [phone]  5/22/2017

## 2017-05-22 NOTE — ASSESSMENT & PLAN NOTE
-new onset atrial fibrillation secondary to STEMI, patient is rate controlled  -cardiology comanage consultation for further management recs

## 2017-05-22 NOTE — CONSULTS
[facility]  Urology  Consult Note    Patient Name: Humphrey Machado  MRN: [id number]  Admission Date: 5/19/2017  Hospital Length of Stay: 3   Code Status: DNR   Attending Provider: Seth Abraham MD  Consulting Provider: Maria Marion MD  Primary Care Physician: Andrew Murray MD  Principal Problem:ST elevation myocardial infarction (STEMI)    Inpatient consult to Urology  Consult performed by: MARIA MARION  Consult ordered by: KALEN MENDEZ          Subjective:     HPI:  Humphrey Machado is a 85 y.o. male admitted with MI on 5/19. He has a PMHx of HTN, HLD, CKD, prostate cancer, RCC s/p left nephrectomy, urethral stricture, recurrent UTI, osteomyelitis secondary to sacral decubitus ulcer.     His prostate cancer was treated with XRT many years ago. 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 He underwent cystoscopy with Dr. Abraham for this on 5/17 and was noted to have a fistula from the bulbar urethra to the right thigh as well as friable and non-viable prostate tissue. Stevens was left in place along with 5 fr right ureteral catheter at that time.     He was brought to the hospital on 5/19 with SOB and found to have a STEMI. Family refused aggressive management and patient was made comfort care, DNR, DNI with conservative management. He has since been stepped down to the floor and his clinic picture has slightly improved. His kidney function has continued to worsen during hospitalization now with Cr up to 5.1. Urine and blood cultures from admission growing ESBL Klebsiella, multi-drug resistant.     Palliative care has been consulted for goals of care. The family is unsure at this time how aggressive they wish to be with his care. Urology consulted for source control regarding his urethrocutaneous fistula.     Past Medical History:   Diagnosis Date    *Atrial fibrillation     Acute appendicitis 2/19/2015    Anemia     Anxiety     Arthritis     generalized     "Basal cell carcinoma 01/2013    right temple    BCC (basal cell carcinoma)     right mid forearm    Bladder stone 6/28/2016    Blood transfusion     Chronic urethral stricture 10/14/2015    Chronic venous insufficiency 7/8/2013    CKD (chronic kidney disease) stage 3, GFR 30-59 ml/min 9/6/2012    Coronary artery disease     Elevated PSA     Essential hypertension 7/30/2015    Hematuria, gross     History of Left Nephrectomy (~2006) 1/29/2014    Done at [facility].     Hypertension     Inflammatory arthritis 8/14/2012    Kidney stone     Long-term use of Plaquenil 6/4/2015    Mixed incontinence urge and stress 4/11/2014    Nonrheumatic aortic valve stenosis 5/30/2016    NSTEMI (non-ST elevated myocardial infarction) 5/30/2016    Olecranon bursitis 1/14/2013    Osteopenia 8/14/2012    Personal history of kidney cancer 4/11/2014    Prostate cancer     Radiation     treatment for prostate cancer    Recurrent UTI 7/8/2013    Respiratory distress     S/P radiation therapy 4/11/2014    Skin cancer of arm     left leg (malignant)    Solitary kidney 7/15/2013    Venous insufficiency of leg 7/12/2012    Venous stasis ulcers 7/6/2012    Vitamin D deficiency disease 5/8/2013       Past Surgical History:   Procedure Laterality Date    APPENDECTOMY      CYSTOSCOPY      with dialation    NEPHRECTOMY  2006-07?    Removal of left kidney    TONSILLECTOMY         Review of patient's allergies indicates:   Allergen Reactions    Aspirin Other (See Comments)     Stomach      Ditropan [oxybutynin chloride]      Unable to describe side effect other than "felt strange"     Keflex [cephalexin] Rash     Morbilliform  Has tolerated multiple cephalosporins since 2013.    Macrobid [nitrofurantoin monohyd/m-cryst] Hives and Rash       Family History     Problem Relation (Age of Onset)    Cancer Mother, Brother, Brother, Maternal Aunt    Heart disease Father    Mental illness Daughter    Urolithiasis Father    "       Social History Main Topics    Smoking status: Current Every Day Smoker     Years: 40.00     Types: Cigars    Smokeless tobacco: Never Used      Comment: pt smokes 3 cigars a day pt will make up his mind when his ready- did give up smoking cigarettes at age 35yrs smoked from 18 - 35 years    Alcohol use 0.6 oz/week     1 Glasses of wine per week      Comment: stop drinking 09/2009. 1 glass of wine at bedtime     Drug use: No      Comment: smokes 3 cigars per day    Sexual activity: No       Review of Systems   Constitutional: Negative for chills and fever.   HENT: Positive for trouble swallowing.    Respiratory: Negative for cough and shortness of breath.    Cardiovascular: Negative for chest pain and palpitations.   Gastrointestinal: Negative for nausea and vomiting.   Genitourinary: Positive for genital sores and penile pain. Negative for flank pain and hematuria.   Neurological: Positive for weakness.       Objective:     Temp:  [97.4 °F (36.3 °C)-98.2 °F (36.8 °C)] 97.6 °F (36.4 °C)  Pulse:  [66-96] 96  Resp:  [16-22] 22  SpO2:  [95 %-99 %] 99 %  BP: ()/(50-60) 90/57     Body mass index is 24.61 kg/m².            Drains     Drain                 Urethral Catheter 05/19/17 1200 3 days                Physical Exam   Constitutional: He is oriented to person, place, and time. He appears cachectic. He has a sickly appearance. He appears ill. No distress.   HENT:   Head: Normocephalic and atraumatic.   Eyes: No scleral icterus.   Neck: No JVD present.   Pulmonary/Chest:   Increased work of breathing. On NC.    Abdominal: Soft. He exhibits no distension. There is no tenderness. There is no rebound and no guarding.   Genitourinary: Uncircumcised.   Genitourinary Comments: 18 Fr montano catheter in place draining clear yellow. 5 fr catheter hanging from meatus.     There is a fistula present within the crease of the right groin, actively draining.    Neurological: He is alert and oriented to person, place,  and time.   Skin: He is not diaphoretic. There is pallor.     Psychiatric: He has a normal mood and affect. His behavior is normal.       Significant Labs:    BMP:    Recent Labs  Lab 05/20/17  0319 05/21/17  0337 05/22/17  0440    136 132*   K 4.1 3.2* 3.0*    102 96   CO2 18* 18* 19*   BUN 66* 75* 86*   CREATININE 4.3* 4.7* 5.1*   CALCIUM 9.1 8.8 8.9       CBC:    Recent Labs  Lab 05/19/17  0900 05/19/17  1048 05/22/17  0440   WBC 27.24* 20.52* 4.18   HGB 9.8* 8.7* 7.8*   HCT 30.8* 27.4* 24.4*    210 133*       Blood Culture:   Recent Labs  Lab 05/19/17  1737 05/20/17  1325 05/20/17  1337   LABBLOO Gram stain nnamdi bottle: Gram negative rods   Results called to and read back by:Radhika Wolf RN 05/20/2017  12:39  KLEBSIELLA PNEUMONIAE ESBL No Growth to date  No Growth to date No Growth to date  No Growth to date     Urine Culture:   Recent Labs  Lab 05/19/17  1737 05/20/17  1607   LABURIN KLEBSIELLA PNEUMONIAE ESBL>100,000 cfu/ml GRAM NEGATIVE KELSEA>100,000 cfu/mlIdentification and susceptibility pending       Significant Imaging:    RUG 5/17/17:  Urethrocutaneous fistula to inner right thigh, a right physical ureteral reflux.  Contrast also noted within the soft tissue of the left inguinal region.    US Retroperitoneal 4/29/16:   Left kidney absent.   Right kidney with no stones, hydro or solid masses.      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 male with hx of prostate cancer treated radiation, now with recurrent UTIs and urethrocutaneous fistula.     - Due to patient's current clinical condition would not recommend aggressive intervention for this fistula  - Patient is currently DNR/DNI, palliative care consulted, family is uncertain how much therapy they wish to pursue at this time  - Recommend treatment of current infection per ID recs  - Also recommend conservative management of fistula with urethral montano for maximal drainage as well as  wound care for drainage control from groin wound               VTE Risk Mitigation         Ordered     Medium Risk of VTE  Once      05/19/17 0945     Reason for No Pharmacological VTE Prophylaxis  Once      05/19/17 0945          Thank you for your consult. I will sign off. Please contact us if you have any additional questions.    Soo Mcdonough MD  Urology  [facility]

## 2017-05-22 NOTE — ASSESSMENT & PLAN NOTE
-s/p recent  evaluation   -Consult Urology to evaluate patient and determine if any additional management for source control in patient,

## 2017-05-22 NOTE — CONSULTS
Consult Note  Palliative Care      Consult Requested By: Dave Bro MD  Reason for Consult: symptom management  Chart reviewed and patient discussed with Dr. Bro       ASSESSMENT/PLAN:       Impression: Mr. Machado is a 85 yr old gentleman s/p STEMI  and refusal of right heart cath 17. Worsening THEO in setting of chronic kidney disease.  BUN and creatinine elevated.  Lasix drip continued with more than adequate clear yellow urine, indwelling urinary catheter.   He is awake, alert and oriented to person, place, time and situation.  Complains of generalized pain and dyspnea on exertion.  No acute distress at this time.        Goals of Care: Palliative care initial meeting with Mr. Machado with his son Humphrey at the bedside to establish rapport and introduce palliative care.    During this time nephrology and cardiology teams at bedside  and provided review of current clinical status.  Son had questions regarding possible cardiology interventions.  Dr. Shah explained the window of opportunity to reverse heart damage with heart cath had  and from a cardiology perspective he was responding well to treatment following the heart attack. Dr. Shah further explained and  Dr. Vega with nephrology confirmed that the biggest challenge for Mr. Machado is the worsening kidney     In previous conversations Mr. Machado indicated dialysis was not a goal he was pursuing.  Today he has questions regarding dialysis and how it might improve his current clinical status.  Mr. Machado's understanding and goal for considering dialysis is to remove the fluid in his lungs and take some of the strain away from his heart.  Dr. Vega explained that dialysis was not warranted at this time and the diuretics were helping with removing the fluid.  It was explained that dialysis in the setting of his comorbid conditions carried risks and it would not repair the kidney and heart damage.      Mr. Machado responded by  "saying to palliative care provider "so you are here to talk about the inevitable - my death".  Explained that the focus was on his living with goals of comfort and quality of life for the time he has available.  Mr. Machado added that better pain control is an immediate goal of care. Stressed to patient and son that this has been a hard conversation and no decisions are necessary at this time.  Mr. Machado's son states his other siblings will be visiting this evening and they will discuss as a family.  Intense emotional support given.     DNR orders written per primary team.  Mr. Machado has advanced directives which have not been received.     Plan/Recommendations:  1. Patient has an advanced directive.  His son Denton is the HPOA.  Family was encouraged to bring to hospital this evening to be added to his medical record.   2.  Rapport established.  Palliative care will continue to follow and assist patient and family develop goals of care.   3.  Hospice has not been discussed at this time. Hospice education has not been completed.   4. Symptom Management: Acute on Chronic pain        Pain: current ;pain medications not optimized as currently ordered        Dilaudid  0.5 mg IVP every 2 hrs as needed for severe pain 7-10/10       Dilaudid  2mg P.O. Every three hours as needed for pain        Recommendation:  Patient may benefit from having Dilaudid   2 mg by mouth every 6 hrs scheduled  and  Dilaudid     IVP every 3 hrs as needed for  Severe breakthrough pain. 7-10/10 scale      5.  Emotional support      Dr. Bro  notified of above recommendations.  Thank you for opportunity to participate in Carter Regalado's care      Signature: MARILEE Fernandes, ACNS-BC, OCN     > 50% of  70 min visit spent in chart review, face to face discussion of goals of care,  symptom assessment, coordination of care and emotional support.      SUBJECTIVE:     History of Present Illness:      Past Medical History:   Diagnosis Date    " *Atrial fibrillation     Acute appendicitis 2/19/2015    Anemia     Anxiety     Arthritis     generalized    Basal cell carcinoma 01/2013    right temple    BCC (basal cell carcinoma)     right mid forearm    Bladder stone 6/28/2016    Blood transfusion     Chronic urethral stricture 10/14/2015    Chronic venous insufficiency 7/8/2013    CKD (chronic kidney disease) stage 3, GFR 30-59 ml/min 9/6/2012    Coronary artery disease     Elevated PSA     Essential hypertension 7/30/2015    Hematuria, gross     History of Left Nephrectomy (~2006) 1/29/2014    Done [facility].     Hypertension     Inflammatory arthritis 8/14/2012    Kidney stone     Long-term use of Plaquenil 6/4/2015    Mixed incontinence urge and stress 4/11/2014    Nonrheumatic aortic valve stenosis 5/30/2016    NSTEMI (non-ST elevated myocardial infarction) 5/30/2016    Olecranon bursitis 1/14/2013    Osteopenia 8/14/2012    Personal history of kidney cancer 4/11/2014    Prostate cancer     Radiation     treatment for prostate cancer    Recurrent UTI 7/8/2013    Respiratory distress     S/P radiation therapy 4/11/2014    Skin cancer of arm     left leg (malignant)    Solitary kidney 7/15/2013    Venous insufficiency of leg 7/12/2012    Venous stasis ulcers 7/6/2012    Vitamin D deficiency disease 5/8/2013     Past Surgical History:   Procedure Laterality Date    APPENDECTOMY      CYSTOSCOPY      with dialation    NEPHRECTOMY  2006-07?    Removal of left kidney    TONSILLECTOMY       Family History   Problem Relation Age of Onset    Cancer Mother      bone     Heart disease Father     Urolithiasis Father     Mental illness Daughter     Cancer Brother      prostate cancer, (Ervin) removed by surgery    Cancer Brother      prostate cancer    Cancer Maternal Aunt     Melanoma Neg Hx     Lupus Neg Hx     Rheum arthritis Neg Hx      Review of patient's allergies indicates:   Allergen Reactions    Aspirin Other  "(See Comments)     Stomach      Ditropan [oxybutynin chloride]      Unable to describe side effect other than "felt strange"     Keflex [cephalexin] Rash     Morbilliform  Has tolerated multiple cephalosporins since 2013.    Macrobid [nitrofurantoin monohyd/m-cryst] Hives and Rash       Medications:  Continuous Infusions:    furosemide (LASIX) 1 mg/mL infusion (non-titrating) 20 mg/hr (05/21/17 2049)    heparin (porcine) in D5W 17 Units/kg/hr (05/21/17 2326)     Scheduled:    allopurinol  100 mg Oral Daily    aspirin  81 mg Oral Daily    atorvastatin  80 mg Oral Daily    carvedilol  3.125 mg Oral BID WM    clopidogrel  75 mg Oral Daily    docusate sodium  100 mg Oral BID    hydroxychloroquine  200 mg Oral BID    meropenem (MERREM) IVPB  500 mg Intravenous Q12H    miconazole   Topical (Top) BID    pantoprazole  40 mg Intravenous Daily    potassium bicarbonate  25 mEq Oral Once    sevelamer carbonate  800 mg Oral TID WM    sodium bicarbonate  650 mg Oral BID    sodium chloride 0.9%  3 mL Intravenous Q8H    sodium chloride 0.9%  3 mL Intravenous Q8H     PRN Meds: bisacodyl, dextrose 50%, dextrose 50%, glucagon (human recombinant), glucose, glucose, heparin (PORCINE), HYDROmorphone, HYDROmorphone, nitroGLYCERIN, ondansetron    24h Oral Morphine Equivalents (OME): 83.3    Bowel Management Plan (BMP): Yes (X )  NO  (  )    OBJECTIVE:   Symptom Assessment (ESAS 0-10 scale)     ESAS 0 1 2 3 4 5 6 7 8 9 10   Pain          X    Dyspnea   X           Anxiety X             Nausea X             Depression  X             Anorexia X             Fatigue X             Insomnia X             Restlessness  X             Agitation X             Constipation     __ --  ___+   Diarrhea           __ --  ___+     Pain:  Character: acute on chronic pain  described as sharp and burning   Duration: chronic pain for several years secondary to  pelvic osteomyelitis   Effect:  States  Severe pain interferes with ability to  " participate in care (turn frequently)  and  It is difficult to concentrate  Factors: pain has been aggravated by recent surgical procedure and is relieved for short time with  prn pain medications   Frequency: constant   Location: sacral   Severity :States pain is 9/10     Comments:     Performance Status: PPS Score (30  )       Physical Exam:  Vitals: reviewed  General: Afebrile, alert, comfortable, no acute distress.   Pulmonary:dyspnea on exertion,clear to auscultation anteriorly.   Cardiac: normal S1 & S2 w/o rubs/murmurs/gallops.   Abdominal: Non-tender, non-distended.Bowel sounds present x 4. No appreciable hepatosplenomegaly. No guarding or rebound tenderness.   Extremities: Moves all extremities x 4. No peripheral edema. 2+ pulses.  Skin: No jaundice, rashes, or visible lesions.  Neurological: Alert and oriented x 4. No focal neuro deficits.   Psych/Mental Status:  CAM / Delirium __ --  ___+   FAST Stage for Dementia:      Labs: Reviewed   CBC:   WBC   Date Value Ref Range Status   05/22/2017 4.18 3.90 - 12.70 K/uL Final     Hemoglobin   Date Value Ref Range Status   05/22/2017 7.8 (L) 14.0 - 18.0 g/dL Final     POC Hematocrit   Date Value Ref Range Status   05/27/2016 22 (L) 36 - 54 %PCV Final     Hematocrit   Date Value Ref Range Status   05/22/2017 24.4 (L) 40.0 - 54.0 % Final     MCV   Date Value Ref Range Status   05/22/2017 84 82 - 98 fL Final     Platelets   Date Value Ref Range Status   05/22/2017 133 (L) 150 - 350 K/uL Final       BMP:   Recent Labs  Lab 05/22/17  0440      *   K 3.0*   CL 96   CO2 19*   BUN 86*   CREATININE 5.1*   CALCIUM 8.9   MG 1.6       LFT: Lab Results   Component Value Date     (H) 05/22/2017    ALKPHOS 102 05/22/2017    BILITOT 0.4 05/22/2017       Albumin:   Albumin   Date Value Ref Range Status   05/22/2017 1.9 (L) 3.5 - 5.2 g/dL Final     Protein:   Total Protein   Date Value Ref Range Status   05/22/2017 6.6 6.0 - 8.4 g/dL Final       LACTIC ACID:    Lab Results   Component Value Date    LACTATE 0.6 09/05/2015    LACTATE 1.1 09/13/2012       Radiology: reviewed     Legal/Advanced Directives: Patient has both living will and HPOA that are not on file.  Family was encouraged to bring to hospital   Living Will: not on file   Resuscitate Status: DNR  Decision-Making Capacity: yes   Medical Power of : son Nitin Machado [phone], next of kin is wife Taryn,     Psychosocial/Cultural:  for 60 yrs 4 children: 3 sons (Humphrey, Denton, Rashard) and one daughter Emy.  He has 8 grandchildren and 2 great grandchildren.  He retired from the [employer] company that [employer].  Prior to becoming ill he enjoyed time with the family and fishing     Spiritual:     F- Christine and Belief: Confucianist    I - Importance: very devout christine and attends Mass regularly   .  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

## 2017-05-22 NOTE — ASSESSMENT & PLAN NOTE
85 y.o. male admitted with MI on 5/19. He has a PMHx of HTN, HLD, CKD, prostate cancer, RCC s/p left nephrectomy, urethral stricture, and recurrent UTI.     Cure of his UTI is unlikely at this point given his source control issue and chronically positive urine cx. Was febrile with leukocytosis earlier in the admission but improved (prior to meropenem).     -continue treatment for UTI x 10 days  -continue meropenem until cx final, if no pseudomonas whould switch to ertapenem  -ID will follow along

## 2017-05-22 NOTE — SUBJECTIVE & OBJECTIVE
"Interval History: Pt feeling better in terms of chest pain and SOB. Making good amt of urine. Creat and BUN still trending up.     Review of patient's allergies indicates:   Allergen Reactions    Aspirin Other (See Comments)     Stomach      Ditropan [oxybutynin chloride]      Unable to describe side effect other than "felt strange"     Keflex [cephalexin] Rash     Morbilliform  Has tolerated multiple cephalosporins since 2013.    Macrobid [nitrofurantoin monohyd/m-cryst] Hives and Rash     Current Facility-Administered Medications   Medication Frequency    allopurinol tablet 100 mg Daily    aspirin chewable tablet 81 mg Daily    atorvastatin tablet 80 mg Daily    bisacodyl suppository 10 mg Daily PRN    carvedilol tablet 3.125 mg BID WM    clopidogrel tablet 75 mg Daily    dextrose 50% injection 12.5 g PRN    dextrose 50% injection 25 g PRN    docusate sodium capsule 100 mg BID    glucagon (human recombinant) injection 1 mg PRN    glucose chewable tablet 16 g PRN    glucose chewable tablet 24 g PRN    heparin 25,000 units in dextrose 5% 250 mL (100 units/mL) bolus from bag; ADDITIONAL PRN BOLUS PRN    heparin 25,000 units in dextrose 5% 250 mL (100 units/mL) infusion Continuous    hydromorphone injection 0.5 mg Q2H PRN    HYDROmorphone tablet 2 mg Q3H PRN    hydroxychloroquine tablet 200 mg BID    meropenem-0.9% sodium chloride 500 mg/50 mL IVPB Q12H    miconazole 2 % cream BID    nitroGLYCERIN SL tablet 0.4 mg Q5 Min PRN    ondansetron injection 4 mg Q8H PRN    pantoprazole injection 40 mg Daily    sevelamer carbonate tablet 800 mg TID WM    sodium bicarbonate tablet 650 mg BID    sodium chloride 0.9% flush 3 mL Q8H    sodium chloride 0.9% flush 3 mL Q8H       Objective:     Vital Signs (Most Recent):  Temp: 97.6 °F (36.4 °C) (05/22/17 0800)  Pulse: 96 (05/22/17 1100)  Resp: (!) 22 (05/22/17 0800)  BP: (!) 90/57 (05/22/17 0800)  SpO2: 99 % (05/22/17 0800)  O2 Device (Oxygen Therapy): " nasal cannula (05/22/17 0800) Vital Signs (24h Range):  Temp:  [97.4 °F (36.3 °C)-98.2 °F (36.8 °C)] 97.6 °F (36.4 °C)  Pulse:  [66-96] 96  Resp:  [16-22] 22  SpO2:  [95 %-99 %] 99 %  BP: ()/(50-60) 90/57     Weight: 77.8 kg (171 lb 8.3 oz) (05/19/17 1125)  Body mass index is 24.61 kg/m².  Body surface area is 1.96 meters squared.    I/O last 3 completed shifts:  In: 1522.5 [P.O.:150; I.V.:1272.5; IV Piggyback:100]  Out: 3725 [Urine:3725]    Physical Exam   Constitutional: He is oriented to person, place, and time. He appears well-developed and well-nourished.   HENT:   Head: Normocephalic and atraumatic.   Eyes: Conjunctivae and EOM are normal.   Neck: Neck supple.   Cardiovascular: Normal rate, regular rhythm and normal heart sounds.    Pulmonary/Chest: Effort normal and breath sounds normal.   Abdominal: Soft. Bowel sounds are normal.   Neurological: He is alert and oriented to person, place, and time.       Significant Labs:  CBC:   Recent Labs  Lab 05/22/17  0440   WBC 4.18   RBC 2.91*   HGB 7.8*   HCT 24.4*   *   MCV 84   MCH 26.8*   MCHC 32.0     CMP:   Recent Labs  Lab 05/22/17  0440      CALCIUM 8.9   ALBUMIN 1.9*   PROT 6.6   *   K 3.0*   CO2 19*   CL 96   BUN 86*   CREATININE 5.1*   ALKPHOS 102   ALT 62*   *   BILITOT 0.4       Recent Labs  Lab 05/19/17  0940   COLORU Yellow   SPECGRAV 1.010   PHUR 5.0   PROTEINUA 2+*   BACTERIA Occasional   NITRITE Negative   LEUKOCYTESUR 3+*   UROBILINOGEN Negative   HYALINECASTS 0        Significant Imaging:  Labs: Reviewed

## 2017-05-22 NOTE — PLAN OF CARE
Problem: Patient Care Overview  Goal: Plan of Care Review  Outcome: Ongoing (interventions implemented as appropriate)  Plan of care discussed with pt. Pt free of falls/trauma/injuries this shift. Lasix and Heparin infusing. Menopenem Q12H. Pt repositioned with wedge Q2H or as tolerated due to constant pain. Dilaudid given Q2H PRN. Heel boots remain on to prevent further breakdown.  Plan for PICC, wound c/s. Antibiotic cream applied to bilateral groin areas. Urine sample collected and pending. Blood sugar WNL -checked per orders. VSS & NADN. On tele-back in NSR. Pt denies CP, SOB, or pain/discomfort at this time.Family at bedside. All questions addressed.  Will continue to monitor.

## 2017-05-23 PROBLEM — R52 PAIN: Status: ACTIVE | Noted: 2017-05-23

## 2017-05-23 LAB
ALBUMIN SERPL BCP-MCNC: 2.2 G/DL
ALP SERPL-CCNC: 119 U/L
ALT SERPL W/O P-5'-P-CCNC: 59 U/L
ANION GAP SERPL CALC-SCNC: 19 MMOL/L
APTT BLDCRRT: 59.9 SEC
AST SERPL-CCNC: 83 U/L
BACTERIA UR CULT: NORMAL
BASOPHILS # BLD AUTO: 0.01 K/UL
BASOPHILS NFR BLD: 0.2 %
BILIRUB DIRECT SERPL-MCNC: 0.3 MG/DL
BILIRUB SERPL-MCNC: 0.4 MG/DL
BUN SERPL-MCNC: 101 MG/DL
CALCIUM SERPL-MCNC: 9.3 MG/DL
CHLORIDE SERPL-SCNC: 92 MMOL/L
CO2 SERPL-SCNC: 21 MMOL/L
CREAT SERPL-MCNC: 5.8 MG/DL
DIFFERENTIAL METHOD: ABNORMAL
EOSINOPHIL # BLD AUTO: 0.1 K/UL
EOSINOPHIL NFR BLD: 1.5 %
ERYTHROCYTE [DISTWIDTH] IN BLOOD BY AUTOMATED COUNT: 15.6 %
EST. GFR  (AFRICAN AMERICAN): 9.4 ML/MIN/1.73 M^2
EST. GFR  (NON AFRICAN AMERICAN): 8.2 ML/MIN/1.73 M^2
FACT X PPP CHRO-ACNC: 0.43 IU/ML
GLUCOSE SERPL-MCNC: 99 MG/DL
HCT VFR BLD AUTO: 27.2 %
HGB BLD-MCNC: 8.8 G/DL
INR PPP: 1
LYMPHOCYTES # BLD AUTO: 0.5 K/UL
LYMPHOCYTES NFR BLD: 10.4 %
MAGNESIUM SERPL-MCNC: 2.6 MG/DL
MCH RBC QN AUTO: 26.7 PG
MCHC RBC AUTO-ENTMCNC: 32.4 %
MCV RBC AUTO: 83 FL
MONOCYTES # BLD AUTO: 0.4 K/UL
MONOCYTES NFR BLD: 9.1 %
NEUTROPHILS # BLD AUTO: 3.6 K/UL
NEUTROPHILS NFR BLD: 78.1 %
OSMOLALITY SERPL: 311 MOSM/KG
OSMOLALITY UR: 309 MOSM/KG
PHOSPHATE SERPL-MCNC: 8 MG/DL
PLATELET # BLD AUTO: 158 K/UL
PMV BLD AUTO: 10.6 FL
POCT GLUCOSE: 116 MG/DL (ref 70–110)
POTASSIUM SERPL-SCNC: 3.4 MMOL/L
PROT SERPL-MCNC: 7.2 G/DL
PROTHROMBIN TIME: 10.9 SEC
RBC # BLD AUTO: 3.29 M/UL
SODIUM SERPL-SCNC: 132 MMOL/L
SODIUM UR-SCNC: 79 MMOL/L
VANCOMYCIN SERPL-MCNC: 13.4 UG/ML
WBC # BLD AUTO: 4.61 K/UL

## 2017-05-23 PROCEDURE — 85610 PROTHROMBIN TIME: CPT

## 2017-05-23 PROCEDURE — 83735 ASSAY OF MAGNESIUM: CPT

## 2017-05-23 PROCEDURE — 99232 SBSQ HOSP IP/OBS MODERATE 35: CPT | Mod: ,,, | Performed by: INTERNAL MEDICINE

## 2017-05-23 PROCEDURE — 20600001 HC STEP DOWN PRIVATE ROOM

## 2017-05-23 PROCEDURE — 85025 COMPLETE CBC W/AUTO DIFF WBC: CPT

## 2017-05-23 PROCEDURE — 85730 THROMBOPLASTIN TIME PARTIAL: CPT

## 2017-05-23 PROCEDURE — 99233 SBSQ HOSP IP/OBS HIGH 50: CPT | Mod: ,,, | Performed by: INTERNAL MEDICINE

## 2017-05-23 PROCEDURE — 25000003 PHARM REV CODE 250: Performed by: INTERNAL MEDICINE

## 2017-05-23 PROCEDURE — 83930 ASSAY OF BLOOD OSMOLALITY: CPT

## 2017-05-23 PROCEDURE — 80048 BASIC METABOLIC PNL TOTAL CA: CPT

## 2017-05-23 PROCEDURE — 83935 ASSAY OF URINE OSMOLALITY: CPT

## 2017-05-23 PROCEDURE — 25000003 PHARM REV CODE 250: Performed by: STUDENT IN AN ORGANIZED HEALTH CARE EDUCATION/TRAINING PROGRAM

## 2017-05-23 PROCEDURE — 99233 SBSQ HOSP IP/OBS HIGH 50: CPT | Mod: S$PBB,,, | Performed by: CLINICAL NURSE SPECIALIST

## 2017-05-23 PROCEDURE — 85520 HEPARIN ASSAY: CPT

## 2017-05-23 PROCEDURE — 84100 ASSAY OF PHOSPHORUS: CPT

## 2017-05-23 PROCEDURE — 84300 ASSAY OF URINE SODIUM: CPT

## 2017-05-23 PROCEDURE — 63600175 PHARM REV CODE 636 W HCPCS: Performed by: INTERNAL MEDICINE

## 2017-05-23 PROCEDURE — 80202 ASSAY OF VANCOMYCIN: CPT

## 2017-05-23 PROCEDURE — 80076 HEPATIC FUNCTION PANEL: CPT

## 2017-05-23 PROCEDURE — 25000003 PHARM REV CODE 250: Performed by: HOSPITALIST

## 2017-05-23 RX ORDER — FUROSEMIDE 10 MG/ML
80 INJECTION INTRAMUSCULAR; INTRAVENOUS ONCE
Status: COMPLETED | OUTPATIENT
Start: 2017-05-23 | End: 2017-05-23

## 2017-05-23 RX ORDER — SEVELAMER CARBONATE 800 MG/1
1600 TABLET, FILM COATED ORAL
Status: DISCONTINUED | OUTPATIENT
Start: 2017-05-23 | End: 2017-06-02 | Stop reason: HOSPADM

## 2017-05-23 RX ORDER — SIMETHICONE 80 MG
1 TABLET,CHEWABLE ORAL 3 TIMES DAILY PRN
Status: DISCONTINUED | OUTPATIENT
Start: 2017-05-23 | End: 2017-06-02 | Stop reason: HOSPADM

## 2017-05-23 RX ADMIN — SEVELAMER CARBONATE 800 MG: 800 TABLET, FILM COATED ORAL at 08:05

## 2017-05-23 RX ADMIN — HYDROMORPHONE HYDROCHLORIDE 2 MG: 2 TABLET ORAL at 05:05

## 2017-05-23 RX ADMIN — DOCUSATE SODIUM 100 MG: 100 CAPSULE, LIQUID FILLED ORAL at 08:05

## 2017-05-23 RX ADMIN — DOCUSATE SODIUM 100 MG: 100 CAPSULE, LIQUID FILLED ORAL at 09:05

## 2017-05-23 RX ADMIN — HYDROMORPHONE HYDROCHLORIDE 0.5 MG: 1 INJECTION, SOLUTION INTRAMUSCULAR; INTRAVENOUS; SUBCUTANEOUS at 07:05

## 2017-05-23 RX ADMIN — Medication 3 ML: at 10:05

## 2017-05-23 RX ADMIN — HYDROXYCHLOROQUINE SULFATE 200 MG: 200 TABLET, FILM COATED ORAL at 08:05

## 2017-05-23 RX ADMIN — SODIUM BICARBONATE 650 MG: 650 TABLET ORAL at 09:05

## 2017-05-23 RX ADMIN — Medication 6.25 MG: at 09:05

## 2017-05-23 RX ADMIN — HYDROMORPHONE HYDROCHLORIDE 2 MG: 2 TABLET ORAL at 12:05

## 2017-05-23 RX ADMIN — HYDROMORPHONE HYDROCHLORIDE 2 MG: 2 TABLET ORAL at 10:05

## 2017-05-23 RX ADMIN — MEROPENEM AND SODIUM CHLORIDE 500 MG: 500 INJECTION, SOLUTION INTRAVENOUS at 04:05

## 2017-05-23 RX ADMIN — SODIUM BICARBONATE 650 MG: 650 TABLET ORAL at 08:05

## 2017-05-23 RX ADMIN — ATORVASTATIN CALCIUM 80 MG: 20 TABLET, FILM COATED ORAL at 08:05

## 2017-05-23 RX ADMIN — Medication 3 ML: at 06:05

## 2017-05-23 RX ADMIN — Medication 6.25 MG: at 08:05

## 2017-05-23 RX ADMIN — FUROSEMIDE 80 MG: 10 INJECTION, SOLUTION INTRAVENOUS at 12:05

## 2017-05-23 RX ADMIN — ASPIRIN 81 MG CHEWABLE TABLET 81 MG: 81 TABLET CHEWABLE at 08:05

## 2017-05-23 RX ADMIN — MICONAZOLE NITRATE: 20 CREAM TOPICAL at 09:05

## 2017-05-23 RX ADMIN — MEROPENEM AND SODIUM CHLORIDE 500 MG: 500 INJECTION, SOLUTION INTRAVENOUS at 05:05

## 2017-05-23 RX ADMIN — ALLOPURINOL 100 MG: 100 TABLET ORAL at 08:05

## 2017-05-23 RX ADMIN — CLOPIDOGREL 75 MG: 75 TABLET, FILM COATED ORAL at 08:05

## 2017-05-23 RX ADMIN — SEVELAMER CARBONATE 1600 MG: 800 TABLET, FILM COATED ORAL at 12:05

## 2017-05-23 RX ADMIN — HYDROXYCHLOROQUINE SULFATE 200 MG: 200 TABLET, FILM COATED ORAL at 09:05

## 2017-05-23 RX ADMIN — SEVELAMER CARBONATE 1600 MG: 800 TABLET, FILM COATED ORAL at 05:05

## 2017-05-23 NOTE — ASSESSMENT & PLAN NOTE
Organism identified as ESBL K pneumoniae. Same organism found in his urine and therefore assumption can be made that bacteremia is secondary to his urinary tract infection. Repeat blood cultures from 5/20 remain no growth to date.  1. Will de-escalate antibiotic therapy to Ertapenem 500 mg daily as there is no need for added pseudomonal coverage provided by meropenem.  2. Monitor for signs of encephalopathy which have been seen as an adverse effect or ertapenem.   3. Anticipate a 14 day course total from clearance of bacteremia. End date: 6/2/17.  4. Patient likely to need CVC for antibiotic therapy.

## 2017-05-23 NOTE — PROGRESS NOTES
[facility]  Infectious Disease  Progress Note    Patient Name: Humphrey Machado  MRN: [id number]  Admission Date: 5/19/2017  Length of Stay: 4 days  Attending Physician: Chaya Tinsley MD  Primary Care Provider: Andrew Murray MD    Isolation Status: Contact  Assessment/Plan:      Urinary tract infection due to extended-spectrum beta lactamase (ESBL)-producing Klebsiella    85 y.o. male admitted with MI on 5/19. He has a PMHx of HTN, HLD, CKD, prostate cancer, RCC s/p left nephrectomy, urethral stricture, and recurrent UTI.     Cure of his UTI is unlikely at this point given his source control issue and chronically positive urine cx. Was febrile with leukocytosis earlier in the admission but improved (prior to meropenem).     -continue treatment for UTI  -continue meropenem until cx final, if no pseudomonas whould switch to ertapenem  -ID will follow along        * Bacteremia due to Klebsiella pneumoniae    Blood cx with ESBL kleb - cleared on 5/20     -previous duration from consult note is incorrect  -will need 14 days of treatment from 5/20 as per medicine's plan              Thank you for your consult. I will follow-up with patient. Please contact us if you have any additional questions.    Rashard Munguia MD  Infectious Disease  [facility]    Subjective:     Principal Problem:Bacteremia due to Klebsiella pneumoniae    HPI: No notes on file  Interval History: No acute events    Review of Systems   Unable to perform ROS: Mental status change     Objective:     Vital Signs (Most Recent):  Temp: 97 °F (36.1 °C) (05/23/17 0800)  Pulse: 82 (05/23/17 0800)  Resp: (!) 26 (05/23/17 0800)  BP: (!) 106/55 (05/23/17 0800)  SpO2: 98 % (05/23/17 0800) Vital Signs (24h Range):  Temp:  [97 °F (36.1 °C)-98.2 °F (36.8 °C)] 97 °F (36.1 °C)  Pulse:  [69-96] 82  Resp:  [20-26] 26  SpO2:  [95 %-98 %] 98 %  BP: ()/(51-64) 106/55     Weight: 77.8 kg (171 lb 8.3 oz)  Body mass index is  24.61 kg/m².    Estimated Creatinine Clearance: 9.6 mL/min (based on Cr of 5.8).    Physical Exam   Constitutional: He appears well-developed. No distress.   HENT:   Head: Normocephalic and atraumatic.   Cardiovascular: Normal rate and regular rhythm.    Pulmonary/Chest: Effort normal. He has no rales.   Abdominal: Soft. Bowel sounds are normal.   Genitourinary:   Genitourinary Comments: +montano, fistula in right thigh crease   Musculoskeletal: Normal range of motion.   Vitals reviewed.      Significant Labs:   Blood Culture:   Recent Labs  Lab 05/19/17  1737 05/20/17  1325 05/20/17  1337   LABBLOO Gram stain nnamdi bottle: Gram negative rods   Results called to and read back by:Radhika Wolf RN 05/20/2017  12:39  KLEBSIELLA PNEUMONIAE ESBL No Growth to date  No Growth to date  No Growth to date No Growth to date  No Growth to date  No Growth to date     Urine Culture:   Recent Labs  Lab 05/19/17  1737 05/20/17  1607   LABURIN KLEBSIELLA PNEUMONIAE ESBL>100,000 cfu/ml GRAM NEGATIVE KELSEA>100,000 cfu/mlIdentification and susceptibility pending       Significant Imaging: I have reviewed all pertinent imaging results/findings within the past 24 hours.

## 2017-05-23 NOTE — PLAN OF CARE
05/23/17 0813   Discharge Reassessment   Assessment Type Discharge Planning Reassessment   Can the patient answer the patient profile reliably? Yes, cognitively intact   How does the patient rate their overall health at the present time? Fair   Describe the patient's ability to walk at the present time. No restrictions   How often would a person be available to care for the patient? Whenever needed   Number of comorbid conditions (as recorded on the chart) Three   During the past month, has the patient often been bothered by feeling down, depressed or hopeless? No   During the past month, has the patient often been bothered by little interest or pleasure in doing things? No   Discharge plan remains the same: No   Discharge Plan A Inpatient Hospice   HHC vs SNF vs Hospice

## 2017-05-23 NOTE — PLAN OF CARE
met with pt, wife and son in room to complete screening and introduce self.  Pt lives at home with his wife.  Wife is his primary caregiver.  Pt is currently being followed by Mariangel carrasco.  Wife reports she has a  at home to assist her with chores.   will follow for any discharge needs.

## 2017-05-23 NOTE — SUBJECTIVE & OBJECTIVE
"Interval History: Low urine output overnight per notes. Pt received lasix 80 mg IV overnight . BP continues to be in low borderline     Review of patient's allergies indicates:   Allergen Reactions    Aspirin Other (See Comments)     Stomach      Ditropan [oxybutynin chloride]      Unable to describe side effect other than "felt strange"     Keflex [cephalexin] Rash     Morbilliform  Has tolerated multiple cephalosporins since 2013.    Macrobid [nitrofurantoin monohyd/m-cryst] Hives and Rash     Current Facility-Administered Medications   Medication Frequency    allopurinol tablet 100 mg Daily    aspirin chewable tablet 81 mg Daily    atorvastatin tablet 80 mg Daily    bisacodyl suppository 10 mg Daily PRN    clopidogrel tablet 75 mg Daily    dextrose 50% injection 12.5 g PRN    dextrose 50% injection 25 g PRN    docusate sodium capsule 100 mg BID    glucagon (human recombinant) injection 1 mg PRN    glucose chewable tablet 16 g PRN    glucose chewable tablet 24 g PRN    heparin 25,000 units in dextrose 5% 250 mL (100 units/mL) bolus from bag; ADDITIONAL PRN BOLUS PRN    heparin 25,000 units in dextrose 5% 250 mL (100 units/mL) infusion Continuous    hydromorphone injection 0.5 mg Q2H PRN    HYDROmorphone tablet 2 mg Q6H    hydroxychloroquine tablet 200 mg BID    meropenem-0.9% sodium chloride 500 mg/50 mL IVPB Q12H    metoprolol 12.5mg/1.25mL oral solution 6.25 mg BID    miconazole 2 % cream BID    nitroGLYCERIN SL tablet 0.4 mg Q5 Min PRN    ondansetron injection 4 mg Q8H PRN    sevelamer carbonate tablet 800 mg TID WM    sodium bicarbonate tablet 650 mg BID    sodium chloride 0.9% flush 3 mL Q8H    sodium chloride 0.9% flush 3 mL Q8H       Objective:     Vital Signs (Most Recent):  Temp: 97 °F (36.1 °C) (05/23/17 0800)  Pulse: 82 (05/23/17 0800)  Resp: (!) 26 (05/23/17 0800)  BP: (!) 106/55 (05/23/17 0800)  SpO2: 98 % (05/23/17 0800)  O2 Device (Oxygen Therapy): nasal cannula " (05/23/17 0800) Vital Signs (24h Range):  Temp:  [97 °F (36.1 °C)-98.2 °F (36.8 °C)] 97 °F (36.1 °C)  Pulse:  [69-96] 82  Resp:  [20-26] 26  SpO2:  [95 %-98 %] 98 %  BP: ()/(51-64) 106/55     Weight: 77.8 kg (171 lb 8.3 oz) (05/19/17 1125)  Body mass index is 24.61 kg/m².  Body surface area is 1.96 meters squared.    I/O last 3 completed shifts:  In: 2421.4 [P.O.:750; I.V.:1521.4; IV Piggyback:150]  Out: 1775 [Urine:1775]    Physical Exam   Constitutional: He is oriented to person, place, and time. He appears well-developed and well-nourished.   HENT:   Head: Normocephalic and atraumatic.   Eyes: Conjunctivae and EOM are normal.   Neck: Neck supple.   Cardiovascular: Normal rate, regular rhythm and normal heart sounds.    Pulmonary/Chest: Effort normal and breath sounds normal.   Abdominal: Soft. Bowel sounds are normal.   Neurological: He is alert and oriented to person, place, and time.       Significant Labs:  CBC:     Recent Labs  Lab 05/23/17  0344   WBC 4.61   RBC 3.29*   HGB 8.8*   HCT 27.2*      MCV 83   MCH 26.7*   MCHC 32.4     CMP:     Recent Labs  Lab 05/23/17  0344   GLU 99   CALCIUM 9.3   ALBUMIN 2.2*   PROT 7.2   *   K 3.4*   CO2 21*   CL 92*   *   CREATININE 5.8*   ALKPHOS 119   ALT 59*   AST 83*   BILITOT 0.4       Recent Labs  Lab 05/19/17  0940   COLORU Yellow   SPECGRAV 1.010   PHUR 5.0   PROTEINUA 2+*   BACTERIA Occasional   NITRITE Negative   LEUKOCYTESUR 3+*   UROBILINOGEN Negative   HYALINECASTS 0      Labs: Reviewed

## 2017-05-23 NOTE — PROGRESS NOTES
[facility]  Nephrology  Progress Note    Patient Name: Humphrey Machado  MRN: [id number]  Admission Date: 5/19/2017  Hospital Length of Stay: 4 days  Attending Provider: Chaya Tinsley MD   Primary Care Physician: Andrew Murray MD  Principal Problem:Bacteremia due to Klebsiella pneumoniae    Subjective:     HPI: 84 yo M with PMHx of HTN,HLD, NSTEMI previously refused [facility] 5/2016, CKD stage IV (baseline 2.7), moderate AS, AF, venous insufficiency, cirrhosis, Ureterocutaneous fistula, chronic osteomyelitis of the pelvic region on cefpodoxime, suspected inflammatory arthritis on HCQ, neuromuscular disorder with peripheral neuropathy with recurrent right foot ulcer, prostate cancer s/p radiation, RCC s/p left nephrectomy, skin cancer of the hand, urethral stricture, urinary incontinence p/w SOB which started an hour prior to admission and EKG concerning for STEMI with afib with q waves in inferior/anterior-septal leads with resolution of chest pain with SL NTG. Patient was evaluated in the ED by Dr Duval and recommended to be taken to cath lab considering his clinical picture but he refused to proceed with angiogram due to risk of hemodialysis possibility     Renal consulted for worsening THEO on CKD     No new subjective & objective note has been filed under this hospital service since the last note was generated.    Assessment/Plan:     Acute kidney injury superimposed on CKD     Pt k/c/o CKD stage 3-4 2/2 HTN, solitary kidney s/p nephrectomy due to RCC, recurrent UTIs   - baseline creat 2.3 to 2.7   - THEO may be pre renal from hypotension, consider hold betablocker   - creat BUN continue to trend up this morning 5.8/101  - pt with low UO overnight after montano cath was dced. Received lasix 80 mg overnight, recorded 775 cc overnight   - recommend rpt renal USG , check post void residual to r/o urinary retention   - may require to start dialysis in the next few days  - palliative care still  discussing wth pt and family reg goals of care   - Na+ down to 132, will check serum and urine osm and lytes, may be due to hypervolemic hyponatremia vs 2/2 to THEO   - will continue to follow           Diya Vega MD  Nephrology  [facility]

## 2017-05-23 NOTE — PROGRESS NOTES
Patient had only 25cc of output from [id number] throughout shift. MD notified. Yoel CRONIN ordered to give  80 mg Lasix IVP X 1. Will continue to monitor output overnight.

## 2017-05-23 NOTE — ASSESSMENT & PLAN NOTE
Stable. Patient was evaluated by SLP however he refused to participate due to abdominal bloating.   1.  Dental soft diet with Nectar thick liquids.   2. Continue SLP management.

## 2017-05-23 NOTE — ASSESSMENT & PLAN NOTE
-s/p recent  evaluation   -Consult Urology - recommend local wound care, montano catheter and ureteral catheter removed.   -Appears there is no further treatment for his fistula with multitude of comorbidities and patient is bound to experience recurrent infections as a result of this.

## 2017-05-23 NOTE — ASSESSMENT & PLAN NOTE
85 y.o. male admitted with MI on 5/19. He has a PMHx of HTN, HLD, CKD, prostate cancer, RCC s/p left nephrectomy, urethral stricture, and recurrent UTI.     Cure of his UTI is unlikely at this point given his source control issue and chronically positive urine cx. Was febrile with leukocytosis earlier in the admission but improved (prior to meropenem).     -continue treatment for UTI  -continue meropenem until cx final, if no pseudomonas whould switch to ertapenem  -ID will follow along

## 2017-05-23 NOTE — PLAN OF CARE
"Palliative Care Social Work   Assessment  Name: Humphrey Machado  MRN: [id number]  Date of Birth/Age:  8/4/1931  Sex: male  Ethnicity:     Attending Physician: Dr. Bro   Reason for Referral: "Symptom management, Potential end of life/hospice"  Consult Order Date: 5/20/17 1839  Primary : Chaya Gu    Palliative Care Provider: MARILEE Fernandes    Present during Interview: pt's wife    Past & Current Medical Situation:   Diagnosis: s/p STEMI  PMH:   Past Medical History:   Diagnosis Date    *Atrial fibrillation     Acute appendicitis 2/19/2015    Anemia     Anxiety     Arthritis     generalized    Basal cell carcinoma 01/2013    right temple    BCC (basal cell carcinoma)     right mid forearm    Bladder stone 6/28/2016    Blood transfusion     Chronic urethral stricture 10/14/2015    Chronic venous insufficiency 7/8/2013    CKD (chronic kidney disease) stage 3, GFR 30-59 ml/min 9/6/2012    Coronary artery disease     Elevated PSA     Essential hypertension 7/30/2015    Hematuria, gross     History of Left Nephrectomy (~2006) 1/29/2014    Done at [facility].     Hypertension     Inflammatory arthritis 8/14/2012    Kidney stone     Long-term use of Plaquenil 6/4/2015    Mixed incontinence urge and stress 4/11/2014    Nonrheumatic aortic valve stenosis 5/30/2016    NSTEMI (non-ST elevated myocardial infarction) 5/30/2016    Olecranon bursitis 1/14/2013    Osteopenia 8/14/2012    Personal history of kidney cancer 4/11/2014    Prostate cancer     Radiation     treatment for prostate cancer    Recurrent UTI 7/8/2013    Respiratory distress     S/P radiation therapy 4/11/2014    Skin cancer of arm     left leg (malignant)    Solitary kidney 7/15/2013    Venous insufficiency of leg 7/12/2012    Venous stasis ulcers 7/6/2012    Vitamin D deficiency disease 5/8/2013     Mental Health/Substance Use History: anxiety  Non-traditional Health practices: n/a    Patients " Mental Status: Alert and oriented    Socio-Economic Factors/Resources:  Address: [address] [place] [address]  Phone Number: [phone] (home)     Marital Status:  x 60 years  Household Composition: Lives with wife  Children: 4 children: 3 sons: Humphrey, Denton, and Rashard. 1 daughter Taryn  Relationships with Family: Pt has a supportive family.  Pt's son Nitin is the healthcare power of  because pt's wife have mild dementia. They have a caretaker/sitter who was here and helps her during the day. Per wife, pt's dtr Taryn has high anxiety and doesn't handle stress well.     Pt has 8 grandchildren and 2 great grandchildren.     Emergency Contacts:   Son: Nitin Machado: [phone]; [phone]  Wife: Emy Machado: [phone]  Dtr: Taryn Machado: [phone]      Activities of Daily Living: independent with some assistance  Support Systems-Family & Community (Home Health, HME etc): [facility]. Sitter during day to help both of them.  Standard walker, shower chair, grab bar, commode.     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    History: no    Financial Resources: Medicare. BCBS      Advanced Care Planning & Legal Concerns:   Advanced Directives/Living Will: no   Planning:  no    Power of : yes Family states pt's son Denton is the HCPOA  Surrogate Decision Maker:       Spirituality, Culture & Coping Mechanisms:  F- Christine and Belief: Confucianist   I - Importance: very devout christine and attends Mass Regularly.  C - Community/Culture Values:   A - Address in Care: Received anointing of the sick 17.  visiting.     Strengths/Coping Strategies: Strong Christine. Family Supportive  Self-Care Activities/Hobbies: Enjoys spending time with his family and fishing.     Goals/Hopes/Expectations: Pt would like better pain control.   Fears/Anxiety/Concerns: Has  verbalized in past that he didn't want dialysis.         Preferences about EOL Environment: (own bed, family nearby, pets, music, etc)  home    Complicated Bereavement Risk Assessment Tool (CBRAT)  Reference:  [facility] Palliative Care [facility] Clinical Practice Group (May 2016). Bereavement Risk Screening and Management Guidelines.  Retrieved from: http://www.CritiSensecc.com.au/wp-content/uploads//KEDXW-Dvqzylqsnbi-Kxnnzhrcc-and-Management-Guideline-2016.pdf      Client Characteristics (Bereaved Client)  ? Under 18      no  ? Was a Twin   no  ? Young Spouse   no  ? Elderly Spouse    yes  ? Isolated    no  ? Lacks Meaningful Social Support   no  ? Dissatisfied with help available during illness   no  ? New to Financial [place] no  ? New to Decision-Making   no   History of Loss (Bereaved Client)  ? Cumulative Multiple Losses   yes  ? Previous Mental Health Illnesses   no  ? Current Mental Health Illness   no  ? Other Significant Health Issues   no   ? Migrant/Refugee   no    Illness  ? Inherited Disorder   no  ? Stigmatized Disease in the   no  ?  Family/Community   yes  ? Lengthy/Burdensome   yes Relationship with   ? Profound Lifelong Partner   no  ? Highly Dependent    yes  ? Antagonistic   no  ? Ambivalent   no  ? Deeply Connected   yes  ? Culturally Defined   no   Death  ? Sudden or Unexpected   no  ? Traumatic Circumstances Associated with Death   no  ? Significant Cultural/Social Burdens as a result of Death   no   Risk Factors Scores  0-2  Low  3-5  Moderate  5+  High  All persons scoring moderate to high presume to be at risk**    (** It is acknowledged that protective factors and resilience may outweigh apparent risk factors.      Total Risk Factors Score:   Moderate    Wife has increased risk due to having mild dementia.  Family support will be most beneficial to wife. Will also provide resources and support as needed.       Discharge Planning Needs/Plan of  Care:      Made visit to pt's room to provide support and establish rapport. Met with pt's wife outside of room. Pt's wife answered all questions appropriately but admitted that she has some dementia and is forgetful at times. Began by establishing rapport. Briefly explained that discharge services dependent on which plan pt/family chose: Dialysis vs Comfort Care. SW explained that after decision made, SW would assist with provide best options for the level of care chosen.      SW will continue to follow as appropriate.          Neelima Paredes LCSW, ACHP-SW

## 2017-05-23 NOTE — PT/OT/SLP PROGRESS
Speech Language Pathology      Humphrey Machado  MRN: [id number]   356/356 A    Patient not seen today secondary to pt unwilling to participate in thin liquid trials s/p repositioning with RN assistance. Pt reporting being bloated and in pain and refusing minimal trials with SLP. Pt agitated. RN reported pt took medications with thin liquids and is tolerating well. Will follow-up as able to assess tolerance of thin liquids and need for nectar thickened liquids.     MAMI Trevino, CCC-SLP   05/23/2017

## 2017-05-23 NOTE — PROGRESS NOTES
[facility] Hospital Medicine  Progress Note    Patient Name: Humphrey Machado  MRN: [id number]  Patient Class: IP- Inpatient   Admission Date: 5/19/2017  Length of Stay: 3 days  Attending Physician: Dave Bro MD  Primary Care Provider: Andrew Murray MD    [place] [facility] Medicine Team: [facility] [place] [facility] C Dave Bro MD    Subjective:     Principal Problem:ST elevation myocardial infarction (STEMI)    HPI:   86 yo M with PMHx of HTN,HLD, NSTEMI previously refused [facility] 5/2016, CKD stage IV (baseline 2.7), moderate AS, AF, venous insufficiency, cirrhosis, Ureterocutaneous fistula, chronic osteomyelitis of the pelvic region on cefpodoxime, suspected inflammatory arthritis on HCQ, neuromuscular disorder with peripheral neuropathy with recurrent right foot ulcer, prostate cancer s/p radiation, RCC s/p left nephrectomy, skin cancer of the hand, urethral stricture, urinary incontinence p/w SOB which started an hour prior to admission and EKG concerning for STEMI with afib with q waves in inferior/anterior-septal leads with resolution of chest pain with SL NTG. Patient was evaluated in the ED by Dr Duval and recommended to be taken to cath lab considering his clinical picture but he refused to proceed with angiogram due to risk of hemodialysis possibility he will be admitted fort mdical treatment in CCU.     Currently he is very SOB with fluid overload and aneuric.looks like not responding to lasix his son is his POA and they are discussing code status and if they will proceed with more procedures including CRRT.    Patient with recent cystoscopy/cystogram/fistulogram by Dr. 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 Abraham's  Operative noted.     Hospital Course:   Hospital course as above, while in the  CCU patient was medically managed for his STEMI, and for his AMIRA.  CCU team discussed comfort vs aggressive measures, patient's code status changed to DNR and due to concern that patient was anuric with developing AMIRA that may require HD. Discussion per EMR with family revealed they would not want to pursue dialysis. He was started on Lasix drip @ 20mg/hr and IV Diuril q12 hrs and had improved urine output on this regimen.  PRN Morphine and Ativan orders had been placed for patient comfort    Patient stepped down to IM-C casiano service on 5/20.  Patient was acutely encephalopathic, developed fevers and notified by RN blood cultures from admission were growing GNR.  Surveillance cultures sent patient was on Vancomycin and Cefepime.  He was having urine output on the lasix and diuril.  Extensive family discussion held (see progress notes for detail)  Family was not universally ready for comfort care, care plan more no escalation of care, given his altered mentation, I suspected morphine and ativan in Amira contributing, opiates switched to dilaudid and ativan d/c.   Discussed NG tube placement for oral med administration and nutrition but was held off.     5/21 - Patient is more awake and alert, likely related to toxic encephalopathy, remains hemodynamically stable, but was having more pain complaints, was receiving IV dilaudid  0.5mg q2hrs.   SLP evaluated patient and he is safe for dental soft nectar thick liquids.   His heart rhythm converted to atrial fibrillation with controlled rate in the 80-90s.   Will need repeat cardiology recs on management in AM.     Patient with nearly 2L UOP x 24hrs, family with more questions about possible HD or what course his Amira may take.  Nephrology re-contacted to follow the patient.  Diuril will be stopped this evening to monitor how pts UOp  And Cr does.    I reviewed more of recent urologic history and patient has a urinary source of his bacteremia, however with complicated chronic  fistula and wound we may not have source control. Discussed with patient and will consult urology to assist in any other management for source control and chronic urethrocutaneous fistula that may be needed - Urology consult placed for mon Am.     Reviewed prior culture data and patient with hx of Klebsiella ESBL and Pseudomonas in urine, both not sensitive to empiric cefepime so ID approval obtained to change to renal dosed Meropenem     Patient says pain control is improving but is still seems he has chronic pain issues from multiple sites, adding po opiates to pain regimen.     5/22 - Additional consultants have evaluated the patient  Cardiology - no additional med management recs at this time, patient has completed 48hrs of heparin for ACS, however with atrial fibrillation, will continue for now.  Coreg change to low dose metoprolol due to hypotension    Urology - evaluated patient and removed ureteral catheter, no further recs for chronic urtherocutaneous fistula, local wound care.   Wound care evaluated the patient and placed barrier cream in right groin with soft dressing to help absorb urinary leak from fistula, I was at bedside and pt did not feel ready to turn for assessment of buttocks.     Nephrology - evaluated the patient and no acute needs for acute HD, they will continue to follow, did inform pt that nephrology feels initiating HD would not improve overall morbidity/mortality from his co-morbid conditions and HD initiation comes with its own risks and invasive procedures required.     Palliative care - evaluated pt to assist in overall care planning.  MD staff to assist with pain management not available this week.         Interval History:  Pt seen this AM, he recently received dilaudid has waxing/waning mental status.  Ate approx 50% of his breakfast tray, reports pain control overall could be improved.     Discussed with patient and will plan to schedule some pain medication and use IV as  breakthrough.       Review of Systems   Constitutional: Positive for activity change and fatigue. Negative for fever.   HENT: Negative for sore throat.    Respiratory: Negative for cough and shortness of breath.    Cardiovascular: Negative for chest pain, palpitations and leg swelling.   Gastrointestinal: Positive for constipation. Negative for abdominal pain, diarrhea, nausea and vomiting.   Genitourinary: Negative for penile pain, penile swelling and scrotal swelling.   Musculoskeletal:        Buttock pain     Objective:     Vital Signs (Most Recent):  Temp: 97.9 °F (36.6 °C) (05/22/17 1644)  Pulse: 95 (05/22/17 1900)  Resp: 20 (05/22/17 1644)  BP: (!) 91/52 (05/22/17 1644)  SpO2: 96 % (05/22/17 1644) Vital Signs (24h Range):  Temp:  [97.4 °F (36.3 °C)-97.9 °F (36.6 °C)] 97.9 °F (36.6 °C)  Pulse:  [66-96] 95  Resp:  [16-22] 20  SpO2:  [96 %-99 %] 96 %  BP: (90-92)/(50-57) 91/52     Weight: 77.8 kg (171 lb 8.3 oz)  Body mass index is 24.61 kg/m².    Intake/Output Summary (Last 24 hours) at 05/22/17 2035  Last data filed at 05/22/17 1900   Gross per 24 hour   Intake          1941.35 ml   Output             1700 ml   Net           241.35 ml      Physical Exam   Constitutional: He is oriented to person, place, and time. He is easily aroused. He has a sickly appearance. He appears ill. No distress.   HENT:   Head: Normocephalic and atraumatic.   Mouth/Throat: Oropharynx is clear and moist.   Eyes: Pupils are equal, round, and reactive to light.   Neck: Neck supple.   Cardiovascular:   Irregular rhythm, rate 80s, holosystolic murmur present   Pulmonary/Chest: No respiratory distress.   On anterior exam clear to auscultation in upper fields.   Abdominal: Soft. Bowel sounds are normal. He exhibits no distension.   Genitourinary: Penis normal.   Genitourinary Comments: Stevens cathter to gravity drainage, ureteral catheter removed. Right thigh fistula covered with barrier cream by wound care RN while I was at bedside and  padding       Musculoskeletal: He exhibits tenderness.   Lymphadenopathy:     He has no cervical adenopathy.   Neurological: He is alert, oriented to person, place, and time and easily aroused.   Skin: Skin is warm. There is pallor.   Scattered ecchymoses over upper extremities, venous stasis dermatitis in LE       Significant Labs:   BMP:     Recent Labs  Lab 05/22/17  0440      *   K 3.0*   CL 96   CO2 19*   BUN 86*   CREATININE 5.1*   CALCIUM 8.9   MG 1.6     Microbiology Results (last 7 days)     Procedure Component Value Units Date/Time    Urine culture [[id number]] Collected:  05/20/17 1607    Order Status:  Completed Specimen:  Urine from Urine, Catheterized Updated:  05/22/17 1627     Urine Culture, Routine --     GRAM NEGATIVE KELSEA  >100,000 cfu/ml  Identification and susceptibility pending      Blood culture [[id number]] Collected:  05/20/17 1325    Order Status:  Completed Specimen:  Blood from Peripheral, Lower Arm, Left Updated:  05/22/17 1612     Blood Culture, Routine No Growth to date     Blood Culture, Routine No Growth to date     Blood Culture, Routine No Growth to date    Narrative:       Left Peripheral    Blood culture [[id number]] Collected:  05/20/17 1337    Order Status:  Completed Specimen:  Blood from Peripheral, Lower Arm, Right Updated:  05/22/17 1612     Blood Culture, Routine No Growth to date     Blood Culture, Routine No Growth to date     Blood Culture, Routine No Growth to date    Narrative:       Right Peripheral    Urine culture [[id number]]  (Susceptibility) Collected:  05/19/17 1737    Order Status:  Completed Specimen:  Urine from Urine, Clean Catch Updated:  05/22/17 0924     Urine Culture, Routine --     KLEBSIELLA PNEUMONIAE ESBL  >100,000 cfu/ml      Narrative:       Add on per Dave Bro MD    Blood culture [[id number]]  (Susceptibility) Collected:  05/19/17 1737    Order Status:  Completed Specimen:  Blood from Peripheral, Antecubital, Left Updated:   05/22/17 0902     Blood Culture, Routine Gram stain nnamdi bottle: Gram negative rods      Blood Culture, Routine Results called to and read back by:Radhika Wolf RN 05/20/2017  12:39     Blood Culture, Routine KLEBSIELLA PNEUMONIAE ESBL    Gram stain [[id number]] Collected:  05/19/17 1737    Order Status:  Completed Specimen:  Urine from Urine, Clean Catch Updated:  05/20/17 1613     Gram Stain Result Many WBC's      Rare Gram negative rods    Narrative:       Add on per Dave Bro MD    Blood culture [[id number]] Collected:  05/20/17 1452    Order Status:  Sent Specimen:  Blood from Peripheral, Forearm, Right Updated:  05/20/17 1452    Gram stain [[id number]]     Order Status:  Completed Specimen:  Urine from Urine     Urine culture [[id number]]     Order Status:  Completed Specimen:  Urine from Urine, Clean Catch     Culture, Respiratory with Gram Stain [[id number]]     Order Status:  No result Specimen:  Respiratory from Sputum, Induced           Significant Imaging: I have reviewed all pertinent imaging results/findings within the past 24 hours.     ECHO (5/20/17)  CONCLUSIONS   Difficult to acquire images with patient being on BIPAP.     1 - Moderately depressed left ventricular systolic function (EF 30-35%).     2 - Impaired LV relaxation, elevated LAP (grade 2 diastolic dysfunction).     3 - Right ventricular enlargement with low normal to mildly depressed systolic function.     4 - Mild mitral regurgitation.     5 - Trivial pericardial effusion.     6 - Mild left atrial enlargement.     7- Aortic valve sclerosis with restricted leaflet motion, stenosis severity could not be assessed due inability to acquire short axis or spectral doppler     8- Inadeqaute TR jet to assess PA pressure.     Assessment/Plan:      * ST elevation myocardial infarction (STEMI)    -ACS protocol  -Aspirin, plavix, heparin gtt   -High intensity statins   -ECHO reveals ischemic cardiomyopathy  -ACEi/ARB held due to Amira  -AM troponin  downtrending @ 29.   -Coreg changed to low dose metoprolol due to hypotension        Acute kidney injury superimposed on CKD    -Suspect intrinsic THEO secondary to poor perfusion from STEMI vs  pre-renal.    - Cr rising to 5.1, blood pressure lower today.  D/C Lasix drip, may need initiation of IV push lasix to maintain UOP when Bp more stable,  patient does not appear acutely overloaded but physical exam is limited due to maintenance of pt comfort.  Will need to monitor if oliguria/anuria develops and if diuretics are going to be only way now to maintain UOP.   -nephrology re-consulted          Ischemic cardiomyopathy    -EF 35%, mangagement as above          Atrial fibrillation with RVR    -I presumed this was new onset atrial fibrillation secondary to STEMI, patient is rate controlled  -cardiology comanage consultation evaluted, in further discussion this AFIb is not new for patient, he does have prior history of it.  However he is not on OP anticoagulation, so discussion is still needed with patient/family to decide if Heparin drip should continue or if warfarin should be started (only oral AntiCoag option)  -continue low dose beta blockers for rate control   -Chads- vasc is 4 - 4.8% annual risk./ ATRIA bleeding risk score 5.8% annual risk of hemorrhage, high risk category        Bacteremia due to Klebsiella pneumoniae    -continue with renal dosed Meropenem 500mg IV q12hrs.   -ID consulted, rec 10days abx, but pt may need at least 14 for bacteremia.   -source is likely  due to patient's chronic urologic complications  -Surveillance culture drawn 5/20 - NGTD.         Urinary tract infection due to extended-spectrum beta lactamase (ESBL)-producing Klebsiella    -management as above    -*Of note one of patient's son's is a transplant patient.  I recommended he should follow strict contact gown precautions as medical staff would in the room with appropriate hand hygeine, but ultimate medical recommendation would  be not to visit.            Chronic pain    -Schedule Dilaudid 2mg po q6hrs.  PRN Dilaudid 0.5mg IV q2hrs for breakthrough.   -Pain related to chronic  issues, buttock pain, also noted to complain of extremity pain in LUE, has a chronic neuropathy diagnosis.   -adding PO dilaudid to medications, want to avoid any long acting basal ER opiates with Amira, would consider very low dose fentanyl patch only if plan of care shifted to full comfort measures.   - PCA may be better option if patient remains poorly controlled.            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   -Appears there is no further treatment for his fistula with multitude of comorbidities and patient is bound to experience recurrent infections as a result of this.           Complicated open wound of right thigh    -Urology consultation  -wound care consultation - recs instituted RN instructed on wound care          Debility    -On bedrest  -q2 turns   -PT/OT for potential placement when pt more clinically stable          Hyperphosphatemia    -adding sevelamer, secondary to AMIRA on ckd          Oropharyngeal dysphagia    evaluted by SLP service - Dental soft diet with Nectar thick liquids.             VTE Risk Mitigation         Ordered     Medium Risk of VTE  Once      05/19/17 0945     Reason for No Pharmacological VTE Prophylaxis  Once      05/19/17 0945          Dave Bro MD  Department of Hospital Medicine   [facility]

## 2017-05-23 NOTE — PROGRESS NOTES
Progress  Note  Palliative Care          ASSESSMENT/PLAN:       Impression: Mr. Machado is a 85 yr old gentleman s/p STEMI  and refusal of right heart cath 5/20/17. Worsening THEO in setting of chronic kidney disease.  BUN and creatinine elevated.  Lasix drip continued with more than adequate clear yellow urine, indwelling urinary catheter.   He is awake, alert and oriented to person, place, time and situation.  Complains of generalized pain and dyspnea on exertion.  No acute distress at this time.        Goals of Care:  Palliative care  meeting with Mr. Machado with his son Nitin (HPOA) and mother at bedside to follow-up goals of care. It appears that Mrs. Machado may have some short term memory loss as she was having some difficulty recalling information given to her minutes after talking with palliative care.  In previous conversations Mr. Machado indicated dialysis was not a goal he was pursuing.  Today he has questions regarding dialysis and how it might improve his current clinical status.  Mr. Machado states being unsure about decision to  Not have dialysis. BUN and Creatinine continue to increase (5.8 and 101). Dialysis not warranted today and it may be required as if not improvement to kidney function is seen.   Son Nitin  (HPOA) states this is not a family goal.  Reminded patient's son that this remains Mr. Machado's decision to make.  Recommended that Mr. Machado discuss the need for dialysis further with the  nephrology team and consider a time limited trial of dialysis if it would be beneficial.     Mr. Machado added that better pain control is an immediate goal of care. Recommendations made for pain management       Ramos Taveras not amenable to further discussion regarding goals of care: specifically discharge options - hospice. Stressed to patient and son that this has been a hard conversation and no decisions are necessary at this time. Encouraged patient and family to discuss as a plan  Is needed to meet patient  and family goals.  Intense emotional support given.      Mr. Machado has advanced directives which have not yet been received.  Encouraged son to bring to hospital.      Plan/Recommendations:  1. Patient has an advanced directive.  His son Denton is the HPOA.  Family was encouraged to bring to hospital this evening to be added to his medical record.   2.  Rapport established.  Palliative care will continue to follow and assist patient and family develop goals of care.   3.   If dialysis becomes necessary and patient is amenable, would offer a time limited trial.   4. Symptom Management: Acute on Chronic pain        Pain: current ;pain medications not optimized as currently ordered        Dilaudid  0.5 mg IVP every 2 hrs as needed for severe pain 7-10/10       Dilaudid  2mg P.O. Every three hours as needed for pain        Recommendation:  Patient may benefit from having Dilaudid   2 mg by mouth every 6 hrs scheduled  and Dilaudid        0 .5mg IVP every 3 hrs as needed for  Severe breakthrough pain. 7-10/10 scale    5.  Emotional support      Primary team   notified of above recommendations.  Thank you for opportunity to participate in Carter Regalado's care      Signature: Leah Parson, APRN, ACNS-BC, OCN     > 50% of  35 min visit spent in chart review, face to face discussion of goals of care,  symptom assessment, coordination of care and emotional support.      SUBJECTIVE:     History of Present Illness:84 yo M with PMHx of HTN,HLD, NSTEMI previously refused [facility] 5/2016, CKD stage IV (baseline 2.7), moderate AS, AF, venous insufficiency, cirrhosis, Ureterocutaneous fistula, chronic osteomyelitis of the pelvic region on cefpodoxime, suspected inflammatory arthritis on HCQ, neuromuscular disorder with peripheral neuropathy with recurrent right foot ulcer, prostate cancer s/p radiation, RCC s/p left nephrectomy, skin cancer of the hand, urethral stricture, urinary incontinence p/w SOB which started an hour prior to  admission and EKG concerning for STEMI with afib with q waves in inferior/anterior-septal leads with resolution of chest pain with SL NTG. Patient was evaluated and recommended to be taken to cath lab considering his clinical picture but he refused to proceed.          Past Medical History:   Diagnosis Date    *Atrial fibrillation     Acute appendicitis 2/19/2015    Anemia     Anxiety     Arthritis     generalized    Basal cell carcinoma 01/2013    right temple    BCC (basal cell carcinoma)     right mid forearm    Bladder stone 6/28/2016    Blood transfusion     Chronic urethral stricture 10/14/2015    Chronic venous insufficiency 7/8/2013    CKD (chronic kidney disease) stage 3, GFR 30-59 ml/min 9/6/2012    Coronary artery disease     Elevated PSA     Essential hypertension 7/30/2015    Hematuria, gross     History of Left Nephrectomy (~2006) 1/29/2014    Done at [facility].     Hypertension     Inflammatory arthritis 8/14/2012    Kidney stone     Long-term use of Plaquenil 6/4/2015    Mixed incontinence urge and stress 4/11/2014    Nonrheumatic aortic valve stenosis 5/30/2016    NSTEMI (non-ST elevated myocardial infarction) 5/30/2016    Olecranon bursitis 1/14/2013    Osteopenia 8/14/2012    Personal history of kidney cancer 4/11/2014    Prostate cancer     Radiation     treatment for prostate cancer    Recurrent UTI 7/8/2013    Respiratory distress     S/P radiation therapy 4/11/2014    Skin cancer of arm     left leg (malignant)    Solitary kidney 7/15/2013    Venous insufficiency of leg 7/12/2012    Venous stasis ulcers 7/6/2012    Vitamin D deficiency disease 5/8/2013     Past Surgical History:   Procedure Laterality Date    APPENDECTOMY      CYSTOSCOPY      with dialation    NEPHRECTOMY  2006-07?    Removal of left kidney    TONSILLECTOMY       Family History   Problem Relation Age of Onset    Cancer Mother      bone     Heart disease Father     Urolithiasis Father   "   Mental illness Daughter     Cancer Brother      prostate cancer, (Ervin) removed by surgery    Cancer Brother      prostate cancer    Cancer Maternal Aunt     Melanoma Neg Hx     Lupus Neg Hx     Rheum arthritis Neg Hx      Review of patient's allergies indicates:   Allergen Reactions    Aspirin Other (See Comments)     Stomach      Ditropan [oxybutynin chloride]      Unable to describe side effect other than "felt strange"     Keflex [cephalexin] Rash     Morbilliform  Has tolerated multiple cephalosporins since 2013.    Macrobid [nitrofurantoin monohyd/m-cryst] Hives and Rash       Medications:  Continuous Infusions:    heparin (porcine) in D5W 17 Units/kg/hr (05/21/17 0796)     Scheduled:    allopurinol  100 mg Oral Daily    aspirin  81 mg Oral Daily    atorvastatin  80 mg Oral Daily    clopidogrel  75 mg Oral Daily    docusate sodium  100 mg Oral BID    HYDROmorphone  2 mg Oral Q6H    hydroxychloroquine  200 mg Oral BID    meropenem (MERREM) IVPB  500 mg Intravenous Q12H    metoprolol  6.25 mg Oral BID    miconazole   Topical (Top) BID    sevelamer carbonate  800 mg Oral TID WM    sodium bicarbonate  650 mg Oral BID    sodium chloride 0.9%  3 mL Intravenous Q8H    sodium chloride 0.9%  3 mL Intravenous Q8H     PRN Meds: bisacodyl, dextrose 50%, dextrose 50%, glucagon (human recombinant), glucose, glucose, heparin (PORCINE), HYDROmorphone, nitroGLYCERIN, ondansetron    24h Oral Morphine Equivalents (OME): 44    Bowel Management Plan (BMP): Yes (X )  NO  (  )    OBJECTIVE:   Symptom Assessment (ESAS 0-10 scale)     ESAS 0 1 2 3 4 5 6 7 8 9 10   Pain          X    Dyspnea   X           Anxiety X             Nausea X             Depression  X             Anorexia X             Fatigue X             Insomnia X             Restlessness  X             Agitation X             Constipation     __ --  ___+   Diarrhea           __ --  ___+     Pain:  Character: acute on chronic pain  described " as sharp and burning   Duration: chronic pain for several years secondary to  pelvic osteomyelitis   Effect:  States  Severe pain interferes with ability to  participate in care (turn frequently)  and  It is difficult to concentrate  Factors: pain has been aggravated by recent surgical procedure and is relieved for short time with  prn pain medications   Frequency: constant   Location: sacral   Severity :States pain is 9/10     Comments:     Performance Status: PPS Score (30  )       Physical Exam:  Vitals: reviewed  General: Afebrile, alert, comfortable, no acute distress.   Pulmonary:dyspnea on exertion,clear to auscultation anteriorly.   Cardiac: normal S1 & S2 w/o rubs/murmurs/gallops.   Abdominal: Non-tender, non-distended.Bowel sounds present x 4. No appreciable hepatosplenomegaly. No guarding or rebound tenderness.   Extremities: Moves all extremities x 4. No peripheral edema. 2+ pulses.  Skin: No jaundice, rashes, or visible lesions.  Neurological: Alert and oriented x 4. No focal neuro deficits.   Psych/Mental Status:  CAM / Delirium __ --  ___+   FAST Stage for Dementia:      Labs: Reviewed   CBC:   WBC   Date Value Ref Range Status   05/23/2017 4.61 3.90 - 12.70 K/uL Final       Hemoglobin   Date Value Ref Range Status   05/23/2017 8.8 (L) 14.0 - 18.0 g/dL Final       POC Hematocrit   Date Value Ref Range Status   05/27/2016 22 (L) 36 - 54 %PCV Final     Hematocrit   Date Value Ref Range Status   05/23/2017 27.2 (L) 40.0 - 54.0 % Final       MCV   Date Value Ref Range Status   05/23/2017 83 82 - 98 fL Final       Platelets   Date Value Ref Range Status   05/23/2017 158 150 - 350 K/uL Final       BMP:     Recent Labs  Lab 05/23/17  0344   GLU 99   *   K 3.4*   CL 92*   CO2 21*   *   CREATININE 5.8*   CALCIUM 9.3   MG 2.6       LFT:   Lab Results   Component Value Date    AST 83 (H) 05/23/2017    ALKPHOS 119 05/23/2017    BILITOT 0.4 05/23/2017       Albumin:   Albumin   Date Value Ref Range  Status   05/23/2017 2.2 (L) 3.5 - 5.2 g/dL Final     Protein:   Total Protein   Date Value Ref Range Status   05/23/2017 7.2 6.0 - 8.4 g/dL Final       LACTIC ACID:   Lab Results   Component Value Date    LACTATE 0.6 09/05/2015    LACTATE 1.1 09/13/2012       Radiology: reviewed     Legal/Advanced Directives: Patient has both living will and HPOA that are not on file.  Family was encouraged to bring to hospital   Living Will: not on file   Resuscitate Status: DNR  Decision-Making Capacity: yes   Medical Power of : son Nitin Machado [phone], next of kin is wife Taryn,     Psychosocial/Cultural:  for 60 yrs 4 children: 3 sons (Humphrey, Denton, Rashard) and one daughter Emy.  He has 8 grandchildren and 2 great grandchildren.  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  Prior to becoming ill he enjoyed time with the family and fishing     Spiritual:     F- Christine and Belief: Confucianist    I - Importance: very devout christine and attends [employer] regularly   .  C - Community    A - Address in Care: anointing of the sick received 5/20/17       Problem list:  Active Hospital Problems    Diagnosis  POA    *ST elevation myocardial infarction (STEMI) [I21.3]  Yes    Hyperphosphatemia [E83.39]  Unknown    Bacteremia due to Klebsiella pneumoniae [R78.81]  Yes    Acute kidney injury superimposed on CKD [N17.9, N18.9]  Yes    Chronic pain [G89.29]  Yes    Ischemic cardiomyopathy [I25.5]  Yes    Oropharyngeal dysphagia [R13.12]  No    Atrial fibrillation with RVR [I48.91]  Yes    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 [N36.0]  Yes    Complicated open wound of right thigh [S71.101A]  Yes    Debility [R53.81]  Yes    Urinary tract infection due to extended-spectrum beta lactamase (ESBL)-producing Klebsiella [N39.0, B96.89]  Yes    Venous insufficiency of leg [I87.2]  Yes      Resolved Hospital Problems    Diagnosis Date Resolved POA    Chest pain [R07.9] 05/21/2017 Yes    Respiratory  distress [R06.00] 05/21/2017 Yes

## 2017-05-23 NOTE — PROGRESS NOTES
[facility] Hospital Medicine  Progress Note    Patient Name: Humphrey Machado  MRN: [id number]  Patient Class: IP- Inpatient   Admission Date: 5/19/2017  Length of Stay: 4 days  Attending Physician: Chaya Tinsley MD  Primary Care Provider: Andrew Murray MD    [facility] Medicine Team: [facility] HOSP MED C Chaya Tinsley MD    Subjective:     Principal Problem:Bacteremia due to Klebsiella pneumoniae    HPI:  85-year-old man with HTN, HLD, NSTEMI previously refused [facility] 5/2016, CKD stage IV (baseline 2.7), moderate AS, AF, venous insufficiency, cirrhosis, Ureterocutaneous fistula, chronic osteomyelitis of the pelvic region on cefpodoxime, suspected inflammatory arthritis on HCQ, neuromuscular disorder with peripheral neuropathy with recurrent right foot ulcer, prostate cancer s/p radiation, RCC s/p left nephrectomy, skin cancer of the hand, urethral stricture, urinary incontinence p/w SOB which started an hour prior to admission and EKG concerning for STEMI with afib with q waves in inferior/anterior-septal leads with resolution of chest pain with SL NTG. Patient was evaluated in the ED by Dr Duval and recommended to be taken to cath lab considering his clinical picture but he refused to proceed with angiogram due to risk of hemodialysis possibility he will be admitted fort mdical treatment in CCU.     Currently he is very SOB with fluid overload and aneuric.looks like not responding to lasix his son is his POA and they are discussing code status and if they will proceed with more procedures including CRRT.    Patient with recent cystoscopy/cystogram/fistulogram by Dr. 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 Abraham's  Operative noted.     Hospital Course:  Hospital course as above, while in  the CCU patient was medically managed for his STEMI, and for his AMIRA.  CCU team discussed comfort vs aggressive measures, patient's code status changed to DNR and due to concern that patient was anuric with developing AMIRA that may require HD. Discussion per EMR with family revealed they would not want to pursue dialysis. He was started on Lasix drip @ 20mg/hr and IV Diuril q12 hrs and had improved urine output on this regimen.  PRN Morphine and Ativan orders had been placed for patient comfort    Patient stepped down to IM-C casiano service on 5/20.  Patient was acutely encephalopathic, developed fevers and notified by RN blood cultures from admission were growing GNR.  Surveillance cultures sent patient was on Vancomycin and Cefepime.  He was having urine output on the lasix and diuril.  Extensive family discussion held (see progress notes for detail)  Family was not universally ready for comfort care, care plan more no escalation of care, given his altered mentation, I suspected morphine and ativan in Amira contributing, opiates switched to dilaudid and ativan d/c.   Discussed NG tube placement for oral med administration and nutrition but was held off.     5/21 - Patient is more awake and alert, likely related to toxic encephalopathy, remains hemodynamically stable, but was having more pain complaints, was receiving IV dilaudid  0.5mg q2hrs.   SLP evaluated patient and he is safe for dental soft nectar thick liquids.   His heart rhythm converted to atrial fibrillation with controlled rate in the 80-90s.   Will need repeat cardiology recs on management in AM.     Patient with nearly 2L UOP x 24hrs, family with more questions about possible HD or what course his Amira may take.  Nephrology re-contacted to follow the patient.  Diuril will be stopped this evening to monitor how pts UOp  And Cr does.    I reviewed more of recent urologic history and patient has a urinary source of his bacteremia, however with complicated  "chronic fistula and wound we may not have source control. Discussed with patient and will consult urology to assist in any other management for source control and chronic urethrocutaneous fistula that may be needed - Urology consult placed for mon Am.     Reviewed prior culture data and patient with hx of Klebsiella ESBL and Pseudomonas in urine, both not sensitive to empiric cefepime so ID approval obtained to change to renal dosed Meropenem     Patient says pain control is improving but is still seems he has chronic pain issues from multiple sites, adding po opiates to pain regimen.     5/22 - Additional consultants have evaluated the patient  Cardiology - no additional med management recs at this time, patient has completed 48hrs of heparin for ACS, however with atrial fibrillation, will continue for now.  Coreg change to low dose metoprolol due to hypotension    Urology - evaluated patient and removed ureteral catheter, no further recs for chronic urtherocutaneous fistula, local wound care.   Wound care evaluated the patient and placed barrier cream in right groin with soft dressing to help absorb urinary leak from fistula, I was at bedside and pt did not feel ready to turn for assessment of buttocks.     Nephrology - evaluated the patient and no acute needs for acute HD, they will continue to follow, did inform pt that nephrology feels initiating HD would not improve overall morbidity/mortality from his co-morbid conditions and HD initiation comes with its own risks and invasive procedures required.     Palliative care - evaluated pt to assist in overall care planning.  MD staff to assist with pain management not available this week.     5/23- patient complained of discomfort. He is still unsure of what he wants for medical management. He is still considering hemodialysis as an option. His renal function continued to worsen but his main concern was his discomfort.      Interval History: "I'm bloated. I don't " "know if I have pain or I'm just bloated.". No acute overnight events. Patient still unsure if he wants to proceed with hemodialysis or be discharged to hospice care when appropriate.     Review of Systems   Constitutional: Negative for chills, fatigue and fever.   HENT: Negative for ear pain, mouth sores, nosebleeds, postnasal drip, rhinorrhea, sinus pressure, sore throat, tinnitus, trouble swallowing and voice change.    Eyes: Negative for photophobia, pain, redness and visual disturbance.   Respiratory: Positive for chest tightness. Negative for apnea, cough, shortness of breath and wheezing.    Cardiovascular: Negative for chest pain, palpitations and leg swelling.   Gastrointestinal: Positive for abdominal distention. Negative for abdominal pain, blood in stool, constipation, diarrhea, nausea and vomiting.   Endocrine: Negative for cold intolerance, heat intolerance, polydipsia and polyuria.   Genitourinary: Negative for decreased urine volume, difficulty urinating, discharge, dysuria, flank pain, frequency, genital sores, hematuria, penile pain, penile swelling, scrotal swelling, testicular pain and urgency.   Musculoskeletal: Negative for arthralgias, back pain, joint swelling, myalgias and neck pain.   Skin: Negative for color change and rash.   Allergic/Immunologic: Negative for environmental allergies and food allergies.   Neurological: Negative for dizziness, seizures, syncope, weakness, light-headedness, numbness and headaches.   Hematological: Negative for adenopathy. Does not bruise/bleed easily.   Psychiatric/Behavioral: Negative for agitation, confusion, decreased concentration, hallucinations, self-injury, sleep disturbance and suicidal ideas. The patient is not nervous/anxious.      Objective:     Vital Signs (Most Recent):  Temp: 97 °F (36.1 °C) (05/23/17 0800)  Pulse: 73 (05/23/17 1500)  Resp: (!) 26 (05/23/17 0800)  BP: (!) 106/55 (05/23/17 0800)  SpO2: 98 % (05/23/17 0800) Vital Signs (24h " Range):  Temp:  [97 °F (36.1 °C)-98.2 °F (36.8 °C)] 97 °F (36.1 °C)  Pulse:  [69-95] 73  Resp:  [20-26] 26  SpO2:  [95 %-98 %] 98 %  BP: ()/(51-64) 106/55     Weight: 77.8 kg (171 lb 8.3 oz)  Body mass index is 24.61 kg/m².    Intake/Output Summary (Last 24 hours) at 05/23/17 1803  Last data filed at 05/23/17 1400   Gross per 24 hour   Intake           890.01 ml   Output               75 ml   Net           815.01 ml      Physical Exam   Constitutional: He is oriented to person, place, and time. He appears well-developed and well-nourished. He has a sickly appearance.   HENT:   Head: Normocephalic and atraumatic.   Right Ear: External ear normal.   Left Ear: External ear normal.   Mouth/Throat: Oropharynx is clear and moist.   Eyes: Conjunctivae and EOM are normal. Pupils are equal, round, and reactive to light.   Neck: Normal range of motion. Neck supple. No thyromegaly present.   Cardiovascular: Normal rate.  An irregularly irregular rhythm present.   Murmur heard.   Systolic murmur is present with a grade of 2/6   Pulmonary/Chest: Effort normal and breath sounds normal. He has no wheezes. He has no rales.   Abdominal: Soft. Bowel sounds are normal. He exhibits no mass. There is no tenderness. There is no rebound.   Genitourinary:   Genitourinary Comments: Stevens catheter in place. Right thigh fistula covered.   Musculoskeletal: Normal range of motion. He exhibits tenderness.   Lymphadenopathy:     He has no cervical adenopathy.   Neurological: He is alert and oriented to person, place, and time.   Skin: Skin is warm and dry.   Venous stasis dermatitis over the bilateral lower extremities.   Psychiatric: He has a normal mood and affect. His behavior is normal.   Vitals reviewed.      Significant Labs:   BMP:   Recent Labs  Lab 05/23/17  0344   GLU 99   *   K 3.4*   CL 92*   CO2 21*   *   CREATININE 5.8*   CALCIUM 9.3   MG 2.6     CBC:   Recent Labs  Lab 05/22/17  0440 05/23/17  0344   WBC 4.18  4.61   HGB 7.8* 8.8*   HCT 24.4* 27.2*   * 158       Significant Imaging: I have reviewed all pertinent imaging results/findings within the past 24 hours.    Assessment/Plan:      Hyperphosphatemia    Secondary to THEO on CKD.   1. Sevelamer as per nephrology.  2. Low phosphate diet.        Oropharyngeal dysphagia    Stable. Patient was evaluated by SLP however he refused to participate due to abdominal bloating.   1.  Dental soft diet with Nectar thick liquids.   2. Continue SLP management.          Ischemic cardiomyopathy    EF 35%, mangagement as above          Chronic pain    Pain related to chronic  issues, buttock pain, also noted to complain of extremity pain in LUE, has a chronic neuropathy diagnosis. Per patient his pain is uncontrolled.   1. Dilaudid 2mg po q6hrs.    2. As needed Dilaudid 0.5mg IV q2hrs for breakthrough.   3. Would consider very low dose fentanyl patch only if plan of care shifted to full comfort measures.           Acute kidney injury superimposed on CKD    Suspected to be intrinsic THEO secondary to poor perfusion from STEMI vs  pre-renal. Serum creatinine continues to worsen. Patient still deciding goals of care.  1. Monitor creatinine level.  2. Avoid nephrotoxic agents.  3. Renally dose medications.  4. Diuresis as below.        Atrial fibrillation with RVR    AFIb is not new for patient, he does have prior history of it.  However he is not on OP anticoagulation, so discussion is still needed with patient/family to decide if Heparin drip should continue or if warfarin should be started (only oral AntiCoag option). Currently rate controlled. Nephrology service recommends holding beta blocker if possible.  1. Continue low dose beta blockers for rate control   2. Chads- vasc is 4 - 4.8% annual risk./ ATRIA bleeding risk score 5.8% annual risk of hemorrhage, high risk category.  3. Patient still undecided about oral anticoagulation.        ST elevation myocardial infarction (STEMI)     No current chest pain or SOB but patient with discomfort. Refused LHC. Currently being managed medically.   1. Aspirin, plavix, heparin gtt   2. High intensity statins   3. Continue low dose metoprolol for now.        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   1. Appears there is no further treatment for his fistula with multitude of comorbidities and patient is bound to experience recurrent infections as a result of this.           Complicated open wound of right thigh    Stable. Urology recommends non aggressive measures.   1. Continue local wound care.        Debility    On bedrest due to active issues.   1. Every 2 hour turns   2. PT/OT for potential placement when pt more clinically stable          Urinary tract infection due to extended-spectrum beta lactamase (ESBL)-producing Klebsiella    Management as above under bacteremia problem.        * Bacteremia due to Klebsiella pneumoniae    Organism identified as ESBL K pneumoniae. Same organism found in his urine and therefore assumption can be made that bacteremia is secondary to his urinary tract infection. Repeat blood cultures from 5/20 remain no growth to date.  1. Will de-escalate antibiotic therapy to Ertapenem 500 mg daily as there is no need for added pseudomonal coverage provided by meropenem.  2. Monitor for signs of encephalopathy which have been seen as an adverse effect or ertapenem.   3. Anticipate a 14 day course total from clearance of bacteremia. End date: 6/2/17.  4. Patient likely to need CVC for antibiotic therapy.          VTE Risk Mitigation         Ordered     Medium Risk of VTE  Once      05/19/17 0945     Reason for No Pharmacological VTE Prophylaxis  Once      05/19/17 0945          Chaya Tinsley MD  Department of Hospital Medicine   [facility]

## 2017-05-23 NOTE — ASSESSMENT & PLAN NOTE
Pt k/c/o CKD stage 3-4 2/2 HTN, solitary kidney s/p nephrectomy due to RCC, recurrent UTIs   - baseline creat 2.3 to 2.7   - THEO may be pre renal from hypotension, consider hold betablocker   - creat slightly down from 5.8 to 5.7, BUN still trending up.   - rpt renal USG same as before with rt kidney with a simple cyst, no obstruction   - pt says he has not been drinking much fluids lately, recommend 0.5 L NS at 75 cc/hr   - recommend CXR   - pt and family decided they are willing for a trial of dialysis if needed.   - will continue to follow

## 2017-05-23 NOTE — ASSESSMENT & PLAN NOTE
-management as above    -*Of note one of patient's son's is a transplant patient.  I recommended he should follow strict contact gown precautions as medical staff would in the room with appropriate hand hygeine, but ultimate medical recommendation would be not to visit.

## 2017-05-23 NOTE — ASSESSMENT & PLAN NOTE
Pain related to chronic  issues, buttock pain, also noted to complain of extremity pain in LUE, has a chronic neuropathy diagnosis. Per patient his pain is uncontrolled.   1. Dilaudid 2mg po q6hrs.    2. As needed Dilaudid 0.5mg IV q2hrs for breakthrough.   3. Would consider very low dose fentanyl patch only if plan of care shifted to full comfort measures.

## 2017-05-23 NOTE — ASSESSMENT & PLAN NOTE
On bedrest due to active issues.   1. Every 2 hour turns   2. PT/OT for potential placement when pt more clinically stable

## 2017-05-23 NOTE — SUBJECTIVE & OBJECTIVE
Interval History:  Pt seen this AM, he recently received dilaudid has waxing/waning mental status.  Ate approx 50% of his breakfast tray, reports pain control overall could be improved.     Discussed with patient and will plan to schedule some pain medication and use IV as breakthrough.       Review of Systems   Constitutional: Positive for activity change and fatigue. Negative for fever.   HENT: Negative for sore throat.    Respiratory: Negative for cough and shortness of breath.    Cardiovascular: Negative for chest pain, palpitations and leg swelling.   Gastrointestinal: Positive for constipation. Negative for abdominal pain, diarrhea, nausea and vomiting.   Genitourinary: Negative for penile pain, penile swelling and scrotal swelling.   Musculoskeletal:        Buttock pain     Objective:     Vital Signs (Most Recent):  Temp: 97.9 °F (36.6 °C) (05/22/17 1644)  Pulse: 95 (05/22/17 1900)  Resp: 20 (05/22/17 1644)  BP: (!) 91/52 (05/22/17 1644)  SpO2: 96 % (05/22/17 1644) Vital Signs (24h Range):  Temp:  [97.4 °F (36.3 °C)-97.9 °F (36.6 °C)] 97.9 °F (36.6 °C)  Pulse:  [66-96] 95  Resp:  [16-22] 20  SpO2:  [96 %-99 %] 96 %  BP: (90-92)/(50-57) 91/52     Weight: 77.8 kg (171 lb 8.3 oz)  Body mass index is 24.61 kg/m².    Intake/Output Summary (Last 24 hours) at 05/22/17 2035  Last data filed at 05/22/17 1900   Gross per 24 hour   Intake          1941.35 ml   Output             1700 ml   Net           241.35 ml      Physical Exam   Constitutional: He is oriented to person, place, and time. He is easily aroused. He has a sickly appearance. He appears ill. No distress.   HENT:   Head: Normocephalic and atraumatic.   Mouth/Throat: Oropharynx is clear and moist.   Eyes: Pupils are equal, round, and reactive to light.   Neck: Neck supple.   Cardiovascular:   Irregular rhythm, rate 80s, holosystolic murmur present   Pulmonary/Chest: No respiratory distress.   On anterior exam clear to auscultation in upper fields.    Abdominal: Soft. Bowel sounds are normal. He exhibits no distension.   Genitourinary: Penis normal.   Genitourinary Comments: Stevens cathter to gravity drainage, ureteral catheter removed. Right thigh fistula covered with barrier cream by wound care RN while I was at bedside and padding       Musculoskeletal: He exhibits tenderness.   Lymphadenopathy:     He has no cervical adenopathy.   Neurological: He is alert, oriented to person, place, and time and easily aroused.   Skin: Skin is warm. There is pallor.   Scattered ecchymoses over upper extremities, venous stasis dermatitis in LE       Significant Labs:   BMP:     Recent Labs  Lab 05/22/17  0440      *   K 3.0*   CL 96   CO2 19*   BUN 86*   CREATININE 5.1*   CALCIUM 8.9   MG 1.6     Microbiology Results (last 7 days)     Procedure Component Value Units Date/Time    Urine culture [[id number]] Collected:  05/20/17 1607    Order Status:  Completed Specimen:  Urine from Urine, Catheterized Updated:  05/22/17 1627     Urine Culture, Routine --     GRAM NEGATIVE KELSEA  >100,000 cfu/ml  Identification and susceptibility pending      Blood culture [[id number]] Collected:  05/20/17 1325    Order Status:  Completed Specimen:  Blood from Peripheral, Lower Arm, Left Updated:  05/22/17 1612     Blood Culture, Routine No Growth to date     Blood Culture, Routine No Growth to date     Blood Culture, Routine No Growth to date    Narrative:       Left Peripheral    Blood culture [[id number]] Collected:  05/20/17 1337    Order Status:  Completed Specimen:  Blood from Peripheral, Lower Arm, Right Updated:  05/22/17 1612     Blood Culture, Routine No Growth to date     Blood Culture, Routine No Growth to date     Blood Culture, Routine No Growth to date    Narrative:       Right Peripheral    Urine culture [[id number]]  (Susceptibility) Collected:  05/19/17 1737    Order Status:  Completed Specimen:  Urine from Urine, Clean Catch Updated:  05/22/17 0924     Urine Culture,  Routine --     KLEBSIELLA PNEUMONIAE ESBL  >100,000 cfu/ml      Narrative:       Add on per Dave Bro MD    Blood culture [[id number]]  (Susceptibility) Collected:  05/19/17 1737    Order Status:  Completed Specimen:  Blood from Peripheral, Antecubital, Left Updated:  05/22/17 0902     Blood Culture, Routine Gram stain nnamdi bottle: Gram negative rods      Blood Culture, Routine Results called to and read back by:Radhika Wolf RN 05/20/2017  12:39     Blood Culture, Routine KLEBSIELLA PNEUMONIAE ESBL    Gram stain [[id number]] Collected:  05/19/17 1737    Order Status:  Completed Specimen:  Urine from Urine, Clean Catch Updated:  05/20/17 1613     Gram Stain Result Many WBC's      Rare Gram negative rods    Narrative:       Add on per Dave Bro MD    Blood culture [[id number]] Collected:  05/20/17 1452    Order Status:  Sent Specimen:  Blood from Peripheral, Forearm, Right Updated:  05/20/17 1452    Gram stain [[id number]]     Order Status:  Completed Specimen:  Urine from Urine     Urine culture [[id number]]     Order Status:  Completed Specimen:  Urine from Urine, Clean Catch     Culture, Respiratory with Gram Stain [[id number]]     Order Status:  No result Specimen:  Respiratory from Sputum, Induced           Significant Imaging: I have reviewed all pertinent imaging results/findings within the past 24 hours.     ECHO (5/20/17)  CONCLUSIONS   Difficult to acquire images with patient being on BIPAP.     1 - Moderately depressed left ventricular systolic function (EF 30-35%).     2 - Impaired LV relaxation, elevated LAP (grade 2 diastolic dysfunction).     3 - Right ventricular enlargement with low normal to mildly depressed systolic function.     4 - Mild mitral regurgitation.     5 - Trivial pericardial effusion.     6 - Mild left atrial enlargement.     7- Aortic valve sclerosis with restricted leaflet motion, stenosis severity could not be assessed due inability to acquire short axis or spectral doppler      8- Inadeqaute TR jet to assess PA pressure.

## 2017-05-23 NOTE — PLAN OF CARE
Problem: Patient Care Overview  Goal: Plan of Care Review  Pt remain free of falls and injury throughout the shift. Pain controlled with PO and IV analgesics. Glucose remains diet controlled. Heparin gtt infused per order; Xa therapeutic will recheck with am labs tomorrow. IV abx infused per order. SLP evaluated and worked with pt today. Plan of care updated; pt tolerating plan of care.

## 2017-05-23 NOTE — ASSESSMENT & PLAN NOTE
-ACS protocol  -Aspirin, plavix, heparin gtt   -High intensity statins   -ECHO reveals ischemic cardiomyopathy  -ACEi/ARB held due to Amira  -AM troponin downtrending @ 29.   -Coreg changed to low dose metoprolol due to hypotension

## 2017-05-23 NOTE — ASSESSMENT & PLAN NOTE
Pt k/c/o CKD stage 3-4 2/2 HTN, solitary kidney s/p nephrectomy due to RCC, recurrent UTIs   - baseline creat 2.3 to 2.7   - THEO may be pre renal from hypotension  - creat BUN continue to trend up this morning 5.8/101  - pt with low UO overnight after montano cath was dced. Received lasix 80 mg overnight, recorded 775 cc overnight   - may require to start dialysis in the next few days  - palliative care still discussing wth pt and family reg goals of care   - Na+ down to 132, will check serum and urine osm and lytes, may be due to hypervolemic hyponatremia vs 2/2 to THEO   - will continue to follow

## 2017-05-23 NOTE — ASSESSMENT & PLAN NOTE
S/P recent  evaluation with recommendations for local wound care, montano catheter and ureteral catheter removed.   1. Appears there is no further treatment for his fistula with multitude of comorbidities and patient is bound to experience recurrent infections as a result of this.

## 2017-05-23 NOTE — SUBJECTIVE & OBJECTIVE
Interval History: No acute events    Review of Systems   Unable to perform ROS: Mental status change     Objective:     Vital Signs (Most Recent):  Temp: 97 °F (36.1 °C) (05/23/17 0800)  Pulse: 82 (05/23/17 0800)  Resp: (!) 26 (05/23/17 0800)  BP: (!) 106/55 (05/23/17 0800)  SpO2: 98 % (05/23/17 0800) Vital Signs (24h Range):  Temp:  [97 °F (36.1 °C)-98.2 °F (36.8 °C)] 97 °F (36.1 °C)  Pulse:  [69-96] 82  Resp:  [20-26] 26  SpO2:  [95 %-98 %] 98 %  BP: ()/(51-64) 106/55     Weight: 77.8 kg (171 lb 8.3 oz)  Body mass index is 24.61 kg/m².    Estimated Creatinine Clearance: 9.6 mL/min (based on Cr of 5.8).    Physical Exam   Constitutional: He appears well-developed. No distress.   HENT:   Head: Normocephalic and atraumatic.   Cardiovascular: Normal rate and regular rhythm.    Pulmonary/Chest: Effort normal. He has no rales.   Abdominal: Soft. Bowel sounds are normal.   Genitourinary:   Genitourinary Comments: +montano, fistula in right thigh crease   Musculoskeletal: Normal range of motion.   Vitals reviewed.      Significant Labs:   Blood Culture:   Recent Labs  Lab 05/19/17  1737 05/20/17  1325 05/20/17  1337   LABBLOO Gram stain nnamdi bottle: Gram negative rods   Results called to and read back by:Radhika Wolf RN 05/20/2017  12:39  KLEBSIELLA PNEUMONIAE ESBL No Growth to date  No Growth to date  No Growth to date No Growth to date  No Growth to date  No Growth to date     Urine Culture:   Recent Labs  Lab 05/19/17  1737 05/20/17  1607   LABURIN KLEBSIELLA PNEUMONIAE ESBL>100,000 cfu/ml GRAM NEGATIVE KELSEA>100,000 cfu/mlIdentification and susceptibility pending       Significant Imaging: I have reviewed all pertinent imaging results/findings within the past 24 hours.

## 2017-05-23 NOTE — PROGRESS NOTES
ABD pad at right groin and bed soiled with urine this morning.  Stevens in place still draining minimal urine output. Changed linen and applied ointment to groin. Will order more ABD pad to apply to groin site. Bladder scan=0cc

## 2017-05-23 NOTE — ASSESSMENT & PLAN NOTE
-Schedule Dilaudid 2mg po q6hrs.  PRN Dilaudid 0.5mg IV q2hrs for breakthrough.   -Pain related to chronic  issues, buttock pain, also noted to complain of extremity pain in LUE, has a chronic neuropathy diagnosis.   -adding PO dilaudid to medications, want to avoid any long acting basal ER opiates with Amira, would consider very low dose fentanyl patch only if plan of care shifted to full comfort measures.   - PCA may be better option if patient remains poorly controlled.

## 2017-05-23 NOTE — ASSESSMENT & PLAN NOTE
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    - Cr rising to 5.1, blood pressure lower today.  D/C Lasix drip, may need initiation of IV push lasix to maintain UOP when Bp more stable,  patient does not appear acutely overloaded but physical exam is limited due to maintenance of pt comfort.  Will need to monitor if oliguria/anuria develops and if diuretics are going to be only way now to maintain UOP.   -nephrology re-consulted

## 2017-05-23 NOTE — CHAPLAIN
visited patient in response to palliative care consult.   provided compassionate presence and prayer for patient and family.

## 2017-05-23 NOTE — ASSESSMENT & PLAN NOTE
No current chest pain or SOB but patient with discomfort. Refused LHC. Currently being managed medically.   1. Aspirin, plavix, heparin gtt   2. High intensity statins   3. Continue low dose metoprolol for now.

## 2017-05-23 NOTE — ASSESSMENT & PLAN NOTE
Blood cx with ESBL kleb - cleared on 5/20     -previous duration from consult note is incorrect  -will need 14 days of treatment from 5/20 as per medicine's plan

## 2017-05-23 NOTE — SUBJECTIVE & OBJECTIVE
"Interval History: "I'm bloated. I don't know if I have pain or I'm just bloated.". No acute overnight events. Patient still unsure if he wants to proceed with hemodialysis or be discharged to hospice care when appropriate.     Review of Systems   Constitutional: Negative for chills, fatigue and fever.   HENT: Negative for ear pain, mouth sores, nosebleeds, postnasal drip, rhinorrhea, sinus pressure, sore throat, tinnitus, trouble swallowing and voice change.    Eyes: Negative for photophobia, pain, redness and visual disturbance.   Respiratory: Positive for chest tightness. Negative for apnea, cough, shortness of breath and wheezing.    Cardiovascular: Negative for chest pain, palpitations and leg swelling.   Gastrointestinal: Positive for abdominal distention. Negative for abdominal pain, blood in stool, constipation, diarrhea, nausea and vomiting.   Endocrine: Negative for cold intolerance, heat intolerance, polydipsia and polyuria.   Genitourinary: Negative for decreased urine volume, difficulty urinating, discharge, dysuria, flank pain, frequency, genital sores, hematuria, penile pain, penile swelling, scrotal swelling, testicular pain and urgency.   Musculoskeletal: Negative for arthralgias, back pain, joint swelling, myalgias and neck pain.   Skin: Negative for color change and rash.   Allergic/Immunologic: Negative for environmental allergies and food allergies.   Neurological: Negative for dizziness, seizures, syncope, weakness, light-headedness, numbness and headaches.   Hematological: Negative for adenopathy. Does not bruise/bleed easily.   Psychiatric/Behavioral: Negative for agitation, confusion, decreased concentration, hallucinations, self-injury, sleep disturbance and suicidal ideas. The patient is not nervous/anxious.      Objective:     Vital Signs (Most Recent):  Temp: 97 °F (36.1 °C) (05/23/17 0800)  Pulse: 73 (05/23/17 1500)  Resp: (!) 26 (05/23/17 0800)  BP: (!) 106/55 (05/23/17 0800)  SpO2: 98 " % (05/23/17 0800) Vital Signs (24h Range):  Temp:  [97 °F (36.1 °C)-98.2 °F (36.8 °C)] 97 °F (36.1 °C)  Pulse:  [69-95] 73  Resp:  [20-26] 26  SpO2:  [95 %-98 %] 98 %  BP: ()/(51-64) 106/55     Weight: 77.8 kg (171 lb 8.3 oz)  Body mass index is 24.61 kg/m².    Intake/Output Summary (Last 24 hours) at 05/23/17 1803  Last data filed at 05/23/17 1400   Gross per 24 hour   Intake           890.01 ml   Output               75 ml   Net           815.01 ml      Physical Exam   Constitutional: He is oriented to person, place, and time. He appears well-developed and well-nourished. He has a sickly appearance.   HENT:   Head: Normocephalic and atraumatic.   Right Ear: External ear normal.   Left Ear: External ear normal.   Mouth/Throat: Oropharynx is clear and moist.   Eyes: Conjunctivae and EOM are normal. Pupils are equal, round, and reactive to light.   Neck: Normal range of motion. Neck supple. No thyromegaly present.   Cardiovascular: Normal rate.  An irregularly irregular rhythm present.   Murmur heard.   Systolic murmur is present with a grade of 2/6   Pulmonary/Chest: Effort normal and breath sounds normal. He has no wheezes. He has no rales.   Abdominal: Soft. Bowel sounds are normal. He exhibits no mass. There is no tenderness. There is no rebound.   Genitourinary:   Genitourinary Comments: Stevens catheter in place. Right thigh fistula covered.   Musculoskeletal: Normal range of motion. He exhibits tenderness.   Lymphadenopathy:     He has no cervical adenopathy.   Neurological: He is alert and oriented to person, place, and time.   Skin: Skin is warm and dry.   Venous stasis dermatitis over the bilateral lower extremities.   Psychiatric: He has a normal mood and affect. His behavior is normal.   Vitals reviewed.      Significant Labs:   BMP:   Recent Labs  Lab 05/23/17  0344   GLU 99   *   K 3.4*   CL 92*   CO2 21*   *   CREATININE 5.8*   CALCIUM 9.3   MG 2.6     CBC:   Recent Labs  Lab  05/22/17  0440 05/23/17  0344   WBC 4.18 4.61   HGB 7.8* 8.8*   HCT 24.4* 27.2*   * 158       Significant Imaging: I have reviewed all pertinent imaging results/findings within the past 24 hours.

## 2017-05-23 NOTE — ASSESSMENT & PLAN NOTE
AFIb is not new for patient, he does have prior history of it.  However he is not on OP anticoagulation, so discussion is still needed with patient/family to decide if Heparin drip should continue or if warfarin should be started (only oral AntiCoag option). Currently rate controlled. Nephrology service recommends holding beta blocker if possible.  1. Continue low dose beta blockers for rate control   2. Chads- vasc is 4 - 4.8% annual risk./ ATRIA bleeding risk score 5.8% annual risk of hemorrhage, high risk category.  3. Patient still undecided about oral anticoagulation.

## 2017-05-23 NOTE — ASSESSMENT & PLAN NOTE
-continue with renal dosed Meropenem 500mg IV q12hrs.   -ID consulted, rec 10days abx, but pt may need at least 14 for bacteremia.   -source is likely  due to patient's chronic urologic complications  -Surveillance culture drawn 5/20 - NGTD.

## 2017-05-23 NOTE — ASSESSMENT & PLAN NOTE
-I presumed this was new onset atrial fibrillation secondary to STEMI, patient is rate controlled  -cardiology comanage consultation evaluted, in further discussion this AFIb is not new for patient, he does have prior history of it.  However he is not on OP anticoagulation, so discussion is still needed with patient/family to decide if Heparin drip should continue or if warfarin should be started (only oral AntiCoag option)  -continue low dose beta blockers for rate control   -Chads- vasc is 4 - 4.8% annual risk./ ATRIA bleeding risk score 5.8% annual risk of hemorrhage, high risk category

## 2017-05-23 NOTE — PLAN OF CARE
Problem: Patient Care Overview  Goal: Plan of Care Review  Outcome: Ongoing (interventions implemented as appropriate)  Plan of care discussed with pt. Pt free of falls/trauma/injuries this shift. Heparin gtt infusing. Pt on contact for ESBL in urine and blood. Meropenem IV Q12H. Educated family on washing hands with soap and water. Lasix 90mg IVP X1 given for decreased UOP. Pt extremely frustrated today-wouldn't allow repositioning. Reeducated on positioning to prevent skin breakdown. Nursing orders for groin wounds initiated. Dilaudid given for pain. VSS & NADN. All questions addressed.  Will continue to monitor.

## 2017-05-24 LAB
ALBUMIN SERPL BCP-MCNC: 2.1 G/DL
ALP SERPL-CCNC: 108 U/L
ALT SERPL W/O P-5'-P-CCNC: 45 U/L
ANION GAP SERPL CALC-SCNC: 17 MMOL/L
APTT BLDCRRT: 83.8 SEC
AST SERPL-CCNC: 53 U/L
BACTERIA #/AREA URNS AUTO: ABNORMAL /HPF
BASOPHILS # BLD AUTO: 0.01 K/UL
BASOPHILS NFR BLD: 0.2 %
BILIRUB DIRECT SERPL-MCNC: 0.2 MG/DL
BILIRUB SERPL-MCNC: 0.3 MG/DL
BILIRUB UR QL STRIP: NEGATIVE
BUN SERPL-MCNC: 112 MG/DL
CALCIUM SERPL-MCNC: 9.3 MG/DL
CHLORIDE SERPL-SCNC: 90 MMOL/L
CLARITY UR REFRACT.AUTO: ABNORMAL
CO2 SERPL-SCNC: 25 MMOL/L
COLOR UR AUTO: ABNORMAL
CREAT SERPL-MCNC: 5.7 MG/DL
DIFFERENTIAL METHOD: ABNORMAL
EOSINOPHIL # BLD AUTO: 0.2 K/UL
EOSINOPHIL NFR BLD: 3.4 %
ERYTHROCYTE [DISTWIDTH] IN BLOOD BY AUTOMATED COUNT: 15.5 %
EST. GFR  (AFRICAN AMERICAN): 9.6 ML/MIN/1.73 M^2
EST. GFR  (NON AFRICAN AMERICAN): 8.3 ML/MIN/1.73 M^2
FACT X PPP CHRO-ACNC: 0.61 IU/ML
GLUCOSE SERPL-MCNC: 91 MG/DL
GLUCOSE UR QL STRIP: NEGATIVE
HCT VFR BLD AUTO: 25.7 %
HGB BLD-MCNC: 8.4 G/DL
HGB UR QL STRIP: ABNORMAL
HYALINE CASTS UR QL AUTO: 0 /LPF
INR PPP: 1.1
KETONES UR QL STRIP: NEGATIVE
LEUKOCYTE ESTERASE UR QL STRIP: ABNORMAL
LYMPHOCYTES # BLD AUTO: 0.7 K/UL
LYMPHOCYTES NFR BLD: 16.3 %
MAGNESIUM SERPL-MCNC: 2.3 MG/DL
MCH RBC QN AUTO: 26.8 PG
MCHC RBC AUTO-ENTMCNC: 32.7 %
MCV RBC AUTO: 82 FL
MICROSCOPIC COMMENT: ABNORMAL
MONOCYTES # BLD AUTO: 0.5 K/UL
MONOCYTES NFR BLD: 10.6 %
NEUTROPHILS # BLD AUTO: 3 K/UL
NEUTROPHILS NFR BLD: 68.8 %
NITRITE UR QL STRIP: NEGATIVE
NON-SQ EPI CELLS #/AREA URNS AUTO: 10 /HPF
PH UR STRIP: 6 [PH] (ref 5–8)
PHOSPHATE SERPL-MCNC: 7.5 MG/DL
PLATELET # BLD AUTO: 169 K/UL
PMV BLD AUTO: 10.9 FL
POCT GLUCOSE: 106 MG/DL (ref 70–110)
POCT GLUCOSE: 132 MG/DL (ref 70–110)
POTASSIUM SERPL-SCNC: 3.5 MMOL/L
PROT SERPL-MCNC: 6.9 G/DL
PROT UR QL STRIP: ABNORMAL
PROTHROMBIN TIME: 11.5 SEC
RBC # BLD AUTO: 3.14 M/UL
RBC #/AREA URNS AUTO: >100 /HPF (ref 0–4)
SODIUM SERPL-SCNC: 132 MMOL/L
SP GR UR STRIP: 1.01 (ref 1–1.03)
SQUAMOUS #/AREA URNS AUTO: 19 /HPF
URN SPEC COLLECT METH UR: ABNORMAL
UROBILINOGEN UR STRIP-ACNC: NEGATIVE EU/DL
VANCOMYCIN SERPL-MCNC: 12 UG/ML
WBC # BLD AUTO: 4.36 K/UL
WBC #/AREA URNS AUTO: >100 /HPF (ref 0–5)
WBC CLUMPS UR QL AUTO: ABNORMAL

## 2017-05-24 PROCEDURE — 80048 BASIC METABOLIC PNL TOTAL CA: CPT

## 2017-05-24 PROCEDURE — 25000003 PHARM REV CODE 250: Performed by: INTERNAL MEDICINE

## 2017-05-24 PROCEDURE — 85610 PROTHROMBIN TIME: CPT

## 2017-05-24 PROCEDURE — 25000003 PHARM REV CODE 250: Performed by: HOSPITALIST

## 2017-05-24 PROCEDURE — 99232 SBSQ HOSP IP/OBS MODERATE 35: CPT | Mod: ,,, | Performed by: INTERNAL MEDICINE

## 2017-05-24 PROCEDURE — 87086 URINE CULTURE/COLONY COUNT: CPT

## 2017-05-24 PROCEDURE — 20600001 HC STEP DOWN PRIVATE ROOM

## 2017-05-24 PROCEDURE — 92526 ORAL FUNCTION THERAPY: CPT

## 2017-05-24 PROCEDURE — 84100 ASSAY OF PHOSPHORUS: CPT

## 2017-05-24 PROCEDURE — 83735 ASSAY OF MAGNESIUM: CPT

## 2017-05-24 PROCEDURE — 85025 COMPLETE CBC W/AUTO DIFF WBC: CPT

## 2017-05-24 PROCEDURE — 63600175 PHARM REV CODE 636 W HCPCS: Performed by: INTERNAL MEDICINE

## 2017-05-24 PROCEDURE — 99233 SBSQ HOSP IP/OBS HIGH 50: CPT | Mod: ,,, | Performed by: INTERNAL MEDICINE

## 2017-05-24 PROCEDURE — 99233 SBSQ HOSP IP/OBS HIGH 50: CPT | Mod: S$PBB,,, | Performed by: CLINICAL NURSE SPECIALIST

## 2017-05-24 PROCEDURE — 80076 HEPATIC FUNCTION PANEL: CPT

## 2017-05-24 PROCEDURE — 25000003 PHARM REV CODE 250: Performed by: STUDENT IN AN ORGANIZED HEALTH CARE EDUCATION/TRAINING PROGRAM

## 2017-05-24 PROCEDURE — 85520 HEPARIN ASSAY: CPT

## 2017-05-24 PROCEDURE — 81001 URINALYSIS AUTO W/SCOPE: CPT

## 2017-05-24 PROCEDURE — 80202 ASSAY OF VANCOMYCIN: CPT

## 2017-05-24 PROCEDURE — 36415 COLL VENOUS BLD VENIPUNCTURE: CPT

## 2017-05-24 PROCEDURE — 85730 THROMBOPLASTIN TIME PARTIAL: CPT

## 2017-05-24 RX ORDER — SODIUM CHLORIDE 9 MG/ML
INJECTION, SOLUTION INTRAVENOUS CONTINUOUS
Status: DISCONTINUED | OUTPATIENT
Start: 2017-05-24 | End: 2017-05-26

## 2017-05-24 RX ORDER — HYDROMORPHONE HYDROCHLORIDE 2 MG/1
4 TABLET ORAL EVERY 6 HOURS
Status: DISCONTINUED | OUTPATIENT
Start: 2017-05-24 | End: 2017-05-31

## 2017-05-24 RX ADMIN — HYDROMORPHONE HYDROCHLORIDE 2 MG: 2 TABLET ORAL at 04:05

## 2017-05-24 RX ADMIN — Medication 3 ML: at 06:05

## 2017-05-24 RX ADMIN — SODIUM CHLORIDE: 0.9 INJECTION, SOLUTION INTRAVENOUS at 05:05

## 2017-05-24 RX ADMIN — HYDROMORPHONE HYDROCHLORIDE 0.5 MG: 1 INJECTION, SOLUTION INTRAMUSCULAR; INTRAVENOUS; SUBCUTANEOUS at 08:05

## 2017-05-24 RX ADMIN — HYDROMORPHONE HYDROCHLORIDE 2 MG: 2 TABLET ORAL at 11:05

## 2017-05-24 RX ADMIN — HYDROMORPHONE HYDROCHLORIDE 0.5 MG: 1 INJECTION, SOLUTION INTRAMUSCULAR; INTRAVENOUS; SUBCUTANEOUS at 02:05

## 2017-05-24 RX ADMIN — Medication 3 ML: at 02:05

## 2017-05-24 RX ADMIN — DOCUSATE SODIUM 100 MG: 100 CAPSULE, LIQUID FILLED ORAL at 08:05

## 2017-05-24 RX ADMIN — HYDROXYCHLOROQUINE SULFATE 200 MG: 200 TABLET, FILM COATED ORAL at 08:05

## 2017-05-24 RX ADMIN — SODIUM CHLORIDE 0.5 G: 900 INJECTION, SOLUTION INTRAVENOUS at 05:05

## 2017-05-24 RX ADMIN — SODIUM BICARBONATE 650 MG: 650 TABLET ORAL at 08:05

## 2017-05-24 RX ADMIN — ALLOPURINOL 100 MG: 100 TABLET ORAL at 08:05

## 2017-05-24 RX ADMIN — Medication 3 ML: at 10:05

## 2017-05-24 RX ADMIN — HEPARIN SODIUM AND DEXTROSE 17 UNITS/KG/HR: 10000; 5 INJECTION INTRAVENOUS at 11:05

## 2017-05-24 RX ADMIN — ATORVASTATIN CALCIUM 80 MG: 20 TABLET, FILM COATED ORAL at 08:05

## 2017-05-24 RX ADMIN — MICONAZOLE NITRATE: 20 CREAM TOPICAL at 08:05

## 2017-05-24 RX ADMIN — HYDROXYCHLOROQUINE SULFATE 200 MG: 200 TABLET, FILM COATED ORAL at 09:05

## 2017-05-24 RX ADMIN — MEROPENEM AND SODIUM CHLORIDE 500 MG: 500 INJECTION, SOLUTION INTRAVENOUS at 04:05

## 2017-05-24 RX ADMIN — SEVELAMER CARBONATE 1600 MG: 800 TABLET, FILM COATED ORAL at 11:05

## 2017-05-24 RX ADMIN — SEVELAMER CARBONATE 1600 MG: 800 TABLET, FILM COATED ORAL at 08:05

## 2017-05-24 RX ADMIN — SODIUM BICARBONATE 650 MG: 650 TABLET ORAL at 09:05

## 2017-05-24 RX ADMIN — CLOPIDOGREL 75 MG: 75 TABLET, FILM COATED ORAL at 08:05

## 2017-05-24 RX ADMIN — HYDROMORPHONE HYDROCHLORIDE 4 MG: 2 TABLET ORAL at 11:05

## 2017-05-24 RX ADMIN — ASPIRIN 81 MG CHEWABLE TABLET 81 MG: 81 TABLET CHEWABLE at 08:05

## 2017-05-24 RX ADMIN — Medication 6.25 MG: at 08:05

## 2017-05-24 RX ADMIN — Medication 6.25 MG: at 09:05

## 2017-05-24 RX ADMIN — HYDROMORPHONE HYDROCHLORIDE 4 MG: 2 TABLET ORAL at 05:05

## 2017-05-24 NOTE — ASSESSMENT & PLAN NOTE
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 Serum creatinine continues to worsen. Patient unwilling to have goals of care discussion or establish a therapeutic management plan.  1. Monitor creatinine level.  2. Avoid nephrotoxic agents.  3. Renally dose medications.

## 2017-05-24 NOTE — PHYSICIAN QUERY
PT Name: Humphrey Machado  MR #: [id number]    Physician Query Form - Atrial Fibrillation Specificity     CDS/: Rafael Galvan Jr, RN               Contact information:[phone]     This form is a permanent document in the medical record.     Query Date: May 24, 2017    By submitting this query, we are merely seeking further clarification of documentation. Please utilize your independent clinical judgment when addressing the question(s) below.    The medical record contains the following:   Indicators     Supporting Clinical Findings Location in Medical Record   x Atrial Fibrillation Atrial fibrillation with RVR    AFIb is not new for patient, he does have prior history of it.  However he is not on OP anticoagulation, so discussion is still needed with patient/family to decide if Heparin drip should continue or if warfarin should be started (only oral AntiCoag option). Currently rate controlled.   5/23 Hospital Medicine Progress Note   x EKG results Sinus tachycardia with Premature supraventricular complexes      EKG:      Afib with ST elevation AVR , II,III  5/19 EKG Report      5/20 Cardiology Progress Note    Medication      Treatment     x Other Patient converted back to Afib on tele monitor. MD notified. MD Yoel order for  EKG. Orders placed.       2250- EKG placed in chart=afib  5/20 Nursing Progress Note       Provider, please further specify the Atrial Fibrillation diagnosis.    [  ] Chronic    [ X ] Paroxysmal    [  ] Other (please specify): ____________________________    [  ] Clinically Undetermined    Please document in your progress notes daily for the duration of treatment until resolved, and include in your discharge summary.

## 2017-05-24 NOTE — SUBJECTIVE & OBJECTIVE
"Interval History:   No acute overnight events . Urine output ok.     Review of patient's allergies indicates:   Allergen Reactions    Aspirin Other (See Comments)     Stomach      Ditropan [oxybutynin chloride]      Unable to describe side effect other than "felt strange"     Keflex [cephalexin] Rash     Morbilliform  Has tolerated multiple cephalosporins since 2013.    Macrobid [nitrofurantoin monohyd/m-cryst] Hives and Rash     Current Facility-Administered Medications   Medication Frequency    allopurinol tablet 100 mg Daily    aspirin chewable tablet 81 mg Daily    atorvastatin tablet 80 mg Daily    bisacodyl suppository 10 mg Daily PRN    clopidogrel tablet 75 mg Daily    dextrose 50% injection 12.5 g PRN    dextrose 50% injection 25 g PRN    docusate sodium capsule 100 mg BID    glucagon (human recombinant) injection 1 mg PRN    glucose chewable tablet 16 g PRN    glucose chewable tablet 24 g PRN    heparin 25,000 units in dextrose 5% 250 mL (100 units/mL) bolus from bag; ADDITIONAL PRN BOLUS PRN    heparin 25,000 units in dextrose 5% 250 mL (100 units/mL) infusion Continuous    hydromorphone injection 0.5 mg Q2H PRN    HYDROmorphone tablet 2 mg Q6H    hydroxychloroquine tablet 200 mg BID    meropenem-0.9% sodium chloride 500 mg/50 mL IVPB Q12H    metoprolol 12.5mg/1.25mL oral solution 6.25 mg BID    miconazole 2 % cream BID    nitroGLYCERIN SL tablet 0.4 mg Q5 Min PRN    ondansetron injection 4 mg Q8H PRN    sevelamer carbonate tablet 1,600 mg TID WM    simethicone chewable tablet 80 mg TID PRN    sodium bicarbonate tablet 650 mg BID    sodium chloride 0.9% flush 3 mL Q8H    sodium chloride 0.9% flush 3 mL Q8H       Objective:     Vital Signs (Most Recent):  Temp: 97.7 °F (36.5 °C) (05/24/17 0730)  Pulse: 68 (05/24/17 0730)  Resp: 20 (05/24/17 0730)  BP: (!) 90/55 (05/24/17 0730)  SpO2: 95 % (05/24/17 0730)  O2 Device (Oxygen Therapy): nasal cannula (05/24/17 0730) Vital Signs " (24h Range):  Temp:  [97.2 °F (36.2 °C)-98.2 °F (36.8 °C)] 97.7 °F (36.5 °C)  Pulse:  [68-83] 68  Resp:  [20] 20  SpO2:  [95 %-97 %] 95 %  BP: ()/(55-69) 90/55     Weight: 77.8 kg (171 lb 8.3 oz) (05/19/17 1125)  Body mass index is 24.61 kg/m².  Body surface area is 1.96 meters squared.    I/O last 3 completed shifts:  In: 980 [P.O.:660; I.V.:270; IV Piggyback:50]  Out: 975 [Urine:975]    Physical Exam   Constitutional: He is oriented to person, place, and time. He appears well-developed and well-nourished.   HENT:   Head: Normocephalic and atraumatic.   Eyes: Conjunctivae and EOM are normal.   Neck: Neck supple.   Cardiovascular: Normal rate, regular rhythm and normal heart sounds.    Pulmonary/Chest: Effort normal and breath sounds normal.   Abdominal: Soft. Bowel sounds are normal.   Neurological: He is alert and oriented to person, place, and time.       Significant Labs:  CBC:     Recent Labs  Lab 05/24/17  0511   WBC 4.36   RBC 3.14*   HGB 8.4*   HCT 25.7*      MCV 82   MCH 26.8*   MCHC 32.7     CMP:     Recent Labs  Lab 05/24/17  0511   GLU 91   CALCIUM 9.3   ALBUMIN 2.1*   PROT 6.9   *   K 3.5   CO2 25   CL 90*   *   CREATININE 5.7*   ALKPHOS 108   ALT 45*   AST 53*   BILITOT 0.3       Recent Labs  Lab 05/24/17  0030   COLORU Brittany   SPECGRAV 1.010   PHUR 6.0   PROTEINUA 2+*   BACTERIA Rare   NITRITE Negative   LEUKOCYTESUR 2+*   UROBILINOGEN Negative   HYALINECASTS 0      Labs: Reviewed

## 2017-05-24 NOTE — ASSESSMENT & PLAN NOTE
- 2/2 to THEO on CKD, phos down from 8.0 to 7.5   - pt on low phos diet   - continue sevelamer 1600 TID today

## 2017-05-24 NOTE — PROGRESS NOTES
[facility]  Infectious Disease  Progress Note    Patient Name: Humphrey Machado  MRN: [id number]  Admission Date: 5/19/2017  Length of Stay: 5 days  Attending Physician: Chaya Tinsley MD  Primary Care Provider: Andrew Murray MD    Isolation Status: Contact  Assessment/Plan:      Urinary tract infection due to extended-spectrum beta lactamase (ESBL)-producing Klebsiella    85 y.o. male admitted with MI on 5/19. He has a PMHx of HTN, HLD, CKD, prostate cancer, RCC s/p left nephrectomy, urethral stricture, and recurrent UTI.     Cure of his UTI is unlikely at this point given his source control issue and chronically positive urine cx. Was febrile with leukocytosis earlier in the admission but improved (prior to meropenem).     -continue treatment for UTI  -continue ertapenem          * Bacteremia due to Klebsiella pneumoniae    Blood cx with ESBL kleb - cleared on 5/20     -will need 14 days of treatment with ertapenem from 5/20  -this will also cover the UTI            Thank you for your consult. I will sign off. Please contact us if you have any additional questions.    Rashard Munguia MD  Infectious Disease  [facility]    Subjective:     Principal Problem:Bacteremia due to Klebsiella pneumoniae    HPI: No notes on file  Interval History: No acute events    Review of Systems   Unable to perform ROS: Mental status change     Objective:     Vital Signs (Most Recent):  Temp: 97.7 °F (36.5 °C) (05/24/17 0730)  Pulse: 75 (05/24/17 1100)  Resp: 20 (05/24/17 0730)  BP: (!) 90/55 (05/24/17 0730)  SpO2: 95 % (05/24/17 0730) Vital Signs (24h Range):  Temp:  [97.2 °F (36.2 °C)-98.2 °F (36.8 °C)] 97.7 °F (36.5 °C)  Pulse:  [68-83] 75  Resp:  [20] 20  SpO2:  [95 %-97 %] 95 %  BP: ()/(55-69) 90/55     Weight: 77.8 kg (171 lb 8.3 oz)  Body mass index is 24.61 kg/m².    Estimated Creatinine Clearance: 9.8 mL/min (based on Cr of 5.7).    Physical Exam   Constitutional: He appears  well-developed. No distress.   HENT:   Head: Normocephalic and atraumatic.   Cardiovascular: Normal rate and regular rhythm.    Pulmonary/Chest: Effort normal. He has no rales.   Abdominal: Soft. Bowel sounds are normal.   Genitourinary:   Genitourinary Comments: +montano, fistula in right thigh crease   Musculoskeletal: Normal range of motion.   Vitals reviewed.      Significant Labs:   Blood Culture:     Recent Labs  Lab 05/19/17  1737 05/20/17  1325 05/20/17  1337   LABBLOO Gram stain nnamdi bottle: Gram negative rods   Results called to and read back by:Radhika Wolf RN 05/20/2017  12:39  KLEBSIELLA PNEUMONIAE ESBL No Growth to date  No Growth to date  No Growth to date  No Growth to date No Growth to date  No Growth to date  No Growth to date  No Growth to date     Urine Culture:     Recent Labs  Lab 05/19/17  1737 05/20/17  1607   LABURIN KLEBSIELLA PNEUMONIAE ESBL>100,000 cfu/ml KLEBSIELLA PNEUMONIAE ESBL>100,000 cfu/ml       Significant Imaging: I have reviewed all pertinent imaging results/findings within the past 24 hours.

## 2017-05-24 NOTE — PT/OT/SLP PROGRESS
Speech Language Pathology  Treatment    Humphrey Machado   MRN: [id number]   Admitting Diagnosis: ST elevation myocardial infarction (STEMI)  The primary encounter diagnosis was Venous insufficiency of both lower extremities. Diagnoses of ST elevation myocardial infarction (STEMI) involving other coronary artery of inferior wall, Atrial fibrillation with RVR, Respiratory distress, CKD (chronic kidney disease), unspecified stage, Ulcer of calf, right, with fat layer exposed, Chest pain, Inflammatory arthritis, Peripheral vascular disease, unspecified, Encounter for long-term (current) use of other medications, STEMI (ST elevation myocardial infarction), ST elevation myocardial infarction (STEMI), ST elevation myocardial infarction (STEMI), unspecified artery, Acute kidney injury superimposed on CKD, Ischemic cardiomyopathy, Other chronic pain, Complicated open wound of right thigh, subsequent encounter, Oropharyngeal dysphagia, Debility, Bacteremia due to Klebsiella pneumoniae, Hyperphosphatemia, 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    Diet recommendations: Solid Diet Level: Dental Soft  Liquid Diet Level: Nectar Thick Feed only when awake/alert, No straws, HOB to 90 degrees, Small bites/sips, 1 bite/sip at a time, Check for pocketing/oral residue, Eliminate distractions, Assistance with meals and Assistance with thickening liquids   -To achieve nectar-thickened consistency, use 1 thickener packet per 6 oz of liquids.   -Continue to monitor for signs and symptoms of aspiration and discontinue oral feeding should you notice any of the following: watery eyes, reddened facial area, wet vocal quality, increased work of breathing, change in respiratory status, increased congestion, coughing, fever, etc.    SLP Treatment Date:  17  Speech Start Time: 1552     Speech Stop Time: 1602     Speech Total (min): 10 min       TREATMENT BILLABLE MINUTES:  Treatment Swallowing Dysfunction 10    Has the patient been evaluated by SLP for swallowing? : Yes  Keep patient NPO?: No   General Precautions: Standard, aspiration, nectar thick, fall, respiratory, contact  Current Respiratory Status: nasal cannula       Subjective:  SLP communicated with nurse prior to session, nurse explains Patient consuming non-thickened liquids. Nurse further explains famiyl to have conference tonight to discuss Patient's POC.   Patient presents fatigued, cooperative  Patient's wife reports Patient not thickening liquids, points to thickener packets left on windowsill  Patient reports L-sided, generalized pain.    Pain Ratin/10  Location - Side: Left  Location - Orientation: generalized     Pain Addressed: Pre-medicate for activity  Pain Rating Post-Intervention: 5/10    Objective: Patient with MD upon first attempt.  Patient with MD upon second attempt. SLP re-attempted later service day. Upon third attempt,  Patient found with: blood pressure cuff, pulse ox (continuous), telemetry, peripheral IV, oxygen, reclined on L side. SLP assists patient in repositioned in bed and elevates HOB to 90 degree angle. CNA in room to empty catheter bag during session. Patient accepts straw sips thin liquids today x4. Patient with delayed, wet change in vocal quality following sips. SLP reviewed recommendations to continue using thickened liquids, aspiration risk, S/S aspiration, and ongoing aspiration precautions including bit not limited to sitting upright for all meals, avoiding straws, using single sips, attention to ea sip and strict supervision with all meals for safety.  Patient and family v/u SLP recommendations. Whiteboard current. No further questions.     Assessment:  Humphrey Machado is a 85 y.o. male with a medical diagnosis of ST elevation myocardial infarction  (STEMI) and presents with Oral Dysphagia and S/S pharyngeal Dyspahgia.    Discharge recommendations: Discharge Facility/Level Of Care Needs:  (pending progress; if pt toelrating thin liquids, no ST f/u)     Goals:    SLP Goals        Problem: SLP Goal    Goal Priority Disciplines Outcome   SLP Goal     SLP Ongoing (interventions implemented as appropriate)   Description:  Speech Language Pathology Goals  Goals expected to be met by 5/28/2017  1. Pt will tolerate dental soft diet with nectar-thickened liquids w/o overt S/S aspiration, MIN A, to improve swallow safety  2. Pt will tolerate trials of thin liquids w/o overt S/S aspiration, MIN A, to improve swallow safety  3. Educate Patient and family on safe swallow strategies and aspiration precautions                       Plan:   Patient to be seen Therapy Frequency: 5 x/week   Plan of Care expires: 06/20/17  Plan of Care reviewed with: patient, spouse, friend  SLP Follow-up?: Yes       ROYAL Crowe, [facility]-SLP  Speech-Language Pathology  Pager: [phone]  5/24/2017

## 2017-05-24 NOTE — PLAN OF CARE
Problem: Patient Care Overview  Goal: Plan of Care Review  Outcome: Ongoing (interventions implemented as appropriate)  Patient is free of fall/trauma/injury. Denies CP, SOB. Pain medication administered per PRN and scheduled orders for complaints of pain to bilateral lower extremities and lower back. Pt also turned every 2 hours and positioned with a foam wedge. Blood glucose monitoring ongoing. No supplemental insulin required per PRN sliding scale. IV abx therapy ongoing. Pt tolerating well. Heparin continues to infuse. Plan of care discussed with pt. Pt verbalizes understanding. All questions addressed. Will continue to monitor

## 2017-05-24 NOTE — ASSESSMENT & PLAN NOTE
Pain related to chronic  issues, buttock pain, also noted to complain of extremity pain in LUE, has a chronic neuropathy diagnosis. Per patient his pain is uncontrolled.   1. Dilaudid 4mg po every 6 hrs.    2. As needed Dilaudid 0.5mg IV every 2 hrs for breakthrough.   3. Would consider very low dose fentanyl patch only if plan of care shifted to full comfort measures.

## 2017-05-24 NOTE — ASSESSMENT & PLAN NOTE
85 y.o. male admitted with MI on 5/19. He has a PMHx of HTN, HLD, CKD, prostate cancer, RCC s/p left nephrectomy, urethral stricture, and recurrent UTI.     Cure of his UTI is unlikely at this point given his source control issue and chronically positive urine cx. Was febrile with leukocytosis earlier in the admission but improved (prior to meropenem).     -continue treatment for UTI  -continue ertapenem

## 2017-05-24 NOTE — SUBJECTIVE & OBJECTIVE
Interval History: No acute events    Review of Systems   Unable to perform ROS: Mental status change     Objective:     Vital Signs (Most Recent):  Temp: 97.7 °F (36.5 °C) (05/24/17 0730)  Pulse: 75 (05/24/17 1100)  Resp: 20 (05/24/17 0730)  BP: (!) 90/55 (05/24/17 0730)  SpO2: 95 % (05/24/17 0730) Vital Signs (24h Range):  Temp:  [97.2 °F (36.2 °C)-98.2 °F (36.8 °C)] 97.7 °F (36.5 °C)  Pulse:  [68-83] 75  Resp:  [20] 20  SpO2:  [95 %-97 %] 95 %  BP: ()/(55-69) 90/55     Weight: 77.8 kg (171 lb 8.3 oz)  Body mass index is 24.61 kg/m².    Estimated Creatinine Clearance: 9.8 mL/min (based on Cr of 5.7).    Physical Exam   Constitutional: He appears well-developed. No distress.   HENT:   Head: Normocephalic and atraumatic.   Cardiovascular: Normal rate and regular rhythm.    Pulmonary/Chest: Effort normal. He has no rales.   Abdominal: Soft. Bowel sounds are normal.   Genitourinary:   Genitourinary Comments: +montano, fistula in right thigh crease   Musculoskeletal: Normal range of motion.   Vitals reviewed.      Significant Labs:   Blood Culture:     Recent Labs  Lab 05/19/17  1737 05/20/17  1325 05/20/17  1337   LABBLOO Gram stain nnamdi bottle: Gram negative rods   Results called to and read back by:Radhika Wolf RN 05/20/2017  12:39  KLEBSIELLA PNEUMONIAE ESBL No Growth to date  No Growth to date  No Growth to date  No Growth to date No Growth to date  No Growth to date  No Growth to date  No Growth to date     Urine Culture:     Recent Labs  Lab 05/19/17  1737 05/20/17  1607   LABURIN KLEBSIELLA PNEUMONIAE ESBL>100,000 cfu/ml KLEBSIELLA PNEUMONIAE ESBL>100,000 cfu/ml       Significant Imaging: I have reviewed all pertinent imaging results/findings within the past 24 hours.

## 2017-05-24 NOTE — ASSESSMENT & PLAN NOTE
Secondary to THEO on CKD. Slightly improved level today which down trended from 8 to 7.5.  1. Sevelamer as per nephrology.  2. Low phosphate diet.

## 2017-05-24 NOTE — PROGRESS NOTES
[facility] Hospital Medicine  Progress Note    Patient Name: Humphrey Machado  MRN: [id number]  Patient Class: IP- Inpatient   Admission Date: 5/19/2017  Length of Stay: 5 days  Attending Physician: Chaya Tinsley MD  Primary Care Provider: Andrew Murray MD    [facility] Team: [facility] Chaya Tinsley MD    Subjective:     Principal Problem:ST elevation myocardial infarction (STEMI)    HPI:  85-year-old man with HTN, HLD, NSTEMI previously refused [facility] 5/2016, CKD stage IV (baseline 2.7), moderate AS, AF, venous insufficiency, cirrhosis, Ureterocutaneous fistula, chronic osteomyelitis of the pelvic region on cefpodoxime, suspected inflammatory arthritis on HCQ, neuromuscular disorder with peripheral neuropathy with recurrent right foot ulcer, prostate cancer s/p radiation, RCC s/p left nephrectomy, skin cancer of the hand, urethral stricture, urinary incontinence p/w SOB which started an hour prior to admission and EKG concerning for STEMI with afib with q waves in inferior/anterior-septal leads with resolution of chest pain with SL NTG. Patient was evaluated in the ED by Dr Duval and recommended to be taken to cath lab considering his clinical picture but he refused to proceed with angiogram due to risk of hemodialysis possibility he will be admitted fort mdical treatment in CCU.     Currently he is very SOB with fluid overload and aneuric.looks like not responding to lasix his son is his POA and they are discussing code status and if they will proceed with more procedures including CRRT.    Patient with recent cystoscopy/cystogram/fistulogram by Dr. 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 Abraham's  Operative noted.     Hospital Course:  Hospital course as above, while  in the CCU patient was medically managed for his STEMI, and for his AMIRA.  CCU team discussed comfort vs aggressive measures, patient's code status changed to DNR and due to concern that patient was anuric with developing AMIRA that may require HD. Discussion per EMR with family revealed they would not want to pursue dialysis. He was started on Lasix drip @ 20mg/hr and IV Diuril q12 hrs and had improved urine output on this regimen.  PRN Morphine and Ativan orders had been placed for patient comfort    Patient stepped down to IM-C casiano service on 5/20.  Patient was acutely encephalopathic, developed fevers and notified by RN blood cultures from admission were growing GNR.  Surveillance cultures sent patient was on Vancomycin and Cefepime.  He was having urine output on the lasix and diuril.  Extensive family discussion held (see progress notes for detail)  Family was not universally ready for comfort care, care plan more no escalation of care, given his altered mentation, I suspected morphine and ativan in Amira contributing, opiates switched to dilaudid and ativan d/c.   Discussed NG tube placement for oral med administration and nutrition but was held off.     5/21 - Patient is more awake and alert, likely related to toxic encephalopathy, remains hemodynamically stable, but was having more pain complaints, was receiving IV dilaudid  0.5mg q2hrs.   SLP evaluated patient and he is safe for dental soft nectar thick liquids.   His heart rhythm converted to atrial fibrillation with controlled rate in the 80-90s.   Will need repeat cardiology recs on management in AM.     Patient with nearly 2L UOP x 24hrs, family with more questions about possible HD or what course his Amira may take.  Nephrology re-contacted to follow the patient.  Diuril will be stopped this evening to monitor how pts UOp  And Cr does.    I reviewed more of recent urologic history and patient has a urinary source of his bacteremia, however with complicated  chronic fistula and wound we may not have source control. Discussed with patient and will consult urology to assist in any other management for source control and chronic urethrocutaneous fistula that may be needed - Urology consult placed for mon Am.     Reviewed prior culture data and patient with hx of Klebsiella ESBL and Pseudomonas in urine, both not sensitive to empiric cefepime so ID approval obtained to change to renal dosed Meropenem     Patient says pain control is improving but is still seems he has chronic pain issues from multiple sites, adding po opiates to pain regimen.     5/22 - Additional consultants have evaluated the patient  Cardiology - no additional med management recs at this time, patient has completed 48hrs of heparin for ACS, however with atrial fibrillation, will continue for now.  Coreg change to low dose metoprolol due to hypotension    Urology - evaluated patient and removed ureteral catheter, no further recs for chronic urtherocutaneous fistula, local wound care.   Wound care evaluated the patient and placed barrier cream in right groin with soft dressing to help absorb urinary leak from fistula, I was at bedside and pt did not feel ready to turn for assessment of buttocks.     Nephrology - evaluated the patient and no acute needs for acute HD, they will continue to follow, did inform pt that nephrology feels initiating HD would not improve overall morbidity/mortality from his co-morbid conditions and HD initiation comes with its own risks and invasive procedures required.     Palliative care - evaluated pt to assist in overall care planning.  MD staff to assist with pain management not available this week.     5/23- patient complained of discomfort. He is still unsure of what he wants for medical management. He is still considering hemodialysis as an option. His renal function continued to worsen but his main concern was his discomfort.    5/24- patient unable to voice his wishes nor  "does he want to have a goals of care discussion. He has poor insight into his disease process. He is verbally abusive to his providers and nursing staff. Discussed with his wife and grandson scheduling a family meeting to establish therapeutic plan.       Interval History: "I thought I wasn't going to survive. 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 I am the farthest away from making any decisions'. No acute overnight events. Patient complains of severe pain yet is not in any distress, does not appear uncomfortable and is not tachycardic. He is verbally abusive not only to myself but to his nurse.     Review of Systems   Constitutional: Negative for chills, fatigue and fever.   HENT: Negative for ear pain, mouth sores, nosebleeds, postnasal drip, rhinorrhea, sinus pressure, sore throat, tinnitus, trouble swallowing and voice change.    Eyes: Negative for photophobia, pain, redness and visual disturbance.   Respiratory: Negative for apnea, cough, chest tightness, shortness of breath and wheezing.    Cardiovascular: Negative for chest pain, palpitations and leg swelling.   Gastrointestinal: Positive for abdominal distention. Negative for abdominal pain, blood in stool, constipation, diarrhea, nausea and vomiting.   Endocrine: Negative for cold intolerance, heat intolerance, polydipsia and polyuria.   Genitourinary: Negative for decreased urine volume, difficulty urinating, discharge, dysuria, flank pain, frequency, genital sores, hematuria, penile pain, penile swelling, scrotal swelling, testicular pain and urgency.   Musculoskeletal: Positive for arthralgias and myalgias. Negative for back pain, joint swelling and neck pain.   Skin: Negative for color change and rash.   Allergic/Immunologic: Negative for environmental allergies and food allergies.   Neurological: Negative for dizziness, seizures, syncope, weakness, light-headedness, numbness and headaches.   Hematological: Negative for adenopathy. Does not bruise/bleed easily. "   Psychiatric/Behavioral: Negative for agitation, confusion, decreased concentration, hallucinations, self-injury, sleep disturbance and suicidal ideas. The patient is not nervous/anxious.      Objective:     Vital Signs (Most Recent):  Temp: 97.6 °F (36.4 °C) (05/24/17 1200)  Pulse: 67 (05/24/17 1500)  Resp: 18 (05/24/17 1200)  BP: 97/61 (05/24/17 1200)  SpO2: 96 % (05/24/17 1200) Vital Signs (24h Range):  Temp:  [97.2 °F (36.2 °C)-98.2 °F (36.8 °C)] 97.6 °F (36.4 °C)  Pulse:  [67-83] 67  Resp:  [18-20] 18  SpO2:  [95 %-97 %] 96 %  BP: ()/(55-69) 97/61     Weight: 77.8 kg (171 lb 8.3 oz)  Body mass index is 24.61 kg/m².    Intake/Output Summary (Last 24 hours) at 05/24/17 1527  Last data filed at 05/24/17 0638   Gross per 24 hour   Intake              180 ml   Output              900 ml   Net             -720 ml      Physical Exam   Constitutional: He is oriented to person, place, and time. He appears well-developed and well-nourished. He has a sickly appearance.   HENT:   Head: Normocephalic and atraumatic.   Right Ear: External ear normal.   Left Ear: External ear normal.   Mouth/Throat: Oropharynx is clear and moist.   Eyes: Conjunctivae and EOM are normal. Pupils are equal, round, and reactive to light.   Neck: Normal range of motion. Neck supple. No thyromegaly present.   Cardiovascular: Normal rate.  An irregularly irregular rhythm present.   Murmur heard.   Systolic murmur is present with a grade of 2/6   Pulmonary/Chest: Effort normal and breath sounds normal. He has no wheezes. He has no rales.   Abdominal: Soft. Bowel sounds are normal. He exhibits no mass. There is no tenderness. There is no rebound.   Genitourinary:   Genitourinary Comments: Stevens catheter in place. Right thigh fistula covered.   Musculoskeletal: Normal range of motion. He exhibits tenderness.   Lymphadenopathy:     He has no cervical adenopathy.   Neurological: He is alert and oriented to person, place, and time.   Skin: Skin is  warm and dry.   Venous stasis dermatitis over the bilateral lower extremities.   Psychiatric: He has a normal mood and affect. His behavior is normal.   Vitals reviewed.      Significant Labs:   CBC:   Recent Labs  Lab 05/23/17 0344 05/24/17 0511   WBC 4.61 4.36   HGB 8.8* 8.4*   HCT 27.2* 25.7*    169     CMP:   Recent Labs  Lab 05/23/17 0344 05/24/17 0511   * 132*   K 3.4* 3.5   CL 92* 90*   CO2 21* 25   GLU 99 91   * 112*   CREATININE 5.8* 5.7*   CALCIUM 9.3 9.3   PROT 7.2 6.9   ALBUMIN 2.2* 2.1*   BILITOT 0.4 0.3   ALKPHOS 119 108   AST 83* 53*   ALT 59* 45*   ANIONGAP 19* 17*   EGFRNONAA 8.2* 8.3*     Coagulation:   Recent Labs  Lab 05/24/17 0511   INR 1.1   APTT 83.8*       Significant Imaging: I have reviewed all pertinent imaging results/findings within the past 24 hours.    Assessment/Plan:      Hyperphosphatemia    Secondary to THEO on CKD. Slightly improved level today which down trended from 8 to 7.5.  1. Sevelamer as per nephrology.  2. Low phosphate diet.        Oropharyngeal dysphagia    Stable.   1. Dental soft diet with Nectar thick liquids.   2. Continue SLP management.          Ischemic cardiomyopathy    EF 35%, mangagement as above          Chronic pain    Pain related to chronic  issues, buttock pain, also noted to complain of extremity pain in LUE, has a chronic neuropathy diagnosis. Per patient his pain is uncontrolled.   1. Dilaudid 4mg po every 6 hrs.    2. As needed Dilaudid 0.5mg IV every 2 hrs for breakthrough.   3. Would consider very low dose fentanyl patch only if plan of care shifted to full comfort measures.           Acute kidney injury superimposed on CKD    Suspected to be intrinsic THEO secondary to poor perfusion from STEMI vs  pre-renal. Serum creatinine continues to worsen. Patient unwilling to have goals of care discussion or establish a therapeutic management plan.  1. Monitor creatinine level.  2. Avoid nephrotoxic agents.  3. Renally dose  medications.          Bacteremia due to Klebsiella pneumoniae    Organism identified as ESBL K pneumoniae. Same organism found in his urine and therefore assumption can be made that bacteremia is secondary to his urinary tract infection. Repeat blood cultures from 5/20 remain no growth to date.  1. Ertapenem 500 mg daily as there is no need for added pseudomonal coverage provided by meropenem.  2. Monitor for signs of encephalopathy which have been seen as an adverse effect or ertapenem.   3. Anticipate a 14 day course total from clearance of bacteremia. End date: 6/2/17.  4. Patient likely to need CVC for antibiotic therapy.        Atrial fibrillation with RVR    AFIb is not new for patient, he does have prior history of it.  However he is not on OP anticoagulation, so discussion is still needed with patient/family to decide if Heparin drip should continue or if warfarin should be started (only oral AntiCoag option). Currently rate controlled. Nephrology service recommends holding beta blocker if possible.  1. Continue low dose beta blockers for rate control   2. Chads- vasc is 4 - 4.8% annual risk./ ATRIA bleeding risk score 5.8% annual risk of hemorrhage, high risk category.  3. Patient still undecided about oral anticoagulation.        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   1. Appears there is no further treatment for his fistula with multitude of comorbidities and patient is bound to experience recurrent infections as a result of this.           Complicated open wound of right thigh    Stable. Urology recommends non aggressive measures.   1. Continue local wound care.        Debility    On bedrest due to active issues.   1. Every 2 hour turns   2. PT/OT for potential placement when pt more clinically stable          Urinary tract infection due to extended-spectrum beta lactamase (ESBL)-producing  Klebsiella    Management as above under bacteremia problem.        * ST elevation myocardial infarction (STEMI)    No current chest pain or SOB but patient with discomfort. Refused C. Currently being managed medically.   1. Aspirin, plavix, heparin gtt   2. High intensity statins   3. Continue low dose metoprolol for now.          VTE Risk Mitigation         Ordered     Medium Risk of VTE  Once      05/19/17 0945     Reason for No Pharmacological VTE Prophylaxis  Once      05/19/17 0945          Chaya Tinsley MD  Department of Hospital Medicine   [facility]

## 2017-05-24 NOTE — PROGRESS NOTES
Progress  Note  Palliative Care          ASSESSMENT/PLAN:       Impression: Mr. Machado is a 85 yr old gentleman s/p STEMI  and refusal of right heart cath 5/20/17. Worsening THEO in setting of chronic kidney disease.  BUN and creatinine elevated.  Lasix drip continued with more than adequate clear yellow urine, indwelling urinary catheter.   He is awake, alert and oriented to person, place, time and situation.  Complains of  Severe acute on chronic pain severe chronic pain and dyspnea on exertion.  No acute distress at this time.        Goals of Care:  Palliative care  meeting with Mr. Machado with his son Nitin (HP) and mother at bedside to follow-up goals of care.     In previous conversations Mr. Machado indicated dialysis was not a goal he was pursuing.  Today he has indicated wish to pursue trial of dialysis as  BUN and Creatinine continue to increase (5.8 and 101).      Mr. Machado states he goal of dialysis is to remove fluid from his lungs and decrease shortness of breath.  Neph. Team has informed patient and family that fluid will continue to accumulate and dialysis could be temporary or permanent.    Son Nitin  (\[facility] [place] [facility]\"") states this is not a family goal.  Reminded patient's son that this remains Mr. Machado's decision to make    .Palliative care as well as primary team have encouraged the patient and family to give consideration to plan of care should Mr. Machado choose not to have dialysis.  Patient and family have refused to engage in discussion or decision making.          At this time, patient and family reluctant to further discussion regarding goals of care: specifically discharge options - hospice. continue with dialysis if it is an option,discharge with home health/AIM or transition to comfort care or hospice hospice. Both patient and son exhibit rude behavior toward palliative care provider      Pain:   OME 78   Mr. Machado added that better pain control is an immediate goal of care. States acute pain is  improving slightly and the chronic pain is still severe and unmanaged.  PRN  IV dilaudid for severe pain not optimized.  Patient was encouraged to request IV dilaudid when pain level is 7-10 on pain scale.  Mr. Machado was also encouraged to utilize complimentary pain management strategies enhance pain medications: distraction - watching television, reading,  Music - use of  Channel [employer], ice and heat to affected area, aromatherapy-lavender offered by palliative care APRN    Current   Dilaudid  4mg by mouth every 6 hrs scheduled  Dilaudid 0.5 mg IVP every 2 hrs as needed for severe pain 7-10/10   .      Plan/Recommendations:  1. Patient has an advanced directive.  His son Denton is the HPOA.  Family was encouraged to bring to hospital this evening to be added to his medical record.   2. Patient may benefit from family meeting with primary team, nephrology and palliative care.  Palliative care will facilitate  3.  Palliative care will continue to follow and assist in developing realistic goals of care   3. Encourage use of non-pharmacological pain management strategies  4. Emotional support      Primary team   Aware  of above recommendations.  Thank you for opportunity to participate in Carter Regalado's care      Signature: Leah Parson, MARILEE, ACNS-BC, OCN     > 50% of  35 min visit spent in chart review, face to face discussion of goals of care,  symptom assessment, coordination of care and emotional support.      SUBJECTIVE:     History of Present Illness:84 yo M with PMHx of HTN,HLD, NSTEMI previously refused [facility] 5/2016, CKD stage IV (baseline 2.7), moderate AS, AF, venous insufficiency, cirrhosis, Ureterocutaneous fistula, chronic osteomyelitis of the pelvic region on cefpodoxime, suspected inflammatory arthritis on HCQ, neuromuscular disorder with peripheral neuropathy with recurrent right foot ulcer, prostate cancer s/p radiation, RCC s/p left nephrectomy, skin cancer of the hand, urethral stricture, urinary  incontinence p/w SOB which started an hour prior to admission and EKG concerning for STEMI with afib with q waves in inferior/anterior-septal leads with resolution of chest pain with SL NTG. Patient was evaluated and recommended to be taken to cath lab considering his clinical picture but he refused to proceed.          Past Medical History:   Diagnosis Date    *Atrial fibrillation     Acute appendicitis 2/19/2015    Anemia     Anxiety     Arthritis     generalized    Basal cell carcinoma 01/2013    right temple    BCC (basal cell carcinoma)     right mid forearm    Bladder stone 6/28/2016    Blood transfusion     Chronic urethral stricture 10/14/2015    Chronic venous insufficiency 7/8/2013    CKD (chronic kidney disease) stage 3, GFR 30-59 ml/min 9/6/2012    Coronary artery disease     Elevated PSA     Essential hypertension 7/30/2015    Hematuria, gross     History of Left Nephrectomy (~2006) 1/29/2014    Done at [facility].     Hypertension     Inflammatory arthritis 8/14/2012    Kidney stone     Long-term use of Plaquenil 6/4/2015    Mixed incontinence urge and stress 4/11/2014    Nonrheumatic aortic valve stenosis 5/30/2016    NSTEMI (non-ST elevated myocardial infarction) 5/30/2016    Olecranon bursitis 1/14/2013    Osteopenia 8/14/2012    Personal history of kidney cancer 4/11/2014    Prostate cancer     Radiation     treatment for prostate cancer    Recurrent UTI 7/8/2013    Respiratory distress     S/P radiation therapy 4/11/2014    Skin cancer of arm     left leg (malignant)    Solitary kidney 7/15/2013    Venous insufficiency of leg 7/12/2012    Venous stasis ulcers 7/6/2012    Vitamin D deficiency disease 5/8/2013     Past Surgical History:   Procedure Laterality Date    APPENDECTOMY      CYSTOSCOPY      with dialation    NEPHRECTOMY  2006-07?    Removal of left kidney    TONSILLECTOMY       Family History   Problem Relation Age of Onset    Cancer Mother      bone   "   Heart disease Father     Urolithiasis Father     Mental illness Daughter     Cancer Brother      prostate cancer, (Ervin) removed by surgery    Cancer Brother      prostate cancer    Cancer Maternal Aunt     Melanoma Neg Hx     Lupus Neg Hx     Rheum arthritis Neg Hx      Review of patient's allergies indicates:   Allergen Reactions    Aspirin Other (See Comments)     Stomach      Ditropan [oxybutynin chloride]      Unable to describe side effect other than "felt strange"     Keflex [cephalexin] Rash     Morbilliform  Has tolerated multiple cephalosporins since 2013.    Macrobid [nitrofurantoin monohyd/m-cryst] Hives and Rash       Medications:  Continuous Infusions:    heparin (porcine) in D5W 17 Units/kg/hr (05/24/17 1112)     Scheduled:    allopurinol  100 mg Oral Daily    aspirin  81 mg Oral Daily    atorvastatin  80 mg Oral Daily    clopidogrel  75 mg Oral Daily    docusate sodium  100 mg Oral BID    ertapenem (INVANZ) IVPB  500 mg Intravenous Q24H    HYDROmorphone  2 mg Oral Q6H    hydroxychloroquine  200 mg Oral BID    metoprolol  6.25 mg Oral BID    miconazole   Topical (Top) BID    sevelamer carbonate  1,600 mg Oral TID WM    sodium bicarbonate  650 mg Oral BID    sodium chloride 0.9%  3 mL Intravenous Q8H    sodium chloride 0.9%  3 mL Intravenous Q8H     PRN Meds: bisacodyl, dextrose 50%, dextrose 50%, glucagon (human recombinant), glucose, glucose, heparin (PORCINE), HYDROmorphone, nitroGLYCERIN, ondansetron, simethicone    24h Oral Morphine Equivalents (OME): 78    Bowel Management Plan (BMP): Yes (X )  NO  (  )    OBJECTIVE:   Symptom Assessment (ESAS 0-10 scale)     ESAS 0 1 2 3 4 5 6 7 8 9 10   Pain        X      Dyspnea   X           Anxiety X             Nausea X             Depression  X             Anorexia X             Fatigue X             Insomnia X             Restlessness  X             Agitation X             Constipation     __ --  ___+   Diarrhea           " __ --  ___+     Pain:  Character: acute on chronic pain  described as sharp and burning   Duration: chronic pain for several years secondary to  pelvic osteomyelitis   Effect:  States  Severe pain interferes with ability to  participate in care (turn frequently)  and  It is difficult to concentrate  Factors: pain has been aggravated by recent surgical procedure and is relieved for short time with  prn pain medications   Frequency: constant   Location: sacral   Severity :States pain is 9/10     Comments:     Performance Status: PPS Score (30  )       Physical Exam:  Vitals: reviewed  General: Afebrile, alert, comfortable, no acute distress.   Pulmonary:dyspnea on exertion,clear to auscultation anteriorly.   Cardiac: normal S1 & S2 w/o rubs/murmurs/gallops.   Abdominal: Non-tender, non-distended.Bowel sounds present x 4. No appreciable hepatosplenomegaly. No guarding or rebound tenderness.   Extremities: Moves all extremities x 4. No peripheral edema. 2+ pulses.  Skin: No jaundice,venous stasis  Rashes to bilateral lower extremities   Neurological: Alert and oriented x 4. No focal neuro deficits.   Psych/Mental Status: rude uncooperative, resists care  CAM / Delirium _not present   FAST Stage for Dementia:      Labs: Reviewed   CBC:   WBC   Date Value Ref Range Status   05/24/2017 4.36 3.90 - 12.70 K/uL Final     MCV   Date Value Ref Range Status   05/23/2017 83 82 - 98 fL Final        A  MCV   Date Value Ref Range Status   05/24/2017 82 82 - 98 fL Final   LKPHOS 108 05/24/2017    BILITOT 0.3 05/24/2017       Albumin:   Albumin   Date Value Ref Range Status   05/24/2017 2.1 (L) 3.5 - 5.2 g/dL Final     Protein:   Total Protein   Date Value Ref Range Status   05/24/2017 6.9 6.0 - 8.4 g/dL Final       LACTIC ACID:   Lab Results   Component Value Date    LACTATE 0.6 09/05/2015    LACTATE 1.1 09/13/2012       Radiology: reviewed     Legal/Advanced Directives: Patient has both living will and HPOA that are not on file.   Family was encouraged to bring to hospital   Living Will: not on file   Resuscitate Status: DNR  Decision-Making Capacity: yes   Medical Power of : son Nitin Machado [phone], next of kin is wife Taryn,     Psychosocial/Cultural:  for 60 yrs 4 children: 3 sons (Humphrey, Denton, Rashard) and one daughter Emy.  He has 8 grandchildren and 2 great grandchildren.  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  Prior to becoming ill he enjoyed time with the family and fishing     Spiritual:     F- Christine and Belief: Christianity    I - Importance: very devout christine and attends [employer] regularly   .  C - Community    A - Address in Care: anointing of the sick received 5/20/17       Problem list:  Active Hospital Problems    Diagnosis  POA    *Bacteremia due to Klebsiella pneumoniae [R78.81]  Yes    Pain [R52]  Yes    Palliative care encounter [Z51.5]  Not Applicable    Goals of care, counseling/discussion [Z71.89]  Not Applicable    Hyperphosphatemia [E83.39]  Unknown    Acute kidney injury superimposed on CKD [N17.9, N18.9]  Yes    Chronic pain [G89.29]  Yes    Ischemic cardiomyopathy [I25.5]  Yes    Oropharyngeal dysphagia [R13.12]  No    Atrial fibrillation with RVR [I48.91]  Yes    ST elevation myocardial infarction (STEMI) [I21.3]  Yes    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

## 2017-05-24 NOTE — ASSESSMENT & PLAN NOTE
Organism identified as ESBL K pneumoniae. Same organism found in his urine and therefore assumption can be made that bacteremia is secondary to his urinary tract infection. Repeat blood cultures from 5/20 remain no growth to date.  1. Ertapenem 500 mg daily as there is no need for added pseudomonal coverage provided by meropenem.  2. Monitor for signs of encephalopathy which have been seen as an adverse effect or ertapenem.   3. Anticipate a 14 day course total from clearance of bacteremia. End date: 6/2/17.  4. Patient likely to need CVC for antibiotic therapy.

## 2017-05-24 NOTE — ASSESSMENT & PLAN NOTE
Blood cx with ESBL kleb - cleared on 5/20     -will need 14 days of treatment with ertapenem from 5/20  -this will also cover the UTI

## 2017-05-24 NOTE — PROGRESS NOTES
"[facility]  Nephrology  Progress Note    Patient Name: Humphrey Machado  MRN: [id number]  Admission Date: 5/19/2017  Hospital Length of Stay: 5 days  Attending Provider: Chaya Tinsley MD   Primary Care Physician: Andrew Murray MD  Principal Problem:Bacteremia due to Klebsiella pneumoniae    Subjective:     HPI: 84 yo M with PMHx of HTN,HLD, NSTEMI previously refused [facility] 5/2016, CKD stage IV (baseline 2.7), moderate AS, AF, venous insufficiency, cirrhosis, Ureterocutaneous fistula, chronic osteomyelitis of the pelvic region on cefpodoxime, suspected inflammatory arthritis on HCQ, neuromuscular disorder with peripheral neuropathy with recurrent right foot ulcer, prostate cancer s/p radiation, RCC s/p left nephrectomy, skin cancer of the hand, urethral stricture, urinary incontinence p/w SOB which started an hour prior to admission and EKG concerning for STEMI with afib with q waves in inferior/anterior-septal leads with resolution of chest pain with SL NTG. Patient was evaluated in the ED by Dr Duval and recommended to be taken to cath lab considering his clinical picture but he refused to proceed with angiogram due to risk of hemodialysis possibility     Renal consulted for worsening THEO on CKD     Interval History:   No acute overnight events . Urine output ok.     Review of patient's allergies indicates:   Allergen Reactions    Aspirin Other (See Comments)     Stomach      Ditropan [oxybutynin chloride]      Unable to describe side effect other than "felt strange"     Keflex [cephalexin] Rash     Morbilliform  Has tolerated multiple cephalosporins since 2013.    Macrobid [nitrofurantoin monohyd/m-cryst] Hives and Rash     Current Facility-Administered Medications   Medication Frequency    allopurinol tablet 100 mg Daily    aspirin chewable tablet 81 mg Daily    atorvastatin tablet 80 mg Daily    bisacodyl suppository 10 mg Daily PRN    clopidogrel tablet 75 mg Daily    dextrose " 50% injection 12.5 g PRN    dextrose 50% injection 25 g PRN    docusate sodium capsule 100 mg BID    glucagon (human recombinant) injection 1 mg PRN    glucose chewable tablet 16 g PRN    glucose chewable tablet 24 g PRN    heparin 25,000 units in dextrose 5% 250 mL (100 units/mL) bolus from bag; ADDITIONAL PRN BOLUS PRN    heparin 25,000 units in dextrose 5% 250 mL (100 units/mL) infusion Continuous    hydromorphone injection 0.5 mg Q2H PRN    HYDROmorphone tablet 2 mg Q6H    hydroxychloroquine tablet 200 mg BID    meropenem-0.9% sodium chloride 500 mg/50 mL IVPB Q12H    metoprolol 12.5mg/1.25mL oral solution 6.25 mg BID    miconazole 2 % cream BID    nitroGLYCERIN SL tablet 0.4 mg Q5 Min PRN    ondansetron injection 4 mg Q8H PRN    sevelamer carbonate tablet 1,600 mg TID WM    simethicone chewable tablet 80 mg TID PRN    sodium bicarbonate tablet 650 mg BID    sodium chloride 0.9% flush 3 mL Q8H    sodium chloride 0.9% flush 3 mL Q8H       Objective:     Vital Signs (Most Recent):  Temp: 97.7 °F (36.5 °C) (05/24/17 0730)  Pulse: 68 (05/24/17 0730)  Resp: 20 (05/24/17 0730)  BP: (!) 90/55 (05/24/17 0730)  SpO2: 95 % (05/24/17 0730)  O2 Device (Oxygen Therapy): nasal cannula (05/24/17 0730) Vital Signs (24h Range):  Temp:  [97.2 °F (36.2 °C)-98.2 °F (36.8 °C)] 97.7 °F (36.5 °C)  Pulse:  [68-83] 68  Resp:  [20] 20  SpO2:  [95 %-97 %] 95 %  BP: ()/(55-69) 90/55     Weight: 77.8 kg (171 lb 8.3 oz) (05/19/17 1125)  Body mass index is 24.61 kg/m².  Body surface area is 1.96 meters squared.    I/O last 3 completed shifts:  In: 980 [P.O.:660; I.V.:270; IV Piggyback:50]  Out: 975 [Urine:975]    Physical Exam   Constitutional: He is oriented to person, place, and time. He appears well-developed and well-nourished.   HENT:   Head: Normocephalic and atraumatic.   Eyes: Conjunctivae and EOM are normal.   Neck: Neck supple.   Cardiovascular: Normal rate, regular rhythm and normal heart sounds.     Pulmonary/Chest: Effort normal and breath sounds normal.   Abdominal: Soft. Bowel sounds are normal.   Neurological: He is alert and oriented to person, place, and time.       Significant Labs:  CBC:     Recent Labs  Lab 05/24/17  0511   WBC 4.36   RBC 3.14*   HGB 8.4*   HCT 25.7*      MCV 82   MCH 26.8*   MCHC 32.7     CMP:     Recent Labs  Lab 05/24/17  0511   GLU 91   CALCIUM 9.3   ALBUMIN 2.1*   PROT 6.9   *   K 3.5   CO2 25   CL 90*   *   CREATININE 5.7*   ALKPHOS 108   ALT 45*   AST 53*   BILITOT 0.3       Recent Labs  Lab 05/24/17  0030   COLORU Brittany   SPECGRAV 1.010   PHUR 6.0   PROTEINUA 2+*   BACTERIA Rare   NITRITE Negative   LEUKOCYTESUR 2+*   UROBILINOGEN Negative   HYALINECASTS 0      Labs: Reviewed    Assessment/Plan:     Acute kidney injury superimposed on CKD     Pt k/c/o CKD stage 3-4 2/2 HTN, solitary kidney s/p nephrectomy due to RCC, recurrent UTIs   - baseline creat 2.3 to 2.7   - THEO may be pre renal from hypotension, consider hold betablocker   - creat slightly down from 5.8 to 5.7, BUN still trending up.   - rpt renal USG same as before with rt kidney with a simple cyst, no obstruction   - pt says he has not been drinking much fluids lately, recommend 0.5 L NS at 75 cc/hr   - recommend CXR   - pt and family decided they are willing for a trial of dialysis if needed.   - will continue to follow         Hyperphosphatemia    - 2/2 to THEO on CKD, phos down from 8.0 to 7.5   - pt on low phos diet   - continue sevelamer 1600 TID today           Diya Vega MD  Nephrology  [facility]

## 2017-05-24 NOTE — SUBJECTIVE & OBJECTIVE
"Interval History: "I thought I wasn't going to survive. 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 I am the farthest away from making any decisions'. No acute overnight events. Patient complains of severe pain yet is not in any distress, does not appear uncomfortable and is not tachycardic. He is verbally abusive not only to myself but to his nurse.     Review of Systems   Constitutional: Negative for chills, fatigue and fever.   HENT: Negative for ear pain, mouth sores, nosebleeds, postnasal drip, rhinorrhea, sinus pressure, sore throat, tinnitus, trouble swallowing and voice change.    Eyes: Negative for photophobia, pain, redness and visual disturbance.   Respiratory: Negative for apnea, cough, chest tightness, shortness of breath and wheezing.    Cardiovascular: Negative for chest pain, palpitations and leg swelling.   Gastrointestinal: Positive for abdominal distention. Negative for abdominal pain, blood in stool, constipation, diarrhea, nausea and vomiting.   Endocrine: Negative for cold intolerance, heat intolerance, polydipsia and polyuria.   Genitourinary: Negative for decreased urine volume, difficulty urinating, discharge, dysuria, flank pain, frequency, genital sores, hematuria, penile pain, penile swelling, scrotal swelling, testicular pain and urgency.   Musculoskeletal: Positive for arthralgias and myalgias. Negative for back pain, joint swelling and neck pain.   Skin: Negative for color change and rash.   Allergic/Immunologic: Negative for environmental allergies and food allergies.   Neurological: Negative for dizziness, seizures, syncope, weakness, light-headedness, numbness and headaches.   Hematological: Negative for adenopathy. Does not bruise/bleed easily.   Psychiatric/Behavioral: Negative for agitation, confusion, decreased concentration, hallucinations, self-injury, sleep disturbance and suicidal ideas. The patient is not nervous/anxious.      Objective:     Vital Signs (Most Recent):  Temp: 97.6 °F (36.4 °C) " (05/24/17 1200)  Pulse: 67 (05/24/17 1500)  Resp: 18 (05/24/17 1200)  BP: 97/61 (05/24/17 1200)  SpO2: 96 % (05/24/17 1200) Vital Signs (24h Range):  Temp:  [97.2 °F (36.2 °C)-98.2 °F (36.8 °C)] 97.6 °F (36.4 °C)  Pulse:  [67-83] 67  Resp:  [18-20] 18  SpO2:  [95 %-97 %] 96 %  BP: ()/(55-69) 97/61     Weight: 77.8 kg (171 lb 8.3 oz)  Body mass index is 24.61 kg/m².    Intake/Output Summary (Last 24 hours) at 05/24/17 1527  Last data filed at 05/24/17 0638   Gross per 24 hour   Intake              180 ml   Output              900 ml   Net             -720 ml      Physical Exam   Constitutional: He is oriented to person, place, and time. He appears well-developed and well-nourished. He has a sickly appearance.   HENT:   Head: Normocephalic and atraumatic.   Right Ear: External ear normal.   Left Ear: External ear normal.   Mouth/Throat: Oropharynx is clear and moist.   Eyes: Conjunctivae and EOM are normal. Pupils are equal, round, and reactive to light.   Neck: Normal range of motion. Neck supple. No thyromegaly present.   Cardiovascular: Normal rate.  An irregularly irregular rhythm present.   Murmur heard.   Systolic murmur is present with a grade of 2/6   Pulmonary/Chest: Effort normal and breath sounds normal. He has no wheezes. He has no rales.   Abdominal: Soft. Bowel sounds are normal. He exhibits no mass. There is no tenderness. There is no rebound.   Genitourinary:   Genitourinary Comments: Stevens catheter in place. Right thigh fistula covered.   Musculoskeletal: Normal range of motion. He exhibits tenderness.   Lymphadenopathy:     He has no cervical adenopathy.   Neurological: He is alert and oriented to person, place, and time.   Skin: Skin is warm and dry.   Venous stasis dermatitis over the bilateral lower extremities.   Psychiatric: He has a normal mood and affect. His behavior is normal.   Vitals reviewed.      Significant Labs:   CBC:   Recent Labs  Lab 05/23/17  0344 05/24/17  0511   WBC 4.61  4.36   HGB 8.8* 8.4*   HCT 27.2* 25.7*    169     CMP:   Recent Labs  Lab 05/23/17  0344 05/24/17  0511   * 132*   K 3.4* 3.5   CL 92* 90*   CO2 21* 25   GLU 99 91   * 112*   CREATININE 5.8* 5.7*   CALCIUM 9.3 9.3   PROT 7.2 6.9   ALBUMIN 2.2* 2.1*   BILITOT 0.4 0.3   ALKPHOS 119 108   AST 83* 53*   ALT 59* 45*   ANIONGAP 19* 17*   EGFRNONAA 8.2* 8.3*     Coagulation:   Recent Labs  Lab 05/24/17  0511   INR 1.1   APTT 83.8*       Significant Imaging: I have reviewed all pertinent imaging results/findings within the past 24 hours.

## 2017-05-24 NOTE — PLAN OF CARE
Problem: SLP Goal  Goal: SLP Goal  Speech Language Pathology Goals  Goals expected to be met by 5/28/2017  1. Pt will tolerate dental soft diet with nectar-thickened liquids w/o overt S/S aspiration, MIN A, to improve swallow safety  2. Pt will tolerate trials of thin liquids w/o overt S/S aspiration, MIN A, to improve swallow safety  3. Educate Patient and family on safe swallow strategies and aspiration precautions       Outcome: Ongoing (interventions implemented as appropriate)  Patient non-compliant with nectar-thickened liquids and consuming thickened liquids with straws. Patient and family educated on aspiration precautions and diet recommendations.  See note for full details. ST to continue to follow. Thank you.   DAVID Crowe., [facility]-SLP  Speech-Language Pathology  Pager: [phone]  5/24/2017

## 2017-05-24 NOTE — PLAN OF CARE
Problem: Patient Care Overview  Goal: Plan of Care Review  Outcome: Ongoing (interventions implemented as appropriate)  Plan of care discussed with pt. Pt free of falls/trauma/injuries this shift. Heparin gtt infusing. Anti Xa therapeutic. MD notified of Brittany urine. Urinalysis and  Urine cx result pending. Meropenem Q12H. Dilaudid PRN and scheduled given for pain. Plan for possible HD.VSS & NADN. Pt denies CP, SOB, or pain/discomfort at this time. All questions addressed.  Will continue to monitor.

## 2017-05-24 NOTE — PHYSICIAN QUERY
PT Name: Humphrey Machado  MR #: [id number]    Physician Query Form - Cause and Effect Relationship Clarification      CDS/: Rafael Galvan Jr RN             Contact information:[phone]    This form is a permanent document in the medical record.     Query Date: May 24, 2017    By submitting this query, we are merely seeking further clarification of documentation. Please utilize your independent clinical judgment when addressing the question(s) below.    The Medical record contains the following:  Supporting Clinical Findings   Location in record    Sepsis   --SIRS 4/4 with possible urinary source                                                                       Bacteremia due to Klebsiella pneumoniae    Organism identified as ESBL K pneumoniae. Same organism found in his urine and therefore assumption can be made that bacteremia is secondary to his urinary tract infection.                                    5/20 Cardiology Progress Note      5/23 Hospital Medicine Progress Note                                                                                                                                                                                                       Provider, please clarify if there is any correlation between          Sepsis         and        Klebsiella pneumoniae      .           Are the conditions:     [ x ] Due to or associated with each other     [  ] Unrelated to each other     [  ] Other (Please Specify): _________________________     [  ] Clinically Undetermined

## 2017-05-25 LAB
ALBUMIN SERPL BCP-MCNC: 2.2 G/DL
ALP SERPL-CCNC: 101 U/L
ALT SERPL W/O P-5'-P-CCNC: 37 U/L
ANION GAP SERPL CALC-SCNC: 18 MMOL/L
APTT BLDCRRT: 92.7 SEC
AST SERPL-CCNC: 47 U/L
BACTERIA BLD CULT: NORMAL
BACTERIA BLD CULT: NORMAL
BACTERIA UR CULT: NO GROWTH
BASOPHILS # BLD AUTO: 0.02 K/UL
BASOPHILS NFR BLD: 0.4 %
BILIRUB DIRECT SERPL-MCNC: 0.2 MG/DL
BILIRUB SERPL-MCNC: 0.3 MG/DL
BUN SERPL-MCNC: 119 MG/DL
CALCIUM SERPL-MCNC: 8.9 MG/DL
CHLORIDE SERPL-SCNC: 94 MMOL/L
CO2 SERPL-SCNC: 22 MMOL/L
CREAT SERPL-MCNC: 6 MG/DL
DIFFERENTIAL METHOD: ABNORMAL
EOSINOPHIL # BLD AUTO: 0.2 K/UL
EOSINOPHIL NFR BLD: 4.4 %
ERYTHROCYTE [DISTWIDTH] IN BLOOD BY AUTOMATED COUNT: 15.5 %
EST. GFR  (AFRICAN AMERICAN): 9.1 ML/MIN/1.73 M^2
EST. GFR  (NON AFRICAN AMERICAN): 7.8 ML/MIN/1.73 M^2
FACT X PPP CHRO-ACNC: 0.62 IU/ML
GLUCOSE SERPL-MCNC: 81 MG/DL
HCT VFR BLD AUTO: 24.7 %
HGB BLD-MCNC: 7.8 G/DL
INR PPP: 1.1
LYMPHOCYTES # BLD AUTO: 1.2 K/UL
LYMPHOCYTES NFR BLD: 23.6 %
MAGNESIUM SERPL-MCNC: 2.4 MG/DL
MCH RBC QN AUTO: 26.4 PG
MCHC RBC AUTO-ENTMCNC: 31.6 %
MCV RBC AUTO: 84 FL
MONOCYTES # BLD AUTO: 0.7 K/UL
MONOCYTES NFR BLD: 13.2 %
NEUTROPHILS # BLD AUTO: 2.9 K/UL
NEUTROPHILS NFR BLD: 57.2 %
PHOSPHATE SERPL-MCNC: 8.5 MG/DL
PLATELET # BLD AUTO: 171 K/UL
PMV BLD AUTO: 10.8 FL
POCT GLUCOSE: 93 MG/DL (ref 70–110)
POTASSIUM SERPL-SCNC: 3.4 MMOL/L
PROT SERPL-MCNC: 6.8 G/DL
PROTHROMBIN TIME: 11.7 SEC
RBC # BLD AUTO: 2.95 M/UL
SODIUM SERPL-SCNC: 134 MMOL/L
VANCOMYCIN SERPL-MCNC: 10 UG/ML
WBC # BLD AUTO: 5 K/UL

## 2017-05-25 PROCEDURE — 20600001 HC STEP DOWN PRIVATE ROOM

## 2017-05-25 PROCEDURE — 80048 BASIC METABOLIC PNL TOTAL CA: CPT

## 2017-05-25 PROCEDURE — 25000003 PHARM REV CODE 250: Performed by: INTERNAL MEDICINE

## 2017-05-25 PROCEDURE — 80076 HEPATIC FUNCTION PANEL: CPT

## 2017-05-25 PROCEDURE — 97535 SELF CARE MNGMENT TRAINING: CPT

## 2017-05-25 PROCEDURE — 36415 COLL VENOUS BLD VENIPUNCTURE: CPT

## 2017-05-25 PROCEDURE — 85610 PROTHROMBIN TIME: CPT

## 2017-05-25 PROCEDURE — 94761 N-INVAS EAR/PLS OXIMETRY MLT: CPT

## 2017-05-25 PROCEDURE — 99233 SBSQ HOSP IP/OBS HIGH 50: CPT | Mod: ,,, | Performed by: INTERNAL MEDICINE

## 2017-05-25 PROCEDURE — G8988 SELF CARE GOAL STATUS: HCPCS | Mod: CK

## 2017-05-25 PROCEDURE — 85520 HEPARIN ASSAY: CPT

## 2017-05-25 PROCEDURE — G8987 SELF CARE CURRENT STATUS: HCPCS | Mod: CL

## 2017-05-25 PROCEDURE — 93010 ELECTROCARDIOGRAM REPORT: CPT | Mod: ,,, | Performed by: INTERNAL MEDICINE

## 2017-05-25 PROCEDURE — 97162 PT EVAL MOD COMPLEX 30 MIN: CPT

## 2017-05-25 PROCEDURE — 80202 ASSAY OF VANCOMYCIN: CPT

## 2017-05-25 PROCEDURE — 84100 ASSAY OF PHOSPHORUS: CPT

## 2017-05-25 PROCEDURE — 97530 THERAPEUTIC ACTIVITIES: CPT

## 2017-05-25 PROCEDURE — 85730 THROMBOPLASTIN TIME PARTIAL: CPT

## 2017-05-25 PROCEDURE — 85025 COMPLETE CBC W/AUTO DIFF WBC: CPT

## 2017-05-25 PROCEDURE — 63600175 PHARM REV CODE 636 W HCPCS: Performed by: INTERNAL MEDICINE

## 2017-05-25 PROCEDURE — 25000003 PHARM REV CODE 250: Performed by: HOSPITALIST

## 2017-05-25 PROCEDURE — 25000003 PHARM REV CODE 250: Performed by: STUDENT IN AN ORGANIZED HEALTH CARE EDUCATION/TRAINING PROGRAM

## 2017-05-25 PROCEDURE — 93005 ELECTROCARDIOGRAM TRACING: CPT

## 2017-05-25 PROCEDURE — 83735 ASSAY OF MAGNESIUM: CPT

## 2017-05-25 PROCEDURE — 97166 OT EVAL MOD COMPLEX 45 MIN: CPT

## 2017-05-25 PROCEDURE — 92526 ORAL FUNCTION THERAPY: CPT

## 2017-05-25 RX ADMIN — DOCUSATE SODIUM 100 MG: 100 CAPSULE, LIQUID FILLED ORAL at 10:05

## 2017-05-25 RX ADMIN — HYDROMORPHONE HYDROCHLORIDE 4 MG: 2 TABLET ORAL at 06:05

## 2017-05-25 RX ADMIN — MICONAZOLE NITRATE: 20 CREAM TOPICAL at 10:05

## 2017-05-25 RX ADMIN — SODIUM BICARBONATE 650 MG: 650 TABLET ORAL at 10:05

## 2017-05-25 RX ADMIN — Medication 6.25 MG: at 10:05

## 2017-05-25 RX ADMIN — HYDROMORPHONE HYDROCHLORIDE 4 MG: 2 TABLET ORAL at 11:05

## 2017-05-25 RX ADMIN — Medication 3 ML: at 04:05

## 2017-05-25 RX ADMIN — HYDROXYCHLOROQUINE SULFATE 200 MG: 200 TABLET, FILM COATED ORAL at 10:05

## 2017-05-25 RX ADMIN — HYDROMORPHONE HYDROCHLORIDE 4 MG: 2 TABLET ORAL at 05:05

## 2017-05-25 RX ADMIN — NITROGLYCERIN 0.4 MG: 0.4 TABLET SUBLINGUAL at 08:05

## 2017-05-25 RX ADMIN — CLOPIDOGREL 75 MG: 75 TABLET, FILM COATED ORAL at 10:05

## 2017-05-25 RX ADMIN — HYDROMORPHONE HYDROCHLORIDE 0.5 MG: 1 INJECTION, SOLUTION INTRAMUSCULAR; INTRAVENOUS; SUBCUTANEOUS at 12:05

## 2017-05-25 RX ADMIN — ATORVASTATIN CALCIUM 80 MG: 20 TABLET, FILM COATED ORAL at 10:05

## 2017-05-25 RX ADMIN — SODIUM CHLORIDE 0.5 G: 900 INJECTION, SOLUTION INTRAVENOUS at 04:05

## 2017-05-25 RX ADMIN — Medication 3 ML: at 10:05

## 2017-05-25 RX ADMIN — Medication 3 ML: at 05:05

## 2017-05-25 RX ADMIN — HYDROMORPHONE HYDROCHLORIDE 4 MG: 2 TABLET ORAL at 01:05

## 2017-05-25 RX ADMIN — SEVELAMER CARBONATE 1600 MG: 800 TABLET, FILM COATED ORAL at 10:05

## 2017-05-25 RX ADMIN — HYDROMORPHONE HYDROCHLORIDE 0.5 MG: 1 INJECTION, SOLUTION INTRAMUSCULAR; INTRAVENOUS; SUBCUTANEOUS at 09:05

## 2017-05-25 RX ADMIN — ASPIRIN 81 MG CHEWABLE TABLET 81 MG: 81 TABLET CHEWABLE at 10:05

## 2017-05-25 RX ADMIN — HEPARIN SODIUM AND DEXTROSE 17 UNITS/KG/HR: 10000; 5 INJECTION INTRAVENOUS at 05:05

## 2017-05-25 RX ADMIN — ALLOPURINOL 100 MG: 100 TABLET ORAL at 10:05

## 2017-05-25 RX ADMIN — HYDROMORPHONE HYDROCHLORIDE 0.5 MG: 1 INJECTION, SOLUTION INTRAMUSCULAR; INTRAVENOUS; SUBCUTANEOUS at 04:05

## 2017-05-25 RX ADMIN — HYDROMORPHONE HYDROCHLORIDE 0.5 MG: 1 INJECTION, SOLUTION INTRAMUSCULAR; INTRAVENOUS; SUBCUTANEOUS at 08:05

## 2017-05-25 NOTE — ASSESSMENT & PLAN NOTE
- 2/2 to THEO on CKD, phos still high   - pt on low phos diet   - continue sevelamer 1600 TID today   - will need to start on HD next few days. Awaiting final decision

## 2017-05-25 NOTE — PT/OT/SLP EVAL
Occupational Therapy  Evaluation    Humphrey Machado   MRN: [id number]   Admitting Diagnosis: ST elevation myocardial infarction (STEMI)    OT Date of Treatment: 05/25/17   OT Start Time: 0900  OT Stop Time: 0936  OT Total Time (min): 36 min    Billable Minutes:  Evaluation 26  Self Care/Home Management 10    Diagnosis: ST elevation myocardial infarction (STEMI)     Past Medical History:   Diagnosis Date    *Atrial fibrillation     Acute appendicitis 2/19/2015    Anemia     Anxiety     Arthritis     generalized    Basal cell carcinoma 01/2013    right temple    BCC (basal cell carcinoma)     right mid forearm    Bladder stone 6/28/2016    Blood transfusion     Chronic urethral stricture 10/14/2015    Chronic venous insufficiency 7/8/2013    CKD (chronic kidney disease) stage 3, GFR 30-59 ml/min 9/6/2012    Coronary artery disease     Elevated PSA     Essential hypertension 7/30/2015    Hematuria, gross     History of Left Nephrectomy (~2006) 1/29/2014    Done at [facility].     Hypertension     Inflammatory arthritis 8/14/2012    Kidney stone     Long-term use of Plaquenil 6/4/2015    Mixed incontinence urge and stress 4/11/2014    Nonrheumatic aortic valve stenosis 5/30/2016    NSTEMI (non-ST elevated myocardial infarction) 5/30/2016    Olecranon bursitis 1/14/2013    Osteopenia 8/14/2012    Personal history of kidney cancer 4/11/2014    Prostate cancer     Radiation     treatment for prostate cancer    Recurrent UTI 7/8/2013    Respiratory distress     S/P radiation therapy 4/11/2014    Skin cancer of arm     left leg (malignant)    Solitary kidney 7/15/2013    Venous insufficiency of leg 7/12/2012    Venous stasis ulcers 7/6/2012    Vitamin D deficiency disease 5/8/2013      Past Surgical History:   Procedure Laterality Date    APPENDECTOMY      CYSTOSCOPY      with dialation    NEPHRECTOMY  2006-07?    Removal of left kidney    TONSILLECTOMY         Referring physician: Dr. Tinsley  "Tinsley   Date referred to OT: 5/24/17    General Precautions: Standard, aspiration, fall, contact, nectar thick  Orthopedic Precautions: N/A  Braces: N/A    Do you have any cultural, spiritual, Congregational conflicts, given your current situation?: None     Patient History:  Living Environment  Lives With: spouse  Living Arrangements: house  Living Environment Comment: Pt lives with wife in 1-story house with 1 YAKOV. PTA, pt was using RW for mobility and required assist with ADLs at times. Wife is able to assist upon D/C.  Equipment Currently Used at Home: walker, rolling, shower chair, 3-in-1 commode (W/C not used)    Prior level of function:   Bed Mobility/Transfers: needs device  Grooming: independent  Bathing: needs assist  Upper Body Dressing: needs assist  Lower Body Dressing: needs assist  Toileting: needs assist  Home Management Skills: unable to perform        Subjective:  Communicated with RN prior to session.  "I feel emotionally jumbled." Pt seemed intermittently confused, although oriented.    Chief Complaint: Pain  Patient/Family stated goals: None stated    Pain/Comfort  Pain Rating 1: 8/10  Location 1: chest  Pain Addressed 1: Reposition, Distraction  Pain Rating Post-Intervention 1: 8/10  Pain Rating 2: 8/10  Location - Side 2: Bilateral  Location 2: leg  Pain Addressed 2: Reposition, Distraction, Cessation of Activity    Objective:  Patient found with: telemetry, montano catheter, oxygen, peripheral IV, pressure relief boots    Cognitive Exam:  Oriented to: Person, Place, Time and Situation  Follows Commands/attention: Inattentive and Follows one-step commands  Communication: clear/fluent, delayed responses  Safety awareness/insight to disability: intact    Visual/perceptual:  Intact    Physical Exam:  Postural examination/scapula alignment: Rounded shoulder and Head forward  Skin integrity: Bruising of B lower legs; small bandage on forehead  Edema: None noted     Sensation:   Reports numbness/tingling " B hands and feet    Upper Extremity Range of Motion:  Right Upper Extremity: WFL  Left Upper Extremity: WFL    Upper Extremity Strength:  Right Upper Extremity: NT due to pt not cooperative  Left Upper Extremity: NT due to pt not cooperative   Strength: WFL    Functional Mobility:  Bed Mobility:  Rolling/Turning to Left: Total assistance, With side rail  Scooting/Bridging: Total Assistance, With assist of 2  Supine to Sit: Total Assistance, With assist of 2  Sit to Supine: Total Assistance, With assist of 2    Transfers:  Sit <> Stand Assistance: Activity did not occur    Activities of Daily Living:  LE Dressing Level of Assistance: Total assistance to don socks EOB    Balance:   Static Sit: FAIR: Maintains without assist, but unable to take any challenges   Dynamic Sit: FAIR+: Maintains balance through MINIMAL excursions of active trunk motion    Therapeutic Activities and Exercises:  OT/PT eval; pt with delayed responses to all questions and required significant amount of increased time for all mobility; pt oriented but noted to have intermittent confusion; attempted to explain role of therapy and purpose/goals of treatment, but pt with limited understanding and became frustrated/angry several times throughout evaluation despite therapists attempts to assist with mobility and explain purpose; pt with c/o pain in several areas throughout evaluation, initially wanting to attempt standing and then declining and requesting to return to bed; able to sit EOB ~20 min with Min A, progressing to SBA with cues for upright posture; positioned pt comfortably in bed with wedge to prevent breakdown    AM-PAC 6 CLICK ADL  How much help from another person does this patient currently need?  1 = Unable, Total/Dependent Assistance  2 = A lot, Maximum/Moderate Assistance  3 = A little, Minimum/Contact Guard/Supervision  4 = None, Modified [place]/Independent    Putting on and taking off regular lower body clothing? :  "1  Bathing (including washing, rinsing, drying)?: 1  Toileting, which includes using toilet, bedpan, or urinal? : 2  Putting on and taking off regular upper body clothing?: 2  Taking care of personal grooming such as brushing teeth?: 2  Eating meals?: 3  Total Score: 11    AM-PAC Raw Score CMS "G-Code Modifier Level of Impairment Assistance   6 % Total / Unable   7 - 9 CM 80 - 100% Maximal Assist   10-14 CL 60 - 80% Moderate Assist   15 - 19 CK 40 - 60% Moderate Assist   20 - 22 CJ 20 - 40% Minimal Assist   23 CI 1-20% SBA / CGA   24 CH 0% Independent/ Mod I       Patient left left sidelying with all lines intact, call button in reach and family present    Assessment:  Humphrey Machado is a 85 y.o. male with a medical diagnosis of ST elevation myocardial infarction (STEMI). Pt required increased time for all mobility and ADL tasks, and with significantly delayed responses to questions, although oriented. Required Total A for bed mobility and declined standing. Pt became frustrated several times throughout evaluation and with limited understanding of purpose of therapy and goals of treatment despite multiple attempts (family stepped out of room). Pt with deficits below and would continue to benefit from OT to improve participation with ADLs and increase independence. Recommend long-term SNF upon D/C pending progress and participation.    Rehab identified problem list/impairments: Rehab identified problem list/impairments: impaired endurance, weakness, impaired self care skills, impaired functional mobilty, impaired balance, decreased upper extremity function, decreased lower extremity function, pain, impaired skin, impaired cardiopulmonary response to activity    Rehab potential is fair.    Activity tolerance: Fair    Discharge recommendations: Discharge Facility/Level Of Care Needs: nursing facility, skilled (long-term pending progress)     Barriers to discharge: Barriers to Discharge: Decreased caregiver " support (Pt requires increased assist)    Equipment recommendations:  (TBD)     GOALS:    Occupational Therapy Goals        Problem: Occupational Therapy Goal    Goal Priority Disciplines Outcome Interventions   Occupational Therapy Goal     OT, PT/OT Ongoing (interventions implemented as appropriate)    Description:  Goals to be met by: 7 days (6/1/17)     Patient will increase functional independence with ADLs by performing:    UE Dressing with Minimal Assistance.  Grooming while EOB with Supervision.  Toileting from bedside commode with Moderate Assistance for hygiene and clothing management.   Sitting at edge of bed x15 minutes with Stand-by Assistance.  Supine to sit with Moderate Assistance.  Toilet transfer to bedside commode with Moderate Assistance.                      PLAN:  Patient to be seen 4 x/week to address the above listed problems via self-care/home management, therapeutic activities, therapeutic exercises  Plan of Care expires: 06/25/17  Plan of Care reviewed with: patient, spouse, sibling    OT G-codes  Functional Assessment Tool Used: [facility]  Score: 11  Functional Limitation: Self care  Self Care Current Status (): CL  Self Care Goal Status (): HIEU Zendejas  05/25/2017

## 2017-05-25 NOTE — PT/OT/SLP EVAL
Physical Therapy  Evaluation    Humphrey Machado   MRN: [id number]   Admitting Diagnosis: ST elevation myocardial infarction (STEMI)    PT Received On: 05/25/17  PT Start Time: 0900     PT Stop Time: 0936    PT Total Time (min): 36 min       Billable Minutes:  Evaluation 26 and Therapeutic Activity 10 (co-tx with OT)    Diagnosis: ST elevation myocardial infarction (STEMI)    Past Medical History:   Diagnosis Date    *Atrial fibrillation     Acute appendicitis 2/19/2015    Anemia     Anxiety     Arthritis     generalized    Basal cell carcinoma 01/2013    right temple    BCC (basal cell carcinoma)     right mid forearm    Bladder stone 6/28/2016    Blood transfusion     Chronic urethral stricture 10/14/2015    Chronic venous insufficiency 7/8/2013    CKD (chronic kidney disease) stage 3, GFR 30-59 ml/min 9/6/2012    Coronary artery disease     Elevated PSA     Essential hypertension 7/30/2015    Hematuria, gross     History of Left Nephrectomy (~2006) 1/29/2014    [facility] at [facility].     Hypertension     Inflammatory arthritis 8/14/2012    Kidney stone     Long-term use of Plaquenil 6/4/2015    Mixed incontinence urge and stress 4/11/2014    Nonrheumatic aortic valve stenosis 5/30/2016    NSTEMI (non-ST elevated myocardial infarction) 5/30/2016    Olecranon bursitis 1/14/2013    Osteopenia 8/14/2012    Personal history of kidney cancer 4/11/2014    Prostate cancer     Radiation     treatment for prostate cancer    Recurrent UTI 7/8/2013    Respiratory distress     S/P radiation therapy 4/11/2014    Skin cancer of arm     left leg (malignant)    Solitary kidney 7/15/2013    Venous insufficiency of leg 7/12/2012    Venous stasis ulcers 7/6/2012    Vitamin D deficiency disease 5/8/2013      Past Surgical History:   Procedure Laterality Date    APPENDECTOMY      CYSTOSCOPY      with dialation    NEPHRECTOMY  2006-07?    Removal of left kidney    TONSILLECTOMY         Referring physician:  "Chaya Tinsley MD  Date referred to PT: 5/24/17    General Precautions: Standard, aspiration, nectar thick, fall, contact  Orthopedic Precautions: N/A   Braces: N/A       Do you have any cultural, spiritual, Tenriism conflicts, given your current situation?: none noted     Patient History:  Lives With: spouse  Living Arrangements: house  Transportation Available: family or friend will provide  Living Environment Comment: Pt reports that he lives with his wife in a 1SH with TH to enter. Pt reports that PTA he used RW for mobility and required assist with ADLs "sometimes." Pt reports that his wife is able to assist upon d/c.   Equipment Currently Used at Home: walker, rolling, shower chair  DME owned (not currently used): bedside commode and wheelchair    Previous Level of Function:  Ambulation Skills: needs device  Transfer Skills: needs device  ADL Skills: needs assist ("sometimes")    Subjective:  Communicated with RN prior to session.  "It would have been nice to know what we were doing." Pt stated during therapy evaluation, despite therapists explaining sequence and goals of mobility prior to mobilization.   Chief Complaint: chest pain and leg pain   Patient goals: return home     Pain/Comfort  Pain Rating 1: 8/10  Location - Side 1: Bilateral  Location - Orientation 1: generalized  Location 1: chest  Pain Addressed 1: Reposition, Distraction  Pain Rating 2: 8/10  Location - Side 2: Bilateral  Location - Orientation 2: generalized  Location 2: leg  Pain Addressed 2: Reposition, Distraction, Cessation of Activity      Objective:   Patient found with: telemetry, montano catheter, oxygen, peripheral IV, pressure relief boots     Cognitive Exam:  Oriented to: Person, Place and Time    Follows Commands/attention: Inattentive and Follows one-step commands intermittently   Communication: clear/fluent  Safety awareness/insight to disability: impaired    Physical Exam:  Postural examination/scapula alignment: Rounded " shoulder, Head forward and Posterior pelvic tilt    Skin integrity: bruising/dark pigmentation on BLE; redness noted at B heels   Edema: None noted     Sensation:   Pt reports numbness/tingling in B hands and feet.     Lower Extremity Range of Motion:  Right Lower Extremity: PROM WFL; limited AROM   Left Lower Extremity: PROM WFL; limited AROM     Lower Extremity Strength:  Right Lower Extremity: difficult to accurately assess 2* decreased participation and command-following; decreased strength throughout   Left Lower Extremity: difficult to accurately assess 2* decreased participation and command-following; decreased strength throughout     Functional Mobility:  Bed Mobility:  Scooting/Bridging: Total Assistance, With assist of 2 (drawsheet to HOB)  Supine to Sit: Total Assistance, With assist of 2, With side rail  Sit to Supine: Total Assistance, With assist of  2   Rolling to L: Total assistance     Transfers:  Sit <> Stand Assistance: Activity did not occur    Gait:   Gait Distance: unable to perform standing tasks this session    Balance:   Static Sit: Nayan-SBA (mostly SBA)  Dynamic Sit: maxA    Therapeutic Activities and Exercises:  PT/OT evaluation complete  Pt educated on role of PT and PT POC. Pt needs reinforcement as he appeared to have limited understanding.   Static sitting at EOB x~20 minutes with initially Nayan but progressed to SBA. Mod v/c for upright posture and forward gaze. Encouraged attempt to stand but pt became fatigued with sitting EOB and requested return to supine.   Required increased time, explanation, and cueing throughout session for continued participation. Pt intermittently confused, although A&Ox4, during session with delayed responses and decreased understanding of commands and therapists' explanations. Pt occasionally became frustrated/angry with therapists depsite frequent explanations of sequencing and purpose of therapy session.   Pt positioned in bed with wedge a R side, B  pressure relief boots donned, and HOB elevated.      AM-PAC 6 CLICK MOBILITY  How much help from another person does this patient currently need?   1 = Unable, Total/Dependent Assistance   2 = A lot, Maximum/Moderate Assistance  3 = A little, Minimum/Contact Guard/Supervision  4 = None, Modified [place]/Independent    Turning over in bed (including adjusting bedclothes, sheets and blankets)?: 2  Sitting down on and standing up from a chair with arms (e.g., wheelchair, bedside commode, etc.): 1  Moving from lying on back to sitting on the side of the bed?: 2  Moving to and from a bed to a chair (including a wheelchair)?: 1  Need to walk in hospital room?: 1  Climbing 3-5 steps with a railing?: 1  Total Score: 8     AM-PAC Raw Score CMS G-Code Modifier Level of Impairment Assistance   6 % Total / Unable   7 - 9 CM 80 - 100% Maximal Assist   10 - 14 CL 60 - 80% Moderate Assist   15 - 19 CK 40 - 60% Moderate Assist   20 - 22 CJ 20 - 40% Minimal Assist   23 CI 1-20% SBA / CGA   24 CH 0% Independent/ Mod I     Patient left supine with wedge at R side, with all lines intact, call button in reach and pt's wife and brother present.    Assessment:   Humphrey Machado is a 85 y.o. male with a medical diagnosis of ST elevation myocardial infarction (STEMI) and presents with limited functional mobility 2* weakness, impaired endurance, decreased balance, and overall deconditioning. Pt required increased assist with all functional mobility. Pt was able to maintain static sitting balance with Nayan initially but progressed to SBA. Pt encouraged to attempt standing, but pt fatigued with static sitting and required return to supine. Pt with decreased participation and poor attitude towards therapists during session, requiring increased time and explanation for continued participation. Pt would benefit from skilled acute PT in order to address current deficits and progress functional mobility. Pt will require SNF upon d/c  (possible long-term pending hospital course and medical status), as he currently requires increased assist with all functional tasks and does not have adequate support system available.     Rehab identified problem list/impairments: Rehab identified problem list/impairments: weakness, impaired functional mobilty, impaired self care skills, impaired endurance, impaired sensation, gait instability, impaired skin, decreased safety awareness, pain, impaired balance, decreased coordination, impaired cognition, impaired cardiopulmonary response to activity    Rehab potential is good.    Activity tolerance: Fair    Discharge recommendations: Discharge Facility/Level Of Care Needs: nursing facility, skilled (may require long-term SNF; pending hospital course and medical status )     Barriers to discharge: Barriers to Discharge: Decreased caregiver support (Pt requires increased assist at this time.)    Equipment recommendations: Equipment Needed After Discharge:  (TBD)      GOALS:    Physical Therapy Goals        Problem: Physical Therapy Goal    Goal Priority Disciplines Outcome Goal Variances Interventions   Physical Therapy Goal     PT/OT, PT Ongoing (interventions implemented as appropriate)     Description:  Goals to be met by: 17     Patient will increase functional independence with mobility by performin. Supine to sit with Moderate Assistance  2. Sit to supine with Moderate Assistance  3. Sit to stand transfer with Moderate Assistance  4. Bed to chair transfer with Moderate Assistance using appropriate AD.  5. Gait  x 5 feet with Moderate Assistance using appropriate AD.   6. Lower extremity exercise program x10 reps, with assistance as needed, in order to increase LE strength and (I) with functional mobility.                       PLAN:    Patient to be seen 4 x/week to address the above listed problems via gait training, therapeutic activities, therapeutic exercises  Plan of Care expires: 17  Plan  of Care reviewed with: patient, spouse, sibling        Nataly De Oliveira, PT, DPT   5/25/2017  [phone]

## 2017-05-25 NOTE — ASSESSMENT & PLAN NOTE
Pt k/c/o CKD stage 3-4 2/2 HTN, solitary kidney s/p nephrectomy due to RCC, recurrent UTIs   - baseline creat 2.3 to 2.7   - THEO may be pre renal from hypotension, consider hold betablocker   - creat BUN continue to trend up today    - rpt renal USG same as before with rt kidney with a simple cyst, no obstruction   - pt informed that he may have to start on HD in the next few days   - Pt continues to remain undecided about if wats to be on dialysis or not   Plan for meeting between palliative care and family again today. Will follow up on discussion.

## 2017-05-25 NOTE — PROGRESS NOTES
"[facility]  Nephrology  Progress Note    Patient Name: Humphrey Machado  MRN: [id number]  Admission Date: 5/19/2017  Hospital Length of Stay: 6 days  Attending Provider: Chaya Tinsley MD   Primary Care Physician: Andrew Murray MD  Principal Problem:ST elevation myocardial infarction (STEMI)    Subjective:     HPI: 86 yo M with PMHx of HTN,HLD, NSTEMI previously refused [place] 5/2016, CKD stage IV (baseline 2.7), moderate AS, AF, venous insufficiency, cirrhosis, Ureterocutaneous fistula, chronic osteomyelitis of the pelvic region on cefpodoxime, suspected inflammatory arthritis on HCQ, neuromuscular disorder with peripheral neuropathy with recurrent right foot ulcer, prostate cancer s/p radiation, RCC s/p left nephrectomy, skin cancer of the hand, urethral stricture, urinary incontinence p/w SOB which started an hour prior to admission and EKG concerning for STEMI with afib with q waves in inferior/anterior-septal leads with resolution of chest pain with SL NTG. Patient was evaluated in the ED by Dr Duval and recommended to be taken to cath lab considering his clinical picture but he refused to proceed with angiogram due to risk of hemodialysis possibility     Renal consulted for worsening THEO on CKD     Interval History:   Ptt received a trial of fluids overnight. Renal function continues to worsen although pt with fair urine output. Pt still undecided about starting on HD     Review of patient's allergies indicates:   Allergen Reactions    Aspirin Other (See Comments)     Stomach      Ditropan [oxybutynin chloride]      Unable to describe side effect other than "felt strange"     Keflex [cephalexin] Rash     Morbilliform  Has tolerated multiple cephalosporins since 2013.    Macrobid [nitrofurantoin monohyd/m-cryst] Hives and Rash     Current Facility-Administered Medications   Medication Frequency    0.9%  NaCl infusion Continuous    allopurinol tablet 100 mg Daily    aspirin chewable " tablet 81 mg Daily    atorvastatin tablet 80 mg Daily    bisacodyl suppository 10 mg Daily PRN    clopidogrel tablet 75 mg Daily    dextrose 50% injection 12.5 g PRN    dextrose 50% injection 25 g PRN    docusate sodium capsule 100 mg BID    ertapenem (INVANZ) 0.5 g in sodium chloride 0.9% 50 mL IVPB Q24H    glucagon (human recombinant) injection 1 mg PRN    glucose chewable tablet 16 g PRN    glucose chewable tablet 24 g PRN    heparin 25,000 units in dextrose 5% 250 mL (100 units/mL) bolus from bag; ADDITIONAL PRN BOLUS PRN    heparin 25,000 units in dextrose 5% 250 mL (100 units/mL) infusion Continuous    hydromorphone injection 0.5 mg Q2H PRN    HYDROmorphone tablet 4 mg Q6H    hydroxychloroquine tablet 200 mg BID    metoprolol 12.5mg/1.25mL oral solution 6.25 mg BID    miconazole 2 % cream BID    nitroGLYCERIN SL tablet 0.4 mg Q5 Min PRN    ondansetron injection 4 mg Q8H PRN    sevelamer carbonate tablet 1,600 mg TID WM    simethicone chewable tablet 80 mg TID PRN    sodium bicarbonate tablet 650 mg BID    sodium chloride 0.9% flush 3 mL Q8H       Objective:     Vital Signs (Most Recent):  Temp: 97.5 °F (36.4 °C) (05/25/17 0800)  Pulse: 60 (05/25/17 1200)  Resp: 18 (05/25/17 1200)  BP: 108/60 (05/25/17 1200)  SpO2: 98 % (05/25/17 1200)  O2 Device (Oxygen Therapy): nasal cannula (05/25/17 1200) Vital Signs (24h Range):  Temp:  [97.5 °F (36.4 °C)-98.3 °F (36.8 °C)] 97.5 °F (36.4 °C)  Pulse:  [59-71] 60  Resp:  [16-18] 18  SpO2:  [92 %-98 %] 98 %  BP: (100-108)/(56-62) 108/60     Weight: 77.8 kg (171 lb 8.3 oz) (05/19/17 1125)  Body mass index is 24.61 kg/m².  Body surface area is 1.96 meters squared.    I/O last 3 completed shifts:  In: 450 [P.O.:450]  Out: 1800 [Urine:1800]    Physical Exam   Constitutional: He is oriented to person, place, and time. He appears well-developed and well-nourished.   HENT:   Head: Normocephalic and atraumatic.   Eyes: Conjunctivae and EOM are normal.   Neck:  Neck supple.   Cardiovascular: Normal rate, regular rhythm and normal heart sounds.    Pulmonary/Chest: Effort normal and breath sounds normal.   Abdominal: Soft. Bowel sounds are normal.   Neurological: He is alert and oriented to person, place, and time.       Significant Labs:  CBC:     Recent Labs  Lab 05/25/17  0555   WBC 5.00   RBC 2.95*   HGB 7.8*   HCT 24.7*      MCV 84   MCH 26.4*   MCHC 31.6*     CMP:     Recent Labs  Lab 05/25/17  0555   GLU 81   CALCIUM 8.9   ALBUMIN 2.2*   PROT 6.8   *   K 3.4*   CO2 22*   CL 94*   *   CREATININE 6.0*   ALKPHOS 101   ALT 37   AST 47*   BILITOT 0.3       Recent Labs  Lab 05/24/17  0030   COLORU Brittany   SPECGRAV 1.010   PHUR 6.0   PROTEINUA 2+*   BACTERIA Rare   NITRITE Negative   LEUKOCYTESUR 2+*   UROBILINOGEN Negative   HYALINECASTS 0      Labs: Reviewed    Assessment/Plan:     Acute kidney injury superimposed on CKD     Pt k/c/o CKD stage 3-4 2/2 HTN, solitary kidney s/p nephrectomy due to RCC, recurrent UTIs   - baseline creat 2.3 to 2.7   - THEO may be pre renal from hypotension, consider hold betablocker   - creat BUN continue to trend up today    - rpt renal USG same as before with rt kidney with a simple cyst, no obstruction   - pt informed that he may have to start on HD in the next few days   - Pt continues to remain undecided about if wats to be on dialysis or not   Plan for meeting between palliative care and family again today. Will follow up on discussion.           Hyperphosphatemia    - 2/2 to THEO on CKD, phos still high   - pt on low phos diet   - continue sevelamer 1600 TID today   - will need to start on HD next few days. Awaiting final decision          Diya Vega MD  Nephrology  [facility]

## 2017-05-25 NOTE — PROGRESS NOTES
Spoke with son Nitin Machado, (OA: [phone])  to arrange family meeting for Friday, May 26th at 10:30 with primary team, nephrology and palliative care.  Nitin will contact other family members and agrees to meet at this time.  Dr. Tinsley with primary team and Dr. Vega aware of time and will be present for meeting.

## 2017-05-25 NOTE — PLAN OF CARE
Problem: Physical Therapy Goal  Goal: Physical Therapy Goal  Goals to be met by: 17     Patient will increase functional independence with mobility by performin. Supine to sit with Moderate Assistance  2. Sit to supine with Moderate Assistance  3. Sit to stand transfer with Moderate Assistance  4. Bed to chair transfer with Moderate Assistance using appropriate AD.  5. Gait  x 5 feet with Moderate Assistance using appropriate AD.   6. Lower extremity exercise program x10 reps, with assistance as needed, in order to increase LE strength and (I) with functional mobility.     Outcome: Ongoing (interventions implemented as appropriate)  PT evaluation complete and appropriate goals established.    Nataly De Oliveira, PT, DPT   2017  [phone]

## 2017-05-25 NOTE — NURSING
"Patient reported chest pain to staff.  Bedside inquired about chest pain.  Patient stated, "I don't know for sure what kind of pain it is."  Educated about nitro and hydromorphone.  Patient stated he doesn't need the nitro at this time.  Son at bedside.    "

## 2017-05-25 NOTE — SUBJECTIVE & OBJECTIVE
"Interval History:   Ptt received a trial of fluids overnight. Renal function continues to worsen although pt with fair urine output. Pt still undecided about starting on HD     Review of patient's allergies indicates:   Allergen Reactions    Aspirin Other (See Comments)     Stomach      Ditropan [oxybutynin chloride]      Unable to describe side effect other than "felt strange"     Keflex [cephalexin] Rash     Morbilliform  Has tolerated multiple cephalosporins since 2013.    Macrobid [nitrofurantoin monohyd/m-cryst] Hives and Rash     Current Facility-Administered Medications   Medication Frequency    0.9%  NaCl infusion Continuous    allopurinol tablet 100 mg Daily    aspirin chewable tablet 81 mg Daily    atorvastatin tablet 80 mg Daily    bisacodyl suppository 10 mg Daily PRN    clopidogrel tablet 75 mg Daily    dextrose 50% injection 12.5 g PRN    dextrose 50% injection 25 g PRN    docusate sodium capsule 100 mg BID    ertapenem (INVANZ) 0.5 g in sodium chloride 0.9% 50 mL IVPB Q24H    glucagon (human recombinant) injection 1 mg PRN    glucose chewable tablet 16 g PRN    glucose chewable tablet 24 g PRN    heparin 25,000 units in dextrose 5% 250 mL (100 units/mL) bolus from bag; ADDITIONAL PRN BOLUS PRN    heparin 25,000 units in dextrose 5% 250 mL (100 units/mL) infusion Continuous    hydromorphone injection 0.5 mg Q2H PRN    HYDROmorphone tablet 4 mg Q6H    hydroxychloroquine tablet 200 mg BID    metoprolol 12.5mg/1.25mL oral solution 6.25 mg BID    miconazole 2 % cream BID    nitroGLYCERIN SL tablet 0.4 mg Q5 Min PRN    ondansetron injection 4 mg Q8H PRN    sevelamer carbonate tablet 1,600 mg TID WM    simethicone chewable tablet 80 mg TID PRN    sodium bicarbonate tablet 650 mg BID    sodium chloride 0.9% flush 3 mL Q8H       Objective:     Vital Signs (Most Recent):  Temp: 97.5 °F (36.4 °C) (05/25/17 0800)  Pulse: 60 (05/25/17 1200)  Resp: 18 (05/25/17 1200)  BP: 108/60 " (05/25/17 1200)  SpO2: 98 % (05/25/17 1200)  O2 Device (Oxygen Therapy): nasal cannula (05/25/17 1200) Vital Signs (24h Range):  Temp:  [97.5 °F (36.4 °C)-98.3 °F (36.8 °C)] 97.5 °F (36.4 °C)  Pulse:  [59-71] 60  Resp:  [16-18] 18  SpO2:  [92 %-98 %] 98 %  BP: (100-108)/(56-62) 108/60     Weight: 77.8 kg (171 lb 8.3 oz) (05/19/17 1125)  Body mass index is 24.61 kg/m².  Body surface area is 1.96 meters squared.    I/O last 3 completed shifts:  In: 450 [P.O.:450]  Out: 1800 [Urine:1800]    Physical Exam   Constitutional: He is oriented to person, place, and time. He appears well-developed and well-nourished.   HENT:   Head: Normocephalic and atraumatic.   Eyes: Conjunctivae and EOM are normal.   Neck: Neck supple.   Cardiovascular: Normal rate, regular rhythm and normal heart sounds.    Pulmonary/Chest: Effort normal and breath sounds normal.   Abdominal: Soft. Bowel sounds are normal.   Neurological: He is alert and oriented to person, place, and time.       Significant Labs:  CBC:     Recent Labs  Lab 05/25/17  0555   WBC 5.00   RBC 2.95*   HGB 7.8*   HCT 24.7*      MCV 84   MCH 26.4*   MCHC 31.6*     CMP:     Recent Labs  Lab 05/25/17  0555   GLU 81   CALCIUM 8.9   ALBUMIN 2.2*   PROT 6.8   *   K 3.4*   CO2 22*   CL 94*   *   CREATININE 6.0*   ALKPHOS 101   ALT 37   AST 47*   BILITOT 0.3       Recent Labs  Lab 05/24/17  0030   COLORU Brittany   SPECGRAV 1.010   PHUR 6.0   PROTEINUA 2+*   BACTERIA Rare   NITRITE Negative   LEUKOCYTESUR 2+*   UROBILINOGEN Negative   HYALINECASTS 0      Labs: Reviewed

## 2017-05-25 NOTE — PT/OT/SLP PROGRESS
"Speech Language Pathology  Treatment    Humphrey Machado   MRN: [id number]   Admitting Diagnosis: ST elevation myocardial infarction (STEMI)    Diet recommendations: Solid Diet Level: Dental Soft  Liquid Diet Level: Nectar Thick Feed only when awake/alert, HOB to 90 degrees, Small bites/sips, Alternating bites/sips, 1 bite/sip at a time, Meds whole 1 at a time, Avoid talking while eating and Assistance with meals    SLP Treatment Date: 05/25/17  Speech Start Time: 1558     Speech Stop Time: 1608     Speech Total (min): 10 min       TREATMENT BILLABLE MINUTES:  Treatment Swallowing Dysfunction 10    Has the patient been evaluated by SLP for swallowing? : Yes  Keep patient NPO?: No   General Precautions: Standard, aspiration, fall, contact, nectar thick  Current Respiratory Status:  (room air)       Subjective:  Nurse explains to SLP upon first attempt Patient in family meeting, pages SLP later service day when family meeting finished  Nurse reports Patient consuming thin liquids, whole medications  Patient's spouse says, "We haven't been thickening anything."  Patient reports decreased appetite  Pain/Comfort  Pain Rating 1: 8/10  Location - Side 1: Bilateral  Location - Orientation 1: generalized  Location 1: back  Pain Addressed 1: Distraction, Nurse notified, Pre-medicate for activity  Pain Rating Post-Intervention 1: 8/10    Objective:   Patient found with: oxygen, peripheral IV, pressure relief boots, montano catheter, telemetry, reclined in bed with family at bedside.  SLP notesd thickener packets moved from bedside table to Patient's windowsill.  Patient with minimal appetite and accepts trials of thin liquids only this service day. HOB elevated and Pt repositions self in bed.  He is seen today with thin liquids (straw sips, x4, self-fed.) Patient with decreased hyolaryngeal excursion and inconsistent timing of initiation of swallow upon trials presented.  SLP educated Patient on ongoing aspiration precautions and " safest textures. Patient distracted by cell phone and perseverates on cell phone with son. Son v/u swallow precautions and safest textures and then explains family conference scheduled for next service day to determine Patient's POC. SLP answers questions from son re: meal options and Boost supplements. No additional questions noted. Whiteboard current with recommendations.     Assessment:  Humphrey Machado is a 85 y.o. male with a medical diagnosis of ST elevation myocardial infarction (STEMI) and presents with S/S pharyngeal dysphagia.    Discharge recommendations: Discharge Facility/Level Of Care Needs: nursing facility, skilled     Goals:    SLP Goals        Problem: SLP Goal    Goal Priority Disciplines Outcome   SLP Goal     SLP Ongoing (interventions implemented as appropriate)   Description:  Speech Language Pathology Goals  Goals expected to be met by 5/28/2017  1. Pt will tolerate dental soft diet with nectar-thickened liquids w/o overt S/S aspiration, MIN A, to improve swallow safety  2. Pt will tolerate trials of thin liquids w/o overt S/S aspiration, MIN A, to improve swallow safety  3. Educate Patient and family on safe swallow strategies and aspiration precautions                         Plan:   Patient to be seen Therapy Frequency: 5 x/week   Plan of Care expires: 06/20/17  Plan of Care reviewed with: patient, spouse, son  SLP Follow-up?: Yes         DAVID Crowe., [facility]-SLP  Speech-Language Pathology  Pager: [phone]  5/25/2017

## 2017-05-25 NOTE — PLAN OF CARE
Problem: SLP Goal  Goal: SLP Goal  Speech Language Pathology Goals  Goals expected to be met by 5/28/2017  1. Pt will tolerate dental soft diet with nectar-thickened liquids w/o overt S/S aspiration, MIN A, to improve swallow safety  2. Pt will tolerate trials of thin liquids w/o overt S/S aspiration, MIN A, to improve swallow safety  3. Educate Patient and family on safe swallow strategies and aspiration precautions       Outcome: Ongoing (interventions implemented as appropriate)   Patient noncompliant with SLP recommendation for nectar-thickened liquids. See note for full details. Nurse informs family meeting scheduled for next service day. ST to continue to monitor.       DAVID Crowe., [facility]-SLP  Speech-Language Pathology  Pager: [phone]  5/25/2017

## 2017-05-25 NOTE — NURSING
Patient did not call staff prior to eating breakfast.  Educated patient and family about calling prior to eating meals.  Family stated they were not aware to call.

## 2017-05-25 NOTE — PLAN OF CARE
Problem: Occupational Therapy Goal  Goal: Occupational Therapy Goal  Goals to be met by: 7 days (6/1/17)     Patient will increase functional independence with ADLs by performing:    UE Dressing with Minimal Assistance.  Grooming while EOB with Supervision.  Toileting from bedside commode with Moderate Assistance for hygiene and clothing management.   Sitting at edge of bed x15 minutes with Stand-by Assistance.  Supine to sit with Moderate Assistance.  Toilet transfer to bedside commode with Moderate Assistance.    Outcome: Ongoing (interventions implemented as appropriate)  Eval and POC set 5/25/17

## 2017-05-26 LAB
ALBUMIN SERPL BCP-MCNC: 2.2 G/DL
ALP SERPL-CCNC: 100 U/L
ALT SERPL W/O P-5'-P-CCNC: 32 U/L
ANION GAP SERPL CALC-SCNC: 17 MMOL/L
APTT BLDCRRT: 98.1 SEC
AST SERPL-CCNC: 48 U/L
BASOPHILS # BLD AUTO: 0.01 K/UL
BASOPHILS NFR BLD: 0.2 %
BILIRUB DIRECT SERPL-MCNC: 0.2 MG/DL
BILIRUB SERPL-MCNC: 0.3 MG/DL
BUN SERPL-MCNC: 120 MG/DL
CALCIUM SERPL-MCNC: 9.1 MG/DL
CHLORIDE SERPL-SCNC: 94 MMOL/L
CO2 SERPL-SCNC: 25 MMOL/L
CREAT SERPL-MCNC: 5.8 MG/DL
DIFFERENTIAL METHOD: ABNORMAL
EOSINOPHIL # BLD AUTO: 0.2 K/UL
EOSINOPHIL NFR BLD: 4.1 %
ERYTHROCYTE [DISTWIDTH] IN BLOOD BY AUTOMATED COUNT: 15.7 %
EST. GFR  (AFRICAN AMERICAN): 9.4 ML/MIN/1.73 M^2
EST. GFR  (NON AFRICAN AMERICAN): 8.2 ML/MIN/1.73 M^2
FACT X PPP CHRO-ACNC: 0.64 IU/ML
FACT X PPP CHRO-ACNC: 0.64 IU/ML
GLUCOSE SERPL-MCNC: 83 MG/DL
HCT VFR BLD AUTO: 24.9 %
HGB BLD-MCNC: 7.9 G/DL
INR PPP: 1.1
LYMPHOCYTES # BLD AUTO: 1.1 K/UL
LYMPHOCYTES NFR BLD: 24.6 %
MAGNESIUM SERPL-MCNC: 2.3 MG/DL
MCH RBC QN AUTO: 26.4 PG
MCHC RBC AUTO-ENTMCNC: 31.7 %
MCV RBC AUTO: 83 FL
MONOCYTES # BLD AUTO: 0.7 K/UL
MONOCYTES NFR BLD: 15.3 %
NEUTROPHILS # BLD AUTO: 2.5 K/UL
NEUTROPHILS NFR BLD: 54.1 %
PHOSPHATE SERPL-MCNC: 8.8 MG/DL
PLATELET # BLD AUTO: 157 K/UL
PMV BLD AUTO: 10.3 FL
POCT GLUCOSE: 124 MG/DL (ref 70–110)
POTASSIUM SERPL-SCNC: 3.6 MMOL/L
PROT SERPL-MCNC: 6.8 G/DL
PROTHROMBIN TIME: 11.6 SEC
RBC # BLD AUTO: 2.99 M/UL
SODIUM SERPL-SCNC: 136 MMOL/L
VANCOMYCIN SERPL-MCNC: 9.5 UG/ML
WBC # BLD AUTO: 4.63 K/UL

## 2017-05-26 PROCEDURE — 80048 BASIC METABOLIC PNL TOTAL CA: CPT

## 2017-05-26 PROCEDURE — 99233 SBSQ HOSP IP/OBS HIGH 50: CPT | Mod: ,,, | Performed by: INTERNAL MEDICINE

## 2017-05-26 PROCEDURE — 77001 FLUOROGUIDE FOR VEIN DEVICE: CPT

## 2017-05-26 PROCEDURE — 36558 INSERT TUNNELED CV CATH: CPT | Mod: ,,, | Performed by: INTERNAL MEDICINE

## 2017-05-26 PROCEDURE — 97535 SELF CARE MNGMENT TRAINING: CPT

## 2017-05-26 PROCEDURE — 20600001 HC STEP DOWN PRIVATE ROOM

## 2017-05-26 PROCEDURE — 36415 COLL VENOUS BLD VENIPUNCTURE: CPT

## 2017-05-26 PROCEDURE — 25000003 PHARM REV CODE 250: Performed by: HOSPITALIST

## 2017-05-26 PROCEDURE — 84100 ASSAY OF PHOSPHORUS: CPT

## 2017-05-26 PROCEDURE — 85025 COMPLETE CBC W/AUTO DIFF WBC: CPT

## 2017-05-26 PROCEDURE — 25000003 PHARM REV CODE 250

## 2017-05-26 PROCEDURE — 97530 THERAPEUTIC ACTIVITIES: CPT

## 2017-05-26 PROCEDURE — 76937 US GUIDE VASCULAR ACCESS: CPT | Mod: 26,,, | Performed by: INTERNAL MEDICINE

## 2017-05-26 PROCEDURE — 85610 PROTHROMBIN TIME: CPT

## 2017-05-26 PROCEDURE — 63600175 PHARM REV CODE 636 W HCPCS

## 2017-05-26 PROCEDURE — 80076 HEPATIC FUNCTION PANEL: CPT

## 2017-05-26 PROCEDURE — 25000003 PHARM REV CODE 250: Performed by: INTERNAL MEDICINE

## 2017-05-26 PROCEDURE — 63600175 PHARM REV CODE 636 W HCPCS: Performed by: INTERNAL MEDICINE

## 2017-05-26 PROCEDURE — 25000003 PHARM REV CODE 250: Performed by: STUDENT IN AN ORGANIZED HEALTH CARE EDUCATION/TRAINING PROGRAM

## 2017-05-26 PROCEDURE — 77001 FLUOROGUIDE FOR VEIN DEVICE: CPT | Mod: 26,,, | Performed by: INTERNAL MEDICINE

## 2017-05-26 PROCEDURE — 83735 ASSAY OF MAGNESIUM: CPT

## 2017-05-26 PROCEDURE — 85520 HEPARIN ASSAY: CPT

## 2017-05-26 PROCEDURE — 80202 ASSAY OF VANCOMYCIN: CPT

## 2017-05-26 PROCEDURE — 76937 US GUIDE VASCULAR ACCESS: CPT

## 2017-05-26 PROCEDURE — 85730 THROMBOPLASTIN TIME PARTIAL: CPT

## 2017-05-26 RX ORDER — SODIUM CHLORIDE 9 MG/ML
INJECTION, SOLUTION INTRAVENOUS ONCE
Status: COMPLETED | OUTPATIENT
Start: 2017-05-26 | End: 2017-05-27

## 2017-05-26 RX ORDER — SODIUM CHLORIDE 9 MG/ML
INJECTION, SOLUTION INTRAVENOUS
Status: DISCONTINUED | OUTPATIENT
Start: 2017-05-26 | End: 2017-06-01

## 2017-05-26 RX ADMIN — HYDROMORPHONE HYDROCHLORIDE 0.5 MG: 1 INJECTION, SOLUTION INTRAMUSCULAR; INTRAVENOUS; SUBCUTANEOUS at 08:05

## 2017-05-26 RX ADMIN — Medication 6.25 MG: at 09:05

## 2017-05-26 RX ADMIN — ATORVASTATIN CALCIUM 80 MG: 20 TABLET, FILM COATED ORAL at 10:05

## 2017-05-26 RX ADMIN — SODIUM BICARBONATE 650 MG: 650 TABLET ORAL at 09:05

## 2017-05-26 RX ADMIN — MICONAZOLE NITRATE: 20 CREAM TOPICAL at 09:05

## 2017-05-26 RX ADMIN — HYDROMORPHONE HYDROCHLORIDE 4 MG: 2 TABLET ORAL at 11:05

## 2017-05-26 RX ADMIN — SEVELAMER CARBONATE 1600 MG: 800 TABLET, FILM COATED ORAL at 10:05

## 2017-05-26 RX ADMIN — SODIUM CHLORIDE 0.5 G: 900 INJECTION, SOLUTION INTRAVENOUS at 04:05

## 2017-05-26 RX ADMIN — SODIUM BICARBONATE 650 MG: 650 TABLET ORAL at 10:05

## 2017-05-26 RX ADMIN — SEVELAMER CARBONATE 1600 MG: 800 TABLET, FILM COATED ORAL at 04:05

## 2017-05-26 RX ADMIN — DOCUSATE SODIUM 100 MG: 100 CAPSULE, LIQUID FILLED ORAL at 10:05

## 2017-05-26 RX ADMIN — MICONAZOLE NITRATE: 20 CREAM TOPICAL at 10:05

## 2017-05-26 RX ADMIN — NITROGLYCERIN 0.4 MG: 0.4 TABLET SUBLINGUAL at 11:05

## 2017-05-26 RX ADMIN — Medication 3 ML: at 09:05

## 2017-05-26 RX ADMIN — CLOPIDOGREL 75 MG: 75 TABLET, FILM COATED ORAL at 10:05

## 2017-05-26 RX ADMIN — HYDROXYCHLOROQUINE SULFATE 200 MG: 200 TABLET, FILM COATED ORAL at 10:05

## 2017-05-26 RX ADMIN — DOCUSATE SODIUM 100 MG: 100 CAPSULE, LIQUID FILLED ORAL at 09:05

## 2017-05-26 RX ADMIN — ALLOPURINOL 100 MG: 100 TABLET ORAL at 10:05

## 2017-05-26 RX ADMIN — Medication 6.25 MG: at 10:05

## 2017-05-26 RX ADMIN — HYDROMORPHONE HYDROCHLORIDE 4 MG: 2 TABLET ORAL at 06:05

## 2017-05-26 RX ADMIN — Medication 3 ML: at 06:05

## 2017-05-26 RX ADMIN — ASPIRIN 81 MG CHEWABLE TABLET 81 MG: 81 TABLET CHEWABLE at 10:05

## 2017-05-26 RX ADMIN — HYDROXYCHLOROQUINE SULFATE 200 MG: 200 TABLET, FILM COATED ORAL at 09:05

## 2017-05-26 RX ADMIN — HEPARIN SODIUM AND DEXTROSE 17 UNITS/KG/HR: 10000; 5 INJECTION INTRAVENOUS at 01:05

## 2017-05-26 NOTE — PLAN OF CARE
Problem: Physical Therapy Goal  Goal: Physical Therapy Goal  Goals to be met by: 17     Patient will increase functional independence with mobility by performin. Supine to sit with Moderate Assistance  2. Sit to supine with Moderate Assistance  3. Sit to stand transfer with Moderate Assistance  4. Bed to chair transfer with Moderate Assistance using appropriate AD.  5. Gait  x 5 feet with Moderate Assistance using appropriate AD.   6. Lower extremity exercise program x10 reps, with assistance as needed, in order to increase LE strength and (I) with functional mobility.      Outcome: Ongoing (interventions implemented as appropriate)  Goals reviewed and remain appropriate. Pt progressing towards goals.    Nataly De Oliveira, PT, DPT   2017  [phone]

## 2017-05-26 NOTE — PLAN OF CARE
met with pt and family at the bedside for follow up.  pts care continues to be monitored.  pts wife and son are very involved in pts care.  Please contact  for any discharge needs.

## 2017-05-26 NOTE — SUBJECTIVE & OBJECTIVE
"Interval History: "I don't know if I'm in pain. I always have pain". No acute overnight events. Pain uncontrolled.     Review of Systems   Constitutional: Negative for chills, fatigue and fever.   HENT: Negative for ear pain, mouth sores, nosebleeds, postnasal drip, rhinorrhea, sinus pressure, sore throat, tinnitus, trouble swallowing and voice change.    Eyes: Negative for photophobia, pain, redness and visual disturbance.   Respiratory: Negative for apnea, cough, chest tightness, shortness of breath and wheezing.    Cardiovascular: Negative for chest pain, palpitations and leg swelling.   Gastrointestinal: Negative for abdominal distention, abdominal pain, blood in stool, constipation, diarrhea, nausea and vomiting.   Endocrine: Negative for cold intolerance, heat intolerance, polydipsia and polyuria.   Genitourinary: Negative for decreased urine volume, difficulty urinating, discharge, dysuria, flank pain, frequency, genital sores, hematuria, penile pain, penile swelling, scrotal swelling, testicular pain and urgency.   Musculoskeletal: Positive for arthralgias and myalgias. Negative for back pain, joint swelling and neck pain.   Skin: Negative for color change and rash.   Allergic/Immunologic: Negative for environmental allergies and food allergies.   Neurological: Negative for dizziness, seizures, syncope, weakness, light-headedness, numbness and headaches.   Hematological: Negative for adenopathy. Does not bruise/bleed easily.   Psychiatric/Behavioral: Negative for agitation, confusion, decreased concentration, hallucinations, self-injury, sleep disturbance and suicidal ideas. The patient is not nervous/anxious.      Objective:     Vital Signs (Most Recent):  Temp: 97.5 °F (36.4 °C) (05/25/17 0800)  Pulse: 61 (05/25/17 1900)  Resp: 18 (05/25/17 1200)  BP: 108/60 (05/25/17 1200)  SpO2: 98 % (05/25/17 1200) Vital Signs (24h Range):  Temp:  [97.5 °F (36.4 °C)-98.3 °F (36.8 °C)] 97.5 °F (36.4 °C)  Pulse:  [59-71] " 61  Resp:  [18] 18  SpO2:  [92 %-98 %] 98 %  BP: (106-108)/(60-62) 108/60     Weight: 77.8 kg (171 lb 8.3 oz)  Body mass index is 24.61 kg/m².    Intake/Output Summary (Last 24 hours) at 05/25/17 2029  Last data filed at 05/25/17 1500   Gross per 24 hour   Intake              120 ml   Output              550 ml   Net             -430 ml      Physical Exam   Constitutional: He is oriented to person, place, and time. He appears well-developed and well-nourished. He has a sickly appearance.   HENT:   Head: Normocephalic and atraumatic.   Right Ear: External ear normal.   Left Ear: External ear normal.   Mouth/Throat: Oropharynx is clear and moist.   Eyes: Conjunctivae and EOM are normal. Pupils are equal, round, and reactive to light.   Neck: Normal range of motion. Neck supple. No thyromegaly present.   Cardiovascular: Normal rate and regular rhythm.    Murmur heard.   Systolic murmur is present with a grade of 2/6   Pulmonary/Chest: Effort normal and breath sounds normal. He has no wheezes. He has no rales.   Abdominal: Soft. Bowel sounds are normal. He exhibits no mass. There is no tenderness. There is no rebound.   Genitourinary:   Genitourinary Comments: Stevens catheter in place. Right thigh fistula covered.   Musculoskeletal: Normal range of motion. He exhibits tenderness.   Lymphadenopathy:     He has no cervical adenopathy.   Neurological: He is alert and oriented to person, place, and time.   Skin: Skin is warm and dry.   Venous stasis dermatitis over the bilateral lower extremities.   Psychiatric: He has a normal mood and affect. His behavior is normal.   Vitals reviewed.      Significant Labs:   BMP:   Recent Labs  Lab 05/25/17  0555   GLU 81   *   K 3.4*   CL 94*   CO2 22*   *   CREATININE 6.0*   CALCIUM 8.9   MG 2.4     CBC:   Recent Labs  Lab 05/24/17  0511 05/25/17  0555   WBC 4.36 5.00   HGB 8.4* 7.8*   HCT 25.7* 24.7*    171       Significant Imaging: I have reviewed all pertinent  imaging results/findings within the past 24 hours.

## 2017-05-26 NOTE — NURSING TRANSFER
Nursing Transfer Note      5/26/2017     Transfer To: CATH LAB    Transfer via bed    Transfer with cardiac monitoring, 2L NC    Transported by [facility]    Medicines sent: No    Chart send with patient: Yes    Notified: son

## 2017-05-26 NOTE — NURSING
Patient's bed and linens were saturated in urine.  Blood noted in blue pads.  Patient has montano in place.  Patient stated urethral opening is sore.  Will continue to monitor.  PCT and bedside changed all linen, performed morning hygiene on patient and montano.  Placed blue pad directly on saturated mattress.

## 2017-05-26 NOTE — ASSESSMENT & PLAN NOTE
Pt k/c/o CKD stage 3-4 2/2 HTN, solitary kidney s/p nephrectomy due to RCC, recurrent UTIs   - baseline creat 2.3 to 2.7   - THEO may be pre renal from hypotension, Pt continues to have low borderline BPs, consider hold betablocker   - creat slightly down today, BUN continues to rise   - rpt renal USG same as before with rt kidney with a simple cyst, no obstruction   - pt agreed to trial of HD yest. Plan to place temp cath today followed by dialysis

## 2017-05-26 NOTE — SUBJECTIVE & OBJECTIVE
"Interval History:   Per note cardiology was reconsulted overnight due to chest pain but it turned out he had flank pain. EKG showed same findings as before. Interventional cardiology revisited the pt this morning and per note pt and family refused intervention.    Pt made NPO and heparin drip held in anticipation for temp cath for HD     Review of patient's allergies indicates:   Allergen Reactions    Aspirin Other (See Comments)     Stomach      Ditropan [oxybutynin chloride]      Unable to describe side effect other than "felt strange"     Keflex [cephalexin] Rash     Morbilliform  Has tolerated multiple cephalosporins since 2013.    Macrobid [nitrofurantoin monohyd/m-cryst] Hives and Rash     Current Facility-Administered Medications   Medication Frequency    0.9%  NaCl infusion Continuous    allopurinol tablet 100 mg Daily    aspirin chewable tablet 81 mg Daily    atorvastatin tablet 80 mg Daily    bisacodyl suppository 10 mg Daily PRN    clopidogrel tablet 75 mg Daily    dextrose 50% injection 12.5 g PRN    dextrose 50% injection 25 g PRN    docusate sodium capsule 100 mg BID    ertapenem (INVANZ) 0.5 g in sodium chloride 0.9% 50 mL IVPB Q24H    glucagon (human recombinant) injection 1 mg PRN    glucose chewable tablet 16 g PRN    glucose chewable tablet 24 g PRN    heparin 25,000 units in dextrose 5% 250 mL (100 units/mL) bolus from bag; ADDITIONAL PRN BOLUS PRN    heparin 25,000 units in dextrose 5% 250 mL (100 units/mL) infusion Continuous    hydromorphone injection 0.5 mg Q2H PRN    HYDROmorphone tablet 4 mg Q6H    hydroxychloroquine tablet 200 mg BID    metoprolol 12.5mg/1.25mL oral solution 6.25 mg BID    miconazole 2 % cream BID    nitroGLYCERIN SL tablet 0.4 mg Q5 Min PRN    ondansetron injection 4 mg Q8H PRN    sevelamer carbonate tablet 1,600 mg TID WM    simethicone chewable tablet 80 mg TID PRN    sodium bicarbonate tablet 650 mg BID    sodium chloride 0.9% flush 3 mL " Q8H       Objective:     Vital Signs (Most Recent):  Temp: 98 °F (36.7 °C) (05/26/17 0500)  Pulse: 63 (05/26/17 0700)  Resp: 16 (05/25/17 2000)  BP: (!) 101/53 (05/26/17 0500)  SpO2: 98 % (05/26/17 0500)  O2 Device (Oxygen Therapy): nasal cannula (05/25/17 2000) Vital Signs (24h Range):  Temp:  [97.5 °F (36.4 °C)-98 °F (36.7 °C)] 98 °F (36.7 °C)  Pulse:  [59-68] 63  Resp:  [16-18] 16  SpO2:  [97 %-99 %] 98 %  BP: ()/(53-60) 101/53     Weight: 77.8 kg (171 lb 8.3 oz) (05/19/17 1125)  Body mass index is 24.61 kg/m².  Body surface area is 1.96 meters squared.    I/O last 3 completed shifts:  In: 540 [P.O.:540]  Out: 1425 [Urine:1425]    Physical Exam   Constitutional: He is oriented to person, place, and time. He appears well-developed and well-nourished.   HENT:   Head: Normocephalic and atraumatic.   Eyes: Conjunctivae and EOM are normal.   Neck: Neck supple.   Cardiovascular: Normal rate, regular rhythm and normal heart sounds.    Pulmonary/Chest: Effort normal and breath sounds normal.   Abdominal: Soft. Bowel sounds are normal.   Neurological: He is alert and oriented to person, place, and time.       Significant Labs:  CBC:     Recent Labs  Lab 05/26/17  0407   WBC 4.63   RBC 2.99*   HGB 7.9*   HCT 24.9*      MCV 83   MCH 26.4*   MCHC 31.7*     CMP:     Recent Labs  Lab 05/26/17  0407   GLU 83   CALCIUM 9.1   ALBUMIN 2.2*   PROT 6.8      K 3.6   CO2 25   CL 94*   *   CREATININE 5.8*   ALKPHOS 100   ALT 32   AST 48*   BILITOT 0.3       Recent Labs  Lab 05/24/17  0030   COLORU Brittany   SPECGRAV 1.010   PHUR 6.0   PROTEINUA 2+*   BACTERIA Rare   NITRITE Negative   LEUKOCYTESUR 2+*   UROBILINOGEN Negative   HYALINECASTS 0      Labs: Reviewed

## 2017-05-26 NOTE — PT/OT/SLP PROGRESS
"Physical Therapy  Treatment    Humphrey Machado   MRN: [id number]   Admitting Diagnosis: ST elevation myocardial infarction (STEMI)    PT Received On: 05/26/17  PT Start Time: 0948     PT Stop Time: 1020    PT Total Time (min): 32 min       Billable Minutes:  Therapeutic Activity 32 (co-tx with OT)    Treatment Type: Treatment  PT/PTA: PT     PTA Visit Number: 0       General Precautions: Standard, fall, contact, aspiration, nectar thick  Orthopedic Precautions: N/A   Braces: N/A    Do you have any cultural, spiritual, Shinto conflicts, given your current situation?: none noted     Subjective:  Communicated with RN prior to session.  "I kind of feel like I'm suffocating," pt reported upon therapists arrival to room. Pt reported improvement once transferred to sit EOB.     Pain/Comfort  Pain Rating 1:  (Pt reported pain in legs, arms, and groin but did not rate.)  Pain Addressed 1: Reposition, Distraction, Cessation of Activity    Objective:   Patient found with: telemetry, montano catheter    Functional Mobility:  Bed Mobility:   Scooting/Bridging: Total Assistance, With assist of 2 (drawsheet to HOB )  Supine to Sit: Total Assistance, With assist of 2  Sit to Supine: Total Assistance, With assist of  2    Max v/c and t/c for sequencing and technique    Transfers:  Sit <> Stand Assistance: Total Assistance (with assist of 2; x2 reps)  Sit <> Stand Assistive Device: No Assistive Device    Max v/c and t/c for technique with PT demonstrating technique prior to first rep.     Gait:   Gait Distance: unable to perform at this time    Balance:   Static Sit: min-SBA  Dynamic Sit: maxA  Static Stand: totalAx2    Therapeutic Activities and Exercises:  Reviewed role of PT and PT POC.   Static sitting at EOB x~20 minutes, initially with Nayan but progressed to SBA. Max v/c and t/c for upright posture and forward gaze. Pt maintaining increased cervical flexion throughout, with only briefly periods of time that pt was able to lift " head.   Sit<>stand x2 reps with totalAx2. Static standing x2 reps, x~30 seconds each rep with totalAx2. Max t/c for increased hip, knee, and trunk extension with B knees blocked throughout. Max v/c for upright posture, increased hip extension, and forward gaze. RN applied ointment to adonis area during 2nd standing rep. Pt with increased fatigue following 2nd rep requiring return to supine.     AM-PAC 6 CLICK MOBILITY  How much help from another person does this patient currently need?   1 = Unable, Total/Dependent Assistance  2 = A lot, Maximum/Moderate Assistance  3 = A little, Minimum/Contact Guard/Supervision  4 = None, Modified Hart/Independent    Turning over in bed (including adjusting bedclothes, sheets and blankets)?: 2  Sitting down on and standing up from a chair with arms (e.g., wheelchair, bedside commode, etc.): 2  Moving from lying on back to sitting on the side of the bed?: 2  Moving to and from a bed to a chair (including a wheelchair)?: 2  Need to walk in hospital room?: 1  Climbing 3-5 steps with a railing?: 1  Total Score: 10    AM-PAC Raw Score CMS G-Code Modifier Level of Impairment Assistance   6 % Total / Unable   7 - 9 CM 80 - 100% Maximal Assist   10 - 14 CL 60 - 80% Moderate Assist   15 - 19 CK 40 - 60% Moderate Assist   20 - 22 CJ 20 - 40% Minimal Assist   23 CI 1-20% SBA / CGA   24 CH 0% Independent/ Mod I     Patient left supine with all lines intact, call button in reach, RN notified and pt's wife, son, and family friend present.    Assessment:  Humphrey Machado is a 85 y.o. male with a medical diagnosis of ST elevation myocardial infarction (STEMI) and presents with limited functional mobility 2* impaired endurance, weakness, and decreased balance. Pt was able to progress functional mobility this session, as he tolerated static standing x2 reps. However, pt continues to require totalAx2 for all bed mobility and transfers. Pt also continues to demonstrate mild confusion to  tasks descriptions (although A&O) with pt requiring increased time, cueing, and instruction to participate in therapeutic activities. Pt would continue to benefit from skilled acute PT in order to address current deficits and progress functional mobility. Pt will require SNF upon d/c as he currently requires increased assistance with all functional tasks and does not have adequate support system available.     Rehab identified problem list/impairments: Rehab identified problem list/impairments: weakness, impaired endurance, impaired self care skills, gait instability, impaired functional mobilty, impaired balance, impaired cognition, decreased coordination, decreased lower extremity function, decreased safety awareness, pain, impaired cardiopulmonary response to activity, impaired coordination, impaired skin    Rehab potential is good.    Activity tolerance: Fair    Discharge recommendations: Discharge Facility/Level Of Care Needs: nursing facility, skilled     Barriers to discharge: Barriers to Discharge: Decreased caregiver support (Pt requires increased assist at this time.)    Equipment recommendations: Equipment Needed After Discharge:  (TBD)     GOALS:    Physical Therapy Goals        Problem: Physical Therapy Goal    Goal Priority Disciplines Outcome Goal Variances Interventions   Physical Therapy Goal     PT/OT, PT Ongoing (interventions implemented as appropriate)     Description:  Goals to be met by: 17     Patient will increase functional independence with mobility by performin. Supine to sit with Moderate Assistance  2. Sit to supine with Moderate Assistance  3. Sit to stand transfer with Moderate Assistance  4. Bed to chair transfer with Moderate Assistance using appropriate AD.  5. Gait  x 5 feet with Moderate Assistance using appropriate AD.   6. Lower extremity exercise program x10 reps, with assistance as needed, in order to increase LE strength and (I) with functional mobility.                        PLAN:    Patient to be seen 4 x/week  to address the above listed problems via gait training, therapeutic activities, therapeutic exercises  Plan of Care expires: 06/23/17  Plan of Care reviewed with: patient, spouse, son        Nataly Alvino, PT, DPT   5/26/2017  [phone]

## 2017-05-26 NOTE — ASSESSMENT & PLAN NOTE
- 2/2 to [facility]O on CKD, phos still high   - pt on low phos diet   - continue sevelamer 1600 TID today   - will need to start on HD.Plan as above

## 2017-05-26 NOTE — PLAN OF CARE
Problem: Patient Care Overview  Goal: Plan of Care Review  Outcome: Ongoing (interventions implemented as appropriate)  Pt remains on contact isolation for ESBL in the urine and blood. Heparin gtt continues to infuse at MD ordered rate. Pt is on 2L nasal cannula, O2 sats remain stable. Stevens catheter in place and continue to drain. Pt remained free from falls/trauma/injury. VSS. Denies any CP, SOB, palpitations, dizziness, pain and discomfort. Turning/repositioning independently in bed. Plan of care reviewed with patient and all questions answered, verbalizes understanding. No acute distress noted. Will continue to monitor.

## 2017-05-26 NOTE — PROGRESS NOTES
[facility] Hospital Medicine  Progress Note    Patient Name: Humphrey Machado  MRN: [id number]  Patient Class: IP- Inpatient   Admission Date: 5/19/2017  Length of Stay: 6 days  Attending Physician: Chaya Tinsley MD  Primary Care Provider: Andrew Murray MD    [facility] Medicine Team: [facility] C Chaya Tinsley MD    Subjective:     Principal Problem:ST elevation myocardial infarction (STEMI)    HPI:  85-year-old man with HTN, HLD, NSTEMI previously refused [facility] 5/2016, CKD stage IV (baseline 2.7), moderate AS, AF, venous insufficiency, cirrhosis, Ureterocutaneous fistula, chronic osteomyelitis of the pelvic region on cefpodoxime, suspected inflammatory arthritis on HCQ, neuromuscular disorder with peripheral neuropathy with recurrent right foot ulcer, prostate cancer s/p radiation, RCC s/p left nephrectomy, skin cancer of the hand, urethral stricture, urinary incontinence p/w SOB which started an hour prior to admission and EKG concerning for STEMI with afib with q waves in inferior/anterior-septal leads with resolution of chest pain with SL NTG. Patient was evaluated in the ED by Dr Duval and recommended to be taken to cath lab considering his clinical picture but he refused to proceed with angiogram due to risk of hemodialysis possibility he will be admitted fort mdical treatment in CCU.     Currently he is very SOB with fluid overload and aneuric.looks like not responding to lasix his son is his POA and they are discussing code status and if they will proceed with more procedures including CRRT.    Patient with recent cystoscopy/cystogram/fistulogram by Dr. 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 Abraham's  Operative noted.     Hospital Course:  Hospital course as above, while  in the CCU patient was medically managed for his STEMI, and for his AMIRA.  CCU team discussed comfort vs aggressive measures, patient's code status changed to DNR and due to concern that patient was anuric with developing AMIRA that may require HD. Discussion per EMR with family revealed they would not want to pursue dialysis. He was started on Lasix drip @ 20mg/hr and IV Diuril q12 hrs and had improved urine output on this regimen.  PRN Morphine and Ativan orders had been placed for patient comfort    Patient stepped down to IM-C casiano service on 5/20.  Patient was acutely encephalopathic, developed fevers and notified by RN blood cultures from admission were growing GNR.  Surveillance cultures sent patient was on Vancomycin and Cefepime.  He was having urine output on the lasix and diuril.  Extensive family discussion held (see progress notes for detail)  Family was not universally ready for comfort care, care plan more no escalation of care, given his altered mentation, I suspected morphine and ativan in Amira contributing, opiates switched to dilaudid and ativan d/c.   Discussed NG tube placement for oral med administration and nutrition but was held off.     5/21 - Patient is more awake and alert, likely related to toxic encephalopathy, remains hemodynamically stable, but was having more pain complaints, was receiving IV dilaudid  0.5mg q2hrs.   SLP evaluated patient and he is safe for dental soft nectar thick liquids.   His heart rhythm converted to atrial fibrillation with controlled rate in the 80-90s.   Will need repeat cardiology recs on management in AM.     Patient with nearly 2L UOP x 24hrs, family with more questions about possible HD or what course his Amira may take.  Nephrology re-contacted to follow the patient.  Diuril will be stopped this evening to monitor how pts UOp  And Cr does.    I reviewed more of recent urologic history and patient has a urinary source of his bacteremia, however with complicated  chronic fistula and wound we may not have source control. Discussed with patient and will consult urology to assist in any other management for source control and chronic urethrocutaneous fistula that may be needed - Urology consult placed for mon Am.     Reviewed prior culture data and patient with hx of Klebsiella ESBL and Pseudomonas in urine, both not sensitive to empiric cefepime so ID approval obtained to change to renal dosed Meropenem     Patient says pain control is improving but is still seems he has chronic pain issues from multiple sites, adding po opiates to pain regimen.     5/22 - Additional consultants have evaluated the patient  Cardiology - no additional med management recs at this time, patient has completed 48hrs of heparin for ACS, however with atrial fibrillation, will continue for now.  Coreg change to low dose metoprolol due to hypotension    Urology - evaluated patient and removed ureteral catheter, no further recs for chronic urtherocutaneous fistula, local wound care.   Wound care evaluated the patient and placed barrier cream in right groin with soft dressing to help absorb urinary leak from fistula, I was at bedside and pt did not feel ready to turn for assessment of buttocks.     Nephrology - evaluated the patient and no acute needs for acute HD, they will continue to follow, did inform pt that nephrology feels initiating HD would not improve overall morbidity/mortality from his co-morbid conditions and HD initiation comes with its own risks and invasive procedures required.     Palliative care - evaluated pt to assist in overall care planning.  MD staff to assist with pain management not available this week.     5/23- patient complained of discomfort. He is still unsure of what he wants for medical management. He is still considering hemodialysis as an option. His renal function continued to worsen but his main concern was his discomfort.    5/24- patient unable to voice his wishes nor  "does he want to have a goals of care discussion. He has poor insight into his disease process. He is verbally abusive to his providers and nursing staff. Discussed with his wife and grandson scheduling a family meeting to establish therapeutic plan.     5/25- he continues to be abusive to medical personnel. When trying to assess his pain he becomes abusive and screams that he can't tell.     Interval History: "I don't know if I'm in pain. I always have pain". No acute overnight events. Pain uncontrolled.     Review of Systems   Constitutional: Negative for chills, fatigue and fever.   HENT: Negative for ear pain, mouth sores, nosebleeds, postnasal drip, rhinorrhea, sinus pressure, sore throat, tinnitus, trouble swallowing and voice change.    Eyes: Negative for photophobia, pain, redness and visual disturbance.   Respiratory: Negative for apnea, cough, chest tightness, shortness of breath and wheezing.    Cardiovascular: Negative for chest pain, palpitations and leg swelling.   Gastrointestinal: Negative for abdominal distention, abdominal pain, blood in stool, constipation, diarrhea, nausea and vomiting.   Endocrine: Negative for cold intolerance, heat intolerance, polydipsia and polyuria.   Genitourinary: Negative for decreased urine volume, difficulty urinating, discharge, dysuria, flank pain, frequency, genital sores, hematuria, penile pain, penile swelling, scrotal swelling, testicular pain and urgency.   Musculoskeletal: Positive for arthralgias and myalgias. Negative for back pain, joint swelling and neck pain.   Skin: Negative for color change and rash.   Allergic/Immunologic: Negative for environmental allergies and food allergies.   Neurological: Negative for dizziness, seizures, syncope, weakness, light-headedness, numbness and headaches.   Hematological: Negative for adenopathy. Does not bruise/bleed easily.   Psychiatric/Behavioral: Negative for agitation, confusion, decreased concentration, " hallucinations, self-injury, sleep disturbance and suicidal ideas. The patient is not nervous/anxious.      Objective:     Vital Signs (Most Recent):  Temp: 97.5 °F (36.4 °C) (05/25/17 0800)  Pulse: 61 (05/25/17 1900)  Resp: 18 (05/25/17 1200)  BP: 108/60 (05/25/17 1200)  SpO2: 98 % (05/25/17 1200) Vital Signs (24h Range):  Temp:  [97.5 °F (36.4 °C)-98.3 °F (36.8 °C)] 97.5 °F (36.4 °C)  Pulse:  [59-71] 61  Resp:  [18] 18  SpO2:  [92 %-98 %] 98 %  BP: (106-108)/(60-62) 108/60     Weight: 77.8 kg (171 lb 8.3 oz)  Body mass index is 24.61 kg/m².    Intake/Output Summary (Last 24 hours) at 05/25/17 2029  Last data filed at 05/25/17 1500   Gross per 24 hour   Intake              120 ml   Output              550 ml   Net             -430 ml      Physical Exam   Constitutional: He is oriented to person, place, and time. He appears well-developed and well-nourished. He has a sickly appearance.   HENT:   Head: Normocephalic and atraumatic.   Right Ear: External ear normal.   Left Ear: External ear normal.   Mouth/Throat: Oropharynx is clear and moist.   Eyes: Conjunctivae and EOM are normal. Pupils are equal, round, and reactive to light.   Neck: Normal range of motion. Neck supple. No thyromegaly present.   Cardiovascular: Normal rate and regular rhythm.    Murmur heard.   Systolic murmur is present with a grade of 2/6   Pulmonary/Chest: Effort normal and breath sounds normal. He has no wheezes. He has no rales.   Abdominal: Soft. Bowel sounds are normal. He exhibits no mass. There is no tenderness. There is no rebound.   Genitourinary:   Genitourinary Comments: Stevens catheter in place. Right thigh fistula covered.   Musculoskeletal: Normal range of motion. He exhibits tenderness.   Lymphadenopathy:     He has no cervical adenopathy.   Neurological: He is alert and oriented to person, place, and time.   Skin: Skin is warm and dry.   Venous stasis dermatitis over the bilateral lower extremities.   Psychiatric: He has a  normal mood and affect. His behavior is normal.   Vitals reviewed.      Significant Labs:   BMP:   Recent Labs  Lab 05/25/17  0555   GLU 81   *   K 3.4*   CL 94*   CO2 22*   *   CREATININE 6.0*   CALCIUM 8.9   MG 2.4     CBC:   Recent Labs  Lab 05/24/17  0511 05/25/17  0555   WBC 4.36 5.00   HGB 8.4* 7.8*   HCT 25.7* 24.7*    171       Significant Imaging: I have reviewed all pertinent imaging results/findings within the past 24 hours.    Assessment/Plan:      Hyperphosphatemia    Secondary to THEO on CKD. Slightly improved level today which down trended from 8 to 7.5.  1. Sevelamer as per nephrology.  2. Low phosphate diet.        Oropharyngeal dysphagia    Stable.   1. Dental soft diet with Nectar thick liquids.   2. Continue SLP management.          Ischemic cardiomyopathy    EF 35%, mangagement as above          Chronic pain    Pain related to chronic  issues, buttock pain, also noted to complain of extremity pain in LUE, has a chronic neuropathy diagnosis. Per patient his pain is uncontrolled.   1. Dilaudid 4mg po every 6 hrs.    2. As needed Dilaudid 0.5mg IV every 2 hrs for breakthrough.   3. Would consider very low dose fentanyl patch only if plan of care shifted to full comfort measures.   4. If pain continues to be as severe then will continue to titrate therapy.          Acute kidney injury superimposed on CKD    Suspected to be intrinsic THEO secondary to poor perfusion from STEMI vs  pre-renal. Renal function continues to worsen. Although patient stated he did not want HD, this afternoon he told myself and nephrology that he wishes to proceed with HD.  1. Monitor creatinine level.  2. Avoid nephrotoxic agents.  3. Renally dose medications.  4. Prepare for trial of RRT.          Bacteremia due to Klebsiella pneumoniae    Organism identified as ESBL K pneumoniae. Same organism found in his urine and therefore assumption can be made that bacteremia is secondary to his urinary tract  infection. Repeat blood cultures from 5/20 remain no growth to date.  1. Ertapenem 500 mg daily as there is no need for added pseudomonal coverage provided by meropenem.  2. Monitor for signs of encephalopathy which have been seen as an adverse effect or ertapenem.   3. Anticipate a 14 day course total from clearance of bacteremia. End date: 6/2/17.  4. Patient likely to need CVC for antibiotic therapy.        Atrial fibrillation with RVR    AFIb is not new for patient, he does have prior history of it.  However he is not on OP anticoagulation, so discussion is still needed with patient/family to decide if Heparin drip should continue or if warfarin should be started (only oral AntiCoag option). Currently rate controlled. Nephrology service recommends holding beta blocker if possible.  1. Continue low dose beta blockers for rate control   2. Chads- vasc is 4 - 4.8% annual risk./ ATRIA bleeding risk score 5.8% annual risk of hemorrhage, high risk category.  3. Patient still undecided about oral anticoagulation.        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   1. Appears there is no further treatment for his fistula with multitude of comorbidities and patient is bound to experience recurrent infections as a result of this.           Complicated open wound of right thigh    Stable. Urology recommends non aggressive measures.   1. Continue local wound care.        Debility    On bedrest due to active issues.   1. Every 2 hour turns   2. PT/OT for potential placement when pt more clinically stable          Urinary tract infection due to extended-spectrum beta lactamase (ESBL)-producing Klebsiella    Management as above under bacteremia problem.        * ST elevation myocardial infarction (STEMI)    No current chest pain or SOB but patient with discomfort. Refused LHC. Currently being managed medically.   1.  Aspirin, plavix, heparin gtt   2. High intensity statins   3. Continue low dose metoprolol for now.          VTE Risk Mitigation         Ordered     Medium Risk of VTE  Once      05/19/17 0945     Reason for No Pharmacological VTE Prophylaxis  Once      05/19/17 0945          Chaya Tinsley MD  Department of Hospital Medicine   [facility]

## 2017-05-26 NOTE — NURSING
"Patient is very agitated.  When asked to rate his pain or point to where his pain is, he stated, "You don't know that if someone sticks you over and over that it hurts.  I don't know where my pain is."  Patient is lying in bed.  His son and spouse are at bedside.  Patient securely holds his  cigar and glasses in hand for most of the day.  "

## 2017-05-26 NOTE — PT/OT/SLP PROGRESS
Speech Language Pathology      Humphrey Machado  MRN: [id number]    Patient not seen today secondary to Patient NPO and off floor for testing. ST to continue to monitor and will follow-up.    Nataly Woodward, FORTUNATO-SLP

## 2017-05-26 NOTE — CONSULTS
INTERVENTIONAL CARDIOLOGY CONSULT      Referring Physician:  Chaya Tinsley MD  Indication: Prior STEMI     HPI:    Mr. Machado is a 85-yo man with ESBL bacteremia, CKD IV currently with THEO and Cr 5.8, venous insufficiency, cirrhosis, Ureterocutaneous fistula, chronic osteomyelitis of the pelvic region on cefpodoxime, suspected inflammatory arthritis on HCQ, neuromuscular disorder with peripheral neuropathy with recurrent right foot ulcer, prostate cancer s/p radiation, RCC s/p left nephrectomy, skin cancer of the hand, urethral stricture, urinary incontinence.  He presented with STEMI 5/19/17 and refused coronary angiogram.  He did have an episode of chest pain last night (not STEMI criteria on EKG), and interventional is consulted this AM.  Of note, he had a prior PET stress in 2016 for which he also declined angiogram.      Interventional in consulted for angiogram, per the chart. In speaking with the family and his son, they do not want angiogram or anything invasive at this time.     Past Medical History:   Diagnosis Date    *Atrial fibrillation     Acute appendicitis 2/19/2015    Anemia     Anxiety     Arthritis     generalized    Basal cell carcinoma 01/2013    right temple    BCC (basal cell carcinoma)     right mid forearm    Bladder stone 6/28/2016    Blood transfusion     Chronic urethral stricture 10/14/2015    Chronic venous insufficiency 7/8/2013    CKD (chronic kidney disease) stage 3, GFR 30-59 ml/min 9/6/2012    Coronary artery disease     Elevated PSA     Essential hypertension 7/30/2015    Hematuria, gross     History of Left Nephrectomy (~2006) 1/29/2014    [facility] at [facility].     Hypertension     Inflammatory arthritis 8/14/2012    Kidney stone     Long-term use of Plaquenil 6/4/2015    Mixed incontinence urge and stress 4/11/2014    Nonrheumatic aortic valve stenosis 5/30/2016    NSTEMI (non-ST elevated myocardial infarction) 5/30/2016    Olecranon bursitis 1/14/2013  "   Osteopenia 8/14/2012    Personal history of kidney cancer 4/11/2014    Prostate cancer     Radiation     treatment for prostate cancer    Recurrent UTI 7/8/2013    Respiratory distress     S/P radiation therapy 4/11/2014    Skin cancer of arm     left leg (malignant)    Solitary kidney 7/15/2013    Venous insufficiency of leg 7/12/2012    Venous stasis ulcers 7/6/2012    Vitamin D deficiency disease 5/8/2013     Past Surgical History:   Procedure Laterality Date    APPENDECTOMY      CYSTOSCOPY      with dialation    NEPHRECTOMY  2006-07?    Removal of left kidney    TONSILLECTOMY       Family History   Problem Relation Age of Onset    Cancer Mother      bone     Heart disease Father     Urolithiasis Father     Mental illness Daughter     Cancer Brother      prostate cancer, (Ervin) removed by surgery    Cancer Brother      prostate cancer    Cancer Maternal Aunt     Melanoma Neg Hx     Lupus Neg Hx     Rheum arthritis Neg Hx      Social History   Substance Use Topics    Smoking status: Current Every Day Smoker     Years: 40.00     Types: Cigars    Smokeless tobacco: Never Used      Comment: pt smokes 3 cigars a day pt will make up his mind when his ready- did give up smoking cigarettes at age 35yrs smoked from 18 - 35 years    Alcohol use 0.6 oz/week     1 Glasses of wine per week      Comment: stop drinking 09/2009. 1 glass of wine at bedtime      Review of patient's allergies indicates:   Allergen Reactions    Aspirin Other (See Comments)     Stomach      Ditropan [oxybutynin chloride]      Unable to describe side effect other than "felt strange"     Keflex [cephalexin] Rash     Morbilliform  Has tolerated multiple cephalosporins since 2013.    Macrobid [nitrofurantoin monohyd/m-cryst] Hives and Rash      allopurinol  100 mg Oral Daily    aspirin  81 mg Oral Daily    atorvastatin  80 mg Oral Daily    clopidogrel  75 mg Oral Daily    docusate sodium  100 mg Oral BID    " ertapenem (INVANZ) IVPB  500 mg Intravenous Q24H    HYDROmorphone  4 mg Oral Q6H    hydroxychloroquine  200 mg Oral BID    metoprolol  6.25 mg Oral BID    miconazole   Topical (Top) BID    sevelamer carbonate  1,600 mg Oral TID WM    sodium bicarbonate  650 mg Oral BID    sodium chloride 0.9%  3 mL Intravenous Q8H     OBJECTIVE:     Vitals:    05/26/17 0300 05/26/17 0400 05/26/17 0500 05/26/17 0700   BP:   (!) 101/53    Pulse: 62 66 64 63   Resp:       Temp:   98 °F (36.7 °C)    TempSrc:   Oral    SpO2:   98%    Weight:       Height:         Gen: Lying in bed, elderly man, frail  HEENT: Supple  Cvs: Regular rate and rhythm, systolic ejection murmur  Resp: Normal work of breathing, clear bilaterally  Abd: Soft, not distended  Ext: Warm, hemosiderin deposition noted    Labs:       Recent Labs  Lab 05/26/17  0407      K 3.6   CL 94*   CO2 25   *   CREATININE 5.8*   CALCIUM 9.1   PHOS 8.8*       Recent Labs  Lab 05/26/17  0407   WBC 4.63   HGB 7.9*   HCT 24.9*          Recent Labs  Lab 05/20/17  0319 05/20/17  0826 05/22/17  0440   TROPONINI >50.000* >50.000* 29.112*       IMAGING:   EKG, TTE reviewed.     IMPRESSION and PLAN:    Mr. Machado is an 85-yo man with multiple medical problems, recent STEMI who declined intervention.  Interventional cardiology is consulted for possible angiogram.  He did have inferior STEMI with Q waves present on 5/19, echocardiogram showing WMA and EF 30-35%, which was new from 2016.  However, he is a week out from his STEMI, and the OAT trial would suggest that revascularization at this point is likely not beneficial (a viability study would help differentiate, but do not recommend this at this time).  Additionally, he is at very high risk of requiring dialysis, certainly with THEO on top of his CKD.  He is anemic at baseline.  Therefore, the risks would significantly outweigh the benefits in this case.  Moreover, the patient and his family do not wish to undergo  invasive procedures at this time, consistent with his previous wishes.      Recommendations:   - Optimize anti-anginal therapy as tolerated   (should be on 2 anti-anginals) - Goal HR 50's with beta blockade, can add Imdur or ranolazine as BP tolerates      Dimitry Downs M.D.  Cardiology Fellow

## 2017-05-26 NOTE — PROGRESS NOTES
Progress Note  Nephrology    Admit Date: 5/19/2017  Length of Stay: 7  Consult Requested By: Chaya Tinsley MD  Reason for Consult: permacath    SUBJECTIVE:     Interval History:  MARYA consulted for permacath placement. Consent obtained from son.         Patient Active Problem List   Diagnosis    Ulcer of calf    Venous insufficiency of leg    Inflammatory arthritis    Encounter for long-term (current) use of other medications    Osteopenia    Urinary tract infection associated with indwelling urethral catheter    Vitamin D deficiency disease    Peripheral vascular disease, unspecified    Anemia of other chronic disease    Urinary tract infection due to extended-spectrum beta lactamase (ESBL)-producing Klebsiella    Solitary kidney    Prostate cancer    S/p nephrectomy    Mixed incontinence urge and stress    Long-term use of Plaquenil    Essential hypertension    Debility    Chronic urethral stricture    Chronic osteomyelitis of pelvic region    Nonrheumatic aortic valve stenosis    Urinary retention    Anemia    Hypotension    CKD (chronic kidney disease)    Urethral fistula    Varicose veins of right lower extremity with ulcer    Decubitus ulcer of left heel, stage 3    Atherosclerosis of native arteries of the extremities with ulceration    Ulcer of right foot with fat layer exposed    Ulcer of ankle    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    Complicated open wound of right thigh    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    Tobacco use    Constipation- opioid related    Osteomyelitis of multiple sites    Abnormal albumin    Fistula    ST elevation myocardial infarction (STEMI)    Atrial fibrillation with RVR    Bacteremia due to Klebsiella pneumoniae    Acute kidney injury superimposed on CKD    Chronic pain    Ischemic cardiomyopathy    Oropharyngeal dysphagia    Hyperphosphatemia    Palliative care encounter    Goals of care, counseling/discussion     Pain     Past Medical History:   Diagnosis Date    *Atrial fibrillation     Acute appendicitis 2/19/2015    Anemia     Anxiety     Arthritis     generalized    Basal cell carcinoma 01/2013    right temple    BCC (basal cell carcinoma)     right mid forearm    Bladder stone 6/28/2016    Blood transfusion     Chronic urethral stricture 10/14/2015    Chronic venous insufficiency 7/8/2013    CKD (chronic kidney disease) stage 3, GFR 30-59 ml/min 9/6/2012    Coronary artery disease     Elevated PSA     Essential hypertension 7/30/2015    Hematuria, gross     History of Left Nephrectomy (~2006) 1/29/2014    [facility].     Hypertension     Inflammatory arthritis 8/14/2012    Kidney stone     Long-term use of Plaquenil 6/4/2015    Mixed incontinence urge and stress 4/11/2014    Nonrheumatic aortic valve stenosis 5/30/2016    NSTEMI (non-ST elevated myocardial infarction) 5/30/2016    Olecranon bursitis 1/14/2013    Osteopenia 8/14/2012    Personal history of kidney cancer 4/11/2014    Prostate cancer     Radiation     treatment for prostate cancer    Recurrent UTI 7/8/2013    Respiratory distress     S/P radiation therapy 4/11/2014    Skin cancer of arm     left leg (malignant)    Solitary kidney 7/15/2013    Venous insufficiency of leg 7/12/2012    Venous stasis ulcers 7/6/2012    Vitamin D deficiency disease 5/8/2013        OBJECTIVE:   Temp:  [97.5 °F (36.4 °C)-98.6 °F (37 °C)]   Pulse:  [59-74]   Resp:  [16]   BP: ()/(53-60)   SpO2:  [97 %-99 %]       Intake/Output Summary (Last 24 hours) at 05/26/17 1432  Last data filed at 05/26/17 1400   Gross per 24 hour   Intake              720 ml   Output             1575 ml   Net             -855 ml         Physical Exam:  Constitutional: He is oriented to person, place, and time. He appears well-developed and well-nourished.   HENT:   Head: Normocephalic and atraumatic.   Eyes: Conjunctivae and EOM are normal.   Neck: Neck supple.    Cardiovascular: Normal rate, regular rhythm and normal heart sounds.    Pulmonary/Chest: Effort normal and breath sounds normal.   Abdominal: Soft. Bowel sounds are normal.   Neurological: He is alert and oriented to person, place, and time.     Laboratory:  CBC with Diff:     Recent Labs  Lab 05/24/17  0511 05/25/17  0555 05/26/17  0407   WBC 4.36 5.00 4.63   HGB 8.4* 7.8* 7.9*   HCT 25.7* 24.7* 24.9*    171 157   LYMPH 16.3*  0.7* 23.6  1.2 24.6  1.1   MONO 10.6  0.5 13.2  0.7 15.3*  0.7   EOSINOPHIL 3.4 4.4 4.1     COAG:    Recent Labs  Lab 05/24/17  0511 05/25/17  0555 05/26/17  0407   APTT 83.8* 92.7* 98.1*   INR 1.1 1.1 1.1       CMP:   Recent Labs  Lab 05/24/17  0511 05/25/17  0555 05/26/17  0407   GLU 91 81 83   CALCIUM 9.3 8.9 9.1   ALBUMIN 2.1* 2.2* 2.2*   PROT 6.9 6.8 6.8   * 134* 136   K 3.5 3.4* 3.6   CO2 25 22* 25   CL 90* 94* 94*   * 119* 120*   CREATININE 5.7* 6.0* 5.8*   ALKPHOS 108 101 100   ALT 45* 37 32   AST 53* 47* 48*   BILITOT 0.3 0.3 0.3   MG 2.3 2.4 2.3   PHOS 7.5* 8.5* 8.8*       Hemoglobin A1C   Date Value Ref Range Status   05/29/2016 4.8 4.5 - 6.2 % Final   01/31/2012 5.5 4.0 - 6.2 % Final   06/17/2009 6.2 4.0 - 6.2 % Final         ASSESSMENT/PLAN:   Principle Problem:  ST elevation myocardial infarction (STEMI)  Active Hospital Problems    Diagnosis  POA    *ST elevation myocardial infarction (STEMI) [I21.3]  Yes    Pain [R52]  Yes    Palliative care encounter [Z51.5]  Not Applicable    Goals of care, counseling/discussion [Z71.89]  Not Applicable    Hyperphosphatemia [E83.39]  Yes    Bacteremia due to Klebsiella pneumoniae [R78.81]  Yes    Acute kidney injury superimposed on CKD [N17.9, N18.9]  Yes    Chronic pain [G89.29]  Yes    Ischemic cardiomyopathy [I25.5]  Yes    Oropharyngeal dysphagia [R13.12]  No    Atrial fibrillation with RVR [I48.91]  Yes    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

## 2017-05-26 NOTE — PROGRESS NOTES
"[facility]  Nephrology  Progress Note    Patient Name: Humphrey Machado  MRN: [id number]  Admission Date: 5/19/2017  Hospital Length of Stay: 7 days  Attending Provider: Chaya Tinsley MD   Primary Care Physician: Andrew Murray MD  Principal Problem:ST elevation myocardial infarction (STEMI)    Subjective:     HPI: 86 yo M with PMHx of HTN,HLD, NSTEMI previously refused [facility] 5/2016, CKD stage IV (baseline 2.7), moderate AS, AF, venous insufficiency, cirrhosis, Ureterocutaneous fistula, chronic osteomyelitis of the pelvic region on cefpodoxime, suspected inflammatory arthritis on HCQ, neuromuscular disorder with peripheral neuropathy with recurrent right foot ulcer, prostate cancer s/p radiation, RCC s/p left nephrectomy, skin cancer of the hand, urethral stricture, urinary incontinence p/w SOB which started an hour prior to admission and EKG concerning for STEMI with afib with q waves in inferior/anterior-septal leads with resolution of chest pain with SL NTG. Patient was evaluated in the ED by Dr Duval and recommended to be taken to cath lab considering his clinical picture but he refused to proceed with angiogram due to risk of hemodialysis possibility     Renal consulted for worsening THEO on CKD     Interval History:   Per note cardiology was reconsulted overnight due to chest pain but it turned out he had flank pain. EKG showed same findings as before. Interventional cardiology revisited the pt this morning and per note pt and family refused intervention.    Pt made NPO and heparin drip held in anticipation for temp cath for HD     Review of patient's allergies indicates:   Allergen Reactions    Aspirin Other (See Comments)     Stomach      Ditropan [oxybutynin chloride]      Unable to describe side effect other than "felt strange"     Keflex [cephalexin] Rash     Morbilliform  Has tolerated multiple cephalosporins since 2013.    Macrobid [nitrofurantoin monohyd/m-cryst] Hives and Rash "     Current Facility-Administered Medications   Medication Frequency    0.9%  NaCl infusion Continuous    allopurinol tablet 100 mg Daily    aspirin chewable tablet 81 mg Daily    atorvastatin tablet 80 mg Daily    bisacodyl suppository 10 mg Daily PRN    clopidogrel tablet 75 mg Daily    dextrose 50% injection 12.5 g PRN    dextrose 50% injection 25 g PRN    docusate sodium capsule 100 mg BID    ertapenem (INVANZ) 0.5 g in sodium chloride 0.9% 50 mL IVPB Q24H    glucagon (human recombinant) injection 1 mg PRN    glucose chewable tablet 16 g PRN    glucose chewable tablet 24 g PRN    heparin 25,000 units in dextrose 5% 250 mL (100 units/mL) bolus from bag; ADDITIONAL PRN BOLUS PRN    heparin 25,000 units in dextrose 5% 250 mL (100 units/mL) infusion Continuous    hydromorphone injection 0.5 mg Q2H PRN    HYDROmorphone tablet 4 mg Q6H    hydroxychloroquine tablet 200 mg BID    metoprolol 12.5mg/1.25mL oral solution 6.25 mg BID    miconazole 2 % cream BID    nitroGLYCERIN SL tablet 0.4 mg Q5 Min PRN    ondansetron injection 4 mg Q8H PRN    sevelamer carbonate tablet 1,600 mg TID WM    simethicone chewable tablet 80 mg TID PRN    sodium bicarbonate tablet 650 mg BID    sodium chloride 0.9% flush 3 mL Q8H       Objective:     Vital Signs (Most Recent):  Temp: 98 °F (36.7 °C) (05/26/17 0500)  Pulse: 63 (05/26/17 0700)  Resp: 16 (05/25/17 2000)  BP: (!) 101/53 (05/26/17 0500)  SpO2: 98 % (05/26/17 0500)  O2 Device (Oxygen Therapy): nasal cannula (05/25/17 2000) Vital Signs (24h Range):  Temp:  [97.5 °F (36.4 °C)-98 °F (36.7 °C)] 98 °F (36.7 °C)  Pulse:  [59-68] 63  Resp:  [16-18] 16  SpO2:  [97 %-99 %] 98 %  BP: ()/(53-60) 101/53     Weight: 77.8 kg (171 lb 8.3 oz) (05/19/17 1125)  Body mass index is 24.61 kg/m².  Body surface area is 1.96 meters squared.    I/O last 3 completed shifts:  In: 540 [P.O.:540]  Out: 1425 [Urine:1425]    Physical Exam   Constitutional: He is oriented to person,  place, and time. He appears well-developed and well-nourished.   HENT:   Head: Normocephalic and atraumatic.   Eyes: Conjunctivae and EOM are normal.   Neck: Neck supple.   Cardiovascular: Normal rate, regular rhythm and normal heart sounds.    Pulmonary/Chest: Effort normal and breath sounds normal.   Abdominal: Soft. Bowel sounds are normal.   Neurological: He is alert and oriented to person, place, and time.       Significant Labs:  CBC:     Recent Labs  Lab 05/26/17  0407   WBC 4.63   RBC 2.99*   HGB 7.9*   HCT 24.9*      MCV 83   MCH 26.4*   MCHC 31.7*     CMP:     Recent Labs  Lab 05/26/17  0407   GLU 83   CALCIUM 9.1   ALBUMIN 2.2*   PROT 6.8      K 3.6   CO2 25   CL 94*   *   CREATININE 5.8*   ALKPHOS 100   ALT 32   AST 48*   BILITOT 0.3       Recent Labs  Lab 05/24/17  0030   COLORU Brittany   SPECGRAV 1.010   PHUR 6.0   PROTEINUA 2+*   BACTERIA Rare   NITRITE Negative   LEUKOCYTESUR 2+*   UROBILINOGEN Negative   HYALINECASTS 0      Labs: Reviewed    Assessment/Plan:     Acute kidney injury superimposed on CKD     Pt k/c/o CKD stage 3-4 2/2 HTN, solitary kidney s/p nephrectomy due to RCC, recurrent UTIs   - baseline creat 2.3 to 2.7   - THEO may be pre renal from hypotension, Pt continues to have low borderline BPs, consider hold betablocker   - creat slightly down today, BUN continues to rise   - rpt renal USG same as before with rt kidney with a simple cyst, no obstruction   - pt agreed to trial of HD yest. Plan to place temp cath today followed by dialysis             Hyperphosphatemia    - 2/2 to THEO on CKD, phos still high   - pt on low phos diet   - continue sevelamer 1600 TID today   - will need to start on HD.Plan as above           Diya Vega MD  Nephrology  [facility]

## 2017-05-26 NOTE — PROGRESS NOTES
Right IJ tunneled CVC placed per Dr Kohli under direct fluoro and OK to use per him.  Pressure dsg to site.

## 2017-05-26 NOTE — PROGRESS NOTES
Progress  Note  Palliative Care          ASSESSMENT/PLAN:       Impression: Mr. Machado is a 85 yr old gentleman s/p STEMI  and refusal of right heart cath 5/20/17. Worsening THEO in setting of chronic kidney disease.  BUN and creatinine elevated.  Lasix drip continued with more than adequate clear yellow urine, indwelling urinary catheter.   He is awake, alert and oriented to person, and place. Acknowledged that he is beginning to feel some confusion possibly associated with uremia. Pain better controlled today.  No acute distress at this time.        Goals of Care:   Family meeting this 10:30 AM  conducted by  Dr. Tinsley,  Dr. Vega and Palliative care with patient's wife, two sons and daughter.  Focus on meeting was to develop treatment plan and goals of care as the patient and family have been reluctant to make decisions.  The following goals were established.  - DNR orders will remain in place  - Patient has elected to have a trial of dialysis: trial will last three days - through Tues 5/30 and he will make decision regarding long term dialysis  - Antibiotics will continue through June 2  - Patient and family have declined discharge with SNF goal is to be discharged to home with home PT   -continue with current pain management     Symptom Management   Pain: recommend continuing   Dilaudid  4mg by mouth every 6 hrs scheduled  Dilaudid 0.5 mg IVP every 2 hrs as needed for severe pain 7-10/10   .    Plan/Recommendations:  1. Patient has an advanced directive.  His son Denton is the HPOA.  Family was encouraged to bring to hospital to be added to his medical record.   2. Palliative care will continue to follow.    3. See above recommendations for symptom management   4. Emotional support      Primary team   Aware  of above recommendations.  Thank you for opportunity to participate in Carter Regalado's care      Signature: Leah Parson, MARILEE, ACNS-BC, OCN     > 50% of  35 min visit spent in chart review, face to  face discussion of goals of care,  symptom assessment, coordination of care and emotional support.      SUBJECTIVE:     History of Present Illness:86 yo M with PMHx of HTN,HLD, NSTEMI previously refused [facility] 5/2016, CKD stage IV (baseline 2.7), moderate AS, AF, venous insufficiency, cirrhosis, Ureterocutaneous fistula, chronic osteomyelitis of the pelvic region on cefpodoxime, suspected inflammatory arthritis on HCQ, neuromuscular disorder with peripheral neuropathy with recurrent right foot ulcer, prostate cancer s/p radiation, RCC s/p left nephrectomy, skin cancer of the hand, urethral stricture, urinary incontinence p/w SOB which started an hour prior to admission and EKG concerning for STEMI with afib with q waves in inferior/anterior-septal leads with resolution of chest pain with SL NTG. Patient was evaluated and recommended to be taken to cath lab considering his clinical picture but he refused to proceed.          Past Medical History:   Diagnosis Date    *Atrial fibrillation     Acute appendicitis 2/19/2015    Anemia     Anxiety     Arthritis     generalized    Basal cell carcinoma 01/2013    right temple    BCC (basal cell carcinoma)     right mid forearm    Bladder stone 6/28/2016    Blood transfusion     Chronic urethral stricture 10/14/2015    Chronic venous insufficiency 7/8/2013    CKD (chronic kidney disease) stage 3, GFR 30-59 ml/min 9/6/2012    Coronary artery disease     Elevated PSA     Essential hypertension 7/30/2015    Hematuria, gross     History of Left Nephrectomy (~2006) 1/29/2014    Done at [facility].     Hypertension     Inflammatory arthritis 8/14/2012    Kidney stone     Long-term use of Plaquenil 6/4/2015    Mixed incontinence urge and stress 4/11/2014    Nonrheumatic aortic valve stenosis 5/30/2016    NSTEMI (non-ST elevated myocardial infarction) 5/30/2016    Olecranon bursitis 1/14/2013    Osteopenia 8/14/2012    Personal history of kidney cancer 4/11/2014  "   Prostate cancer     Radiation     treatment for prostate cancer    Recurrent UTI 7/8/2013    Respiratory distress     S/P radiation therapy 4/11/2014    Skin cancer of arm     left leg (malignant)    Solitary kidney 7/15/2013    Venous insufficiency of leg 7/12/2012    Venous stasis ulcers 7/6/2012    Vitamin D deficiency disease 5/8/2013     Past Surgical History:   Procedure Laterality Date    APPENDECTOMY      CYSTOSCOPY      with dialation    NEPHRECTOMY  2006-07?    Removal of left kidney    TONSILLECTOMY       Family History   Problem Relation Age of Onset    Cancer Mother      bone     Heart disease Father     Urolithiasis Father     Mental illness Daughter     Cancer Brother      prostate cancer, (Ervin) removed by surgery    Cancer Brother      prostate cancer    Cancer Maternal Aunt     Melanoma Neg Hx     Lupus Neg Hx     Rheum arthritis Neg Hx      Review of patient's allergies indicates:   Allergen Reactions    Aspirin Other (See Comments)     Stomach      Ditropan [oxybutynin chloride]      Unable to describe side effect other than "felt strange"     Keflex [cephalexin] Rash     Morbilliform  Has tolerated multiple cephalosporins since 2013.    Macrobid [nitrofurantoin monohyd/m-cryst] Hives and Rash       Medications:  Continuous Infusions:    heparin (porcine) in D5W 17 Units/kg/hr (05/24/17 1112)     Scheduled:    allopurinol  100 mg Oral Daily    aspirin  81 mg Oral Daily    atorvastatin  80 mg Oral Daily    clopidogrel  75 mg Oral Daily    docusate sodium  100 mg Oral BID    ertapenem (INVANZ) IVPB  500 mg Intravenous Q24H    HYDROmorphone  2 mg Oral Q6H    hydroxychloroquine  200 mg Oral BID    metoprolol  6.25 mg Oral BID    miconazole   Topical (Top) BID    sevelamer carbonate  1,600 mg Oral TID WM    sodium bicarbonate  650 mg Oral BID    sodium chloride 0.9%  3 mL Intravenous Q8H    sodium chloride 0.9%  3 mL Intravenous Q8H     PRN Meds: " bisacodyl, dextrose 50%, dextrose 50%, glucagon (human recombinant), glucose, glucose, heparin (PORCINE), HYDROmorphone, nitroGLYCERIN, ondansetron, simethicone    24h Oral Morphine Equivalents (OME): 26    Bowel Management Plan (BMP): Yes (X )  NO  (  )    OBJECTIVE:   Symptom Assessment (ESAS 0-10 scale)     ESAS 0 1 2 3 4 5 6 7 8 9 10   Pain      X        Dyspnea   X           Anxiety X             Nausea X             Depression  X             Anorexia X             Fatigue X             Insomnia X             Restlessness  X             Agitation X             Constipation     __ --  ___+   Diarrhea           __ --  ___+     Pain:  Character: acute on chronic pain  described as sharp and burning   Duration: chronic pain for several years secondary to  pelvic osteomyelitis   Effect:  States  Severe pain interferes with ability to  participate in care (turn frequently)  and  It is difficult to concentrate  Factors: pain has been aggravated by recent surgical procedure and is relieved for short time with  prn pain medications   Frequency: constant   Location: sacral   Severity :States pain is 5/10     Comments:     Performance Status: PPS Score (30  )       Physical Exam:  Vitals: reviewed  General: Afebrile, alert, comfortable, no acute distress.   Pulmonary:dyspnea on exertion,clear to auscultation anteriorly.   Cardiac: normal S1 & S2 w/o rubs/murmurs/gallops.   Abdominal: Non-tender, non-distended.Bowel sounds present x 4. No appreciable hepatosplenomegaly. No guarding or rebound tenderness.   Extremities: Moves all extremities x 4. No peripheral edema. 2+ pulses.  Skin: No jaundice,venous stasis  Rashes to bilateral lower extremities   Neurological: Alert and oriented x 4. No focal neuro deficits.   Psych/Mental Status: rude uncooperative, resists care  CAM / Delirium _not present   FAST Stage for Dementia:      Labs: Reviewed     Radiology: reviewed     Legal/Advanced Directives: Patient has both living will  and HPOA that are not on file.  Family was encouraged to bring to hospital   Living Will: not on file   Resuscitate Status: DNR  Decision-Making Capacity: yes   Medical Power of : son Nitin Machado [phone], next of kin is wife Taryn,     Psychosocial/Cultural:  for 60 yrs 4 children: 3 sons (Humphrey, Denton, Rashard) and one daughter Emy.  He has 8 grandchildren and 2 great grandchildren.  He retired from the [employer] company that [employer].  Prior to becoming ill he enjoyed time with the family and fishing     Spiritual:     F- Christine and Belief: Hinduism    I - Importance: very devout christine and attends Mass regularly   .  C - Community    A - Address in Care: anointing of the sick received 5/20/17       Problem list:  Active Hospital Problems    Diagnosis  POA    *Bacteremia due to Klebsiella pneumoniae [R78.81]  Yes    Pain [R52]  Yes    Palliative care encounter [Z51.5]  Not Applicable    Goals of care, counseling/discussion [Z71.89]  Not Applicable    Hyperphosphatemia [E83.39]  Unknown    Acute kidney injury superimposed on CKD [N17.9, N18.9]  Yes    Chronic pain [G89.29]  Yes    Ischemic cardiomyopathy [I25.5]  Yes    Oropharyngeal dysphagia [R13.12]  No    Atrial fibrillation with RVR [I48.91]  Yes    ST elevation myocardial infarction (STEMI) [I21.3]  Yes    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

## 2017-05-26 NOTE — PROGRESS NOTES
Around 8: 30 PM Nursing called for left sided pain. I ordered a STAT EKG and went to follow up on patient at bed side.   Patient actually complained of left sided flank pain and not chest pain.   However, the EKG did show old ST elevation in III and AvF, however, to less extent than on presentation.   The family wanted to restart discussion about LHC which was not done on 05/19/2017.   After a detailed discussion about the risk and benefits of LHC and the chances of  his progression to ESRD being very high.     However, they wanted to do LHC now since his kidney are nearing advanced stage any ways. Discussed with the family the importance of a specific time window when revascularization has the most benefits, however they wanted to revisit LHC.   Family requested a repeat ECHO and be updated about the result. Should there be new area of regional wall motion abnormality or new area of current of injury on EKG, they would like to reconsider LHC and rescend the DNR/DNI status, DESPITE the known high risk of him ending up in HD.     Primary team to follow up on ECHO in the AM, and in case of recurrent CP to repeat EKG.

## 2017-05-26 NOTE — NURSING
Ordered to Hold Heparin and place patient on NPO with sips with medication.  Educated patient and family. Will follow up to determine if Heparin needs to be restarted after catheter placement.

## 2017-05-26 NOTE — ASSESSMENT & PLAN NOTE
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 Renal function continues to worsen. Although patient stated he did not want HD, this afternoon he told myself and nephrology that he wishes to proceed with HD.  1. Monitor creatinine level.  2. Avoid nephrotoxic agents.  3. Renally dose medications.  4. Prepare for trial of RRT.

## 2017-05-26 NOTE — PROGRESS NOTES
"Pt states, " I am having chest tightness, not really pain but tightness". Pt has PRN orders for Nitro. Pt's BP is 100/56 (72). MD Yoel notified. Orders given for a STAT EKG and a dose of SL Nitro. Orders carried out, will continue to monitor.     MD Christian at bedside and reviewed EKG and noted a STEMI on the EKG. Pt is a DNR and has previously declined [facility]. Orders given to treat pain. Family notified of situation.    Family is now speaking about rescinding the DNR and taking the Pt to the Cath lab. MD Yoel at bedside discussing code status and potential interventions.     Pt's son, power of , at bedside.   "

## 2017-05-26 NOTE — PLAN OF CARE
Problem: Occupational Therapy Goal  Goal: Occupational Therapy Goal  Goals to be met by: 7 days (6/1/17)     Patient will increase functional independence with ADLs by performing:    UE Dressing with Minimal Assistance.  Grooming while EOB with Supervision.  Toileting from bedside commode with Moderate Assistance for hygiene and clothing management.   Sitting at edge of bed x15 minutes with Stand-by Assistance.  Supine to sit with Moderate Assistance.  Toilet transfer to bedside commode with Moderate Assistance.     Outcome: Ongoing (interventions implemented as appropriate)  Goals remain appropriate.    LUIS ALFREDO Garcia  5/26/2017

## 2017-05-26 NOTE — NURSING
"Patient is very confused.  Administered Nitro to patient per MD order.  Patient's family requested a second dose be administered.  Asked patient if his chest hurt or felt better, and patient stated, "I don't know."  Patient needs prompts to take medication.  He needs to be told to put medication in his mouth and when to swallow water.   "

## 2017-05-26 NOTE — PT/OT/SLP PROGRESS
"Occupational Therapy  Cotreatment    Humphrey Machado   MRN: [id number]   Admitting Diagnosis: ST elevation myocardial infarction (STEMI)    OT Date of Treatment: 05/26/17   OT Start Time: 0947  OT Stop Time: 1020  OT Total Time (min): 33 min    Billable Minutes:  Self Care/Home Management 16    General Precautions: Standard, contact, aspiration, fall, nectar thick, other (see comments) (cardiac)  Orthopedic Precautions: N/A  Braces: N/A    Do you have any cultural, spiritual, Gnosticism conflicts, given your current situation?: None    Subjective:  Communicated with RN prior to session.  "I have to protect my groin."   Pain/Comfort  Pain Rating 1: other (see comments) (did not quantify)  Location - Side 1: Bilateral  Location - Orientation 1: generalized  Location 1: leg  Pain Addressed 1: Distraction, Reposition  Pain Rating Post-Intervention 1: other (see comments) (did not quantify)  Pain Rating 2: other (see comments) (did not quantify)  Location - Side 2: Bilateral  Location - Orientation 2: generalized  Location 2: groin  Pain Addressed 2: Distraction, Reposition  Pain Rating Post-Intervention 2: other (see comments) (did not quantify)    Objective:  Patient found with: peripheral IV, telemetry, montano catheter     Functional Mobility:  Bed Mobility:  Rolling/Turning to Left: Total assistance, With side rail, With assist of 2  Scooting/Bridging: Total Assistance, With assist of 2  Supine to Sit: With assist of 2, Total Assistance  Sit to Supine: Total Assistance, With assist of 2    Transfers:   Sit <> Stand Assistance: Total Assistance (x2 persons)  Sit <> Stand Assistive Device: No Assistive Device  Bed <> Chair Transfer Assistance: Activity did not occur  Chair <> Mat Assistance: Activity did not occur  Toilet Transfer Assistance: Activity Did not Occur  Tub Bench Transfer Assistance: Activity did not occur    Functional Ambulation: N/A    Activities of Daily Living:  Feeding Level of Assistance: Activity did not " "occur  UE Dressing Level of Assistance: Activity did not occur  LE Dressing Level of Assistance: Activity did not occur  Grooming Position: Seated, EOB  Grooming Level of Assistance: Stand by assistance  Toileting Level of Assistance: Activity did not occur  Bathing Level of Assistance: Activity did not occur    Balance:   Static Sit: FAIR: Maintains without assist, but unable to take any challenges  Min A - brief moments of SBA. Sat at EOB for ~20 mins.   Dynamic Sit: FAIR: Cannot move trunk without losing balance  Static Stand: 0: Needs MAXIMAL assist to maintain  (x2 trials, stood for ~30 sec each time, total A x2 persons with 3rd person for hip extension and improved upright posture, max VCs and A for cervical extension.)  Dynamic stand: 0: N/A    Therapeutic Activities and Exercises:  Pt oriented, able to follow simple commands but required repetition. Anxious regarding anticipated movements. RN present for adonis ointment application.     AM-PAC 6 CLICK ADL   How much help from another person does this patient currently need?   1 = Unable, Total/Dependent Assistance  2 = A lot, Maximum/Moderate Assistance  3 = A little, Minimum/Contact Guard/Supervision  4 = None, Modified Kerr/Independent    Putting on and taking off regular lower body clothing? : 1  Bathing (including washing, rinsing, drying)?: 1  Toileting, which includes using toilet, bedpan, or urinal? : 2  Putting on and taking off regular upper body clothing?: 2  Taking care of personal grooming such as brushing teeth?: 3  Eating meals?: 3  Total Score: 12     AM-PAC Raw Score CMS "G-Code Modifier Level of Impairment Assistance   6 % Total / Unable   7 - 8 CM 80 - 100% Maximal Assist   9-13 CL 60 - 80% Moderate Assist   14 - 19 CK 40 - 60% Moderate Assist   20 - 22 CJ 20 - 40% Minimal Assist   23 CI 1-20% SBA / CGA   24 CH 0% Independent/ Mod I       Patient left right sidelying with all lines intact, call button in reach and spouse, son & " friend present    ASSESSMENT:  Humphrey Machado is a 85 y.o. male with a medical diagnosis of ST elevation myocardial infarction (STEMI) and presents with weakness, pain,  and decreased endurance and LE function severely limiting pt ability to perform self care. Pt required max encouragement throughout therapy session, extra time to process instructions, and need for repeated instructions. Pt given max cues for upright head posture, and often showed mild confusion with task description. Pt would continue to benefit from skilled OT services to address: weakness, endurance, and functional mobility in order to increase (I) with ADLs and decrease caregiver burden.    Rehab identified problem list/impairments: Rehab identified problem list/impairments: weakness, impaired endurance, impaired self care skills, impaired functional mobilty, impaired balance, decreased upper extremity function, decreased lower extremity function, pain, impaired cognition, impaired cardiopulmonary response to activity, impaired skin    Rehab potential is fair.    Activity tolerance: Fair    Discharge recommendations: Discharge Facility/Level Of Care Needs: nursing facility, skilled     Barriers to discharge: Barriers to Discharge: Decreased caregiver support    Equipment recommendations:  (TBD)     GOALS:    Occupational Therapy Goals        Problem: Occupational Therapy Goal    Goal Priority Disciplines Outcome Interventions   Occupational Therapy Goal     OT, PT/OT Ongoing (interventions implemented as appropriate)    Description:  Goals to be met by: 7 days (6/1/17)     Patient will increase functional independence with ADLs by performing:    UE Dressing with Minimal Assistance.  Grooming while EOB with Supervision.  Toileting from bedside commode with Moderate Assistance for hygiene and clothing management.   Sitting at edge of bed x15 minutes with Stand-by Assistance.  Supine to sit with Moderate Assistance.  Toilet transfer to bedside  commode with Moderate Assistance.                      Plan:  Patient to be seen 4 x/week to address the above listed problems via self-care/home management, therapeutic activities, therapeutic exercises, cognitive retraining  Plan of Care expires: 06/25/17  Plan of Care reviewed with: patient, spouse, son, friend         Nolvia Soliz, LUIS ALFREDO  05/26/2017

## 2017-05-27 LAB
ALBUMIN SERPL BCP-MCNC: 2.3 G/DL
ALP SERPL-CCNC: 98 U/L
ALT SERPL W/O P-5'-P-CCNC: 28 U/L
ANION GAP SERPL CALC-SCNC: 15 MMOL/L
APTT BLDCRRT: 25.9 SEC
AST SERPL-CCNC: 43 U/L
BASOPHILS # BLD AUTO: 0.01 K/UL
BASOPHILS NFR BLD: 0.2 %
BILIRUB DIRECT SERPL-MCNC: 0.2 MG/DL
BILIRUB SERPL-MCNC: 0.4 MG/DL
BUN SERPL-MCNC: 120 MG/DL
CALCIUM SERPL-MCNC: 9.4 MG/DL
CHLORIDE SERPL-SCNC: 95 MMOL/L
CO2 SERPL-SCNC: 27 MMOL/L
CREAT SERPL-MCNC: 5.6 MG/DL
DIFFERENTIAL METHOD: ABNORMAL
EOSINOPHIL # BLD AUTO: 0.1 K/UL
EOSINOPHIL NFR BLD: 1.6 %
ERYTHROCYTE [DISTWIDTH] IN BLOOD BY AUTOMATED COUNT: 15.4 %
EST. GFR  (AFRICAN AMERICAN): 9.8 ML/MIN/1.73 M^2
EST. GFR  (NON AFRICAN AMERICAN): 8.5 ML/MIN/1.73 M^2
FACT X PPP CHRO-ACNC: <0.1 IU/ML
GLUCOSE SERPL-MCNC: 90 MG/DL
HCT VFR BLD AUTO: 25.3 %
HGB BLD-MCNC: 8.2 G/DL
INR PPP: 1.1
LYMPHOCYTES # BLD AUTO: 1.2 K/UL
LYMPHOCYTES NFR BLD: 24.7 %
MAGNESIUM SERPL-MCNC: 2.6 MG/DL
MCH RBC QN AUTO: 27.2 PG
MCHC RBC AUTO-ENTMCNC: 32.4 %
MCV RBC AUTO: 84 FL
MONOCYTES # BLD AUTO: 0.6 K/UL
MONOCYTES NFR BLD: 11.5 %
NEUTROPHILS # BLD AUTO: 3.1 K/UL
NEUTROPHILS NFR BLD: 60.6 %
PHOSPHATE SERPL-MCNC: 8.4 MG/DL
PLATELET # BLD AUTO: 167 K/UL
PMV BLD AUTO: 10.9 FL
POCT GLUCOSE: 106 MG/DL (ref 70–110)
POCT GLUCOSE: 96 MG/DL (ref 70–110)
POCT GLUCOSE: 98 MG/DL (ref 70–110)
POTASSIUM SERPL-SCNC: 3.4 MMOL/L
PROT SERPL-MCNC: 6.8 G/DL
PROTHROMBIN TIME: 11.5 SEC
RBC # BLD AUTO: 3.02 M/UL
SODIUM SERPL-SCNC: 137 MMOL/L
VANCOMYCIN SERPL-MCNC: 8.1 UG/ML
WBC # BLD AUTO: 5.03 K/UL

## 2017-05-27 PROCEDURE — 20600001 HC STEP DOWN PRIVATE ROOM

## 2017-05-27 PROCEDURE — 93306 TTE W/DOPPLER COMPLETE: CPT

## 2017-05-27 PROCEDURE — 25000003 PHARM REV CODE 250: Performed by: INTERNAL MEDICINE

## 2017-05-27 PROCEDURE — 36415 COLL VENOUS BLD VENIPUNCTURE: CPT

## 2017-05-27 PROCEDURE — 85025 COMPLETE CBC W/AUTO DIFF WBC: CPT

## 2017-05-27 PROCEDURE — 85610 PROTHROMBIN TIME: CPT

## 2017-05-27 PROCEDURE — 80048 BASIC METABOLIC PNL TOTAL CA: CPT

## 2017-05-27 PROCEDURE — 85730 THROMBOPLASTIN TIME PARTIAL: CPT

## 2017-05-27 PROCEDURE — 80076 HEPATIC FUNCTION PANEL: CPT

## 2017-05-27 PROCEDURE — 80202 ASSAY OF VANCOMYCIN: CPT

## 2017-05-27 PROCEDURE — 90935 HEMODIALYSIS ONE EVALUATION: CPT

## 2017-05-27 PROCEDURE — 63600175 PHARM REV CODE 636 W HCPCS: Performed by: INTERNAL MEDICINE

## 2017-05-27 PROCEDURE — 25000003 PHARM REV CODE 250: Performed by: STUDENT IN AN ORGANIZED HEALTH CARE EDUCATION/TRAINING PROGRAM

## 2017-05-27 PROCEDURE — 84100 ASSAY OF PHOSPHORUS: CPT

## 2017-05-27 PROCEDURE — 99233 SBSQ HOSP IP/OBS HIGH 50: CPT | Mod: ,,, | Performed by: INTERNAL MEDICINE

## 2017-05-27 PROCEDURE — 25000003 PHARM REV CODE 250: Performed by: HOSPITALIST

## 2017-05-27 PROCEDURE — 85520 HEPARIN ASSAY: CPT

## 2017-05-27 PROCEDURE — 90935 HEMODIALYSIS ONE EVALUATION: CPT | Mod: ,,, | Performed by: INTERNAL MEDICINE

## 2017-05-27 PROCEDURE — 93306 TTE W/DOPPLER COMPLETE: CPT | Mod: 26,,, | Performed by: INTERNAL MEDICINE

## 2017-05-27 PROCEDURE — 83735 ASSAY OF MAGNESIUM: CPT

## 2017-05-27 RX ORDER — HEPARIN SODIUM 1000 [USP'U]/ML
1000 INJECTION, SOLUTION INTRAVENOUS; SUBCUTANEOUS ONCE
Status: COMPLETED | OUTPATIENT
Start: 2017-05-27 | End: 2017-05-27

## 2017-05-27 RX ADMIN — Medication 3 ML: at 05:05

## 2017-05-27 RX ADMIN — CLOPIDOGREL 75 MG: 75 TABLET, FILM COATED ORAL at 12:05

## 2017-05-27 RX ADMIN — ALLOPURINOL 100 MG: 100 TABLET ORAL at 12:05

## 2017-05-27 RX ADMIN — SEVELAMER CARBONATE 1600 MG: 800 TABLET, FILM COATED ORAL at 06:05

## 2017-05-27 RX ADMIN — SODIUM CHLORIDE 0.5 G: 900 INJECTION, SOLUTION INTRAVENOUS at 03:05

## 2017-05-27 RX ADMIN — DOCUSATE SODIUM 100 MG: 100 CAPSULE, LIQUID FILLED ORAL at 09:05

## 2017-05-27 RX ADMIN — MICONAZOLE NITRATE: 20 CREAM TOPICAL at 09:05

## 2017-05-27 RX ADMIN — ATORVASTATIN CALCIUM 80 MG: 20 TABLET, FILM COATED ORAL at 12:05

## 2017-05-27 RX ADMIN — DOCUSATE SODIUM 100 MG: 100 CAPSULE, LIQUID FILLED ORAL at 12:05

## 2017-05-27 RX ADMIN — HYDROXYCHLOROQUINE SULFATE 200 MG: 200 TABLET, FILM COATED ORAL at 09:05

## 2017-05-27 RX ADMIN — ASPIRIN 81 MG CHEWABLE TABLET 81 MG: 81 TABLET CHEWABLE at 12:05

## 2017-05-27 RX ADMIN — SEVELAMER CARBONATE 1600 MG: 800 TABLET, FILM COATED ORAL at 12:05

## 2017-05-27 RX ADMIN — HYDROXYCHLOROQUINE SULFATE 200 MG: 200 TABLET, FILM COATED ORAL at 12:05

## 2017-05-27 RX ADMIN — Medication 6.25 MG: at 12:05

## 2017-05-27 RX ADMIN — HYDROMORPHONE HYDROCHLORIDE 4 MG: 2 TABLET ORAL at 06:05

## 2017-05-27 RX ADMIN — HYDROMORPHONE HYDROCHLORIDE 4 MG: 2 TABLET ORAL at 12:05

## 2017-05-27 RX ADMIN — SODIUM CHLORIDE: 0.9 INJECTION, SOLUTION INTRAVENOUS at 08:05

## 2017-05-27 RX ADMIN — HEPARIN SODIUM 1000 UNITS: 1000 INJECTION, SOLUTION INTRAVENOUS; SUBCUTANEOUS at 10:05

## 2017-05-27 RX ADMIN — HYDROMORPHONE HYDROCHLORIDE 4 MG: 2 TABLET ORAL at 05:05

## 2017-05-27 RX ADMIN — Medication 3 ML: at 01:05

## 2017-05-27 NOTE — SUBJECTIVE & OBJECTIVE
"Interval History: "I feel foggy". Overnight patient developed chest heaviness once again. He demanded repeat echocardiogram and LHC.     Review of Systems   Constitutional: Negative for chills, fatigue and fever.   HENT: Negative for ear pain, mouth sores, nosebleeds, postnasal drip, rhinorrhea, sinus pressure, sore throat, tinnitus, trouble swallowing and voice change.    Eyes: Negative for photophobia, pain, redness and visual disturbance.   Respiratory: Negative for apnea, cough, chest tightness, shortness of breath and wheezing.    Cardiovascular: Negative for chest pain, palpitations and leg swelling.   Gastrointestinal: Negative for abdominal distention, abdominal pain, blood in stool, constipation, diarrhea, nausea and vomiting.   Endocrine: Negative for cold intolerance, heat intolerance, polydipsia and polyuria.   Genitourinary: Negative for decreased urine volume, difficulty urinating, discharge, dysuria, flank pain, frequency, genital sores, hematuria, penile pain, penile swelling, scrotal swelling, testicular pain and urgency.   Musculoskeletal: Positive for arthralgias and myalgias. Negative for back pain, joint swelling and neck pain.   Skin: Negative for color change and rash.   Allergic/Immunologic: Negative for environmental allergies and food allergies.   Neurological: Negative for dizziness, seizures, syncope, weakness, light-headedness, numbness and headaches.   Hematological: Negative for adenopathy. Does not bruise/bleed easily.   Psychiatric/Behavioral: Negative for agitation, confusion, decreased concentration, hallucinations, self-injury, sleep disturbance and suicidal ideas. The patient is not nervous/anxious.      Objective:     Vital Signs (Most Recent):  Temp: 98.5 °F (36.9 °C) (05/26/17 1747)  Pulse: 64 (05/26/17 1900)  Resp: 16 (05/26/17 1747)  BP: 139/65 (05/26/17 1747)  SpO2: 98 % (05/26/17 1747) Vital Signs (24h Range):  Temp:  [97.5 °F (36.4 °C)-98.6 °F (37 °C)] 98.5 °F (36.9 " °C)  Pulse:  [59-74] 64  Resp:  [16] 16  SpO2:  [97 %-99 %] 98 %  BP: ()/(53-65) 139/65     Weight: 77.8 kg (171 lb 8.3 oz)  Body mass index is 24.61 kg/m².    Intake/Output Summary (Last 24 hours) at 05/1935  Last data filed at 05/26/17 1400   Gross per 24 hour   Intake              660 ml   Output             1225 ml   Net             -565 ml      Physical Exam   Constitutional: He is oriented to person, place, and time. He appears well-developed and well-nourished. He has a sickly appearance.   HENT:   Head: Normocephalic and atraumatic.   Right Ear: External ear normal.   Left Ear: External ear normal.   Mouth/Throat: Oropharynx is clear and moist.   Eyes: Conjunctivae and EOM are normal. Pupils are equal, round, and reactive to light.   Neck: Normal range of motion. Neck supple. No thyromegaly present.   Cardiovascular: Normal rate and regular rhythm.    Murmur heard.   Systolic murmur is present with a grade of 2/6   Pulmonary/Chest: Effort normal and breath sounds normal. He has no wheezes. He has no rales.   Abdominal: Soft. Bowel sounds are normal. He exhibits no mass. There is no tenderness. There is no rebound.   Genitourinary:   Genitourinary Comments: Stevens catheter in place. Right thigh fistula covered.   Musculoskeletal: Normal range of motion. He exhibits tenderness.   Lymphadenopathy:     He has no cervical adenopathy.   Neurological: He is alert and oriented to person, place, and time.   Skin: Skin is warm and dry.   Venous stasis dermatitis over the bilateral lower extremities.   Psychiatric: He has a normal mood and affect. His behavior is normal.   Vitals reviewed.      Significant Labs:   BMP:   Recent Labs  Lab 05/26/17  0407   GLU 83      K 3.6   CL 94*   CO2 25   *   CREATININE 5.8*   CALCIUM 9.1   MG 2.3     CBC:   Recent Labs  Lab 05/25/17  0555 05/26/17  0407   WBC 5.00 4.63   HGB 7.8* 7.9*   HCT 24.7* 24.9*    157       Significant Imaging: I have reviewed  all pertinent imaging results/findings within the past 24 hours.

## 2017-05-27 NOTE — PLAN OF CARE
Problem: Patient Care Overview  Goal: Plan of Care Review  Outcome: Ongoing (interventions implemented as appropriate)  Pt remains on contact isolation for ESBL in the urine and blood. Heparin gtt DC'd during day shift. Pt is on 2L nasal cannula, O2 sats remain stable. Stevens catheter in place and continue to drain. Pt remained free from falls/trauma/injury. VSS. Denies any CP, SOB, palpitations, dizziness, pain and discomfort. Turning/repositioning independently in bed. Plan of care reviewed with patient and all questions answered, verbalizes understanding. No acute distress noted. Will continue to monitor.

## 2017-05-27 NOTE — PROGRESS NOTES
05/27/17 1400       Urethral Catheter 05/19/17 1200   Placement Date/Time: (c) 05/19/17 1200   Present Prior to Hospital Arrival?: No  Inserted by: RN   Output (mL) 30 mL     Notified Dr. Tinsley this is urine out put from montano, but pt has had unmeasured urine from fistula. No new orders from MD; will continue to monitor.

## 2017-05-27 NOTE — NURSING TRANSFER
Nursing Transfer Note      5/27/2017     Transfer From: 354 to dialysis     Transfer via bed    Transfer with O2, cardiac monitoring    Transported by transporter    Medicines sent: none    Chart send with patient: No    Notified: spouse    Patient reassessed at: 0800 5/27/2017   Report given to SOPHIE Solano. Pt AAOx4, in NAD. Pt sent with tele and oxygen at 2 L NC. CBG not obtained this am, reminded receiving RN that pt still needs CBG.

## 2017-05-27 NOTE — NURSING
Patient returned from CATH Lab.   Dressing is soiled, blood and serous fluid noted (patient was on Heparin prior to procedure).  Cath Lab staff stated to leave dressing intact.  (DO NOT attempt to change dressing at this time).

## 2017-05-27 NOTE — ASSESSMENT & PLAN NOTE
On bedrest due to active issues.   1. Every 2 hour turns   2. PT/OT.  3. Patient refused SNF placement.

## 2017-05-27 NOTE — PROCEDURES
Procedure Date: 5/27/17    (s) and Role:   Mike Kohli MD    Indication: HD    Pre-op Diagnosis:    THEO on CKD requiring HD    Post-op Diagnosis;  THEO on CKD requiring HD    Procedure: Insertion Tunneled HD Catheter with Fluoro     Anesthesia: IV Sedation + Local     Findings/Key Components:   Catheter placed in RA. Good flush and draw through each port.  Estimated Blood Loss: 5mL     Specimens     None         PROCEDURE: After obtaining consent, the patient was taken to the [facility] suite. Sedation was administered. The right neck and chest were prepped and draped in usual sterile fashion. We began using ultrasound guidance to examine the right internal jugular vein. It appeared to be widely patent and was free of thrombus that appeared suitable for access for HD catheter. We accessed the internal jugular vein using a Seldinger technique with an micropunture needle without difficulty, then the thin wire was passed into the superior vena cava through the heart into the inferior vena cava. The wire position was confirmed with intraoperative fluoroscopy, then a 5Fr sheath was introduced over the wire. The wire was removed and the the guidewire was passed into the IVC under fluoroscopic guidance. Counterincision was made at the venotomy and on the chest wall just below the clavicle. Local anesthesia was used to anesthetize the track and a tunneler was used to pass the 23cm GlidePath catheter underneath the chest wall until the felt cuff was just underneath the counter incision. The 5fr sheath was removed and the vein was dilated using serial dilators. Then the large peel-away sheath was then inserted over the guidewire under fluoroscopic guidance. The dilator and wire were removed. The catheter was placed through the peel-away sheath and positioned appropriately at center of RA. Fluoroscopy was used to confirm that the catheter tip was in appropriate position and that there was no kinking at the apex of the  catheter. A permanent recording of the catheter position was also taken with fluoroscopy. Both lumens had excellent draw and flush. The catheter was then secured in place with 3-0 Prolene. The counterincision in the neck was closed with a 3-0 Vicryl. There were no immediate complications. Patient tolerated the procedure well and discharged in stable condition.

## 2017-05-27 NOTE — ASSESSMENT & PLAN NOTE
No current chest pain or SOB but patient with discomfort. Refused LHC. Currently being managed medically. Requested LHC overnight however this is no longer an option. Cardiology had prolonged conversation.  1. Aspirin, plavix, heparin gtt tomorrow.   2. High intensity statins   3. Continue low dose metoprolol for now.

## 2017-05-27 NOTE — ASSESSMENT & PLAN NOTE
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 Renal function continues to worsen. Patient has now changed his mind and is agreeable to HD. CVC placed on 5/26/17.  1. Monitor creatinine level.  2. Avoid nephrotoxic agents.  3. Renally dose medications.  4. Prepare for trial of RRT.  5. Monitor CVC site for bleeding.

## 2017-05-27 NOTE — PROGRESS NOTES
Patient arrived in [facility] via bed. Hooked to cardiac monitor. Denies chest pain and shortness of breath. With O2 at 2LPM via nasal cannula. Acute dialysis started to [facility] catheter without difficulty.  Tolerated well. Patient with no signs of discomfort. Alarms set and all lines secured. Patient will run for 2 hrs treatment.

## 2017-05-27 NOTE — PROGRESS NOTES
Pt has a montano catheter in place. Pt has had 0ml UOP since 2000 last night. R IJ CVC placed yesterday for dialysis. Pt's VSS. MD Tobin notified. Will continue to monitor.

## 2017-05-27 NOTE — ASSESSMENT & PLAN NOTE
Secondary to THEO on CKD. Continues to worsen.  1. Sevelamer as per nephrology.  2. Low phosphate diet.

## 2017-05-27 NOTE — PROGRESS NOTES
[facility] Hospital Medicine  Progress Note    Patient Name: Humphrey Machado  MRN: [id number]  Patient Class: IP- Inpatient   Admission Date: 5/19/2017  Length of Stay: 7 days  Attending Physician: Chaya Tinsley MD  Primary Care Provider: Andrew Murray MD    [facility] Medicine Team: [facility] HOSP MED C Chaya Tinsley MD    Subjective:     Principal Problem:ST elevation myocardial infarction (STEMI)    HPI:  85-year-old man with HTN, HLD, NSTEMI previously refused [facility] 5/2016, CKD stage IV (baseline 2.7), moderate AS, AF, venous insufficiency, cirrhosis, Ureterocutaneous fistula, chronic osteomyelitis of the pelvic region on cefpodoxime, suspected inflammatory arthritis on HCQ, neuromuscular disorder with peripheral neuropathy with recurrent right foot ulcer, prostate cancer s/p radiation, RCC s/p left nephrectomy, skin cancer of the hand, urethral stricture, urinary incontinence p/w SOB which started an hour prior to admission and EKG concerning for STEMI with afib with q waves in inferior/anterior-septal leads with resolution of chest pain with SL NTG. Patient was evaluated in the ED by Dr Duval and recommended to be taken to cath lab considering his clinical picture but he refused to proceed with angiogram due to risk of hemodialysis possibility he will be admitted fort mdical treatment in CCU.     Currently he is very SOB with fluid overload and aneuric.looks like not responding to lasix his son is his POA and they are discussing code status and if they will proceed with more procedures including CRRT.    Patient with recent cystoscopy/cystogram/fistulogram by Dr. 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 Abraham's  Operative noted.     Hospital Course:  Hospital course as above, while  in the CCU patient was medically managed for his STEMI, and for his AMIRA.  CCU team discussed comfort vs aggressive measures, patient's code status changed to DNR and due to concern that patient was anuric with developing AMIRA that may require HD. Discussion per EMR with family revealed they would not want to pursue dialysis. He was started on Lasix drip @ 20mg/hr and IV Diuril q12 hrs and had improved urine output on this regimen.  PRN Morphine and Ativan orders had been placed for patient comfort    Patient stepped down to IM-C casiano service on 5/20.  Patient was acutely encephalopathic, developed fevers and notified by RN blood cultures from admission were growing GNR.  Surveillance cultures sent patient was on Vancomycin and Cefepime.  He was having urine output on the lasix and diuril.  Extensive family discussion held (see progress notes for detail)  Family was not universally ready for comfort care, care plan more no escalation of care, given his altered mentation, I suspected morphine and ativan in Amira contributing, opiates switched to dilaudid and ativan d/c.   Discussed NG tube placement for oral med administration and nutrition but was held off.     5/21 - Patient is more awake and alert, likely related to toxic encephalopathy, remains hemodynamically stable, but was having more pain complaints, was receiving IV dilaudid  0.5mg q2hrs.   SLP evaluated patient and he is safe for dental soft nectar thick liquids.   His heart rhythm converted to atrial fibrillation with controlled rate in the 80-90s.   Will need repeat cardiology recs on management in AM.     Patient with nearly 2L UOP x 24hrs, family with more questions about possible HD or what course his Amira may take.  Nephrology re-contacted to follow the patient.  Diuril will be stopped this evening to monitor how pts UOp  And Cr does.    I reviewed more of recent urologic history and patient has a urinary source of his bacteremia, however with complicated  chronic fistula and wound we may not have source control. Discussed with patient and will consult urology to assist in any other management for source control and chronic urethrocutaneous fistula that may be needed - Urology consult placed for mon Am.     Reviewed prior culture data and patient with hx of Klebsiella ESBL and Pseudomonas in urine, both not sensitive to empiric cefepime so ID approval obtained to change to renal dosed Meropenem     Patient says pain control is improving but is still seems he has chronic pain issues from multiple sites, adding po opiates to pain regimen.     5/22 - Additional consultants have evaluated the patient  Cardiology - no additional med management recs at this time, patient has completed 48hrs of heparin for ACS, however with atrial fibrillation, will continue for now.  Coreg change to low dose metoprolol due to hypotension    Urology - evaluated patient and removed ureteral catheter, no further recs for chronic urtherocutaneous fistula, local wound care.   Wound care evaluated the patient and placed barrier cream in right groin with soft dressing to help absorb urinary leak from fistula, I was at bedside and pt did not feel ready to turn for assessment of buttocks.     Nephrology - evaluated the patient and no acute needs for acute HD, they will continue to follow, did inform pt that nephrology feels initiating HD would not improve overall morbidity/mortality from his co-morbid conditions and HD initiation comes with its own risks and invasive procedures required.     Palliative care - evaluated pt to assist in overall care planning.  MD staff to assist with pain management not available this week.     5/23- patient complained of discomfort. He is still unsure of what he wants for medical management. He is still considering hemodialysis as an option. His renal function continued to worsen but his main concern was his discomfort.    5/24- patient unable to voice his wishes nor  "does he want to have a goals of care discussion. He has poor insight into his disease process. He is verbally abusive to his providers and nursing staff. Discussed with his wife and grandson scheduling a family meeting to establish therapeutic plan.     5/25- he continues to be abusive to medical personnel. When trying to assess his pain he becomes abusive and screams that he can't tell.     5/26-Family meeting today to establish therapeutic plan. Patient has agreed to hemodialysis and oral anticoagulation. He also agreed to PT and OT however he has refused SNF. He has developed some mental "foggyness"     Interval History: "I feel foggy". Overnight patient developed chest heaviness once again. He demanded repeat echocardiogram and LHC.     Review of Systems   Constitutional: Negative for chills, fatigue and fever.   HENT: Negative for ear pain, mouth sores, nosebleeds, postnasal drip, rhinorrhea, sinus pressure, sore throat, tinnitus, trouble swallowing and voice change.    Eyes: Negative for photophobia, pain, redness and visual disturbance.   Respiratory: Negative for apnea, cough, chest tightness, shortness of breath and wheezing.    Cardiovascular: Negative for chest pain, palpitations and leg swelling.   Gastrointestinal: Negative for abdominal distention, abdominal pain, blood in stool, constipation, diarrhea, nausea and vomiting.   Endocrine: Negative for cold intolerance, heat intolerance, polydipsia and polyuria.   Genitourinary: Negative for decreased urine volume, difficulty urinating, discharge, dysuria, flank pain, frequency, genital sores, hematuria, penile pain, penile swelling, scrotal swelling, testicular pain and urgency.   Musculoskeletal: Positive for arthralgias and myalgias. Negative for back pain, joint swelling and neck pain.   Skin: Negative for color change and rash.   Allergic/Immunologic: Negative for environmental allergies and food allergies.   Neurological: Negative for dizziness, " seizures, syncope, weakness, light-headedness, numbness and headaches.   Hematological: Negative for adenopathy. Does not bruise/bleed easily.   Psychiatric/Behavioral: Negative for agitation, confusion, decreased concentration, hallucinations, self-injury, sleep disturbance and suicidal ideas. The patient is not nervous/anxious.      Objective:     Vital Signs (Most Recent):  Temp: 98.5 °F (36.9 °C) (05/26/17 1747)  Pulse: 64 (05/26/17 1900)  Resp: 16 (05/26/17 1747)  BP: 139/65 (05/26/17 1747)  SpO2: 98 % (05/26/17 1747) Vital Signs (24h Range):  Temp:  [97.5 °F (36.4 °C)-98.6 °F (37 °C)] 98.5 °F (36.9 °C)  Pulse:  [59-74] 64  Resp:  [16] 16  SpO2:  [97 %-99 %] 98 %  BP: ()/(53-65) 139/65     Weight: 77.8 kg (171 lb 8.3 oz)  Body mass index is 24.61 kg/m².    Intake/Output Summary (Last 24 hours) at 05/1935  Last data filed at 05/26/17 1400   Gross per 24 hour   Intake              660 ml   Output             1225 ml   Net             -565 ml      Physical Exam   Constitutional: He is oriented to person, place, and time. He appears well-developed and well-nourished. He has a sickly appearance.   HENT:   Head: Normocephalic and atraumatic.   Right Ear: External ear normal.   Left Ear: External ear normal.   Mouth/Throat: Oropharynx is clear and moist.   Eyes: Conjunctivae and EOM are normal. Pupils are equal, round, and reactive to light.   Neck: Normal range of motion. Neck supple. No thyromegaly present.   Cardiovascular: Normal rate and regular rhythm.    Murmur heard.   Systolic murmur is present with a grade of 2/6   Pulmonary/Chest: Effort normal and breath sounds normal. He has no wheezes. He has no rales.   Abdominal: Soft. Bowel sounds are normal. He exhibits no mass. There is no tenderness. There is no rebound.   Genitourinary:   Genitourinary Comments: Stevens catheter in place. Right thigh fistula covered.   Musculoskeletal: Normal range of motion. He exhibits tenderness.   Lymphadenopathy:      He has no cervical adenopathy.   Neurological: He is alert and oriented to person, place, and time.   Skin: Skin is warm and dry.   Venous stasis dermatitis over the bilateral lower extremities.   Psychiatric: He has a normal mood and affect. His behavior is normal.   Vitals reviewed.      Significant Labs:   BMP:   Recent Labs  Lab 05/26/17  0407   GLU 83      K 3.6   CL 94*   CO2 25   *   CREATININE 5.8*   CALCIUM 9.1   MG 2.3     CBC:   Recent Labs  Lab 05/25/17  0555 05/26/17  0407   WBC 5.00 4.63   HGB 7.8* 7.9*   HCT 24.7* 24.9*    157       Significant Imaging: I have reviewed all pertinent imaging results/findings within the past 24 hours.    Assessment/Plan:      Hyperphosphatemia    Secondary to THEO on CKD. Continues to worsen.  1. Sevelamer as per nephrology.  2. Low phosphate diet.        Oropharyngeal dysphagia    Stable.   1. Dental soft diet with Nectar thick liquids.   2. Continue SLP management.          Ischemic cardiomyopathy    EF 35%, mangagement as above          Chronic pain    Pain related to chronic  issues, buttock pain, also noted to complain of extremity pain in LUE, has a chronic neuropathy diagnosis. Per patient his pain is uncontrolled.   1. Dilaudid 4mg po every 6 hrs.    2. As needed Dilaudid 0.5mg IV every 2 hrs for breakthrough.   3. Would consider very low dose fentanyl patch only if plan of care shifted to full comfort measures.   4. If pain continues to be as severe then will continue to titrate therapy.          Acute kidney injury superimposed on CKD    Suspected to be intrinsic THEO secondary to poor perfusion from STEMI vs  pre-renal. Renal function continues to worsen. Patient has now changed his mind and is agreeable to HD. CVC placed on 5/26/17.  1. Monitor creatinine level.  2. Avoid nephrotoxic agents.  3. Renally dose medications.  4. Prepare for trial of RRT.  5. Monitor CVC site for bleeding.            Bacteremia due to Klebsiella pneumoniae     Organism identified as ESBL K pneumoniae. Same organism found in his urine and therefore assumption can be made that bacteremia is secondary to his urinary tract infection. Repeat blood cultures from 5/20 remain no growth to date.  1. Ertapenem 500 mg daily as there is no need for added pseudomonal coverage provided by meropenem.  2. Monitor for signs of encephalopathy which have been seen as an adverse effect or ertapenem.   3. Anticipate a 14 day course total from clearance of bacteremia. End date: 6/2/17.  4. Patient likely to need CVC for antibiotic therapy.        Atrial fibrillation with RVR    AFIb is not new for patient, he does have prior history of it.  However he is not on OP anticoagulation, so discussion is still needed with patient/family to decide if Heparin drip should continue or if warfarin should be started (only oral AntiCoag option). Currently rate controlled. Nephrology service recommends holding beta blocker if possible.  1. Continue low dose beta blockers for rate control   2. Chads- vasc is 4 - 4.8% annual risk./ ATRIA bleeding risk score 5.8% annual risk of hemorrhage, high risk category.  3. Heparin drip to resume tomorrow due to risk for bleeding.  4. Will also start oral anticoagulation.        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   1. Appears there is no further treatment for his fistula with multitude of comorbidities and patient is bound to experience recurrent infections as a result of this.           Complicated open wound of right thigh    Stable. Urology recommends non aggressive measures.   1. Continue local wound care.        Debility    On bedrest due to active issues.   1. Every 2 hour turns   2. PT/OT.  3. Patient refused SNF placement.           Urinary tract infection due to extended-spectrum beta lactamase (ESBL)-producing Klebsiella    Management as above under  bacteremia problem.        * ST elevation myocardial infarction (STEMI)    No current chest pain or SOB but patient with discomfort. Refused LHC. Currently being managed medically. Requested LHC overnight however this is no longer an option. Cardiology had prolonged conversation.  1. Aspirin, plavix, heparin gtt tomorrow.   2. High intensity statins   3. Continue low dose metoprolol for now.          VTE Risk Mitigation         Ordered     Medium Risk of VTE  Once      05/19/17 0945     Reason for No Pharmacological VTE Prophylaxis  Once      05/19/17 0945          Chaya Tinsley MD  Department of Hospital Medicine   [facility]

## 2017-05-27 NOTE — PLAN OF CARE
Problem: Patient Care Overview  Goal: Plan of Care Review  Outcome: Ongoing (interventions implemented as appropriate)  2-hr dialysis treatment completed with 100ml total UF. BP's run low throughout treatment. SBP of 80's. 500ml saline bolus given as ordered by Dr. Vazquez. No UF for 2hrs.Rinsed back blood. CVC flushed with saline. Both lumen filled with heparin. Capped and taped. Changed of dressing done. Report given to Kristin BARRIOS. Transported patient back to room via bed by transport.

## 2017-05-27 NOTE — PLAN OF CARE
Problem: Patient Care Overview  Goal: Plan of Care Review  Outcome: Ongoing (interventions implemented as appropriate)  Pt free of falls/traumas/injuries. Skin remains clean, dry, and intact. Pt on IV antibiotic every 24 hrs. Pain meds PRN scheduled morphine PO Q 24hrs.  Pt re-educated on importance of measuring accurate intake and out put; pt verbalized and demonstrates understanding. Reviewed plan of care with pt; and pt verbalized understanding.  Pt VSS in no distress will continue to monitor.

## 2017-05-27 NOTE — PROGRESS NOTES
[facility]    Patient seen and evaluated on IHD.  Bp's low in the 90's at start and decreased to 80's -improved with 200 cc fluids.  No UF. HD for 2 hours as kaz.   Ca stable. Phos elevated- on binders.  Hgb low but stable.

## 2017-05-27 NOTE — ASSESSMENT & PLAN NOTE
AFIb is not new for patient, he does have prior history of it.  However he is not on OP anticoagulation, so discussion is still needed with patient/family to decide if Heparin drip should continue or if warfarin should be started (only oral AntiCoag option). Currently rate controlled. Nephrology service recommends holding beta blocker if possible.  1. Continue low dose beta blockers for rate control   2. Chads- vasc is 4 - 4.8% annual risk./ ATRIA bleeding risk score 5.8% annual risk of hemorrhage, high risk category.  3. Heparin drip to resume tomorrow due to risk for bleeding.  4. Will also start oral anticoagulation.

## 2017-05-28 LAB
ALBUMIN SERPL BCP-MCNC: 2.3 G/DL
ALP SERPL-CCNC: 87 U/L
ALT SERPL W/O P-5'-P-CCNC: 23 U/L
ANION GAP SERPL CALC-SCNC: 11 MMOL/L
AORTIC VALVE REGURGITATION: ABNORMAL
AORTIC VALVE STENOSIS: ABNORMAL
APTT BLDCRRT: 26.4 SEC
AST SERPL-CCNC: 38 U/L
BASOPHILS # BLD AUTO: 0.01 K/UL
BASOPHILS NFR BLD: 0.2 %
BILIRUB DIRECT SERPL-MCNC: 0.3 MG/DL
BILIRUB SERPL-MCNC: 0.4 MG/DL
BUN SERPL-MCNC: 73 MG/DL
CALCIUM SERPL-MCNC: 8.9 MG/DL
CHLORIDE SERPL-SCNC: 105 MMOL/L
CO2 SERPL-SCNC: 24 MMOL/L
CREAT SERPL-MCNC: 3.8 MG/DL
DIFFERENTIAL METHOD: ABNORMAL
EOSINOPHIL # BLD AUTO: 0.1 K/UL
EOSINOPHIL NFR BLD: 2.4 %
ERYTHROCYTE [DISTWIDTH] IN BLOOD BY AUTOMATED COUNT: 15.7 %
EST. GFR  (AFRICAN AMERICAN): 15.7 ML/MIN/1.73 M^2
EST. GFR  (NON AFRICAN AMERICAN): 13.6 ML/MIN/1.73 M^2
ESTIMATED PA SYSTOLIC PRESSURE: 55.13
FACT X PPP CHRO-ACNC: 0.43 IU/ML
FACT X PPP CHRO-ACNC: <0.1 IU/ML
GLUCOSE SERPL-MCNC: 93 MG/DL
HCT VFR BLD AUTO: 23.5 %
HGB BLD-MCNC: 7.4 G/DL
INR PPP: 1.1
LYMPHOCYTES # BLD AUTO: 0.9 K/UL
LYMPHOCYTES NFR BLD: 20.9 %
MAGNESIUM SERPL-MCNC: 2.3 MG/DL
MCH RBC QN AUTO: 27 PG
MCHC RBC AUTO-ENTMCNC: 31.5 %
MCV RBC AUTO: 86 FL
MITRAL VALVE MOBILITY: NORMAL
MITRAL VALVE REGURGITATION: ABNORMAL
MONOCYTES # BLD AUTO: 0.5 K/UL
MONOCYTES NFR BLD: 11.3 %
NEUTROPHILS # BLD AUTO: 2.7 K/UL
NEUTROPHILS NFR BLD: 64 %
PHOSPHATE SERPL-MCNC: 5.6 MG/DL
PLATELET # BLD AUTO: 143 K/UL
PMV BLD AUTO: 10.2 FL
POCT GLUCOSE: 105 MG/DL (ref 70–110)
POCT GLUCOSE: 87 MG/DL (ref 70–110)
POTASSIUM SERPL-SCNC: 3.7 MMOL/L
PROT SERPL-MCNC: 6.6 G/DL
PROTHROMBIN TIME: 11.5 SEC
RBC # BLD AUTO: 2.74 M/UL
RETIRED EF AND QEF - SEE NOTES: 45 (ref 55–65)
SODIUM SERPL-SCNC: 140 MMOL/L
TRICUSPID VALVE REGURGITATION: ABNORMAL
VANCOMYCIN SERPL-MCNC: 7.3 UG/ML
WBC # BLD AUTO: 4.16 K/UL

## 2017-05-28 PROCEDURE — 25000003 PHARM REV CODE 250: Performed by: INTERNAL MEDICINE

## 2017-05-28 PROCEDURE — 20600001 HC STEP DOWN PRIVATE ROOM

## 2017-05-28 PROCEDURE — 84100 ASSAY OF PHOSPHORUS: CPT

## 2017-05-28 PROCEDURE — 36415 COLL VENOUS BLD VENIPUNCTURE: CPT

## 2017-05-28 PROCEDURE — 85610 PROTHROMBIN TIME: CPT

## 2017-05-28 PROCEDURE — 85520 HEPARIN ASSAY: CPT

## 2017-05-28 PROCEDURE — 85730 THROMBOPLASTIN TIME PARTIAL: CPT

## 2017-05-28 PROCEDURE — 85025 COMPLETE CBC W/AUTO DIFF WBC: CPT

## 2017-05-28 PROCEDURE — 83735 ASSAY OF MAGNESIUM: CPT

## 2017-05-28 PROCEDURE — 63600175 PHARM REV CODE 636 W HCPCS: Performed by: INTERNAL MEDICINE

## 2017-05-28 PROCEDURE — 80048 BASIC METABOLIC PNL TOTAL CA: CPT

## 2017-05-28 PROCEDURE — 85520 HEPARIN ASSAY: CPT | Mod: 91

## 2017-05-28 PROCEDURE — 25000003 PHARM REV CODE 250: Performed by: HOSPITALIST

## 2017-05-28 PROCEDURE — 25000003 PHARM REV CODE 250: Performed by: STUDENT IN AN ORGANIZED HEALTH CARE EDUCATION/TRAINING PROGRAM

## 2017-05-28 PROCEDURE — 80202 ASSAY OF VANCOMYCIN: CPT

## 2017-05-28 PROCEDURE — 99233 SBSQ HOSP IP/OBS HIGH 50: CPT | Mod: ,,, | Performed by: INTERNAL MEDICINE

## 2017-05-28 PROCEDURE — 80076 HEPATIC FUNCTION PANEL: CPT

## 2017-05-28 RX ORDER — SODIUM CHLORIDE 9 MG/ML
INJECTION, SOLUTION INTRAVENOUS
Status: DISCONTINUED | OUTPATIENT
Start: 2017-05-28 | End: 2017-06-01

## 2017-05-28 RX ORDER — HEPARIN SODIUM,PORCINE/D5W 25000/250
17 INTRAVENOUS SOLUTION INTRAVENOUS CONTINUOUS
Status: DISCONTINUED | OUTPATIENT
Start: 2017-05-28 | End: 2017-05-31

## 2017-05-28 RX ORDER — SODIUM CHLORIDE 9 MG/ML
INJECTION, SOLUTION INTRAVENOUS ONCE
Status: DISCONTINUED | OUTPATIENT
Start: 2017-05-29 | End: 2017-05-31

## 2017-05-28 RX ORDER — HYDROCODONE BITARTRATE AND ACETAMINOPHEN 7.5; 325 MG/1; MG/1
1 TABLET ORAL ONCE
Status: COMPLETED | OUTPATIENT
Start: 2017-05-28 | End: 2017-05-28

## 2017-05-28 RX ADMIN — Medication 3.12 MG: at 09:05

## 2017-05-28 RX ADMIN — ATORVASTATIN CALCIUM 80 MG: 20 TABLET, FILM COATED ORAL at 09:05

## 2017-05-28 RX ADMIN — MICONAZOLE NITRATE: 20 CREAM TOPICAL at 09:05

## 2017-05-28 RX ADMIN — Medication 3.12 MG: at 10:05

## 2017-05-28 RX ADMIN — HYDROMORPHONE HYDROCHLORIDE 4 MG: 2 TABLET ORAL at 05:05

## 2017-05-28 RX ADMIN — CLOPIDOGREL 75 MG: 75 TABLET, FILM COATED ORAL at 09:05

## 2017-05-28 RX ADMIN — DOCUSATE SODIUM 100 MG: 100 CAPSULE, LIQUID FILLED ORAL at 09:05

## 2017-05-28 RX ADMIN — HEPARIN SODIUM AND DEXTROSE 17 UNITS/KG/HR: 10000; 5 INJECTION INTRAVENOUS at 10:05

## 2017-05-28 RX ADMIN — SEVELAMER CARBONATE 1600 MG: 800 TABLET, FILM COATED ORAL at 05:05

## 2017-05-28 RX ADMIN — ALLOPURINOL 100 MG: 100 TABLET ORAL at 09:05

## 2017-05-28 RX ADMIN — HYDROXYCHLOROQUINE SULFATE 200 MG: 200 TABLET, FILM COATED ORAL at 09:05

## 2017-05-28 RX ADMIN — SEVELAMER CARBONATE 1600 MG: 800 TABLET, FILM COATED ORAL at 12:05

## 2017-05-28 RX ADMIN — HYDROMORPHONE HYDROCHLORIDE 0.5 MG: 1 INJECTION, SOLUTION INTRAMUSCULAR; INTRAVENOUS; SUBCUTANEOUS at 09:05

## 2017-05-28 RX ADMIN — HYDROMORPHONE HYDROCHLORIDE 0.5 MG: 1 INJECTION, SOLUTION INTRAMUSCULAR; INTRAVENOUS; SUBCUTANEOUS at 03:05

## 2017-05-28 RX ADMIN — Medication 3 ML: at 03:05

## 2017-05-28 RX ADMIN — SEVELAMER CARBONATE 1600 MG: 800 TABLET, FILM COATED ORAL at 09:05

## 2017-05-28 RX ADMIN — SODIUM CHLORIDE 0.5 G: 900 INJECTION, SOLUTION INTRAVENOUS at 03:05

## 2017-05-28 RX ADMIN — ASPIRIN 81 MG CHEWABLE TABLET 81 MG: 81 TABLET CHEWABLE at 09:05

## 2017-05-28 RX ADMIN — HYDROCODONE BITARTRATE AND ACETAMINOPHEN 1 TABLET: 7.5; 325 TABLET ORAL at 06:05

## 2017-05-28 RX ADMIN — HYDROMORPHONE HYDROCHLORIDE 4 MG: 2 TABLET ORAL at 12:05

## 2017-05-28 RX ADMIN — HYDROMORPHONE HYDROCHLORIDE 4 MG: 2 TABLET ORAL at 09:05

## 2017-05-28 NOTE — ASSESSMENT & PLAN NOTE
No current chest pain or SOB but patient with discomfort. Refused LHC. Currently being managed medically. Requested LHC overnight however this is no longer an option. Cardiology had prolonged conversation.  1. Aspirin, plavix, heparin gtt.   2. High intensity statins   3. Continue low dose metoprolol 3.125 mg BID for now.

## 2017-05-28 NOTE — ASSESSMENT & PLAN NOTE
Pain related to chronic  issues, buttock pain, also noted to complain of extremity pain in LUE, has a chronic neuropathy diagnosis. Per patient his pain is uncontrolled.   1. Dilaudid 4mg po every 6 hrs.    2. As needed Dilaudid 0.5mg IV every 2 hrs for breakthrough.   3. Would consider very low dose fentanyl patch only if plan of care shifted to full comfort measures.   4. If pain continues to be as severe then will continue to titrate therapy.

## 2017-05-28 NOTE — SUBJECTIVE & OBJECTIVE
"Interval History: "bad". Overnight with hypotension responsive to IVF. Analgesics held. Now pain uncontrolled.    Review of Systems   Constitutional: Negative for chills, fatigue and fever.   HENT: Negative for ear pain, mouth sores, nosebleeds, postnasal drip, rhinorrhea, sinus pressure, sore throat, tinnitus, trouble swallowing and voice change.    Eyes: Negative for photophobia, pain, redness and visual disturbance.   Respiratory: Negative for apnea, cough, chest tightness, shortness of breath and wheezing.    Cardiovascular: Negative for chest pain, palpitations and leg swelling.   Gastrointestinal: Negative for abdominal distention, abdominal pain, blood in stool, constipation, diarrhea, nausea and vomiting.   Endocrine: Negative for cold intolerance, heat intolerance, polydipsia and polyuria.   Genitourinary: Negative for decreased urine volume, difficulty urinating, discharge, dysuria, flank pain, frequency, genital sores, hematuria, penile pain, penile swelling, scrotal swelling, testicular pain and urgency.   Musculoskeletal: Positive for arthralgias and myalgias. Negative for back pain, joint swelling and neck pain.   Skin: Negative for color change and rash.   Allergic/Immunologic: Negative for environmental allergies and food allergies.   Neurological: Negative for dizziness, seizures, syncope, weakness, light-headedness, numbness and headaches.   Hematological: Negative for adenopathy. Does not bruise/bleed easily.   Psychiatric/Behavioral: Negative for agitation, confusion, decreased concentration, hallucinations, self-injury, sleep disturbance and suicidal ideas. The patient is not nervous/anxious.      Objective:     Vital Signs (Most Recent):  Temp: 97.6 °F (36.4 °C) (05/28/17 1200)  Pulse: (!) 59 (05/28/17 1200)  Resp: 20 (05/28/17 0852)  BP: (!) 102/57 (05/28/17 1200)  SpO2: 95 % (05/28/17 1200) Vital Signs (24h Range):  Temp:  [97.5 °F (36.4 °C)-98.3 °F (36.8 °C)] 97.6 °F (36.4 °C)  Pulse:  " [57-63] 59  Resp:  [18-20] 20  SpO2:  [95 %-99 %] 95 %  BP: ()/(52-57) 102/57     Weight: 72.6 kg (160 lb 0.9 oz)  Body mass index is 22.97 kg/m².    Intake/Output Summary (Last 24 hours) at 05/28/17 1325  Last data filed at 05/28/17 0600   Gross per 24 hour   Intake              600 ml   Output              740 ml   Net             -140 ml      Physical Exam   Constitutional: He is oriented to person, place, and time. He appears well-developed and well-nourished. He has a sickly appearance.   HENT:   Head: Normocephalic and atraumatic.   Right Ear: External ear normal.   Left Ear: External ear normal.   Mouth/Throat: Oropharynx is clear and moist.   Eyes: Conjunctivae and EOM are normal. Pupils are equal, round, and reactive to light.   Neck: Normal range of motion. Neck supple. No thyromegaly present.   Cardiovascular: Normal rate and regular rhythm.    Murmur heard.   Systolic murmur is present with a grade of 2/6   Pulmonary/Chest: Effort normal and breath sounds normal. He has no wheezes. He has no rales.   Abdominal: Soft. Bowel sounds are normal. He exhibits no mass. There is no tenderness. There is no rebound.   Genitourinary:   Genitourinary Comments: Stevens catheter in place. Right thigh fistula covered.   Musculoskeletal: Normal range of motion. He exhibits tenderness.   Lymphadenopathy:     He has no cervical adenopathy.   Neurological: He is alert and oriented to person, place, and time.   Skin: Skin is warm and dry.   Venous stasis dermatitis over the bilateral lower extremities.   Psychiatric: He has a normal mood and affect. His behavior is normal.   Vitals reviewed.      Significant Labs:   BMP:   Recent Labs  Lab 05/28/17  0649   GLU 93      K 3.7      CO2 24   BUN 73*   CREATININE 3.8*   CALCIUM 8.9   MG 2.3     CBC:   Recent Labs  Lab 05/27/17  0358 05/28/17  0649   WBC 5.03 4.16   HGB 8.2* 7.4*   HCT 25.3* 23.5*    143*     Cardiac Markers: No results for input(s): CKMB,  MYOGLOBIN, BNP, TROPISTAT in the last 48 hours.    Significant Imaging: I have reviewed all pertinent imaging results/findings within the past 24 hours.

## 2017-05-28 NOTE — ASSESSMENT & PLAN NOTE
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 Renal function continues to worsen. Patient has now changed his mind and is agreeable to HD. CVC placed on 5/26/17. Patient had first HD session on 5/27.   1. Monitor creatinine level.  2. Avoid nephrotoxic agents.  3. Renally dose medications.  4. Continue trial of RRT.  5. Monitor CVC site for bleeding.

## 2017-05-28 NOTE — NURSING
BP 84/52, MAP 60; HR 63. Pt denies lightheaded or dizziness. Dr. Rudd notified, ordered 500 NS fluid bolus with BP checks at 30 minutes after initiating and 1 HR after initiating. Ordered to hold 2100 dose of Metoprolol and any opioid pain medications until further notice. Will implement. Will continue to monitor.

## 2017-05-28 NOTE — ASSESSMENT & PLAN NOTE
AFIb is not new for patient, he does have prior history of it.  However he is not on OP anticoagulation, so discussion is still needed with patient/family to decide if Heparin drip should continue or if warfarin should be started (only oral AntiCoag option). Currently rate controlled. Nephrology service recommends holding beta blocker if possible.  1. Continue low dose beta blockers for rate control   2. Chads- vasc is 4 - 4.8% annual risk./ ATRIA bleeding risk score 5.8% annual risk of hemorrhage, high risk category.  3. Heparin drip.  4. Will also start oral anticoagulation.

## 2017-05-28 NOTE — ASSESSMENT & PLAN NOTE
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 Renal function continues to worsen. Patient has now changed his mind and is agreeable to HD. CVC placed on 5/26/17. Patient had first HD session on 5/27. Improvement in phosphate level and creatinine. Urine output decreasing.  1. Monitor creatinine level.  2. Avoid nephrotoxic agents.  3. Renally dose medications.  4. Continue trial of RRT.  5. Monitor CVC site for bleeding.

## 2017-05-28 NOTE — ASSESSMENT & PLAN NOTE
Secondary to THEO on CKD. Worsening.  1. Sevelamer as per nephrology.  2. Low phosphate diet.  3. HD later today.

## 2017-05-28 NOTE — NURSING
/57 (82) after 30 min of 500 cc bolus. Dr. Rudd notified, stated to turn off bolus after 250 cc has infused, recheck in 30 minutes. Will implement. Will continue to monitor.

## 2017-05-28 NOTE — PROGRESS NOTES
[facility] Hospital Medicine  Progress Note    Patient Name: Humphrey Machado  MRN: [id number]  Patient Class: IP- Inpatient   Admission Date: 5/19/2017  Length of Stay: 9 days  Attending Physician: Chaya Tinsley MD  Primary Care Provider: Andrew Murray MD    [facility] Medicine Team: [facility] Chaya Tinsley MD    Subjective:     Principal Problem:ST elevation myocardial infarction (STEMI)    HPI:  85-year-old man with HTN, HLD, NSTEMI previously refused [facility] 5/2016, CKD stage IV (baseline 2.7), moderate AS, AF, venous insufficiency, cirrhosis, Ureterocutaneous fistula, chronic osteomyelitis of the pelvic region on cefpodoxime, suspected inflammatory arthritis on HCQ, neuromuscular disorder with peripheral neuropathy with recurrent right foot ulcer, prostate cancer s/p radiation, RCC s/p left nephrectomy, skin cancer of the hand, urethral stricture, urinary incontinence p/w SOB which started an hour prior to admission and EKG concerning for STEMI with afib with q waves in inferior/anterior-septal leads with resolution of chest pain with SL NTG. Patient was evaluated in the ED by Dr Duval and recommended to be taken to cath lab considering his clinical picture but he refused to proceed with angiogram due to risk of hemodialysis possibility he will be admitted fort mdical treatment in CCU.     Currently he is very SOB with fluid overload and aneuric.looks like not responding to lasix his son is his POA and they are discussing code status and if they will proceed with more procedures including CRRT.    Patient with recent cystoscopy/cystogram/fistulogram by Dr. 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 Abraham's  Operative noted.     Hospital Course:  Hospital course as above, while  in the CCU patient was medically managed for his STEMI, and for his AMIRA.  CCU team discussed comfort vs aggressive measures, patient's code status changed to DNR and due to concern that patient was anuric with developing AMIRA that may require HD. Discussion per EMR with family revealed they would not want to pursue dialysis. He was started on Lasix drip @ 20mg/hr and IV Diuril q12 hrs and had improved urine output on this regimen.  PRN Morphine and Ativan orders had been placed for patient comfort    Patient stepped down to IM-C casiano service on 5/20.  Patient was acutely encephalopathic, developed fevers and notified by RN blood cultures from admission were growing GNR.  Surveillance cultures sent patient was on Vancomycin and Cefepime.  He was having urine output on the lasix and diuril.  Extensive family discussion held (see progress notes for detail)  Family was not universally ready for comfort care, care plan more no escalation of care, given his altered mentation, I suspected morphine and ativan in Amira contributing, opiates switched to dilaudid and ativan d/c.   Discussed NG tube placement for oral med administration and nutrition but was held off.     5/21 - Patient is more awake and alert, likely related to toxic encephalopathy, remains hemodynamically stable, but was having more pain complaints, was receiving IV dilaudid  0.5mg q2hrs.   SLP evaluated patient and he is safe for dental soft nectar thick liquids.   His heart rhythm converted to atrial fibrillation with controlled rate in the 80-90s.   Will need repeat cardiology recs on management in AM.     Patient with nearly 2L UOP x 24hrs, family with more questions about possible HD or what course his Amira may take.  Nephrology re-contacted to follow the patient.  Diuril will be stopped this evening to monitor how pts UOp  And Cr does.    I reviewed more of recent urologic history and patient has a urinary source of his bacteremia, however with complicated  chronic fistula and wound we may not have source control. Discussed with patient and will consult urology to assist in any other management for source control and chronic urethrocutaneous fistula that may be needed - Urology consult placed for mon Am.     Reviewed prior culture data and patient with hx of Klebsiella ESBL and Pseudomonas in urine, both not sensitive to empiric cefepime so ID approval obtained to change to renal dosed Meropenem     Patient says pain control is improving but is still seems he has chronic pain issues from multiple sites, adding po opiates to pain regimen.     5/22 - Additional consultants have evaluated the patient  Cardiology - no additional med management recs at this time, patient has completed 48hrs of heparin for ACS, however with atrial fibrillation, will continue for now.  Coreg change to low dose metoprolol due to hypotension    Urology - evaluated patient and removed ureteral catheter, no further recs for chronic urtherocutaneous fistula, local wound care.   Wound care evaluated the patient and placed barrier cream in right groin with soft dressing to help absorb urinary leak from fistula, I was at bedside and pt did not feel ready to turn for assessment of buttocks.     Nephrology - evaluated the patient and no acute needs for acute HD, they will continue to follow, did inform pt that nephrology feels initiating HD would not improve overall morbidity/mortality from his co-morbid conditions and HD initiation comes with its own risks and invasive procedures required.     Palliative care - evaluated pt to assist in overall care planning.  MD staff to assist with pain management not available this week.     5/23- patient complained of discomfort. He is still unsure of what he wants for medical management. He is still considering hemodialysis as an option. His renal function continued to worsen but his main concern was his discomfort.    5/24- patient unable to voice his wishes nor  "does he want to have a goals of care discussion. He has poor insight into his disease process. He is verbally abusive to his providers and nursing staff. Discussed with his wife and grandson scheduling a family meeting to establish therapeutic plan.     5/25- he continues to be abusive to medical personnel. When trying to assess his pain he becomes abusive and screams that he can't tell.     5/26-Family meeting today to establish therapeutic plan. Patient has agreed to hemodialysis and oral anticoagulation. He also agreed to PT and OT however he has refused SNF. He has developed some mental "foggyness"     5/27-Underwent HD.         Interval History: "how do you think?". No acute overnight events. Mild bruising at HD catheter site. HD today.    Review of Systems   Constitutional: Negative for chills, fatigue and fever.   HENT: Negative for ear pain, mouth sores, nosebleeds, postnasal drip, rhinorrhea, sinus pressure, sore throat, tinnitus, trouble swallowing and voice change.    Eyes: Negative for photophobia, pain, redness and visual disturbance.   Respiratory: Negative for apnea, cough, chest tightness, shortness of breath and wheezing.    Cardiovascular: Negative for chest pain, palpitations and leg swelling.   Gastrointestinal: Negative for abdominal distention, abdominal pain, blood in stool, constipation, diarrhea, nausea and vomiting.   Endocrine: Negative for cold intolerance, heat intolerance, polydipsia and polyuria.   Genitourinary: Negative for decreased urine volume, difficulty urinating, discharge, dysuria, flank pain, frequency, genital sores, hematuria, penile pain, penile swelling, scrotal swelling, testicular pain and urgency.   Musculoskeletal: Positive for arthralgias and myalgias. Negative for back pain, joint swelling and neck pain.   Skin: Negative for color change and rash.   Allergic/Immunologic: Negative for environmental allergies and food allergies.   Neurological: Negative for dizziness, " seizures, syncope, weakness, light-headedness, numbness and headaches.   Hematological: Negative for adenopathy. Does not bruise/bleed easily.   Psychiatric/Behavioral: Negative for agitation, confusion, decreased concentration, hallucinations, self-injury, sleep disturbance and suicidal ideas. The patient is not nervous/anxious.      Objective:     Vital Signs (Most Recent):  Temp: 97.6 °F (36.4 °C) (05/28/17 1200)  Pulse: (!) 59 (05/28/17 1200)  Resp: 20 (05/28/17 0852)  BP: (!) 102/57 (05/28/17 1200)  SpO2: 95 % (05/28/17 1200) Vital Signs (24h Range):  Temp:  [97.5 °F (36.4 °C)-98.3 °F (36.8 °C)] 97.6 °F (36.4 °C)  Pulse:  [57-63] 59  Resp:  [18-20] 20  SpO2:  [95 %-99 %] 95 %  BP: ()/(52-57) 102/57     Weight: 72.6 kg (160 lb 0.9 oz)  Body mass index is 22.97 kg/m².    Intake/Output Summary (Last 24 hours) at 05/28/17 1332  Last data filed at 05/28/17 0600   Gross per 24 hour   Intake              600 ml   Output              740 ml   Net             -140 ml      Physical Exam   Constitutional: He is oriented to person, place, and time. He appears well-developed and well-nourished. He has a sickly appearance.   HENT:   Head: Normocephalic and atraumatic.   Right Ear: External ear normal.   Left Ear: External ear normal.   Mouth/Throat: Oropharynx is clear and moist.   Eyes: Conjunctivae and EOM are normal. Pupils are equal, round, and reactive to light.   Neck: Normal range of motion. Neck supple. No thyromegaly present.   Cardiovascular: Normal rate and regular rhythm.    Murmur heard.   Systolic murmur is present with a grade of 2/6   Pulmonary/Chest: Effort normal and breath sounds normal. He has no wheezes. He has no rales.   CVC in right chest with mild bruising.   Abdominal: Soft. Bowel sounds are normal. He exhibits no mass. There is no tenderness. There is no rebound.   Genitourinary:   Genitourinary Comments: Stevens catheter in place. Right thigh fistula covered.   Musculoskeletal: Normal range of  motion. He exhibits tenderness.   Lymphadenopathy:     He has no cervical adenopathy.   Neurological: He is alert and oriented to person, place, and time.   Skin: Skin is warm and dry.   Venous stasis dermatitis over the bilateral lower extremities.   Psychiatric: He has a normal mood and affect. His behavior is normal.   Vitals reviewed.        Assessment/Plan:      Hyperphosphatemia    Secondary to THEO on CKD. Worsening.  1. Sevelamer as per nephrology.  2. Low phosphate diet.  3. HD later today.        Oropharyngeal dysphagia    Stable.   1. Dental soft diet with Nectar thick liquids.   2. Continue SLP management.          Ischemic cardiomyopathy    EF 35%, mangagement as above          Chronic pain    Pain related to chronic  issues, buttock pain, also noted to complain of extremity pain in LUE, has a chronic neuropathy diagnosis. Per patient his pain is uncontrolled.   1. Dilaudid 4mg po every 6 hrs.    2. As needed Dilaudid 0.5mg IV every 2 hrs for breakthrough.   3. Would consider very low dose fentanyl patch only if plan of care shifted to full comfort measures.   4. If pain continues to be as severe then will continue to titrate therapy.          Acute kidney injury superimposed on CKD    Suspected to be intrinsic THEO secondary to poor perfusion from STEMI vs  pre-renal. Renal function continues to worsen. Patient has now changed his mind and is agreeable to HD. CVC placed on 5/26/17. Patient had first HD session on 5/27.   1. Monitor creatinine level.  2. Avoid nephrotoxic agents.  3. Renally dose medications.  4. Continue trial of RRT.  5. Monitor CVC site for bleeding.            Bacteremia due to Klebsiella pneumoniae    Organism identified as ESBL K pneumoniae. Same organism found in his urine and therefore assumption can be made that bacteremia is secondary to his urinary tract infection. Repeat blood cultures from 5/20 remain no growth to date.  1. Ertapenem 500 mg daily as there is no need for  added pseudomonal coverage provided by meropenem.  2. Monitor for signs of encephalopathy which have been seen as an adverse effect or ertapenem.   3. Anticipate a 14 day course total from clearance of bacteremia. End date: 6/2/17.  4. Patient likely to need CVC for antibiotic therapy.        Atrial fibrillation with RVR    AFIb is not new for patient, he does have prior history of it.  However he is not on OP anticoagulation, so discussion is still needed with patient/family to decide if Heparin drip should continue or if warfarin should be started (only oral AntiCoag option). Currently rate controlled. Nephrology service recommends holding beta blocker if possible.  1. Continue low dose beta blockers for rate control   2. Chads- vasc is 4 - 4.8% annual risk./ ATRIA bleeding risk score 5.8% annual risk of hemorrhage, high risk category.  3. Heparin drip.  4. Will also start oral anticoagulation.        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   1. Appears there is no further treatment for his fistula with multitude of comorbidities and patient is bound to experience recurrent infections as a result of this.           Complicated open wound of right thigh    Stable. Urology recommends non aggressive measures.   1. Continue local wound care.        Debility    On bedrest due to active issues.   1. Every 2 hour turns   2. PT/OT.  3. Patient refused SNF placement.           Urinary tract infection due to extended-spectrum beta lactamase (ESBL)-producing Klebsiella    Management as above under bacteremia problem.        * ST elevation myocardial infarction (STEMI)    No current chest pain or SOB but patient with discomfort. Refused LHC. Currently being managed medically. Requested LHC overnight however this is no longer an option. Cardiology had prolonged conversation.  1. Aspirin, plavix, heparin gtt.   2. High  intensity statins   3. Continue low dose metoprolol.          VTE Risk Mitigation         Ordered     Medium Risk of VTE  Once      05/19/17 0945     Reason for No Pharmacological VTE Prophylaxis  Once      05/19/17 0945          Chaya Tinsley MD  Department of Hospital Medicine   [facility]

## 2017-05-28 NOTE — SUBJECTIVE & OBJECTIVE
"Interval History: "how do you think?". No acute overnight events. Mild bruising at HD catheter site. HD today.    Review of Systems   Constitutional: Negative for chills, fatigue and fever.   HENT: Negative for ear pain, mouth sores, nosebleeds, postnasal drip, rhinorrhea, sinus pressure, sore throat, tinnitus, trouble swallowing and voice change.    Eyes: Negative for photophobia, pain, redness and visual disturbance.   Respiratory: Negative for apnea, cough, chest tightness, shortness of breath and wheezing.    Cardiovascular: Negative for chest pain, palpitations and leg swelling.   Gastrointestinal: Negative for abdominal distention, abdominal pain, blood in stool, constipation, diarrhea, nausea and vomiting.   Endocrine: Negative for cold intolerance, heat intolerance, polydipsia and polyuria.   Genitourinary: Negative for decreased urine volume, difficulty urinating, discharge, dysuria, flank pain, frequency, genital sores, hematuria, penile pain, penile swelling, scrotal swelling, testicular pain and urgency.   Musculoskeletal: Positive for arthralgias and myalgias. Negative for back pain, joint swelling and neck pain.   Skin: Negative for color change and rash.   Allergic/Immunologic: Negative for environmental allergies and food allergies.   Neurological: Negative for dizziness, seizures, syncope, weakness, light-headedness, numbness and headaches.   Hematological: Negative for adenopathy. Does not bruise/bleed easily.   Psychiatric/Behavioral: Negative for agitation, confusion, decreased concentration, hallucinations, self-injury, sleep disturbance and suicidal ideas. The patient is not nervous/anxious.      Objective:     Vital Signs (Most Recent):  Temp: 97.6 °F (36.4 °C) (05/28/17 1200)  Pulse: (!) 59 (05/28/17 1200)  Resp: 20 (05/28/17 0852)  BP: (!) 102/57 (05/28/17 1200)  SpO2: 95 % (05/28/17 1200) Vital Signs (24h Range):  Temp:  [97.5 °F (36.4 °C)-98.3 °F (36.8 °C)] 97.6 °F (36.4 °C)  Pulse:  " [57-63] 59  Resp:  [18-20] 20  SpO2:  [95 %-99 %] 95 %  BP: ()/(52-57) 102/57     Weight: 72.6 kg (160 lb 0.9 oz)  Body mass index is 22.97 kg/m².    Intake/Output Summary (Last 24 hours) at 05/28/17 1332  Last data filed at 05/28/17 0600   Gross per 24 hour   Intake              600 ml   Output              740 ml   Net             -140 ml      Physical Exam   Constitutional: He is oriented to person, place, and time. He appears well-developed and well-nourished. He has a sickly appearance.   HENT:   Head: Normocephalic and atraumatic.   Right Ear: External ear normal.   Left Ear: External ear normal.   Mouth/Throat: Oropharynx is clear and moist.   Eyes: Conjunctivae and EOM are normal. Pupils are equal, round, and reactive to light.   Neck: Normal range of motion. Neck supple. No thyromegaly present.   Cardiovascular: Normal rate and regular rhythm.    Murmur heard.   Systolic murmur is present with a grade of 2/6   Pulmonary/Chest: Effort normal and breath sounds normal. He has no wheezes. He has no rales.   CVC in right chest with mild bruising.   Abdominal: Soft. Bowel sounds are normal. He exhibits no mass. There is no tenderness. There is no rebound.   Genitourinary:   Genitourinary Comments: Stevens catheter in place. Right thigh fistula covered.   Musculoskeletal: Normal range of motion. He exhibits tenderness.   Lymphadenopathy:     He has no cervical adenopathy.   Neurological: He is alert and oriented to person, place, and time.   Skin: Skin is warm and dry.   Venous stasis dermatitis over the bilateral lower extremities.   Psychiatric: He has a normal mood and affect. His behavior is normal.   Vitals reviewed.

## 2017-05-28 NOTE — ASSESSMENT & PLAN NOTE
Secondary to THEO on CKD. Improved with HD.  1. Sevelamer as per nephrology.  2. Low phosphate diet.

## 2017-05-28 NOTE — ASSESSMENT & PLAN NOTE
No current chest pain or SOB but patient with discomfort. Refused LHC. Currently being managed medically. Requested LHC overnight however this is no longer an option. Cardiology had prolonged conversation.  1. Aspirin, plavix, heparin gtt.   2. High intensity statins   3. Continue low dose metoprolol.

## 2017-05-28 NOTE — PROGRESS NOTES
[facility] Hospital Medicine  Progress Note    Patient Name: Humphrey Machado  MRN: [id number]  Patient Class: IP- Inpatient   Admission Date: 5/19/2017  Length of Stay: 9 days  Attending Physician: Chaya Tinsely MD  Primary Care Provider: Andrew Murray MD    [facility] Medicine Team: [facility] Chaya Tinsley MD    Subjective:     Principal Problem:ST elevation myocardial infarction (STEMI)    HPI:  85-year-old man with HTN, HLD, NSTEMI previously refused [facility] 5/2016, CKD stage IV (baseline 2.7), moderate AS, AF, venous insufficiency, cirrhosis, Ureterocutaneous fistula, chronic osteomyelitis of the pelvic region on cefpodoxime, suspected inflammatory arthritis on HCQ, neuromuscular disorder with peripheral neuropathy with recurrent right foot ulcer, prostate cancer s/p radiation, RCC s/p left nephrectomy, skin cancer of the hand, urethral stricture, urinary incontinence p/w SOB which started an hour prior to admission and EKG concerning for STEMI with afib with q waves in inferior/anterior-septal leads with resolution of chest pain with SL NTG. Patient was evaluated in the ED by Dr Duval and recommended to be taken to cath lab considering his clinical picture but he refused to proceed with angiogram due to risk of hemodialysis possibility he will be admitted fort mdical treatment in CCU.     Currently he is very SOB with fluid overload and aneuric.looks like not responding to lasix his son is his POA and they are discussing code status and if they will proceed with more procedures including CRRT.    Patient with recent cystoscopy/cystogram/fistulogram by Dr. 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 Abraham's  Operative noted.     Hospital Course:  Hospital course as above, while  in the CCU patient was medically managed for his STEMI, and for his AMIRA.  CCU team discussed comfort vs aggressive measures, patient's code status changed to DNR and due to concern that patient was anuric with developing AMIRA that may require HD. Discussion per EMR with family revealed they would not want to pursue dialysis. He was started on Lasix drip @ 20mg/hr and IV Diuril q12 hrs and had improved urine output on this regimen.  PRN Morphine and Ativan orders had been placed for patient comfort    Patient stepped down to IM-C casiano service on 5/20.  Patient was acutely encephalopathic, developed fevers and notified by RN blood cultures from admission were growing GNR.  Surveillance cultures sent patient was on Vancomycin and Cefepime.  He was having urine output on the lasix and diuril.  Extensive family discussion held (see progress notes for detail)  Family was not universally ready for comfort care, care plan more no escalation of care, given his altered mentation, I suspected morphine and ativan in Amira contributing, opiates switched to dilaudid and ativan d/c.   Discussed NG tube placement for oral med administration and nutrition but was held off.     5/21 - Patient is more awake and alert, likely related to toxic encephalopathy, remains hemodynamically stable, but was having more pain complaints, was receiving IV dilaudid  0.5mg q2hrs.   SLP evaluated patient and he is safe for dental soft nectar thick liquids.   His heart rhythm converted to atrial fibrillation with controlled rate in the 80-90s.   Will need repeat cardiology recs on management in AM.     Patient with nearly 2L UOP x 24hrs, family with more questions about possible HD or what course his Amira may take.  Nephrology re-contacted to follow the patient.  Diuril will be stopped this evening to monitor how pts UOp  And Cr does.    I reviewed more of recent urologic history and patient has a urinary source of his bacteremia, however with complicated  chronic fistula and wound we may not have source control. Discussed with patient and will consult urology to assist in any other management for source control and chronic urethrocutaneous fistula that may be needed - Urology consult placed for mon Am.     Reviewed prior culture data and patient with hx of Klebsiella ESBL and Pseudomonas in urine, both not sensitive to empiric cefepime so ID approval obtained to change to renal dosed Meropenem     Patient says pain control is improving but is still seems he has chronic pain issues from multiple sites, adding po opiates to pain regimen.     5/22 - Additional consultants have evaluated the patient  Cardiology - no additional med management recs at this time, patient has completed 48hrs of heparin for ACS, however with atrial fibrillation, will continue for now.  Coreg change to low dose metoprolol due to hypotension    Urology - evaluated patient and removed ureteral catheter, no further recs for chronic urtherocutaneous fistula, local wound care.   Wound care evaluated the patient and placed barrier cream in right groin with soft dressing to help absorb urinary leak from fistula, I was at bedside and pt did not feel ready to turn for assessment of buttocks.     Nephrology - evaluated the patient and no acute needs for acute HD, they will continue to follow, did inform pt that nephrology feels initiating HD would not improve overall morbidity/mortality from his co-morbid conditions and HD initiation comes with its own risks and invasive procedures required.     Palliative care - evaluated pt to assist in overall care planning.  MD staff to assist with pain management not available this week.     5/23- patient complained of discomfort. He is still unsure of what he wants for medical management. He is still considering hemodialysis as an option. His renal function continued to worsen but his main concern was his discomfort.    5/24- patient unable to voice his wishes nor  "does he want to have a goals of care discussion. He has poor insight into his disease process. He is verbally abusive to his providers and nursing staff. Discussed with his wife and grandson scheduling a family meeting to establish therapeutic plan.     5/25- he continues to be abusive to medical personnel. When trying to assess his pain he becomes abusive and screams that he can't tell.     5/26-Family meeting today to establish therapeutic plan. Patient has agreed to hemodialysis and oral anticoagulation. He also agreed to PT and OT however he has refused SNF. He has developed some mental "foggyness"     5/27-Underwent HD.     5/28- overnight patient with hypotension. Narcotic analgesics held and volume replaced by overnight physician. Moderate to severe pain on evaluation and analgesics resumed. Metoprolol dose further adjusted.    Interval History: "bad". Overnight with hypotension responsive to IVF. Analgesics held. Now pain uncontrolled.    Review of Systems   Constitutional: Negative for chills, fatigue and fever.   HENT: Negative for ear pain, mouth sores, nosebleeds, postnasal drip, rhinorrhea, sinus pressure, sore throat, tinnitus, trouble swallowing and voice change.    Eyes: Negative for photophobia, pain, redness and visual disturbance.   Respiratory: Negative for apnea, cough, chest tightness, shortness of breath and wheezing.    Cardiovascular: Negative for chest pain, palpitations and leg swelling.   Gastrointestinal: Negative for abdominal distention, abdominal pain, blood in stool, constipation, diarrhea, nausea and vomiting.   Endocrine: Negative for cold intolerance, heat intolerance, polydipsia and polyuria.   Genitourinary: Negative for decreased urine volume, difficulty urinating, discharge, dysuria, flank pain, frequency, genital sores, hematuria, penile pain, penile swelling, scrotal swelling, testicular pain and urgency.   Musculoskeletal: Positive for arthralgias and myalgias. Negative for " back pain, joint swelling and neck pain.   Skin: Negative for color change and rash.   Allergic/Immunologic: Negative for environmental allergies and food allergies.   Neurological: Negative for dizziness, seizures, syncope, weakness, light-headedness, numbness and headaches.   Hematological: Negative for adenopathy. Does not bruise/bleed easily.   Psychiatric/Behavioral: Negative for agitation, confusion, decreased concentration, hallucinations, self-injury, sleep disturbance and suicidal ideas. The patient is not nervous/anxious.      Objective:     Vital Signs (Most Recent):  Temp: 97.6 °F (36.4 °C) (05/28/17 1200)  Pulse: (!) 59 (05/28/17 1200)  Resp: 20 (05/28/17 0852)  BP: (!) 102/57 (05/28/17 1200)  SpO2: 95 % (05/28/17 1200) Vital Signs (24h Range):  Temp:  [97.5 °F (36.4 °C)-98.3 °F (36.8 °C)] 97.6 °F (36.4 °C)  Pulse:  [57-63] 59  Resp:  [18-20] 20  SpO2:  [95 %-99 %] 95 %  BP: ()/(52-57) 102/57     Weight: 72.6 kg (160 lb 0.9 oz)  Body mass index is 22.97 kg/m².    Intake/Output Summary (Last 24 hours) at 05/28/17 1325  Last data filed at 05/28/17 0600   Gross per 24 hour   Intake              600 ml   Output              740 ml   Net             -140 ml      Physical Exam   Constitutional: He is oriented to person, place, and time. He appears well-developed and well-nourished. He has a sickly appearance.   HENT:   Head: Normocephalic and atraumatic.   Right Ear: External ear normal.   Left Ear: External ear normal.   Mouth/Throat: Oropharynx is clear and moist.   Eyes: Conjunctivae and EOM are normal. Pupils are equal, round, and reactive to light.   Neck: Normal range of motion. Neck supple. No thyromegaly present.   Cardiovascular: Normal rate and regular rhythm.    Murmur heard.   Systolic murmur is present with a grade of 2/6   Pulmonary/Chest: Effort normal and breath sounds normal. He has no wheezes. He has no rales.   Abdominal: Soft. Bowel sounds are normal. He exhibits no mass. There is no  tenderness. There is no rebound.   Genitourinary:   Genitourinary Comments: Stevens catheter in place. Right thigh fistula covered.   Musculoskeletal: Normal range of motion. He exhibits tenderness.   Lymphadenopathy:     He has no cervical adenopathy.   Neurological: He is alert and oriented to person, place, and time.   Skin: Skin is warm and dry.   Venous stasis dermatitis over the bilateral lower extremities.   Psychiatric: He has a normal mood and affect. His behavior is normal.   Vitals reviewed.      Significant Labs:   BMP:   Recent Labs  Lab 05/28/17  0649   GLU 93      K 3.7      CO2 24   BUN 73*   CREATININE 3.8*   CALCIUM 8.9   MG 2.3     CBC:   Recent Labs  Lab 05/27/17  0358 05/28/17  0649   WBC 5.03 4.16   HGB 8.2* 7.4*   HCT 25.3* 23.5*    143*     Cardiac Markers: No results for input(s): CKMB, MYOGLOBIN, BNP, TROPISTAT in the last 48 hours.    Significant Imaging: I have reviewed all pertinent imaging results/findings within the past 24 hours.    Assessment/Plan:      Hyperphosphatemia    Secondary to THEO on CKD. Improved with HD.  1. Sevelamer as per nephrology.  2. Low phosphate diet.        Oropharyngeal dysphagia    Stable.   1. Dental soft diet with Nectar thick liquids.   2. Continue SLP management.          Ischemic cardiomyopathy    EF 35%, mangagement as above          Chronic pain    Pain related to chronic  issues, buttock pain, also noted to complain of extremity pain in LUE, has a chronic neuropathy diagnosis. Per patient his pain is uncontrolled.   1. Dilaudid 4mg po every 6 hrs.    2. As needed Dilaudid 0.5mg IV every 2 hrs for breakthrough.   3. Would consider very low dose fentanyl patch only if plan of care shifted to full comfort measures.   4. If pain continues to be as severe then will continue to titrate therapy.          Acute kidney injury superimposed on CKD    Suspected to be intrinsic THEO secondary to poor perfusion from STEMI vs  pre-renal. Renal  function continues to worsen. Patient has now changed his mind and is agreeable to HD. CVC placed on 5/26/17. Patient had first HD session on 5/27. Improvement in phosphate level and creatinine. Urine output decreasing.  1. Monitor creatinine level.  2. Avoid nephrotoxic agents.  3. Renally dose medications.  4. Continue trial of RRT.  5. Monitor CVC site for bleeding.            Bacteremia due to Klebsiella pneumoniae    Organism identified as ESBL K pneumoniae. Same organism found in his urine and therefore assumption can be made that bacteremia is secondary to his urinary tract infection. Repeat blood cultures from 5/20 remain no growth to date.  1. Ertapenem 500 mg daily as there is no need for added pseudomonal coverage provided by meropenem.  2. Monitor for signs of encephalopathy which have been seen as an adverse effect or ertapenem.   3. Anticipate a 14 day course total from clearance of bacteremia. End date: 6/2/17.  4. Patient likely to need CVC for antibiotic therapy.        Atrial fibrillation with RVR    AFIb is not new for patient, he does have prior history of it.  However he is not on OP anticoagulation, so discussion is still needed with patient/family to decide if Heparin drip should continue or if warfarin should be started (only oral AntiCoag option). Currently rate controlled. Nephrology service recommends holding beta blocker if possible.  1. Continue low dose beta blockers for rate control   2. Chads- vasc is 4 - 4.8% annual risk./ ATRIA bleeding risk score 5.8% annual risk of hemorrhage, high risk category.  3. Heparin drip.  4. Will also start oral anticoagulation.        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   1. Appears there is no further treatment for his fistula with multitude of comorbidities and patient is bound to experience recurrent infections as a result of this.            Complicated open wound of right thigh    Stable. Urology recommends non aggressive measures.   1. Continue local wound care.        Debility    On bedrest due to active issues.   1. Every 2 hour turns   2. PT/OT.  3. Patient refused SNF placement.           Urinary tract infection due to extended-spectrum beta lactamase (ESBL)-producing Klebsiella    Management as above under bacteremia problem.        * ST elevation myocardial infarction (STEMI)    No current chest pain or SOB but patient with discomfort. Refused LHC. Currently being managed medically. Requested LHC overnight however this is no longer an option. Cardiology had prolonged conversation.  1. Aspirin, plavix, heparin gtt.   2. High intensity statins   3. Continue low dose metoprolol 3.125 mg BID for now.          VTE Risk Mitigation         Ordered     Medium Risk of VTE  Once      05/19/17 0945     Reason for No Pharmacological VTE Prophylaxis  Once      05/19/17 0945          Chaya Tinsley MD  Department of Hospital Medicine   [facility]

## 2017-05-29 LAB
ALBUMIN SERPL BCP-MCNC: 2.3 G/DL
ALP SERPL-CCNC: 85 U/L
ALT SERPL W/O P-5'-P-CCNC: 21 U/L
ANION GAP SERPL CALC-SCNC: 12 MMOL/L
APTT BLDCRRT: 81 SEC
AST SERPL-CCNC: 32 U/L
BASOPHILS # BLD AUTO: 0.01 K/UL
BASOPHILS NFR BLD: 0.2 %
BILIRUB DIRECT SERPL-MCNC: 0.3 MG/DL
BILIRUB SERPL-MCNC: 0.4 MG/DL
BUN SERPL-MCNC: 72 MG/DL
CALCIUM SERPL-MCNC: 9 MG/DL
CHLORIDE SERPL-SCNC: 104 MMOL/L
CO2 SERPL-SCNC: 22 MMOL/L
CREAT SERPL-MCNC: 3.8 MG/DL
DIFFERENTIAL METHOD: ABNORMAL
EOSINOPHIL # BLD AUTO: 0.2 K/UL
EOSINOPHIL NFR BLD: 2.7 %
ERYTHROCYTE [DISTWIDTH] IN BLOOD BY AUTOMATED COUNT: 15.5 %
EST. GFR  (AFRICAN AMERICAN): 15.7 ML/MIN/1.73 M^2
EST. GFR  (NON AFRICAN AMERICAN): 13.6 ML/MIN/1.73 M^2
FACT X PPP CHRO-ACNC: 0.64 IU/ML
FACT X PPP CHRO-ACNC: 0.64 IU/ML
GLUCOSE SERPL-MCNC: 79 MG/DL
HCT VFR BLD AUTO: 24.5 %
HGB BLD-MCNC: 7.8 G/DL
INR PPP: 1.1
LYMPHOCYTES # BLD AUTO: 1.2 K/UL
LYMPHOCYTES NFR BLD: 20.8 %
MAGNESIUM SERPL-MCNC: 2.1 MG/DL
MCH RBC QN AUTO: 27.3 PG
MCHC RBC AUTO-ENTMCNC: 31.8 %
MCV RBC AUTO: 86 FL
MONOCYTES # BLD AUTO: 0.5 K/UL
MONOCYTES NFR BLD: 8.7 %
NEUTROPHILS # BLD AUTO: 3.7 K/UL
NEUTROPHILS NFR BLD: 66 %
PHOSPHATE SERPL-MCNC: 5.8 MG/DL
PLATELET # BLD AUTO: 156 K/UL
PMV BLD AUTO: 10.7 FL
POCT GLUCOSE: 102 MG/DL (ref 70–110)
POCT GLUCOSE: 94 MG/DL (ref 70–110)
POCT GLUCOSE: 98 MG/DL (ref 70–110)
POTASSIUM SERPL-SCNC: 3.6 MMOL/L
PROT SERPL-MCNC: 6.6 G/DL
PROTHROMBIN TIME: 12 SEC
RBC # BLD AUTO: 2.86 M/UL
SODIUM SERPL-SCNC: 138 MMOL/L
VANCOMYCIN SERPL-MCNC: 5.9 UG/ML
WBC # BLD AUTO: 5.53 K/UL

## 2017-05-29 PROCEDURE — 83735 ASSAY OF MAGNESIUM: CPT

## 2017-05-29 PROCEDURE — 63600175 PHARM REV CODE 636 W HCPCS: Performed by: INTERNAL MEDICINE

## 2017-05-29 PROCEDURE — 25000003 PHARM REV CODE 250: Performed by: INTERNAL MEDICINE

## 2017-05-29 PROCEDURE — 36415 COLL VENOUS BLD VENIPUNCTURE: CPT

## 2017-05-29 PROCEDURE — 85520 HEPARIN ASSAY: CPT

## 2017-05-29 PROCEDURE — 20600001 HC STEP DOWN PRIVATE ROOM

## 2017-05-29 PROCEDURE — 99233 SBSQ HOSP IP/OBS HIGH 50: CPT | Mod: ,,, | Performed by: INTERNAL MEDICINE

## 2017-05-29 PROCEDURE — 85610 PROTHROMBIN TIME: CPT

## 2017-05-29 PROCEDURE — 80076 HEPATIC FUNCTION PANEL: CPT

## 2017-05-29 PROCEDURE — 80202 ASSAY OF VANCOMYCIN: CPT

## 2017-05-29 PROCEDURE — 63600175 PHARM REV CODE 636 W HCPCS: Performed by: HOSPITALIST

## 2017-05-29 PROCEDURE — 80048 BASIC METABOLIC PNL TOTAL CA: CPT

## 2017-05-29 PROCEDURE — 25000003 PHARM REV CODE 250: Performed by: HOSPITALIST

## 2017-05-29 PROCEDURE — 84100 ASSAY OF PHOSPHORUS: CPT

## 2017-05-29 PROCEDURE — 27000207 HC ISOLATION

## 2017-05-29 PROCEDURE — 92526 ORAL FUNCTION THERAPY: CPT

## 2017-05-29 PROCEDURE — 25000003 PHARM REV CODE 250: Performed by: STUDENT IN AN ORGANIZED HEALTH CARE EDUCATION/TRAINING PROGRAM

## 2017-05-29 PROCEDURE — 99232 SBSQ HOSP IP/OBS MODERATE 35: CPT | Mod: S$PBB,,, | Performed by: CLINICAL NURSE SPECIALIST

## 2017-05-29 PROCEDURE — P9047 ALBUMIN (HUMAN), 25%, 50ML: HCPCS | Performed by: INTERNAL MEDICINE

## 2017-05-29 PROCEDURE — 27201247 HC HEMODIALYSIS, SET-UP & CANCEL

## 2017-05-29 PROCEDURE — 85730 THROMBOPLASTIN TIME PARTIAL: CPT

## 2017-05-29 PROCEDURE — 85025 COMPLETE CBC W/AUTO DIFF WBC: CPT

## 2017-05-29 RX ORDER — FLUTICASONE PROPIONATE 50 MCG
2 SPRAY, SUSPENSION (ML) NASAL 2 TIMES DAILY
Status: DISCONTINUED | OUTPATIENT
Start: 2017-05-29 | End: 2017-06-02 | Stop reason: HOSPADM

## 2017-05-29 RX ORDER — HEPARIN SODIUM 1000 [USP'U]/ML
1000 INJECTION, SOLUTION INTRAVENOUS; SUBCUTANEOUS
Status: DISCONTINUED | OUTPATIENT
Start: 2017-05-29 | End: 2017-06-02 | Stop reason: HOSPADM

## 2017-05-29 RX ORDER — PROCHLORPERAZINE MALEATE 5 MG
5 TABLET ORAL ONCE
Status: COMPLETED | OUTPATIENT
Start: 2017-05-29 | End: 2017-05-29

## 2017-05-29 RX ORDER — ALBUMIN HUMAN 250 G/1000ML
25 SOLUTION INTRAVENOUS ONCE
Status: COMPLETED | OUTPATIENT
Start: 2017-05-29 | End: 2017-05-29

## 2017-05-29 RX ADMIN — ONDANSETRON 4 MG: 2 INJECTION INTRAMUSCULAR; INTRAVENOUS at 03:05

## 2017-05-29 RX ADMIN — PROCHLORPERAZINE MALEATE 5 MG: 5 TABLET, FILM COATED ORAL at 06:05

## 2017-05-29 RX ADMIN — MICONAZOLE NITRATE: 20 CREAM TOPICAL at 12:05

## 2017-05-29 RX ADMIN — HYDROMORPHONE HYDROCHLORIDE 0.5 MG: 1 INJECTION, SOLUTION INTRAMUSCULAR; INTRAVENOUS; SUBCUTANEOUS at 09:05

## 2017-05-29 RX ADMIN — ASPIRIN 81 MG CHEWABLE TABLET 81 MG: 81 TABLET CHEWABLE at 12:05

## 2017-05-29 RX ADMIN — MICONAZOLE NITRATE: 20 CREAM TOPICAL at 09:05

## 2017-05-29 RX ADMIN — HYDROMORPHONE HYDROCHLORIDE 4 MG: 2 TABLET ORAL at 12:05

## 2017-05-29 RX ADMIN — HEPARIN SODIUM 1000 UNITS: 1000 INJECTION, SOLUTION INTRAVENOUS; SUBCUTANEOUS at 10:05

## 2017-05-29 RX ADMIN — ALLOPURINOL 100 MG: 100 TABLET ORAL at 12:05

## 2017-05-29 RX ADMIN — DOCUSATE SODIUM 100 MG: 100 CAPSULE, LIQUID FILLED ORAL at 09:05

## 2017-05-29 RX ADMIN — HEPARIN SODIUM AND DEXTROSE 17 UNITS/KG/HR: 10000; 5 INJECTION INTRAVENOUS at 03:05

## 2017-05-29 RX ADMIN — SEVELAMER CARBONATE 1600 MG: 800 TABLET, FILM COATED ORAL at 06:05

## 2017-05-29 RX ADMIN — ATORVASTATIN CALCIUM 80 MG: 20 TABLET, FILM COATED ORAL at 12:05

## 2017-05-29 RX ADMIN — HYDROMORPHONE HYDROCHLORIDE 4 MG: 2 TABLET ORAL at 06:05

## 2017-05-29 RX ADMIN — CLOPIDOGREL 75 MG: 75 TABLET, FILM COATED ORAL at 12:05

## 2017-05-29 RX ADMIN — FLUTICASONE PROPIONATE 2 SPRAY: 50 SPRAY, METERED NASAL at 09:05

## 2017-05-29 RX ADMIN — SODIUM CHLORIDE 0.5 G: 900 INJECTION, SOLUTION INTRAVENOUS at 06:05

## 2017-05-29 RX ADMIN — DOCUSATE SODIUM 100 MG: 100 CAPSULE, LIQUID FILLED ORAL at 12:05

## 2017-05-29 RX ADMIN — HYDROXYCHLOROQUINE SULFATE 200 MG: 200 TABLET, FILM COATED ORAL at 12:05

## 2017-05-29 RX ADMIN — HYDROMORPHONE HYDROCHLORIDE 0.5 MG: 1 INJECTION, SOLUTION INTRAMUSCULAR; INTRAVENOUS; SUBCUTANEOUS at 03:05

## 2017-05-29 RX ADMIN — ALBUMIN (HUMAN) 25 G: 12.5 SOLUTION INTRAVENOUS at 08:05

## 2017-05-29 RX ADMIN — Medication 3 ML: at 03:05

## 2017-05-29 RX ADMIN — SEVELAMER CARBONATE 1600 MG: 800 TABLET, FILM COATED ORAL at 12:05

## 2017-05-29 RX ADMIN — HYDROXYCHLOROQUINE SULFATE 200 MG: 200 TABLET, FILM COATED ORAL at 09:05

## 2017-05-29 NOTE — PROGRESS NOTES
Arrived in dialysis unit with SBP 80's.  Asymptomatic.  Albumin 25% given for BP support.  No change in BP.  Seen by Dr. Vazquez in the [facility].  HD canceled for today.  Transported from dialysis unit to room 356A via bed by 2 transporters.

## 2017-05-29 NOTE — PROGRESS NOTES
Progress  Note  Palliative Care          ASSESSMENT/PLAN:       Impression: Mr. Machado is a 85 yr old gentleman s/p STEMI  and refusal of right heart cath 5/20/17. Worsening THEO in setting of chronic kidney disease.  BUN and creatinine elevated.  Lasix drip continued with more than adequate clear yellow urine, indwelling urinary catheter.   He is awake, alert and oriented to person, and place.  Patient not tolerating HD well: trial began on Sat. 5/27/17 for two hours   Blood pressure dropped with SBP in 80's, unable to pull much fluid, patient uncomfortable.  Unable to proceed with trial on 5/29/17.           Goals of Care:   Family meeting this 10:30 AM  conducted by  Dr. Tinsley,  Dr. Vega and Palliative care with patient's wife, two sons and daughter.  Focus on meeting was to develop treatment plan and goals of care as the patient and family have been reluctant to make decisions.  The following goals were established.  - DNR orders will remain in place  - Patient has elected to have a trial of dialysis: trial will last three days - through Tues 5/30 and he will make decision regarding long term dialysis  - Antibiotics will continue through June 2  - Patient and family have declined discharge with SNF goal is to be discharged to home with home PT   -continue with current pain management     5/29/17:   Patient and family in agreement that they will give the HD trial another attempt on Tues.  5/30/17.  If unable to proceed with trial, Mr. Machado's son, Niitn states the family will readdress goals of care - with consideration given to a transition to hospice.        Symptom Management   Pain: recommend continuing   Dilaudid  4mg by mouth every 6 hrs scheduled  Dilaudid 0.5 mg IVP every 2 hrs as needed for severe pain 7-10/10   .    Plan/Recommendations:  1. Patient has an advanced directive.  His son Denton is the HPOA.  Family was encouraged to bring to hospital to be added to his medical record.   2. Palliative  care will continue to follow.    3. See above recommendations for symptom management   4. Emotional support      Primary team   Aware  of above recommendations.  Thank you for opportunity to participate in Carter Regalado's care      Signature: MARILEE Fernandes, ACNS-BC, OCN     > 50% of  25 min visit spent in chart review, face to face discussion of goals of care,  symptom assessment, coordination of care and emotional support.      SUBJECTIVE:     History of Present Illness:84 yo M with PMHx of HTN,HLD, NSTEMI previously refused [facility] 5/2016, CKD stage IV (baseline 2.7), moderate AS, AF, venous insufficiency, cirrhosis, Ureterocutaneous fistula, chronic osteomyelitis of the pelvic region on cefpodoxime, suspected inflammatory arthritis on HCQ, neuromuscular disorder with peripheral neuropathy with recurrent right foot ulcer, prostate cancer s/p radiation, RCC s/p left nephrectomy, skin cancer of the hand, urethral stricture, urinary incontinence p/w SOB which started an hour prior to admission and EKG concerning for STEMI with afib with q waves in inferior/anterior-septal leads with resolution of chest pain with SL NTG. Patient was evaluated and recommended to be taken to cath lab considering his clinical picture but he refused to proceed.          Past Medical History:   Diagnosis Date    *Atrial fibrillation     Acute appendicitis 2/19/2015    Anemia     Anxiety     Arthritis     generalized    Basal cell carcinoma 01/2013    right temple    BCC (basal cell carcinoma)     right mid forearm    Bladder stone 6/28/2016    Blood transfusion     Chronic urethral stricture 10/14/2015    Chronic venous insufficiency 7/8/2013    CKD (chronic kidney disease) stage 3, GFR 30-59 ml/min 9/6/2012    Coronary artery disease     Elevated PSA     Essential hypertension 7/30/2015    Hematuria, gross     History of Left Nephrectomy (~2006) 1/29/2014    Done at [facility].     Hypertension     Inflammatory arthritis  "8/14/2012    Kidney stone     Long-term use of Plaquenil 6/4/2015    Mixed incontinence urge and stress 4/11/2014    Nonrheumatic aortic valve stenosis 5/30/2016    NSTEMI (non-ST elevated myocardial infarction) 5/30/2016    Olecranon bursitis 1/14/2013    Osteopenia 8/14/2012    Personal history of kidney cancer 4/11/2014    Prostate cancer     Radiation     treatment for prostate cancer    Recurrent UTI 7/8/2013    Respiratory distress     S/P radiation therapy 4/11/2014    Skin cancer of arm     left leg (malignant)    Solitary kidney 7/15/2013    Venous insufficiency of leg 7/12/2012    Venous stasis ulcers 7/6/2012    Vitamin D deficiency disease 5/8/2013     Past Surgical History:   Procedure Laterality Date    APPENDECTOMY      CYSTOSCOPY      with dialation    NEPHRECTOMY  2006-07?    Removal of left kidney    TONSILLECTOMY       Family History   Problem Relation Age of Onset    Cancer Mother      bone     Heart disease Father     Urolithiasis Father     Mental illness Daughter     Cancer Brother      prostate cancer, (Ervin) removed by surgery    Cancer Brother      prostate cancer    Cancer Maternal Aunt     Melanoma Neg Hx     Lupus Neg Hx     Rheum arthritis Neg Hx      Review of patient's allergies indicates:   Allergen Reactions    Aspirin Other (See Comments)     Stomach      Ditropan [oxybutynin chloride]      Unable to describe side effect other than "felt strange"     Keflex [cephalexin] Rash     Morbilliform  Has tolerated multiple cephalosporins since 2013.    Macrobid [nitrofurantoin monohyd/m-cryst] Hives and Rash       Medications:  Continuous Infusions:    heparin (porcine) in D5W 17 Units/kg/hr (05/24/17 1112)     Scheduled:    allopurinol  100 mg Oral Daily    aspirin  81 mg Oral Daily    atorvastatin  80 mg Oral Daily    clopidogrel  75 mg Oral Daily    docusate sodium  100 mg Oral BID    ertapenem (INVANZ) IVPB  500 mg Intravenous Q24H    " HYDROmorphone  2 mg Oral Q6H    hydroxychloroquine  200 mg Oral BID    metoprolol  6.25 mg Oral BID    miconazole   Topical (Top) BID    sevelamer carbonate  1,600 mg Oral TID WM    sodium bicarbonate  650 mg Oral BID    sodium chloride 0.9%  3 mL Intravenous Q8H    sodium chloride 0.9%  3 mL Intravenous Q8H     PRN Meds: bisacodyl, dextrose 50%, dextrose 50%, glucagon (human recombinant), glucose, glucose, heparin (PORCINE), HYDROmorphone, nitroGLYCERIN, ondansetron, simethicone    24h Oral Morphine Equivalents (OME): 26    Bowel Management Plan (BMP): Yes (X )  NO  (  )    OBJECTIVE:   Symptom Assessment (ESAS 0-10 scale)     ESAS 0 1 2 3 4 5 6 7 8 9 10   Pain      X        Dyspnea   X           Anxiety X             Nausea X             Depression  X             Anorexia X             Fatigue X             Insomnia X             Restlessness  X             Agitation X             Constipation     __ --  ___+   Diarrhea           __ --  ___+     Pain:  Character: acute on chronic pain  described as sharp and burning   Duration: chronic pain for several years secondary to  pelvic osteomyelitis   Effect:  States  Severe pain interferes with ability to  participate in care (turn frequently)  and  It is difficult to concentrate  Factors: pain has been aggravated by recent surgical procedure and is relieved for short time with  prn pain medications   Frequency: constant   Location: sacral   Severity :States pain is 5/10     Comments:     Performance Status: PPS Score (30  )       Physical Exam:  Vitals: reviewed  General: Afebrile, alert, comfortable, no acute distress.   Pulmonary:dyspnea on exertion,clear to auscultation anteriorly.   Cardiac: normal S1 & S2 w/o rubs/murmurs/gallops.   Abdominal: Non-tender, non-distended.Bowel sounds present x 4. No appreciable hepatosplenomegaly. No guarding or rebound tenderness.   Extremities: Moves all extremities x 4. No peripheral edema. 2+ pulses.  Skin: No  jaundice,venous stasis  Rashes to bilateral lower extremities   Neurological: Alert and oriented x 4. No focal neuro deficits.   Psych/Mental Status: rude uncooperative, resists care  CAM / Delirium _not present   FAST Stage for Dementia:      Labs: Reviewed     Radiology: reviewed     Legal/Advanced Directives: Patient has both living will and HPOA that are not on file.  Family was encouraged to bring to hospital   Living Will: not on file   Resuscitate Status: DNR  Decision-Making Capacity: yes   Medical Power of : son Nitin Machado [phone], next of kin is wife Taryn,     Psychosocial/Cultural:  for 60 yrs 4 children: 3 sons (Humphrey, Denton, Rashard) and one daughter Emy.  He has 8 grandchildren and 2 great grandchildren.  He retired from the [employer] company that [employer].  Prior to becoming ill he enjoyed time with the family and fishing     Spiritual:     F- Christine and Belief: Restorationism    I - Importance: very devout christine and attends Mass regularly   .  C - Community    A - Address in Care: anointing of the sick received 5/20/17       Problem list:  Active Hospital Problems    Diagnosis  POA    *Bacteremia due to Klebsiella pneumoniae [R78.81]  Yes    Pain [R52]  Yes    Palliative care encounter [Z51.5]  Not Applicable    Goals of care, counseling/discussion [Z71.89]  Not Applicable    Hyperphosphatemia [E83.39]  Unknown    Acute kidney injury superimposed on CKD [N17.9, N18.9]  Yes    Chronic pain [G89.29]  Yes    Ischemic cardiomyopathy [I25.5]  Yes    Oropharyngeal dysphagia [R13.12]  No    Atrial fibrillation with RVR [I48.91]  Yes    ST elevation myocardial infarction (STEMI) [I21.3]  Yes    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 [N36.0]  Yes    Complicated open wound of right thigh [S71.101A]  Yes    Debility [R53.81]  Yes    Urinary tract infection due to extended-spectrum beta lactamase (ESBL)-producing Klebsiella [N39.0, B96.89]  Yes      Resolved  Hospital Problems    Diagnosis Date Resolved POA    Chest pain [R07.9] 05/21/2017 Yes    Respiratory distress [R06.00] 05/21/2017 Yes

## 2017-05-29 NOTE — PLAN OF CARE
Problem: Patient Care Overview  Goal: Plan of Care Review  Outcome: Ongoing (interventions implemented as appropriate)  Pt free of falls/traumas/injuries. Skin remains clean, dry, and intact. PRN pain meds. Pt turned every 2 hours. Pt started on heparin gtt at prescribed rate , anti-xa therapeutic 0.43, next one due 5/29/2017 in am.   Pt re-educated on importance of measuring accurate intake and out put; pt verbalized and demonstrates understanding. Reviewed plan of care with pt; and pt verbalized understanding.  Pt VSS in no distress will continue to monitor.

## 2017-05-29 NOTE — PLAN OF CARE
Problem: Patient Care Overview  Goal: Plan of Care Review  Outcome: Ongoing (interventions implemented as appropriate)  Pt free of falls, injuries this shift. POC reviewed with pt, family at bedside, verbalized understanding. Pt to receive dialysis later this AM. VSS throughout shift, NAD noted. Heparin gtt infusing per MAR; next anti-Xa due this AM. Complaints of pain managed with PRN IV and scheduled PO dilaudid. VSS, NAD noted. Will continue to monitor.

## 2017-05-29 NOTE — PROGRESS NOTES
[facility] Hospital Medicine  Progress Note    Patient Name: Humphrey Machado  MRN: [id number]  Patient Class: IP- Inpatient   Admission Date: 5/19/2017  Length of Stay: 10 days  Attending Physician: Chaya Tinsley MD  Primary Care Provider: Andrew Murray MD    [facility] Medicine Team: [facility] C Chaya Tinsley MD    Subjective:     Principal Problem:ST elevation myocardial infarction (STEMI)    HPI:  85-year-old man with HTN, HLD, NSTEMI previously refused [place] 5/2016, CKD stage IV (baseline 2.7), moderate AS, AF, venous insufficiency, cirrhosis, Ureterocutaneous fistula, chronic osteomyelitis of the pelvic region on cefpodoxime, suspected inflammatory arthritis on HCQ, neuromuscular disorder with peripheral neuropathy with recurrent right foot ulcer, prostate cancer s/p radiation, RCC s/p left nephrectomy, skin cancer of the hand, urethral stricture, urinary incontinence p/w SOB which started an hour prior to admission and EKG concerning for STEMI with afib with q waves in inferior/anterior-septal leads with resolution of chest pain with SL NTG. Patient was evaluated in the ED by Dr Duval and recommended to be taken to cath lab considering his clinical picture but he refused to proceed with angiogram due to risk of hemodialysis possibility he will be admitted fort mdical treatment in CCU.     Currently he is very SOB with fluid overload and aneuric.looks like not responding to lasix his son is his POA and they are discussing code status and if they will proceed with more procedures including CRRT.    Patient with recent cystoscopy/cystogram/fistulogram by Dr. 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 Abraham's  Operative noted.     Hospital Course:  Hospital course as above, while  in the CCU patient was medically managed for his STEMI, and for his AMIRA.  CCU team discussed comfort vs aggressive measures, patient's code status changed to DNR and due to concern that patient was anuric with developing AMIRA that may require HD. Discussion per EMR with family revealed they would not want to pursue dialysis. He was started on Lasix drip @ 20mg/hr and IV Diuril q12 hrs and had improved urine output on this regimen.  PRN Morphine and Ativan orders had been placed for patient comfort    Patient stepped down to IM-C casiano service on 5/20.  Patient was acutely encephalopathic, developed fevers and notified by RN blood cultures from admission were growing GNR.  Surveillance cultures sent patient was on Vancomycin and Cefepime.  He was having urine output on the lasix and diuril.  Extensive family discussion held (see progress notes for detail)  Family was not universally ready for comfort care, care plan more no escalation of care, given his altered mentation, I suspected morphine and ativan in Amira contributing, opiates switched to dilaudid and ativan d/c.   Discussed NG tube placement for oral med administration and nutrition but was held off.     5/21 - Patient is more awake and alert, likely related to toxic encephalopathy, remains hemodynamically stable, but was having more pain complaints, was receiving IV dilaudid  0.5mg q2hrs.   SLP evaluated patient and he is safe for dental soft nectar thick liquids.   His heart rhythm converted to atrial fibrillation with controlled rate in the 80-90s.   Will need repeat cardiology recs on management in AM.     Patient with nearly 2L UOP x 24hrs, family with more questions about possible HD or what course his Amira may take.  Nephrology re-contacted to follow the patient.  Diuril will be stopped this evening to monitor how pts UOp  And Cr does.    I reviewed more of recent urologic history and patient has a urinary source of his bacteremia, however with complicated  chronic fistula and wound we may not have source control. Discussed with patient and will consult urology to assist in any other management for source control and chronic urethrocutaneous fistula that may be needed - Urology consult placed for mon Am.     Reviewed prior culture data and patient with hx of Klebsiella ESBL and Pseudomonas in urine, both not sensitive to empiric cefepime so ID approval obtained to change to renal dosed Meropenem     Patient says pain control is improving but is still seems he has chronic pain issues from multiple sites, adding po opiates to pain regimen.     5/22 - Additional consultants have evaluated the patient  Cardiology - no additional med management recs at this time, patient has completed 48hrs of heparin for ACS, however with atrial fibrillation, will continue for now.  Coreg change to low dose metoprolol due to hypotension    Urology - evaluated patient and removed ureteral catheter, no further recs for chronic urtherocutaneous fistula, local wound care.   Wound care evaluated the patient and placed barrier cream in right groin with soft dressing to help absorb urinary leak from fistula, I was at bedside and pt did not feel ready to turn for assessment of buttocks.     Nephrology - evaluated the patient and no acute needs for acute HD, they will continue to follow, did inform pt that nephrology feels initiating HD would not improve overall morbidity/mortality from his co-morbid conditions and HD initiation comes with its own risks and invasive procedures required.     Palliative care - evaluated pt to assist in overall care planning.  MD staff to assist with pain management not available this week.     5/23- patient complained of discomfort. He is still unsure of what he wants for medical management. He is still considering hemodialysis as an option. His renal function continued to worsen but his main concern was his discomfort.    5/24- patient unable to voice his wishes nor  "does he want to have a goals of care discussion. He has poor insight into his disease process. He is verbally abusive to his providers and nursing staff. Discussed with his wife and grandson scheduling a family meeting to establish therapeutic plan.     5/25- he continues to be abusive to medical personnel. When trying to assess his pain he becomes abusive and screams that he can't tell.     5/26-Family meeting today to establish therapeutic plan. Patient has agreed to hemodialysis and oral anticoagulation. He also agreed to PT and OT however he has refused SNF. He has developed some mental "foggyness"     5/27-Underwent HD.     5/28- overnight patient with hypotension. Narcotic analgesics held and volume replaced by overnight physician. Moderate to severe pain on evaluation and analgesics resumed. Metoprolol dose further adjusted.    5/29- Patient could not be dialyzed due to hypotension. Options discussed by Nephrology service. Plan to hold metoprolol for now and hold narcotic analgesics 4 hours prior to HD tomorrow. If patient unable to tolerate then will need hospice care.    Interval History: "I'm dying. Nothing is going the way it's supposed too and I'm dying". No acute overnight events. Hypotension this morning.    Review of Systems   Constitutional: Negative for chills, fatigue and fever.   HENT: Negative for ear pain, mouth sores, nosebleeds, postnasal drip, rhinorrhea, sinus pressure, sore throat, tinnitus, trouble swallowing and voice change.    Eyes: Negative for photophobia, pain, redness and visual disturbance.   Respiratory: Negative for apnea, cough, chest tightness, shortness of breath and wheezing.    Cardiovascular: Negative for chest pain, palpitations and leg swelling.   Gastrointestinal: Negative for abdominal distention, abdominal pain, blood in stool, constipation, diarrhea, nausea and vomiting.   Endocrine: Negative for cold intolerance, heat intolerance, polydipsia and polyuria. "   Genitourinary: Negative for decreased urine volume, difficulty urinating, discharge, dysuria, flank pain, frequency, genital sores, hematuria, penile pain, penile swelling, scrotal swelling, testicular pain and urgency.   Musculoskeletal: Positive for arthralgias and myalgias. Negative for back pain, joint swelling and neck pain.   Skin: Negative for color change and rash.   Allergic/Immunologic: Negative for environmental allergies and food allergies.   Neurological: Negative for dizziness, seizures, syncope, weakness, light-headedness, numbness and headaches.   Hematological: Negative for adenopathy. Does not bruise/bleed easily.   Psychiatric/Behavioral: Negative for agitation, confusion, decreased concentration, hallucinations, self-injury, sleep disturbance and suicidal ideas. The patient is not nervous/anxious.      Objective:     Vital Signs (Most Recent):  Temp: (!) 95.1 °F (35.1 °C) (05/29/17 1200)  Pulse: 62 (05/29/17 1200)  Resp: 18 (05/29/17 1200)  BP: (!) 116/59 (05/29/17 1200)  SpO2: 99 % (05/29/17 0500) Vital Signs (24h Range):  Temp:  [95.1 °F (35.1 °C)-98.5 °F (36.9 °C)] 95.1 °F (35.1 °C)  Pulse:  [54-64] 62  Resp:  [18-20] 18  SpO2:  [98 %-99 %] 99 %  BP: ()/(42-59) 116/59     Weight: 72.6 kg (160 lb 0.9 oz)  Body mass index is 22.97 kg/m².    Intake/Output Summary (Last 24 hours) at 05/29/17 1302  Last data filed at 05/29/17 0500   Gross per 24 hour   Intake           883.46 ml   Output              525 ml   Net           358.46 ml      Physical Exam   Constitutional: He is oriented to person, place, and time. He appears well-developed and well-nourished. He has a sickly appearance.   HENT:   Head: Normocephalic and atraumatic.   Right Ear: External ear normal.   Left Ear: External ear normal.   Mouth/Throat: Oropharynx is clear and moist.   Eyes: Conjunctivae and EOM are normal. Pupils are equal, round, and reactive to light.   Neck: Normal range of motion. Neck supple. No thyromegaly  present.   Cardiovascular: Normal rate and regular rhythm.    Murmur heard.   Systolic murmur is present with a grade of 2/6   Pulmonary/Chest: Effort normal and breath sounds normal. He has no wheezes. He has no rales.   CVC in right chest with mild bruising.   Abdominal: Soft. Bowel sounds are normal. He exhibits no mass. There is no tenderness. There is no rebound.   Genitourinary:   Genitourinary Comments: Stevens catheter in place. Right thigh fistula covered.   Musculoskeletal: Normal range of motion. He exhibits tenderness.   Lymphadenopathy:     He has no cervical adenopathy.   Neurological: He is alert and oriented to person, place, and time.   Skin: Skin is warm and dry.   Venous stasis dermatitis over the bilateral lower extremities.   Psychiatric: His behavior is normal. His affect is labile.   Vitals reviewed.      Significant Labs:   BMP:   Recent Labs  Lab 05/29/17  0416 05/29/17  0417   GLU 79  --      --    K 3.6  --      --    CO2 22*  --    BUN 72*  --    CREATININE 3.8*  --    CALCIUM 9.0  --    MG  --  2.1     CBC:   Recent Labs  Lab 05/28/17  0649 05/29/17  0416   WBC 4.16 5.53   HGB 7.4* 7.8*   HCT 23.5* 24.5*   * 156       Significant Imaging: I have reviewed all pertinent imaging results/findings within the past 24 hours.    Assessment/Plan:      Hyperphosphatemia    Secondary to THEO on CKD. Worsening.  1. Sevelamer as per nephrology.  2. Low phosphate diet.  3. HD tomorrow.        Oropharyngeal dysphagia    Stable.   1. Dental soft diet with Nectar thick liquids.   2. Continue SLP management.  3. If patient is deemed Hospice Care then would consider pleasure feedings.        Ischemic cardiomyopathy    EF 35%, mangagement as above          Chronic pain    Pain related to chronic  issues, buttock pain, also noted to complain of extremity pain in LUE, has a chronic neuropathy diagnosis. Per patient his pain is uncontrolled.   1. Dilaudid 4mg po every 6 hrs.    2. As needed  Dilaudid 0.5mg IV every 2 hrs for breakthrough.   3. Would consider very low dose fentanyl patch only if plan of care shifted to full comfort measures.   4. If pain continues to be as severe then will continue to titrate therapy.          Acute kidney injury superimposed on CKD    Suspected to be intrinsic THEO secondary to poor perfusion from STEMI vs  pre-renal. Renal function continues to worsen. Patient has now changed his mind and is agreeable to HD. CVC placed on 5/26/17. Patient had first HD session on 5/27. Second HD session planned for today but could not tolerate due to hypotension.  1. Monitor creatinine level.  2. Avoid nephrotoxic agents.  3. Renally dose medications.  4. Continue trial of RRT and if patient is unable to tolerate then refer for Hospice care.            Bacteremia due to Klebsiella pneumoniae    Organism identified as ESBL K pneumoniae. Same organism found in his urine and therefore assumption can be made that bacteremia is secondary to his urinary tract infection. Repeat blood cultures from 5/20 remain no growth to date.  1. Ertapenem 500 mg daily as there is no need for added pseudomonal coverage provided by meropenem.  2. Monitor for signs of encephalopathy which have been seen as an adverse effect or ertapenem.   3. Anticipate a 14 day course total from clearance of bacteremia. End date: 6/2/17.  4. Patient likely to need CVC for antibiotic therapy.        Atrial fibrillation with RVR    AFIb is not new for patient, he does have prior history of it.  However he is not on OP anticoagulation, so discussion is still needed with patient/family to decide if Heparin drip should continue or if warfarin should be started (only oral AntiCoag option). Currently rate controlled. Nephrology service recommends holding beta blocker if possible.  1. Continue low dose beta blockers for rate control   2. Chads- vasc is 4 - 4.8% annual risk./ ATRIA bleeding risk score 5.8% annual risk of hemorrhage,  high risk category.  3. Heparin drip.  4. Will also start oral anticoagulation.        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   1. Appears there is no further treatment for his fistula with multitude of comorbidities and patient is bound to experience recurrent infections as a result of this.           Complicated open wound of right thigh    Stable. Urology recommends non aggressive measures.   1. Continue local wound care.        Debility    On bedrest due to active issues.   1. Every 2 hour turns   2. PT/OT.  3. Patient refused SNF placement.           Urinary tract infection due to extended-spectrum beta lactamase (ESBL)-producing Klebsiella    Management as above under bacteremia problem.        * ST elevation myocardial infarction (STEMI)    No current chest pain or SOB but patient with discomfort. Refused LHC. Currently being managed medically. Cardiology had prolonged conversation about LHC no longer being an option which would change the outcome.  1. Aspirin, plavix, heparin gtt.   2. High intensity statins   3. Continue low dose metoprolol.          VTE Risk Mitigation         Ordered     Medium Risk of VTE  Once      05/19/17 0945     Reason for No Pharmacological VTE Prophylaxis  Once      05/19/17 0945          Chaya Tinsley MD  Department of Hospital Medicine   [facility]

## 2017-05-29 NOTE — SUBJECTIVE & OBJECTIVE
"Interval History: "I'm dying. Nothing is going the way it's supposed too and I'm dying". No acute overnight events. Hypotension this morning.    Review of Systems   Constitutional: Negative for chills, fatigue and fever.   HENT: Negative for ear pain, mouth sores, nosebleeds, postnasal drip, rhinorrhea, sinus pressure, sore throat, tinnitus, trouble swallowing and voice change.    Eyes: Negative for photophobia, pain, redness and visual disturbance.   Respiratory: Negative for apnea, cough, chest tightness, shortness of breath and wheezing.    Cardiovascular: Negative for chest pain, palpitations and leg swelling.   Gastrointestinal: Negative for abdominal distention, abdominal pain, blood in stool, constipation, diarrhea, nausea and vomiting.   Endocrine: Negative for cold intolerance, heat intolerance, polydipsia and polyuria.   Genitourinary: Negative for decreased urine volume, difficulty urinating, discharge, dysuria, flank pain, frequency, genital sores, hematuria, penile pain, penile swelling, scrotal swelling, testicular pain and urgency.   Musculoskeletal: Positive for arthralgias and myalgias. Negative for back pain, joint swelling and neck pain.   Skin: Negative for color change and rash.   Allergic/Immunologic: Negative for environmental allergies and food allergies.   Neurological: Negative for dizziness, seizures, syncope, weakness, light-headedness, numbness and headaches.   Hematological: Negative for adenopathy. Does not bruise/bleed easily.   Psychiatric/Behavioral: Negative for agitation, confusion, decreased concentration, hallucinations, self-injury, sleep disturbance and suicidal ideas. The patient is not nervous/anxious.      Objective:     Vital Signs (Most Recent):  Temp: (!) 95.1 °F (35.1 °C) (05/29/17 1200)  Pulse: 62 (05/29/17 1200)  Resp: 18 (05/29/17 1200)  BP: (!) 116/59 (05/29/17 1200)  SpO2: 99 % (05/29/17 0500) Vital Signs (24h Range):  Temp:  [95.1 °F (35.1 °C)-98.5 °F (36.9 °C)] " 95.1 °F (35.1 °C)  Pulse:  [54-64] 62  Resp:  [18-20] 18  SpO2:  [98 %-99 %] 99 %  BP: ()/(42-59) 116/59     Weight: 72.6 kg (160 lb 0.9 oz)  Body mass index is 22.97 kg/m².    Intake/Output Summary (Last 24 hours) at 05/29/17 1302  Last data filed at 05/29/17 0500   Gross per 24 hour   Intake           883.46 ml   Output              525 ml   Net           358.46 ml      Physical Exam   Constitutional: He is oriented to person, place, and time. He appears well-developed and well-nourished. He has a sickly appearance.   HENT:   Head: Normocephalic and atraumatic.   Right Ear: External ear normal.   Left Ear: External ear normal.   Mouth/Throat: Oropharynx is clear and moist.   Eyes: Conjunctivae and EOM are normal. Pupils are equal, round, and reactive to light.   Neck: Normal range of motion. Neck supple. No thyromegaly present.   Cardiovascular: Normal rate and regular rhythm.    Murmur heard.   Systolic murmur is present with a grade of 2/6   Pulmonary/Chest: Effort normal and breath sounds normal. He has no wheezes. He has no rales.   CVC in right chest with mild bruising.   Abdominal: Soft. Bowel sounds are normal. He exhibits no mass. There is no tenderness. There is no rebound.   Genitourinary:   Genitourinary Comments: Stevens catheter in place. Right thigh fistula covered.   Musculoskeletal: Normal range of motion. He exhibits tenderness.   Lymphadenopathy:     He has no cervical adenopathy.   Neurological: He is alert and oriented to person, place, and time.   Skin: Skin is warm and dry.   Venous stasis dermatitis over the bilateral lower extremities.   Psychiatric: His behavior is normal. His affect is labile.   Vitals reviewed.      Significant Labs:   BMP:   Recent Labs  Lab 05/29/17 0416 05/29/17 0417   GLU 79  --      --    K 3.6  --      --    CO2 22*  --    BUN 72*  --    CREATININE 3.8*  --    CALCIUM 9.0  --    MG  --  2.1     CBC:   Recent Labs  Lab 05/28/17  0649 05/29/17 0410    WBC 4.16 5.53   HGB 7.4* 7.8*   HCT 23.5* 24.5*   * 156       Significant Imaging: I have reviewed all pertinent imaging results/findings within the past 24 hours.

## 2017-05-29 NOTE — ASSESSMENT & PLAN NOTE
Stable.   1. Dental soft diet with Nectar thick liquids.   2. Continue SLP management.  3. If patient is deemed Hospice Care then would consider pleasure feedings.

## 2017-05-29 NOTE — PLAN OF CARE
Problem: SLP Goal  Goal: SLP Goal  Speech Language Pathology Goals  Updated goals expected to be met by 6/5/2017  1. Pt will tolerate dental soft diet with nectar-thickened liquids w/o overt S/S aspiration, MIN A, to improve swallow safety   2. Pt will tolerate trials of thin liquids w/o overt S/S aspiration, MIN A, to improve swallow safety   3. Educate Patient and family on safe swallow strategies and aspiration precautions     Goals expected to be met by 5/28/2017  1. Pt will tolerate dental soft diet with nectar-thickened liquids w/o overt S/S aspiration, MIN A, to improve swallow safety ONGOING  2. Pt will tolerate trials of thin liquids w/o overt S/S aspiration, MIN A, to improve swallow safety ONGOING  3. Educate Patient and family on safe swallow strategies and aspiration precautions ONGOING       Outcome: Ongoing (interventions implemented as appropriate)  Patient with limited PO intake during session today.  Assessment of advanced textures ongoing.  Patient and family provided ongoing rview of thickener guidelines and safe swallow precautions.  See note for full details.   Thank you.     DAVID Crowe., [facility]-SLP  Speech-Language Pathology  Pager: [phone]  5/29/2017

## 2017-05-29 NOTE — PLAN OF CARE
Problem: Patient Care Overview  Goal: Plan of Care Review  Outcome: Ongoing (interventions implemented as appropriate)  Pt remains free from falls and injury. Plan of care reviewed with pt. Pt understands plan. Pt denies pain, VSS. Pt was unable to go to dialysis today due to low BP. Plans for dialysis was planned for tomorrow. Will hold pain medication 4 hours previously to HD. Will continue to monitor.

## 2017-05-29 NOTE — SUBJECTIVE & OBJECTIVE
"Interval History:   Pt could not be started on HD this morning as BP was in 80s to 90s systolic     Review of patient's allergies indicates:   Allergen Reactions    Aspirin Other (See Comments)     Stomach      Ditropan [oxybutynin chloride]      Unable to describe side effect other than "felt strange"     Keflex [cephalexin] Rash     Morbilliform  Has tolerated multiple cephalosporins since 2013.    Macrobid [nitrofurantoin monohyd/m-cryst] Hives and Rash     Current Facility-Administered Medications   Medication Frequency    0.9%  NaCl infusion PRN    0.9%  NaCl infusion PRN    0.9%  NaCl infusion Once    allopurinol tablet 100 mg Daily    aspirin chewable tablet 81 mg Daily    atorvastatin tablet 80 mg Daily    bisacodyl suppository 10 mg Daily PRN    clopidogrel tablet 75 mg Daily    dextrose 50% injection 12.5 g PRN    dextrose 50% injection 25 g PRN    docusate sodium capsule 100 mg BID    ertapenem (INVANZ) 0.5 g in sodium chloride 0.9% 50 mL IVPB Q24H    glucagon (human recombinant) injection 1 mg PRN    glucose chewable tablet 16 g PRN    glucose chewable tablet 24 g PRN    heparin (porcine) injection 1,000 Units PRN    heparin 25,000 units in dextrose 5% 250 mL (100 units/mL) infusion Continuous    hydromorphone injection 0.5 mg Q2H PRN    HYDROmorphone tablet 4 mg Q6H    hydroxychloroquine tablet 200 mg BID    metoprolol 12.5mg/1.25mL oral solution 3.125 mg BID    miconazole 2 % cream BID    ondansetron injection 4 mg Q8H PRN    sevelamer carbonate tablet 1,600 mg TID WM    simethicone chewable tablet 80 mg TID PRN    sodium chloride 0.9% flush 3 mL Q8H       Objective:     Vital Signs (Most Recent):  Temp: 97.6 °F (36.4 °C) (05/29/17 0814)  Pulse: (!) 54 (05/29/17 1100)  Resp: 18 (05/29/17 0814)  BP: (!) 89/42 (05/29/17 0956)  SpO2: 99 % (05/29/17 0500)  O2 Device (Oxygen Therapy): nasal cannula (05/29/17 0814) Vital Signs (24h Range):  Temp:  [97.6 °F (36.4 °C)-98.5 °F " (36.9 °C)] 97.6 °F (36.4 °C)  Pulse:  [54-64] 54  Resp:  [18-20] 18  SpO2:  [95 %-99 %] 99 %  BP: ()/(42-59) 89/42     Weight: 72.6 kg (160 lb 0.9 oz) (05/28/17 0400)  Body mass index is 22.97 kg/m².  Body surface area is 1.89 meters squared.    I/O last 3 completed shifts:  In: 1243.5 [P.O.:1150; I.V.:43.5; IV Piggyback:50]  Out: 1335 [Urine:1335]    Physical Exam   Constitutional: He is oriented to person, place, and time. He appears well-developed and well-nourished.   HENT:   Head: Normocephalic and atraumatic.   Eyes: Conjunctivae and EOM are normal.   Neck: Neck supple.   Cardiovascular: Normal rate, regular rhythm and normal heart sounds.    Pulmonary/Chest: Effort normal and breath sounds normal.   Abdominal: Soft. Bowel sounds are normal.   Neurological: He is alert and oriented to person, place, and time.       Significant Labs:  CBC:     Recent Labs  Lab 05/29/17 0416   WBC 5.53   RBC 2.86*   HGB 7.8*   HCT 24.5*      MCV 86   MCH 27.3   MCHC 31.8*     CMP:     Recent Labs  Lab 05/29/17 0416 05/29/17 0417   GLU 79  --    CALCIUM 9.0  --    ALBUMIN  --  2.3*   PROT  --  6.6     --    K 3.6  --    CO2 22*  --      --    BUN 72*  --    CREATININE 3.8*  --    ALKPHOS  --  85   ALT  --  21   AST  --  32   BILITOT  --  0.4       Recent Labs  Lab 05/24/17  0030   COLORU Brittany   SPECGRAV 1.010   PHUR 6.0   PROTEINUA 2+*   BACTERIA Rare   NITRITE Negative   LEUKOCYTESUR 2+*   UROBILINOGEN Negative   HYALINECASTS 0      Labs: Reviewed

## 2017-05-29 NOTE — ASSESSMENT & PLAN NOTE
Secondary to THEO on CKD. Worsening.  1. Sevelamer as per nephrology.  2. Low phosphate diet.  3. HD tomorrow.

## 2017-05-29 NOTE — PROGRESS NOTES
"[facility]  Nephrology  Progress Note    Patient Name: Humphrey Machado  MRN: [id number]  Admission Date: 5/19/2017  Hospital Length of Stay: 10 days  Attending Provider: Chaya Tinsley MD   Primary Care Physician: Andrew Murray MD  Principal Problem:ST elevation myocardial infarction (STEMI)    Subjective:     HPI: 84 yo M with PMHx of HTN,HLD, NSTEMI previously refused [facility] 5/2016, CKD stage IV (baseline 2.7), moderate AS, AF, venous insufficiency, cirrhosis, Ureterocutaneous fistula, chronic osteomyelitis of the pelvic region on cefpodoxime, suspected inflammatory arthritis on HCQ, neuromuscular disorder with peripheral neuropathy with recurrent right foot ulcer, prostate cancer s/p radiation, RCC s/p left nephrectomy, skin cancer of the hand, urethral stricture, urinary incontinence p/w SOB which started an hour prior to admission and EKG concerning for STEMI with afib with q waves in inferior/anterior-septal leads with resolution of chest pain with SL NTG. Patient was evaluated in the ED by Dr Duval and recommended to be taken to cath lab considering his clinical picture but he refused to proceed with angiogram due to risk of hemodialysis possibility     Renal consulted for worsening THEO on CKD     Interval History:   Pt could not be started on HD this morning as BP was in 80s to 90s systolic     Review of patient's allergies indicates:   Allergen Reactions    Aspirin Other (See Comments)     Stomach      Ditropan [oxybutynin chloride]      Unable to describe side effect other than "felt strange"     Keflex [cephalexin] Rash     Morbilliform  Has tolerated multiple cephalosporins since 2013.    Macrobid [nitrofurantoin monohyd/m-cryst] Hives and Rash     Current Facility-Administered Medications   Medication Frequency    0.9%  NaCl infusion PRN    0.9%  NaCl infusion PRN    0.9%  NaCl infusion Once    allopurinol tablet 100 mg Daily    aspirin chewable tablet 81 mg Daily    " atorvastatin tablet 80 mg Daily    bisacodyl suppository 10 mg Daily PRN    clopidogrel tablet 75 mg Daily    dextrose 50% injection 12.5 g PRN    dextrose 50% injection 25 g PRN    docusate sodium capsule 100 mg BID    ertapenem (INVANZ) 0.5 g in sodium chloride 0.9% 50 mL IVPB Q24H    glucagon (human recombinant) injection 1 mg PRN    glucose chewable tablet 16 g PRN    glucose chewable tablet 24 g PRN    heparin (porcine) injection 1,000 Units PRN    heparin 25,000 units in dextrose 5% 250 mL (100 units/mL) infusion Continuous    hydromorphone injection 0.5 mg Q2H PRN    HYDROmorphone tablet 4 mg Q6H    hydroxychloroquine tablet 200 mg BID    metoprolol 12.5mg/1.25mL oral solution 3.125 mg BID    miconazole 2 % cream BID    ondansetron injection 4 mg Q8H PRN    sevelamer carbonate tablet 1,600 mg TID WM    simethicone chewable tablet 80 mg TID PRN    sodium chloride 0.9% flush 3 mL Q8H       Objective:     Vital Signs (Most Recent):  Temp: 97.6 °F (36.4 °C) (05/29/17 0814)  Pulse: (!) 54 (05/29/17 1100)  Resp: 18 (05/29/17 0814)  BP: (!) 89/42 (05/29/17 0956)  SpO2: 99 % (05/29/17 0500)  O2 Device (Oxygen Therapy): nasal cannula (05/29/17 0814) Vital Signs (24h Range):  Temp:  [97.6 °F (36.4 °C)-98.5 °F (36.9 °C)] 97.6 °F (36.4 °C)  Pulse:  [54-64] 54  Resp:  [18-20] 18  SpO2:  [95 %-99 %] 99 %  BP: ()/(42-59) 89/42     Weight: 72.6 kg (160 lb 0.9 oz) (05/28/17 0400)  Body mass index is 22.97 kg/m².  Body surface area is 1.89 meters squared.    I/O last 3 completed shifts:  In: 1243.5 [P.O.:1150; I.V.:43.5; IV Piggyback:50]  Out: 1335 [Urine:1335]    Physical Exam   Constitutional: He is oriented to person, place, and time. He appears well-developed and well-nourished.   HENT:   Head: Normocephalic and atraumatic.   Eyes: Conjunctivae and EOM are normal.   Neck: Neck supple.   Cardiovascular: Normal rate, regular rhythm and normal heart sounds.    Pulmonary/Chest: Effort normal and  breath sounds normal.   Abdominal: Soft. Bowel sounds are normal.   Neurological: He is alert and oriented to person, place, and time.       Significant Labs:  CBC:     Recent Labs  Lab 05/29/17  0416   WBC 5.53   RBC 2.86*   HGB 7.8*   HCT 24.5*      MCV 86   MCH 27.3   MCHC 31.8*     CMP:     Recent Labs  Lab 05/29/17 0416 05/29/17 0417   GLU 79  --    CALCIUM 9.0  --    ALBUMIN  --  2.3*   PROT  --  6.6     --    K 3.6  --    CO2 22*  --      --    BUN 72*  --    CREATININE 3.8*  --    ALKPHOS  --  85   ALT  --  21   AST  --  32   BILITOT  --  0.4       Recent Labs  Lab 05/24/17  0030   COLORU Brittany   SPECGRAV 1.010   PHUR 6.0   PROTEINUA 2+*   BACTERIA Rare   NITRITE Negative   LEUKOCYTESUR 2+*   UROBILINOGEN Negative   HYALINECASTS 0      Labs: Reviewed    Assessment/Plan:     Acute kidney injury superimposed on CKD     Pt k/c/o CKD stage 3-4 2/2 HTN, solitary kidney s/p nephrectomy due to RCC, recurrent UTIs   - baseline creat 2.3 to 2.7   - THEO may be pre renal vs ischemic ATN from hypotension, Pt continues to have low borderline BPs,  - did one round of HD on sat but could not pull vome, pt was uncomfortable through out treatment   - plz dc BB and hold pain meds for 4 hrs prior to HD trial tmw at noon.   - d/w family, if pt continues to have low BP, will have to consider hospice             Diya Vega MD  Nephrology  [facility]

## 2017-05-29 NOTE — PT/OT/SLP PROGRESS
"Speech Language Pathology  Treatment    Humphrey Machado   MRN: [id number]   Admitting Diagnosis: ST elevation myocardial infarction (STEMI)    Diet recommendations: Solid Diet Level: Dental Soft  Liquid Diet Level: Nectar Thick Feed only when awake/alert, HOB to 90 degrees, Small bites/sips, 1 bite/sip at a time, Remain upright 30 minutes post meal, Meds whole 1 at a time, Eliminate distractions, Assistance with meals and Assistance with thickening liquids    SLP Treatment Date: 05/29/17  Speech Start Time: 1445     Speech Stop Time: 1457     Speech Total (min): 12 min       TREATMENT BILLABLE MINUTES:  Treatment Swallowing Dysfunction 12    Has the patient been evaluated by SLP for swallowing? : Yes  Keep patient NPO?: No   General Precautions: Standard, fall, contact, aspiration, nectar thick  Current Respiratory Status:  (room air)       Subjective:  SLP communicated with nurse prior to session, nurse explains Patient's family noncompliant with thickener supplements and feeding patient thin liquids  Patient presents fatigued, frustrated.    He reports decreased appetite.   He explains, "That [nectar-thickened liquids] tastes like it has bread in it, can we give it a rest and let me be I'm smothering"  Patient's son explains, "I think he just needs to be left alone"  Patient's spouse explains, "I have not been adding it [thickener] to the soups"  Pain/Comfort  Pain Rating 1: 5/10  Location - Side 1: Left  Location - Orientation 1: generalized  Location 1: other (see comments) (pt reports the left side hurts)  Pain Addressed 1: Distraction, Cessation of Activity, Nurse notified  Pain Rating Post-Intervention 1: 5/10    Objective:   Patient found with: telemetry, montano catheter, peripheral IV, reclined in bed, spouse and son at bedside. SLP educated Patient and family on diet recommendations and thickener guidelines. Patient's spouse reports she has not been thickening soups because Patient won't eat it if thickened. " SLP demonstrates use of thickener packet + 6 oxz liquids with Boost on lunch tray.   SLP attempts to elevate HOB; however, Patient refuses SLP to raise HOB higher than 50 degree angle. Patient accepts nectar-thickened liquids (Boost) via cup edge x2, then refuses additional sips. He declines additional offerings of PO trials in session (thin liquids, bites of regular solids) offered by SLP and appears increasingly frustrated and asks SLP to leave.  Session discontinued. Patient's whiteboard current with recommendations.     Assessment:  Humphrey Machado is a 85 y.o. male with a medical diagnosis of ST elevation myocardial infarction (STEMI) and presents with S/Spharyngeal Dysphagia.  He demonstrates decreased participation with ST today due to increased pain and fatigue.     Discharge recommendations: Discharge Facility/Level Of Care Needs: nursing facility, skilled     Goals:    SLP Goals        Problem: SLP Goal    Goal Priority Disciplines Outcome   SLP Goal     SLP Ongoing (interventions implemented as appropriate)   Description:  Speech Language Pathology Goals  Updated goals expected to be met by 6/5/2017  1. Pt will tolerate dental soft diet with nectar-thickened liquids w/o overt S/S aspiration, MIN A, to improve swallow safety   2. Pt will tolerate trials of thin liquids w/o overt S/S aspiration, MIN A, to improve swallow safety   3. Educate Patient and family on safe swallow strategies and aspiration precautions     Goals expected to be met by 5/28/2017  1. Pt will tolerate dental soft diet with nectar-thickened liquids w/o overt S/S aspiration, MIN A, to improve swallow safety ONGOING  2. Pt will tolerate trials of thin liquids w/o overt S/S aspiration, MIN A, to improve swallow safety ONGOING  3. Educate Patient and family on safe swallow strategies and aspiration precautions ONGOING                          Plan:   Patient to be seen Therapy Frequency: 5 x/week   Plan of Care expires: 06/20/17  Plan of  Care reviewed with: patient, spouse, son  SLP Follow-up?: Yes       ROYAL Crowe, [facility]-SLP  Speech-Language Pathology  Pager: [phone]  5/29/2017

## 2017-05-29 NOTE — PLAN OF CARE
05/29/17 0815   Discharge Reassessment   Assessment Type Discharge Planning Reassessment   Can the patient answer the patient profile reliably? Yes, cognitively intact   How does the patient rate their overall health at the present time? Fair   Describe the patient's ability to walk at the present time. No restrictions   How often would a person be available to care for the patient? Whenever needed   Number of comorbid conditions (as recorded on the chart) Three   During the past month, has the patient often been bothered by feeling down, depressed or hopeless? No   During the past month, has the patient often been bothered by little interest or pleasure in doing things? No   Discharge plan remains the same: No   Discharge Plan A Home Health;Home with family   Discharge Plan B Skilled Nursing Facility

## 2017-05-29 NOTE — ASSESSMENT & PLAN NOTE
Pt k/c/o CKD stage 3-4 2/2 HTN, solitary kidney s/p nephrectomy due to RCC, recurrent UTIs   - baseline creat 2.3 to 2.7   - THEO may be pre renal vs ischemic ATN from hypotension, Pt continues to have low borderline BPs,  - did one round of HD on sat but could not pull vome, pt was uncomfortable through out treatment   - plz dc BB and hold pain meds for 4 hrs prior to HD trial tmw at noon.   - d/w family, if pt continues to have low BP, will have to consider hospice

## 2017-05-29 NOTE — ASSESSMENT & PLAN NOTE
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 Renal function continues to worsen. Patient has now changed his mind and is agreeable to HD. CVC placed on 5/26/17. Patient had first HD session on 5/27. Second HD session planned for today but could not tolerate due to hypotension.  1. Monitor creatinine level.  2. Avoid nephrotoxic agents.  3. Renally dose medications.  4. Continue trial of RRT and if patient is unable to tolerate then refer for Hospice care.

## 2017-05-29 NOTE — ASSESSMENT & PLAN NOTE
No current chest pain or SOB but patient with discomfort. Refused LHC. Currently being managed medically. Cardiology had prolonged conversation about LHC no longer being an option which would change the outcome.  1. Aspirin, plavix, heparin gtt.   2. High intensity statins   3. Continue low dose metoprolol.

## 2017-05-30 LAB
ALBUMIN SERPL BCP-MCNC: 2.5 G/DL
ALP SERPL-CCNC: 78 U/L
ALT SERPL W/O P-5'-P-CCNC: 16 U/L
ANION GAP SERPL CALC-SCNC: 13 MMOL/L
APTT BLDCRRT: 120.4 SEC
AST SERPL-CCNC: 26 U/L
BASOPHILS # BLD AUTO: 0.01 K/UL
BASOPHILS NFR BLD: 0.2 %
BILIRUB DIRECT SERPL-MCNC: 0.3 MG/DL
BILIRUB SERPL-MCNC: 0.4 MG/DL
BUN SERPL-MCNC: 71 MG/DL
CALCIUM SERPL-MCNC: 9.1 MG/DL
CHLORIDE SERPL-SCNC: 103 MMOL/L
CO2 SERPL-SCNC: 22 MMOL/L
CREAT SERPL-MCNC: 3.8 MG/DL
DIFFERENTIAL METHOD: ABNORMAL
EOSINOPHIL # BLD AUTO: 0.2 K/UL
EOSINOPHIL NFR BLD: 3 %
ERYTHROCYTE [DISTWIDTH] IN BLOOD BY AUTOMATED COUNT: 15.6 %
EST. GFR  (AFRICAN AMERICAN): 15.7 ML/MIN/1.73 M^2
EST. GFR  (NON AFRICAN AMERICAN): 13.6 ML/MIN/1.73 M^2
FACT X PPP CHRO-ACNC: 0.77 IU/ML
FACT X PPP CHRO-ACNC: 0.8 IU/ML
GLUCOSE SERPL-MCNC: 79 MG/DL
HCT VFR BLD AUTO: 23 %
HGB BLD-MCNC: 7.3 G/DL
INR PPP: 1.2
LYMPHOCYTES # BLD AUTO: 1.4 K/UL
LYMPHOCYTES NFR BLD: 28.3 %
MAGNESIUM SERPL-MCNC: 2.2 MG/DL
MCH RBC QN AUTO: 27.2 PG
MCHC RBC AUTO-ENTMCNC: 31.7 %
MCV RBC AUTO: 86 FL
MONOCYTES # BLD AUTO: 0.4 K/UL
MONOCYTES NFR BLD: 7.9 %
NEUTROPHILS # BLD AUTO: 2.9 K/UL
NEUTROPHILS NFR BLD: 59.2 %
PHOSPHATE SERPL-MCNC: 6.1 MG/DL
PLATELET # BLD AUTO: 143 K/UL
PMV BLD AUTO: 10.5 FL
POCT GLUCOSE: 100 MG/DL (ref 70–110)
POCT GLUCOSE: 124 MG/DL (ref 70–110)
POTASSIUM SERPL-SCNC: 3.8 MMOL/L
PROT SERPL-MCNC: 6.5 G/DL
PROTHROMBIN TIME: 12.1 SEC
RBC # BLD AUTO: 2.68 M/UL
SODIUM SERPL-SCNC: 138 MMOL/L
VANCOMYCIN SERPL-MCNC: 5.2 UG/ML
WBC # BLD AUTO: 4.95 K/UL

## 2017-05-30 PROCEDURE — 83735 ASSAY OF MAGNESIUM: CPT

## 2017-05-30 PROCEDURE — 85520 HEPARIN ASSAY: CPT

## 2017-05-30 PROCEDURE — 25000003 PHARM REV CODE 250: Performed by: INTERNAL MEDICINE

## 2017-05-30 PROCEDURE — 90935 HEMODIALYSIS ONE EVALUATION: CPT | Mod: 58,GC,, | Performed by: INTERNAL MEDICINE

## 2017-05-30 PROCEDURE — 25000003 PHARM REV CODE 250: Performed by: STUDENT IN AN ORGANIZED HEALTH CARE EDUCATION/TRAINING PROGRAM

## 2017-05-30 PROCEDURE — 25000003 PHARM REV CODE 250: Performed by: HOSPITALIST

## 2017-05-30 PROCEDURE — 85730 THROMBOPLASTIN TIME PARTIAL: CPT

## 2017-05-30 PROCEDURE — 85025 COMPLETE CBC W/AUTO DIFF WBC: CPT

## 2017-05-30 PROCEDURE — 63600175 PHARM REV CODE 636 W HCPCS: Performed by: INTERNAL MEDICINE

## 2017-05-30 PROCEDURE — 27000207 HC ISOLATION

## 2017-05-30 PROCEDURE — 80100016 HC MAINTENANCE HEMODIALYSIS

## 2017-05-30 PROCEDURE — 80076 HEPATIC FUNCTION PANEL: CPT

## 2017-05-30 PROCEDURE — 84100 ASSAY OF PHOSPHORUS: CPT

## 2017-05-30 PROCEDURE — 85610 PROTHROMBIN TIME: CPT

## 2017-05-30 PROCEDURE — 90935 HEMODIALYSIS ONE EVALUATION: CPT

## 2017-05-30 PROCEDURE — 80202 ASSAY OF VANCOMYCIN: CPT

## 2017-05-30 PROCEDURE — 99233 SBSQ HOSP IP/OBS HIGH 50: CPT | Mod: ,,, | Performed by: HOSPITALIST

## 2017-05-30 PROCEDURE — 20600001 HC STEP DOWN PRIVATE ROOM

## 2017-05-30 PROCEDURE — 80048 BASIC METABOLIC PNL TOTAL CA: CPT

## 2017-05-30 PROCEDURE — 36415 COLL VENOUS BLD VENIPUNCTURE: CPT

## 2017-05-30 PROCEDURE — 85520 HEPARIN ASSAY: CPT | Mod: 91

## 2017-05-30 RX ORDER — LIDOCAINE HYDROCHLORIDE 20 MG/ML
JELLY TOPICAL DAILY
Status: DISCONTINUED | OUTPATIENT
Start: 2017-05-30 | End: 2017-06-01

## 2017-05-30 RX ORDER — SODIUM CHLORIDE 9 MG/ML
INJECTION, SOLUTION INTRAVENOUS
Status: DISCONTINUED | OUTPATIENT
Start: 2017-05-30 | End: 2017-06-01

## 2017-05-30 RX ORDER — SODIUM CHLORIDE 9 MG/ML
INJECTION, SOLUTION INTRAVENOUS ONCE
Status: DISCONTINUED | OUTPATIENT
Start: 2017-05-30 | End: 2017-05-31

## 2017-05-30 RX ORDER — ALBUMIN HUMAN 250 G/1000ML
25 SOLUTION INTRAVENOUS
Status: DISCONTINUED | OUTPATIENT
Start: 2017-05-30 | End: 2017-06-01

## 2017-05-30 RX ADMIN — ERYTHROPOIETIN 7300 UNITS: 10000 INJECTION, SOLUTION INTRAVENOUS; SUBCUTANEOUS at 05:05

## 2017-05-30 RX ADMIN — ALLOPURINOL 100 MG: 100 TABLET ORAL at 09:05

## 2017-05-30 RX ADMIN — DOCUSATE SODIUM 100 MG: 100 CAPSULE, LIQUID FILLED ORAL at 09:05

## 2017-05-30 RX ADMIN — SEVELAMER CARBONATE 1600 MG: 800 TABLET, FILM COATED ORAL at 01:05

## 2017-05-30 RX ADMIN — ASPIRIN 81 MG CHEWABLE TABLET 81 MG: 81 TABLET CHEWABLE at 09:05

## 2017-05-30 RX ADMIN — FLUTICASONE PROPIONATE 2 SPRAY: 50 SPRAY, METERED NASAL at 09:05

## 2017-05-30 RX ADMIN — CLOPIDOGREL 75 MG: 75 TABLET, FILM COATED ORAL at 09:05

## 2017-05-30 RX ADMIN — SODIUM CHLORIDE 0.5 G: 900 INJECTION, SOLUTION INTRAVENOUS at 06:05

## 2017-05-30 RX ADMIN — HYDROXYCHLOROQUINE SULFATE 200 MG: 200 TABLET, FILM COATED ORAL at 09:05

## 2017-05-30 RX ADMIN — HEPARIN SODIUM AND DEXTROSE 17 UNITS/KG/HR: 10000; 5 INJECTION INTRAVENOUS at 12:05

## 2017-05-30 RX ADMIN — ATORVASTATIN CALCIUM 80 MG: 20 TABLET, FILM COATED ORAL at 09:05

## 2017-05-30 RX ADMIN — HYDROMORPHONE HYDROCHLORIDE 0.5 MG: 1 INJECTION, SOLUTION INTRAMUSCULAR; INTRAVENOUS; SUBCUTANEOUS at 06:05

## 2017-05-30 RX ADMIN — SEVELAMER CARBONATE 1600 MG: 800 TABLET, FILM COATED ORAL at 06:05

## 2017-05-30 RX ADMIN — SEVELAMER CARBONATE 1600 MG: 800 TABLET, FILM COATED ORAL at 09:05

## 2017-05-30 RX ADMIN — MICONAZOLE NITRATE: 20 CREAM TOPICAL at 09:05

## 2017-05-30 RX ADMIN — Medication 3 ML: at 01:05

## 2017-05-30 RX ADMIN — HYDROMORPHONE HYDROCHLORIDE 0.5 MG: 1 INJECTION, SOLUTION INTRAMUSCULAR; INTRAVENOUS; SUBCUTANEOUS at 09:05

## 2017-05-30 RX ADMIN — HYDROMORPHONE HYDROCHLORIDE 4 MG: 2 TABLET ORAL at 05:05

## 2017-05-30 RX ADMIN — LIDOCAINE HYDROCHLORIDE 10 ML: 20 JELLY TOPICAL at 01:05

## 2017-05-30 RX ADMIN — HEPARIN SODIUM 1000 UNITS: 1000 INJECTION, SOLUTION INTRAVENOUS; SUBCUTANEOUS at 05:05

## 2017-05-30 NOTE — PROGRESS NOTES
Maintenance HD treatment started. No complications with access to right chest wall catheter. Lines secured and telemetry in place.

## 2017-05-30 NOTE — PT/OT/SLP PROGRESS
Occupational Therapy      Humphrey KAVIN Machado  MRN: [id number]    Patient not seen today secondary to Unavailable (Comment) (with MD team during first 2 attempts; away for HD at third attempt.  ). Will follow-up as appropriate.    HIEU Drummond  5/30/2017

## 2017-05-30 NOTE — PLAN OF CARE
Problem: Patient Care Overview  Goal: Plan of Care Review  Outcome: Ongoing (interventions implemented as appropriate)  Pt remains free from falls and injury. Plan of care reviewed with pt. Pt understands plan. Pt c/o urethral pain, VSS. Plans for dialysis today were discussed. Nephrology at bedside. If dialysis is unsuccessful today, Hospice options discussed with family and pt. Xa .77. Pt currently on 16 units of Heparin. hematuria still present. Will continue to monitor.

## 2017-05-30 NOTE — PT/OT/SLP PROGRESS
Speech Language Pathology      Humphrey Machado  MRN: [id number]    Patient not seen today secondary to dialysis. Will follow-up next service day.    Nataly Woodward CCC-SLP

## 2017-05-30 NOTE — PROGRESS NOTES
Palliative care attempted to follow up with patient.  Patient and family just had long conversation with primary team and nephrology team. Son requesting to allow Mr. Machado to rest before visiting.  He will leaving for dialysis shortly.  Emotional support given.

## 2017-05-30 NOTE — PROGRESS NOTES
[facility] Hospital Medicine  Progress Note    Patient Name: Humphrey Machado  MRN: [id number]  Patient Class: IP- Inpatient   Admission Date: 5/19/2017  Length of Stay: 11 days  Attending Physician: Dave Bro MD  Primary Care Provider: Andrew Murray MD    [facility] Medicine Team: [facility] HOSP MED C Dave Bro MD    Subjective:     Principal Problem:ST elevation myocardial infarction (STEMI)    HPI: 85-year-old man with HTN, HLD, NSTEMI previously refused [facility] 5/2016, CKD stage IV (baseline 2.7), moderate AS, AF, venous insufficiency, cirrhosis, Ureterocutaneous fistula, chronic osteomyelitis of the pelvic region on cefpodoxime, suspected inflammatory arthritis on HCQ, neuromuscular disorder with peripheral neuropathy with recurrent right foot ulcer, prostate cancer s/p radiation, RCC s/p left nephrectomy, skin cancer of the hand, urethral stricture, urinary incontinence p/w SOB which started an hour prior to admission and EKG concerning for STEMI with afib with q waves in inferior/anterior-septal leads with resolution of chest pain with SL NTG. Patient was evaluated in the ED by Dr Duval and recommended to be taken to cath lab considering his clinical picture but he refused to proceed with angiogram due to risk of hemodialysis possibility he will be admitted fort mdical treatment in CCU.     Currently he is very SOB with fluid overload and aneuric.looks like not responding to lasix his son is his POA and they are discussing code status and if they will proceed with more procedures including CRRT.     Patient with recent cystoscopy/cystogram/fistulogram by Dr. 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 Abraham's  Operative noted.        Hospital Course: Hospital course as above, while in  the CCU patient was medically managed for his STEMI, and for his AMIRA.  CCU team discussed comfort vs aggressive measures, patient's code status changed to DNR and due to concern that patient was anuric with developing AMIRA that may require HD. Discussion per EMR with family revealed they would not want to pursue dialysis. He was started on Lasix drip @ 20mg/hr and IV Diuril q12 hrs and had improved urine output on this regimen.  PRN Morphine and Ativan orders had been placed for patient comfort     Patient stepped down to IM-C casiano service on 5/20.  Patient was acutely encephalopathic, developed fevers and notified by RN blood cultures from admission were growing GNR.  Surveillance cultures sent patient was on Vancomycin and Cefepime.  He was having urine output on the lasix and diuril.  Extensive family discussion held (see progress notes for detail)  Family was not universally ready for comfort care, care plan more no escalation of care, given his altered mentation, I suspected morphine and ativan in Amira contributing, opiates switched to dilaudid and ativan d/c.   Discussed NG tube placement for oral med administration and nutrition but was held off.      5/21 - Patient is more awake and alert, likely related to toxic encephalopathy, remains hemodynamically stable, but was having more pain complaints, was receiving IV dilaudid  0.5mg q2hrs.   SLP evaluated patient and he is safe for dental soft nectar thick liquids.   His heart rhythm converted to atrial fibrillation with controlled rate in the 80-90s.   Will need repeat cardiology recs on management in AM.      Patient with nearly 2L UOP x 24hrs, family with more questions about possible HD or what course his Amira may take.  Nephrology re-contacted to follow the patient.  Diuril will be stopped this evening to monitor how pts UOp  And Cr does.     I reviewed more of recent urologic history and patient has a urinary source of his bacteremia, however with complicated  chronic fistula and wound we may not have source control. Discussed with patient and will consult urology to assist in any other management for source control and chronic urethrocutaneous fistula that may be needed - Urology consult placed for mon Am.      Reviewed prior culture data and patient with hx of Klebsiella ESBL and Pseudomonas in urine, both not sensitive to empiric cefepime so ID approval obtained to change to renal dosed Meropenem      Patient says pain control is improving but is still seems he has chronic pain issues from multiple sites, adding po opiates to pain regimen.      5/22 - Additional consultants have evaluated the patient  Cardiology - no additional med management recs at this time, patient has completed 48hrs of heparin for ACS, however with atrial fibrillation, will continue for now.  Coreg change to low dose metoprolol due to hypotension     Urology - evaluated patient and removed ureteral catheter, no further recs for chronic urtherocutaneous fistula, local wound care.   Wound care evaluated the patient and placed barrier cream in right groin with soft dressing to help absorb urinary leak from fistula, I was at bedside and pt did not feel ready to turn for assessment of buttocks.      Nephrology - evaluated the patient and no acute needs for acute HD, they will continue to follow, did inform pt that nephrology feels initiating HD would not improve overall morbidity/mortality from his co-morbid conditions and HD initiation comes with its own risks and invasive procedures required.      Palliative care - evaluated pt to assist in overall care planning.  MD staff to assist with pain management not available this week.      5/23- patient complained of discomfort. He is still unsure of what he wants for medical management. He is still considering hemodialysis as an option. His renal function continued to worsen but his main concern was his discomfort.     5/24- patient unable to voice his  "wishes nor does he want to have a goals of care discussion. He has poor insight into his disease process. He is verbally abusive to his providers and nursing staff. Discussed with his wife and grandson scheduling a family meeting to establish therapeutic plan.      5/25- he continues to be abusive to medical personnel. When trying to assess his pain he becomes abusive and screams that he can't tell.      5/26-Family meeting today to establish therapeutic plan. Patient has agreed to hemodialysis and oral anticoagulation. He also agreed to PT and OT however he has refused SNF. He has developed some mental "foggyness"      5/27-Underwent HD.      5/28- overnight patient with hypotension. Narcotic analgesics held and volume replaced by overnight physician. Moderate to severe pain on evaluation and analgesics resumed. Metoprolol dose further adjusted.     5/29- Patient could not be dialyzed due to hypotension. Options discussed by Nephrology service. Plan to hold metoprolol for now and hold narcotic analgesics 4 hours prior to HD tomorrow. If patient unable to tolerate then will need hospice care.    Interval History:     Patient seen this AM, he has no new complaints, pain moderately controlled, per RN patient with pain at urethral glans/meatus.  I discussed ongoing use of anticoagulation with family with regard to stroke prophylaxis due to his atrial fibrillation diagnosis, no acute recs if we should stop heparin/start coumadin or stob all anticoagulation.     Review of Systems   Constitutional: Positive for fatigue. Negative for fever.   HENT: Positive for trouble swallowing. Negative for rhinorrhea and sore throat.    Eyes: Negative for visual disturbance.   Respiratory: Negative for chest tightness and shortness of breath.    Cardiovascular: Negative for chest pain.   Gastrointestinal: Negative for abdominal pain, diarrhea and nausea.   Genitourinary: Positive for penile pain.   Musculoskeletal: Positive for " arthralgias and myalgias.     Objective:     Vital Signs (Most Recent):  Temp: 97 °F (36.1 °C) (05/30/17 1445)  Pulse: 68 (05/30/17 1730)  Resp: 18 (05/30/17 1730)  BP: 128/63 (05/30/17 1730)  SpO2: 97 % (05/30/17 1100) Vital Signs (24h Range):  Temp:  [97 °F (36.1 °C)-98.8 °F (37.1 °C)] 97 °F (36.1 °C)  Pulse:  [55-68] 68  Resp:  [18] 18  SpO2:  [96 %-99 %] 97 %  BP: ()/(49-65) 128/63     Weight: 72.6 kg (160 lb 0.9 oz)  Body mass index is 22.97 kg/m².    Intake/Output Summary (Last 24 hours) at 05/30/17 1846  Last data filed at 05/30/17 1730   Gross per 24 hour   Intake             1050 ml   Output             1525 ml   Net             -475 ml      Physical Exam   Constitutional: No distress.   HENT:   Dry oropharynx   Eyes: No scleral icterus.   Neck: No JVD present.   Cardiovascular:   Irregular rhythm, systolic murmur present    Pulmonary/Chest: Effort normal. No respiratory distress.   Anterior exam CTA in upper fields, limited exam at lateral bases   Abdominal: Soft. Bowel sounds are normal. There is no tenderness.   Genitourinary:   Genitourinary Comments: Stevens catheter to gravity drainage, draining red tinged dark urine.  Right groin urethrocutaneous fistula dressing in place   Musculoskeletal: He exhibits tenderness.   Neurological: He is alert.   Skin: Skin is warm and dry.   Psychiatric:   Slowed response to questioning, flat affect       Significant Labs:   BMP:   Recent Labs  Lab 05/30/17  0348   GLU 79      K 3.8      CO2 22*   BUN 71*   CREATININE 3.8*   CALCIUM 9.1   MG 2.2     CBC:   Recent Labs  Lab 05/29/17  0416 05/30/17  0348   WBC 5.53 4.95   HGB 7.8* 7.3*   HCT 24.5* 23.0*    143*       Significant Imaging: I have reviewed all pertinent imaging results/findings within the past 24 hours.    Assessment/Plan:      1. STEMI  -continue medical management, ASA, statin/plavix  -holding beta blocker due to #2    2. THEO on CKD  -s/p placement of temp HD access and has  received HD x 1 on Saturday, unable to receive yesterday  -Nephrology staff spoke with patient and family and informed them that if patient cannot tolerate HD today, likely will not be offered again and plans to transition to hospice care would be appropriate - will f/u results of planned HD today  -holding beta blocker due to hypotension, hold pain meds 4hrs prior to HD   -sevelamer    3. Bacteremia, ESBL Klebsiella  -continue ertapenem    4. Goals of Care   - palliative service following, appreciate assistance, will need to re-address pending outcome of treatment for #2.     5. Atrial fibrillation   -rate controlled,  -heparin drip for anticoagulation     6. Chronic Pain   -continue scheduled PO dilaudid and PRN IV dilaudid     7. Urethrocutaneous fistula  -continue montano to gravity drainage  -local wound care   -s/p urology eval w/o any operative interventions.     8. Dysphagia  -dental soft, nectar thick diet    Active Diagnoses:    Diagnosis Date Noted POA    PRINCIPAL PROBLEM:  ST elevation myocardial infarction (STEMI) [I21.3] 05/19/2017 Yes    Acute kidney injury superimposed on CKD [N17.9, N18.9] 05/21/2017 Yes    Ischemic cardiomyopathy [I25.5] 05/21/2017 Yes    Atrial fibrillation with RVR [I48.91]  Yes    Bacteremia due to Klebsiella pneumoniae [R78.81] 05/21/2017 Yes    Urinary tract infection due to extended-spectrum beta lactamase (ESBL)-producing Klebsiella [N39.0, B96.89] 07/08/2013 Yes    Chronic pain [G89.29] 05/21/2017 Yes    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 [N36.0] 05/02/2017 Yes    Complicated open wound of right thigh [S71.101A] 01/25/2017 Yes    Debility [R53.81] 08/04/2015 Yes    Pain [R52] 05/23/2017 Yes    Palliative care encounter [Z51.5]  Not Applicable    Goals of care, counseling/discussion [Z71.89]  Not Applicable    Hyperphosphatemia [E83.39] 05/22/2017 Yes    Oropharyngeal dysphagia [R13.12] 05/21/2017 No      Problems Resolved During this Admission:    Diagnosis Date  Noted Date Resolved POA    Chest pain [R07.9] 05/19/2017 05/21/2017 Yes    Respiratory distress [R06.00] 05/19/2017 05/21/2017 Yes     VTE Risk Mitigation         Ordered     Medium Risk of VTE  Once      05/19/17 0945     Reason for No Pharmacological VTE Prophylaxis  Once      05/19/17 0945          Dave Bro MD  Department of Hospital Medicine   [facility]

## 2017-05-30 NOTE — PLAN OF CARE
Problem: Patient Care Overview  Goal: Plan of Care Review  Outcome: Ongoing (interventions implemented as appropriate)  Second HD treatment complete. Duration of treatment 2.5 hours and no fluid removed per MD orders. Treatment was tolerated well and no complications with access to right chest wall catheter. Catheter flushed with NS and locked with heparin. Capped and tapped.

## 2017-05-30 NOTE — SUBJECTIVE & OBJECTIVE
"Interval History:   Pt continues to have BP in 90s-100s systolic. Continues to c/o pain. Making good amt of urine     Review of patient's allergies indicates:   Allergen Reactions    Aspirin Other (See Comments)     Stomach      Ditropan [oxybutynin chloride]      Unable to describe side effect other than "felt strange"     Keflex [cephalexin] Rash     Morbilliform  Has tolerated multiple cephalosporins since 2013.    Macrobid [nitrofurantoin monohyd/m-cryst] Hives and Rash     Current Facility-Administered Medications   Medication Frequency    0.9%  NaCl infusion PRN    0.9%  NaCl infusion PRN    0.9%  NaCl infusion Once    0.9%  NaCl infusion PRN    0.9%  NaCl infusion Once    allopurinol tablet 100 mg Daily    aspirin chewable tablet 81 mg Daily    atorvastatin tablet 80 mg Daily    bisacodyl suppository 10 mg Daily PRN    clopidogrel tablet 75 mg Daily    dextrose 50% injection 12.5 g PRN    dextrose 50% injection 25 g PRN    docusate sodium capsule 100 mg BID    epoetin mary kay injection 7,300 Units Every Tues, Thurs, Sat    ertapenem (INVANZ) 0.5 g in sodium chloride 0.9% 50 mL IVPB Q24H    fluticasone 50 mcg/actuation nasal spray 2 spray BID    glucagon (human recombinant) injection 1 mg PRN    glucose chewable tablet 16 g PRN    glucose chewable tablet 24 g PRN    heparin (porcine) injection 1,000 Units PRN    heparin 25,000 units in dextrose 5% 250 mL (100 units/mL) infusion Continuous    hydromorphone injection 0.5 mg Q2H PRN    HYDROmorphone tablet 4 mg Q6H    hydroxychloroquine tablet 200 mg BID    miconazole 2 % cream BID    ondansetron injection 4 mg Q8H PRN    sevelamer carbonate tablet 1,600 mg TID WM    simethicone chewable tablet 80 mg TID PRN    sodium chloride 0.9% flush 3 mL Q8H       Objective:     Vital Signs (Most Recent):  Temp: 97.5 °F (36.4 °C) (05/30/17 0900)  Pulse: 63 (05/30/17 1100)  Resp: 18 (05/30/17 0900)  BP: (!) 122/58 (05/30/17 0900)  SpO2: 98 % " (05/30/17 0900)  O2 Device (Oxygen Therapy): nasal cannula w/ humidification (05/30/17 0900) Vital Signs (24h Range):  Temp:  [95.1 °F (35.1 °C)-98.8 °F (37.1 °C)] 97.5 °F (36.4 °C)  Pulse:  [55-70] 63  Resp:  [18] 18  SpO2:  [96 %-99 %] 98 %  BP: ()/(52-59) 122/58     Weight: 72.6 kg (160 lb 0.9 oz) (05/28/17 0400)  Body mass index is 22.97 kg/m².  Body surface area is 1.89 meters squared.    I/O last 3 completed shifts:  In: 830 [P.O.:830]  Out: 1650 [Urine:1650]    Physical Exam   Constitutional: He is oriented to person, place, and time. He appears well-developed and well-nourished.   HENT:   Head: Normocephalic and atraumatic.   Eyes: Conjunctivae and EOM are normal.   Neck: Neck supple.   Cardiovascular: Normal rate, regular rhythm and normal heart sounds.    Pulmonary/Chest: Effort normal and breath sounds normal.   Abdominal: Soft. Bowel sounds are normal.   Neurological: He is alert and oriented to person, place, and time.       Significant Labs:  CBC:     Recent Labs  Lab 05/30/17  0348   WBC 4.95   RBC 2.68*   HGB 7.3*   HCT 23.0*   *   MCV 86   MCH 27.2   MCHC 31.7*     CMP:     Recent Labs  Lab 05/30/17  0348   GLU 79   CALCIUM 9.1   ALBUMIN 2.5*   PROT 6.5      K 3.8   CO2 22*      BUN 71*   CREATININE 3.8*   ALKPHOS 78   ALT 16   AST 26   BILITOT 0.4       Recent Labs  Lab 05/24/17  0030   COLORU Brittany   SPECGRAV 1.010   PHUR 6.0   PROTEINUA 2+*   BACTERIA Rare   NITRITE Negative   LEUKOCYTESUR 2+*   UROBILINOGEN Negative   HYALINECASTS 0      Labs: Reviewed

## 2017-05-30 NOTE — PROGRESS NOTES
"[facility]  Nephrology  Progress Note    Patient Name: Humphrey Machado  MRN: [id number]  Admission Date: 5/19/2017  Hospital Length of Stay: 11 days  Attending Provider: Dave Bro MD   Primary Care Physician: Andrew Murray MD  Principal Problem:ST elevation myocardial infarction (STEMI)    Subjective:     HPI: 84 yo M with PMHx of HTN,HLD, NSTEMI previously refused [facility] 5/2016, CKD stage IV (baseline 2.7), moderate AS, AF, venous insufficiency, cirrhosis, Ureterocutaneous fistula, chronic osteomyelitis of the pelvic region on cefpodoxime, suspected inflammatory arthritis on HCQ, neuromuscular disorder with peripheral neuropathy with recurrent right foot ulcer, prostate cancer s/p radiation, RCC s/p left nephrectomy, skin cancer of the hand, urethral stricture, urinary incontinence p/w SOB which started an hour prior to admission and EKG concerning for STEMI with afib with q waves in inferior/anterior-septal leads with resolution of chest pain with SL NTG. Patient was evaluated in the ED by Dr Duval and recommended to be taken to cath lab considering his clinical picture but he refused to proceed with angiogram due to risk of hemodialysis possibility     Renal consulted for worsening THEO on CKD     Interval History:   Pt continues to have BP in 90s-100s systolic. Continues to c/o pain. Making good amt of urine     Review of patient's allergies indicates:   Allergen Reactions    Aspirin Other (See Comments)     Stomach      Ditropan [oxybutynin chloride]      Unable to describe side effect other than "felt strange"     Keflex [cephalexin] Rash     Morbilliform  Has tolerated multiple cephalosporins since 2013.    Macrobid [nitrofurantoin monohyd/m-cryst] Hives and Rash     Current Facility-Administered Medications   Medication Frequency    0.9%  NaCl infusion PRN    0.9%  NaCl infusion PRN    0.9%  NaCl infusion Once    0.9%  NaCl infusion PRN    0.9%  NaCl infusion Once    " allopurinol tablet 100 mg Daily    aspirin chewable tablet 81 mg Daily    atorvastatin tablet 80 mg Daily    bisacodyl suppository 10 mg Daily PRN    clopidogrel tablet 75 mg Daily    dextrose 50% injection 12.5 g PRN    dextrose 50% injection 25 g PRN    docusate sodium capsule 100 mg BID    epoetin mary kay injection 7,300 Units Every Tues, Thurs, Sat    ertapenem (INVANZ) 0.5 g in sodium chloride 0.9% 50 mL IVPB Q24H    fluticasone 50 mcg/actuation nasal spray 2 spray BID    glucagon (human recombinant) injection 1 mg PRN    glucose chewable tablet 16 g PRN    glucose chewable tablet 24 g PRN    heparin (porcine) injection 1,000 Units PRN    heparin 25,000 units in dextrose 5% 250 mL (100 units/mL) infusion Continuous    hydromorphone injection 0.5 mg Q2H PRN    HYDROmorphone tablet 4 mg Q6H    hydroxychloroquine tablet 200 mg BID    miconazole 2 % cream BID    ondansetron injection 4 mg Q8H PRN    sevelamer carbonate tablet 1,600 mg TID WM    simethicone chewable tablet 80 mg TID PRN    sodium chloride 0.9% flush 3 mL Q8H       Objective:     Vital Signs (Most Recent):  Temp: 97.5 °F (36.4 °C) (05/30/17 0900)  Pulse: 63 (05/30/17 1100)  Resp: 18 (05/30/17 0900)  BP: (!) 122/58 (05/30/17 0900)  SpO2: 98 % (05/30/17 0900)  O2 Device (Oxygen Therapy): nasal cannula w/ humidification (05/30/17 0900) Vital Signs (24h Range):  Temp:  [95.1 °F (35.1 °C)-98.8 °F (37.1 °C)] 97.5 °F (36.4 °C)  Pulse:  [55-70] 63  Resp:  [18] 18  SpO2:  [96 %-99 %] 98 %  BP: ()/(52-59) 122/58     Weight: 72.6 kg (160 lb 0.9 oz) (05/28/17 0400)  Body mass index is 22.97 kg/m².  Body surface area is 1.89 meters squared.    I/O last 3 completed shifts:  In: 830 [P.O.:830]  Out: 1650 [Urine:1650]    Physical Exam   Constitutional: He is oriented to person, place, and time. He appears well-developed and well-nourished.   HENT:   Head: Normocephalic and atraumatic.   Eyes: Conjunctivae and EOM are normal.   Neck: Neck  supple.   Cardiovascular: Normal rate, regular rhythm and normal heart sounds.    Pulmonary/Chest: Effort normal and breath sounds normal.   Abdominal: Soft. Bowel sounds are normal.   Neurological: He is alert and oriented to person, place, and time.       Significant Labs:  CBC:     Recent Labs  Lab 05/30/17  0348   WBC 4.95   RBC 2.68*   HGB 7.3*   HCT 23.0*   *   MCV 86   MCH 27.2   MCHC 31.7*     CMP:     Recent Labs  Lab 05/30/17  0348   GLU 79   CALCIUM 9.1   ALBUMIN 2.5*   PROT 6.5      K 3.8   CO2 22*      BUN 71*   CREATININE 3.8*   ALKPHOS 78   ALT 16   AST 26   BILITOT 0.4       Recent Labs  Lab 05/24/17  0030   COLORU Brittany   SPECGRAV 1.010   PHUR 6.0   PROTEINUA 2+*   BACTERIA Rare   NITRITE Negative   LEUKOCYTESUR 2+*   UROBILINOGEN Negative   HYALINECASTS 0      Labs: Reviewed    Assessment/Plan:     Acute kidney injury superimposed on CKD     Pt k/c/o CKD stage 3-4 2/2 HTN, solitary kidney s/p nephrectomy due to RCC, recurrent UTIs   - baseline creat 2.3 to 2.7   - THEO may be pre renal vs ischemic ATN from hypotension, Pt continues to have low borderline BPs,  - did one round of HD on sat but could not pull volume, pt was uncomfortable through out treatment   - could not do HD yest as starting BP were in 80s systolic   - d/w pt and family will trial HD today again.   - if pt is not able to tolerate will need to consider hospice             Diya Vega MD  Nephrology  [facility]

## 2017-05-30 NOTE — ASSESSMENT & PLAN NOTE
Pt k/c/o CKD stage 3-4 2/2 HTN, solitary kidney s/p nephrectomy due to RCC, recurrent UTIs   - baseline creat 2.3 to 2.7   - THEO may be pre renal vs ischemic ATN from hypotension, Pt continues to have low borderline BPs,  - did one round of HD on sat but could not pull volume, pt was uncomfortable through out treatment   - could not do HD yest as starting BP were in 80s systolic   - d/w pt and family will trial HD today again.   - if pt is not able to tolerate will need to consider hospice

## 2017-05-30 NOTE — PT/OT/SLP PROGRESS
Physical Therapy      Humphrey Machado  MRN: [id number]    Patient not seen today secondary to Dialysis. PT unable to return for additional attempt. Will follow-up at next scheduled session as able.    Nataly De Oliveira, PT, DPT   5/30/2017  [phone]

## 2017-05-31 LAB
ABO + RH BLD: NORMAL
ALBUMIN SERPL BCP-MCNC: 2.4 G/DL
ALP SERPL-CCNC: 79 U/L
ALT SERPL W/O P-5'-P-CCNC: 14 U/L
ANION GAP SERPL CALC-SCNC: 10 MMOL/L
APTT BLDCRRT: 55.8 SEC
AST SERPL-CCNC: 28 U/L
BASOPHILS # BLD AUTO: 0.02 K/UL
BASOPHILS NFR BLD: 0.4 %
BILIRUB DIRECT SERPL-MCNC: 0.2 MG/DL
BILIRUB SERPL-MCNC: 0.3 MG/DL
BLD GP AB SCN CELLS X3 SERPL QL: NORMAL
BUN SERPL-MCNC: 39 MG/DL
CALCIUM SERPL-MCNC: 8.8 MG/DL
CHLORIDE SERPL-SCNC: 108 MMOL/L
CO2 SERPL-SCNC: 22 MMOL/L
CREAT SERPL-MCNC: 2.7 MG/DL
DIFFERENTIAL METHOD: ABNORMAL
EOSINOPHIL # BLD AUTO: 0.1 K/UL
EOSINOPHIL NFR BLD: 1.9 %
ERYTHROCYTE [DISTWIDTH] IN BLOOD BY AUTOMATED COUNT: 15.9 %
EST. GFR  (AFRICAN AMERICAN): 23.8 ML/MIN/1.73 M^2
EST. GFR  (NON AFRICAN AMERICAN): 20.6 ML/MIN/1.73 M^2
FACT X PPP CHRO-ACNC: 0.57 IU/ML
FACT X PPP CHRO-ACNC: 0.62 IU/ML
GLUCOSE SERPL-MCNC: 87 MG/DL
HCT VFR BLD AUTO: 21.2 %
HGB BLD-MCNC: 6.4 G/DL
INR PPP: 1.2
LYMPHOCYTES # BLD AUTO: 1 K/UL
LYMPHOCYTES NFR BLD: 20.2 %
MAGNESIUM SERPL-MCNC: 2.2 MG/DL
MCH RBC QN AUTO: 26.4 PG
MCHC RBC AUTO-ENTMCNC: 30.2 %
MCV RBC AUTO: 88 FL
MONOCYTES # BLD AUTO: 0.4 K/UL
MONOCYTES NFR BLD: 8 %
NEUTROPHILS # BLD AUTO: 3.3 K/UL
NEUTROPHILS NFR BLD: 68.5 %
PHOSPHATE SERPL-MCNC: 4.2 MG/DL
PLATELET # BLD AUTO: 122 K/UL
PMV BLD AUTO: 10.4 FL
POCT GLUCOSE: 85 MG/DL (ref 70–110)
POCT GLUCOSE: 96 MG/DL (ref 70–110)
POTASSIUM SERPL-SCNC: 4 MMOL/L
PROT SERPL-MCNC: 6.4 G/DL
PROTHROMBIN TIME: 12.1 SEC
RBC # BLD AUTO: 2.42 M/UL
SODIUM SERPL-SCNC: 140 MMOL/L
WBC # BLD AUTO: 4.85 K/UL

## 2017-05-31 PROCEDURE — 97530 THERAPEUTIC ACTIVITIES: CPT

## 2017-05-31 PROCEDURE — 83735 ASSAY OF MAGNESIUM: CPT

## 2017-05-31 PROCEDURE — 25000003 PHARM REV CODE 250: Performed by: INTERNAL MEDICINE

## 2017-05-31 PROCEDURE — 85025 COMPLETE CBC W/AUTO DIFF WBC: CPT

## 2017-05-31 PROCEDURE — 84100 ASSAY OF PHOSPHORUS: CPT

## 2017-05-31 PROCEDURE — 99232 SBSQ HOSP IP/OBS MODERATE 35: CPT | Mod: ,,, | Performed by: HOSPITALIST

## 2017-05-31 PROCEDURE — 86900 BLOOD TYPING SEROLOGIC ABO: CPT

## 2017-05-31 PROCEDURE — 85610 PROTHROMBIN TIME: CPT

## 2017-05-31 PROCEDURE — 85520 HEPARIN ASSAY: CPT

## 2017-05-31 PROCEDURE — 25000003 PHARM REV CODE 250: Performed by: STUDENT IN AN ORGANIZED HEALTH CARE EDUCATION/TRAINING PROGRAM

## 2017-05-31 PROCEDURE — 80076 HEPATIC FUNCTION PANEL: CPT

## 2017-05-31 PROCEDURE — 36415 COLL VENOUS BLD VENIPUNCTURE: CPT

## 2017-05-31 PROCEDURE — 99233 SBSQ HOSP IP/OBS HIGH 50: CPT | Mod: 24,,, | Performed by: INTERNAL MEDICINE

## 2017-05-31 PROCEDURE — 80048 BASIC METABOLIC PNL TOTAL CA: CPT

## 2017-05-31 PROCEDURE — 86850 RBC ANTIBODY SCREEN: CPT

## 2017-05-31 PROCEDURE — 85730 THROMBOPLASTIN TIME PARTIAL: CPT

## 2017-05-31 PROCEDURE — 20600001 HC STEP DOWN PRIVATE ROOM

## 2017-05-31 PROCEDURE — 97803 MED NUTRITION INDIV SUBSEQ: CPT

## 2017-05-31 PROCEDURE — 99233 SBSQ HOSP IP/OBS HIGH 50: CPT | Mod: ,,, | Performed by: INTERNAL MEDICINE

## 2017-05-31 PROCEDURE — 63600175 PHARM REV CODE 636 W HCPCS: Performed by: HOSPITALIST

## 2017-05-31 PROCEDURE — 63600175 PHARM REV CODE 636 W HCPCS: Performed by: INTERNAL MEDICINE

## 2017-05-31 PROCEDURE — 25000003 PHARM REV CODE 250: Performed by: HOSPITALIST

## 2017-05-31 RX ORDER — ONDANSETRON 2 MG/ML
4 INJECTION INTRAMUSCULAR; INTRAVENOUS EVERY 4 HOURS PRN
Status: DISCONTINUED | OUTPATIENT
Start: 2017-05-31 | End: 2017-06-02 | Stop reason: HOSPADM

## 2017-05-31 RX ORDER — HYDROMORPHONE HYDROCHLORIDE 2 MG/1
4 TABLET ORAL EVERY 4 HOURS PRN
Status: DISCONTINUED | OUTPATIENT
Start: 2017-05-31 | End: 2017-06-02

## 2017-05-31 RX ORDER — FENTANYL 12.5 UG/1
1 PATCH TRANSDERMAL
Status: DISCONTINUED | OUTPATIENT
Start: 2017-05-31 | End: 2017-06-02

## 2017-05-31 RX ORDER — HALOPERIDOL 5 MG/ML
0.5 INJECTION INTRAMUSCULAR EVERY 4 HOURS PRN
Status: DISCONTINUED | OUTPATIENT
Start: 2017-05-31 | End: 2017-06-01

## 2017-05-31 RX ADMIN — ASPIRIN 81 MG CHEWABLE TABLET 81 MG: 81 TABLET CHEWABLE at 08:05

## 2017-05-31 RX ADMIN — HYDROMORPHONE HYDROCHLORIDE 4 MG: 2 TABLET ORAL at 11:05

## 2017-05-31 RX ADMIN — ONDANSETRON 4 MG: 2 INJECTION INTRAMUSCULAR; INTRAVENOUS at 08:05

## 2017-05-31 RX ADMIN — ONDANSETRON 4 MG: 2 INJECTION INTRAMUSCULAR; INTRAVENOUS at 05:05

## 2017-05-31 RX ADMIN — HYDROMORPHONE HYDROCHLORIDE 4 MG: 2 TABLET ORAL at 03:05

## 2017-05-31 RX ADMIN — SODIUM CHLORIDE 0.5 G: 900 INJECTION, SOLUTION INTRAVENOUS at 04:05

## 2017-05-31 RX ADMIN — DOCUSATE SODIUM 100 MG: 100 CAPSULE, LIQUID FILLED ORAL at 10:05

## 2017-05-31 RX ADMIN — HYDROMORPHONE HYDROCHLORIDE 0.5 MG: 1 INJECTION, SOLUTION INTRAMUSCULAR; INTRAVENOUS; SUBCUTANEOUS at 04:05

## 2017-05-31 RX ADMIN — SIMETHICONE CHEW TAB 80 MG 80 MG: 80 TABLET ORAL at 03:05

## 2017-05-31 RX ADMIN — ALLOPURINOL 100 MG: 100 TABLET ORAL at 08:05

## 2017-05-31 RX ADMIN — MICONAZOLE NITRATE: 20 CREAM TOPICAL at 09:05

## 2017-05-31 RX ADMIN — FENTANYL 1 PATCH: 12 PATCH, EXTENDED RELEASE TRANSDERMAL at 10:05

## 2017-05-31 RX ADMIN — LIDOCAINE HYDROCHLORIDE 10 ML: 20 JELLY TOPICAL at 06:05

## 2017-05-31 RX ADMIN — SEVELAMER CARBONATE 1600 MG: 800 TABLET, FILM COATED ORAL at 08:05

## 2017-05-31 RX ADMIN — CLOPIDOGREL 75 MG: 75 TABLET, FILM COATED ORAL at 08:05

## 2017-05-31 RX ADMIN — Medication 3 ML: at 10:05

## 2017-05-31 RX ADMIN — DOCUSATE SODIUM 100 MG: 100 CAPSULE, LIQUID FILLED ORAL at 08:05

## 2017-05-31 RX ADMIN — ATORVASTATIN CALCIUM 80 MG: 20 TABLET, FILM COATED ORAL at 08:05

## 2017-05-31 RX ADMIN — HYDROXYCHLOROQUINE SULFATE 200 MG: 200 TABLET, FILM COATED ORAL at 10:05

## 2017-05-31 RX ADMIN — MICONAZOLE NITRATE: 20 CREAM TOPICAL at 10:05

## 2017-05-31 RX ADMIN — HYDROXYCHLOROQUINE SULFATE 200 MG: 200 TABLET, FILM COATED ORAL at 08:05

## 2017-05-31 NOTE — ASSESSMENT & PLAN NOTE
Pt k/c/o CKD stage 3-4 2/2 HTN, solitary kidney s/p nephrectomy due to RCC, recurrent UTIs   - baseline creat 2.3 to 2.7   - THEO may be pre renal vs ischemic ATN from hypotension, Pt continues to have low borderline BPs,  - pt has undergone 2 sessions of HD so far without fluid removal   - Pt continues to complain of pain and discomfort. Pt was explained that in order to continue dialysis he would have to be off pain meds for a few hr prior to and during treatment given it can bring the BP down   - hence pt was asked to consider goals and take decision regarding continuation of dialysis   - Palliative care to have meeting with pt and family this afternoon. Will follow up on discussion

## 2017-05-31 NOTE — PROGRESS NOTES
Palliative Medicine Attending Physician Note - 5/30/17:    Case discussed with me by Nephrology today and I have reviewed the patient's chart.  This is my first introduction to the patient.    I did not see the patient today but will plan to tomorrow.    Patient had difficulty tolerating any clearance at today's HD session.    We will need to discuss with the pt and family tomorrow.  Overall his long term ability to tolerate HD appears poor.  I discussed the use of Mitodrine with Nephrology and they do not feel this will make much of a change.    Of note, I have reviewed the pt's meds and he is on scheduled short acting opioids.  I will discuss this further with our team's NP as I was not made aware of this but would NOT recommend scheduling short acting opioids.  Further recs on this to follow tomorrow.    Thank you for allowing us to participate in Mr. Machado's care.    Armida Glynn MD, MS, FAAP  Internal Medicine, Pediatrics, Hospice & Palliative Medicine  Medical Director, Palliative Medicine  [phone]

## 2017-05-31 NOTE — PT/OT/SLP PROGRESS
Speech Language Pathology      Humphrey Machado  MRN: [id number]    Patient not seen today secondary to Other (Comment), Unavailable (Comment) (Palliative care in room with patient/family upon firsat attempt.  Upon second attempt, son explains Patient with increased agitation and requesting comfort care only. Spoke with nurse Coyne to confirm both attempts). ST to continue to monitor.    Nataly Woodward, CCC-SLP

## 2017-05-31 NOTE — SUBJECTIVE & OBJECTIVE
"Interval History:   Pt underwent 2nd session of Hd yest for 2.5 hrs. Pt was able to complete treatment but no fluid was pulled     Review of patient's allergies indicates:   Allergen Reactions    Aspirin Other (See Comments)     Stomach      Ditropan [oxybutynin chloride]      Unable to describe side effect other than "felt strange"     Keflex [cephalexin] Rash     Morbilliform  Has tolerated multiple cephalosporins since 2013.    Macrobid [nitrofurantoin monohyd/m-cryst] Hives and Rash     Current Facility-Administered Medications   Medication Frequency    0.9%  NaCl infusion PRN    0.9%  NaCl infusion PRN    0.9%  NaCl infusion Once    0.9%  NaCl infusion PRN    0.9%  NaCl infusion Once    albumin human 25% bottle 25 g PRN    allopurinol tablet 100 mg Daily    aspirin chewable tablet 81 mg Daily    atorvastatin tablet 80 mg Daily    bisacodyl suppository 10 mg Daily PRN    clopidogrel tablet 75 mg Daily    dextrose 50% injection 12.5 g PRN    dextrose 50% injection 25 g PRN    docusate sodium capsule 100 mg BID    epoetin mary kay injection 7,300 Units Every Tues, Thurs, Sat    ertapenem (INVANZ) 0.5 g in sodium chloride 0.9% 50 mL IVPB Q24H    fluticasone 50 mcg/actuation nasal spray 2 spray BID    glucagon (human recombinant) injection 1 mg PRN    glucose chewable tablet 16 g PRN    glucose chewable tablet 24 g PRN    heparin (porcine) injection 1,000 Units PRN    hydromorphone injection 0.5 mg Q2H PRN    HYDROmorphone tablet 4 mg Q6H    hydroxychloroquine tablet 200 mg BID    lidocaine HCl 2% urojet Daily    miconazole 2 % cream BID    ondansetron injection 4 mg Q8H PRN    sevelamer carbonate tablet 1,600 mg TID WM    simethicone chewable tablet 80 mg TID PRN    sodium chloride 0.9% flush 3 mL Q8H       Objective:     Vital Signs (Most Recent):  Temp: 98.7 °F (37.1 °C) (05/31/17 0847)  Pulse: 61 (05/31/17 1100)  Resp: 18 (05/31/17 0847)  BP: (!) 111/52 (05/31/17 0847)  SpO2: (!) 94 " % (05/31/17 0847)  O2 Device (Oxygen Therapy): room air (05/31/17 0847) Vital Signs (24h Range):  Temp:  [97 °F (36.1 °C)-98.7 °F (37.1 °C)] 98.7 °F (37.1 °C)  Pulse:  [57-69] 61  Resp:  [18] 18  SpO2:  [94 %-99 %] 94 %  BP: ()/(49-63) 111/52     Weight: 72.6 kg (160 lb 0.9 oz) (05/31/17 0811)  Body mass index is 22.97 kg/m².  Body surface area is 1.89 meters squared.    I/O last 3 completed shifts:  In: 1470 [P.O.:870; Other:600]  Out: 1950 [Urine:1350; Other:600]    Physical Exam   Constitutional: He is oriented to person, place, and time. He appears well-developed and well-nourished.   HENT:   Head: Normocephalic and atraumatic.   Eyes: Conjunctivae and EOM are normal.   Neck: Neck supple.   Cardiovascular: Normal rate, regular rhythm and normal heart sounds.    Pulmonary/Chest: Effort normal and breath sounds normal.   Abdominal: Soft. Bowel sounds are normal.   Neurological: He is alert and oriented to person, place, and time.       Significant Labs:  CBC:     Recent Labs  Lab 05/31/17 0418   WBC 4.85   RBC 2.42*   HGB 6.4*   HCT 21.2*   *   MCV 88   MCH 26.4*   MCHC 30.2*     CMP:     Recent Labs  Lab 05/31/17 0418   GLU 87   CALCIUM 8.8   ALBUMIN 2.4*   PROT 6.4      K 4.0   CO2 22*      BUN 39*   CREATININE 2.7*   ALKPHOS 79   ALT 14   AST 28   BILITOT 0.3     Labs: Reviewed

## 2017-05-31 NOTE — ASSESSMENT & PLAN NOTE
Nutrition Problem:   Altered nutrition related laboratory values    Etiology/Related to:   Decreased Kidney Function    As Evident By  GFR-13.6    Treatment Strategy:   Continue with Renal diet orders + Boost Plus     Continue with HD as per MD orders    Nutrition Diagnosis Status:   New

## 2017-05-31 NOTE — PROGRESS NOTES
"[facility]  Nephrology  Progress Note    Patient Name: Humphrey Machado  MRN: [id number]  Admission Date: 5/19/2017  Hospital Length of Stay: 12 days  Attending Provider: Dave Bro MD   Primary Care Physician: Andrew Murray MD  Principal Problem:ST elevation myocardial infarction (STEMI)    Subjective:     HPI: 86 yo M with PMHx of HTN,HLD, NSTEMI previously refused [facility] 5/2016, CKD stage IV (baseline 2.7), moderate AS, AF, venous insufficiency, cirrhosis, Ureterocutaneous fistula, chronic osteomyelitis of the pelvic region on cefpodoxime, suspected inflammatory arthritis on HCQ, neuromuscular disorder with peripheral neuropathy with recurrent right foot ulcer, prostate cancer s/p radiation, RCC s/p left nephrectomy, skin cancer of the hand, urethral stricture, urinary incontinence p/w SOB which started an hour prior to admission and EKG concerning for STEMI with afib with q waves in inferior/anterior-septal leads with resolution of chest pain with SL NTG. Patient was evaluated in the ED by Dr Duval and recommended to be taken to cath lab considering his clinical picture but he refused to proceed with angiogram due to risk of hemodialysis possibility     Renal consulted for worsening THEO on CKD     Interval History:   Pt underwent 2nd session of Hd yest for 2.5 hrs. Pt was able to complete treatment but no fluid was pulled     Review of patient's allergies indicates:   Allergen Reactions    Aspirin Other (See Comments)     Stomach      Ditropan [oxybutynin chloride]      Unable to describe side effect other than "felt strange"     Keflex [cephalexin] Rash     Morbilliform  Has tolerated multiple cephalosporins since 2013.    Macrobid [nitrofurantoin monohyd/m-cryst] Hives and Rash     Current Facility-Administered Medications   Medication Frequency    0.9%  NaCl infusion PRN    0.9%  NaCl infusion PRN    0.9%  NaCl infusion Once    0.9%  NaCl infusion PRN    0.9%  NaCl infusion Once "    albumin human 25% bottle 25 g PRN    allopurinol tablet 100 mg Daily    aspirin chewable tablet 81 mg Daily    atorvastatin tablet 80 mg Daily    bisacodyl suppository 10 mg Daily PRN    clopidogrel tablet 75 mg Daily    dextrose 50% injection 12.5 g PRN    dextrose 50% injection 25 g PRN    docusate sodium capsule 100 mg BID    epoetin mary kay injection 7,300 Units Every Tues, Thurs, Sat    ertapenem (INVANZ) 0.5 g in sodium chloride 0.9% 50 mL IVPB Q24H    fluticasone 50 mcg/actuation nasal spray 2 spray BID    glucagon (human recombinant) injection 1 mg PRN    glucose chewable tablet 16 g PRN    glucose chewable tablet 24 g PRN    heparin (porcine) injection 1,000 Units PRN    hydromorphone injection 0.5 mg Q2H PRN    HYDROmorphone tablet 4 mg Q6H    hydroxychloroquine tablet 200 mg BID    lidocaine HCl 2% urojet Daily    miconazole 2 % cream BID    ondansetron injection 4 mg Q8H PRN    sevelamer carbonate tablet 1,600 mg TID WM    simethicone chewable tablet 80 mg TID PRN    sodium chloride 0.9% flush 3 mL Q8H       Objective:     Vital Signs (Most Recent):  Temp: 98.7 °F (37.1 °C) (05/31/17 0847)  Pulse: 61 (05/31/17 1100)  Resp: 18 (05/31/17 0847)  BP: (!) 111/52 (05/31/17 0847)  SpO2: (!) 94 % (05/31/17 0847)  O2 Device (Oxygen Therapy): room air (05/31/17 0847) Vital Signs (24h Range):  Temp:  [97 °F (36.1 °C)-98.7 °F (37.1 °C)] 98.7 °F (37.1 °C)  Pulse:  [57-69] 61  Resp:  [18] 18  SpO2:  [94 %-99 %] 94 %  BP: ()/(49-63) 111/52     Weight: 72.6 kg (160 lb 0.9 oz) (05/31/17 0811)  Body mass index is 22.97 kg/m².  Body surface area is 1.89 meters squared.    I/O last 3 completed shifts:  In: 1470 [P.O.:870; Other:600]  Out: 1950 [Urine:1350; Other:600]    Physical Exam   Constitutional: He is oriented to person, place, and time. He appears well-developed and well-nourished.   HENT:   Head: Normocephalic and atraumatic.   Eyes: Conjunctivae and EOM are normal.   Neck: Neck  supple.   Cardiovascular: Normal rate, regular rhythm and normal heart sounds.    Pulmonary/Chest: Effort normal and breath sounds normal.   Abdominal: Soft. Bowel sounds are normal.   Neurological: He is alert and oriented to person, place, and time.       Significant Labs:  CBC:     Recent Labs  Lab 05/31/17 0418   WBC 4.85   RBC 2.42*   HGB 6.4*   HCT 21.2*   *   MCV 88   MCH 26.4*   MCHC 30.2*     CMP:     Recent Labs  Lab 05/31/17 0418   GLU 87   CALCIUM 8.8   ALBUMIN 2.4*   PROT 6.4      K 4.0   CO2 22*      BUN 39*   CREATININE 2.7*   ALKPHOS 79   ALT 14   AST 28   BILITOT 0.3     Labs: Reviewed    Assessment/Plan:     Acute kidney injury superimposed on CKD     Pt k/c/o CKD stage 3-4 2/2 HTN, solitary kidney s/p nephrectomy due to RCC, recurrent UTIs   - baseline creat 2.3 to 2.7   - THEO may be pre renal vs ischemic ATN from hypotension, Pt continues to have low borderline BPs,  - pt has undergone 2 sessions of HD so far without fluid removal   - Pt continues to complain of pain and discomfort. Pt was explained that in order to continue dialysis he would have to be off pain meds for a few hr prior to and during treatment given it can bring the BP down   - hence pt was asked to consider goals and take decision regarding continuation of dialysis   - Palliative care to have meeting with pt and family this afternoon. Will follow up on discussion             Diya Vega MD  Nephrology  [facility]

## 2017-05-31 NOTE — PT/OT/SLP PROGRESS
Occupational Therapy  Treatment    Humphrey Machado   MRN: [id number]   Admitting Diagnosis: ST elevation myocardial infarction (STEMI)    OT Date of Treatment: 17   OT Start Time: 0753 (plus additional 5 min this PM)  OT Stop Time: 0812  OT Total Time (min): 19 min; 24 min total    Billable Minutes:  Therapeutic Activity 24      OT reviewed chart of pt this AM noting that family refusing SNF and hospice/pallative consulted with family in discussion that this may be most appropriate route.  OT present in room with pt/family to discuss if therapy is still wanted by these parties, SNF vs palliative placement, and therapy goals depending on choice of placement vs home.  Wife blankly staring at OT at asking OT to consult pt.  Pt confused and with very poor insight and understanding into explanation by OT despite multiple attempts to re-phrase comments and despite pt opportunity to ask questions about what he did not comprehend.  Caregiver attempting to explain to both wife and pt with limited success.  Family requesting OT return this PM once son present.      Upon PM attempt: wife and multiple children present.  OT attempting to discuss the situation again but family stating MD meeting to begin soon and requested OT return 17 at 10am to discuss future therapy plans. Pt remained confused this PM asking for OT to speak with male who works at  home for therapy issues.  Son attempting to re-orient pt.     HIEU Drummond  2017

## 2017-05-31 NOTE — NURSING
Attempted to obtain 12 noon vital signs at 1230,1300, and 1315. Patient and family refused stating they were in a family meeting. Patient is stable with no distress noted. Will continue to monitor patient.

## 2017-05-31 NOTE — PT/OT/SLP PROGRESS
Physical Therapy      Humphrey Machado  MRN: [id number]    Patient not seen today secondary to conversation with OT stated family meeting to be performed today.  Pt confused and disoriented. Will follow-up next scheduled visit.    Sandra Mccartney, PTA

## 2017-05-31 NOTE — NURSING
Pt free of falls, injuries this shift. POC reviewed with pt, family at bedside, verbalized understanding. Pt to receive dialysis later this AM. VSS throughout shift, NAD noted. Heparin gtt infusing per MAR; next anti-Xa due this AM. Complaints of pain managed with PRN IV and scheduled PO dilaudid. VSS, NAD noted. Will continue to monitor.

## 2017-05-31 NOTE — PROGRESS NOTES
[facility] Hospital Medicine  Progress Note    Patient Name: Humphrey Machado  MRN: [id number]  Patient Class: IP- Inpatient   Admission Date: 5/19/2017  Length of Stay: 12 days  Attending Physician: Dave Bro MD  Primary Care Provider: Andrew Murray MD    [facility] Medicine Team: [facility] MED C Dave Bro MD    Subjective:     Principal Problem:ST elevation myocardial infarction (STEMI)    HPI: 85-year-old man with HTN, HLD, NSTEMI previously refused [facility] 5/2016, CKD stage IV (baseline 2.7), moderate AS, AF, venous insufficiency, cirrhosis, Ureterocutaneous fistula, chronic osteomyelitis of the pelvic region on cefpodoxime, suspected inflammatory arthritis on HCQ, neuromuscular disorder with peripheral neuropathy with recurrent right foot ulcer, prostate cancer s/p radiation, RCC s/p left nephrectomy, skin cancer of the hand, urethral stricture, urinary incontinence p/w SOB which started an hour prior to admission and EKG concerning for STEMI with afib with q waves in inferior/anterior-septal leads with resolution of chest pain with SL NTG. Patient was evaluated in the ED by Dr Duval and recommended to be taken to cath lab considering his clinical picture but he refused to proceed with angiogram due to risk of hemodialysis possibility he will be admitted fort mdical treatment in CCU.     Currently he is very SOB with fluid overload and aneuric.looks like not responding to lasix his son is his POA and they are discussing code status and if they will proceed with more procedures including CRRT.     Patient with recent cystoscopy/cystogram/fistulogram by Dr. 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 Abraham's  Operative noted.        Hospital Course: Hospital course as above, while in  the CCU patient was medically managed for his STEMI, and for his AMIRA.  CCU team discussed comfort vs aggressive measures, patient's code status changed to DNR and due to concern that patient was anuric with developing AMIRA that may require HD. Discussion per EMR with family revealed they would not want to pursue dialysis. He was started on Lasix drip @ 20mg/hr and IV Diuril q12 hrs and had improved urine output on this regimen.  PRN Morphine and Ativan orders had been placed for patient comfort     Patient stepped down to IM-C casiano service on 5/20.  Patient was acutely encephalopathic, developed fevers and notified by RN blood cultures from admission were growing GNR.  Surveillance cultures sent patient was on Vancomycin and Cefepime.  He was having urine output on the lasix and diuril.  Extensive family discussion held (see progress notes for detail)  Family was not universally ready for comfort care, care plan more no escalation of care, given his altered mentation, I suspected morphine and ativan in Amira contributing, opiates switched to dilaudid and ativan d/c.   Discussed NG tube placement for oral med administration and nutrition but was held off.      5/21 - Patient is more awake and alert, likely related to toxic encephalopathy, remains hemodynamically stable, but was having more pain complaints, was receiving IV dilaudid  0.5mg q2hrs.   SLP evaluated patient and he is safe for dental soft nectar thick liquids.   His heart rhythm converted to atrial fibrillation with controlled rate in the 80-90s.   Will need repeat cardiology recs on management in AM.      Patient with nearly 2L UOP x 24hrs, family with more questions about possible HD or what course his Amira may take.  Nephrology re-contacted to follow the patient.  Diuril will be stopped this evening to monitor how pts UOp  And Cr does.     I reviewed more of recent urologic history and patient has a urinary source of his bacteremia, however with complicated  chronic fistula and wound we may not have source control. Discussed with patient and will consult urology to assist in any other management for source control and chronic urethrocutaneous fistula that may be needed - Urology consult placed for mon Am.      Reviewed prior culture data and patient with hx of Klebsiella ESBL and Pseudomonas in urine, both not sensitive to empiric cefepime so ID approval obtained to change to renal dosed Meropenem      Patient says pain control is improving but is still seems he has chronic pain issues from multiple sites, adding po opiates to pain regimen.      5/22 - Additional consultants have evaluated the patient  Cardiology - no additional med management recs at this time, patient has completed 48hrs of heparin for ACS, however with atrial fibrillation, will continue for now.  Coreg change to low dose metoprolol due to hypotension     Urology - evaluated patient and removed ureteral catheter, no further recs for chronic urtherocutaneous fistula, local wound care.   Wound care evaluated the patient and placed barrier cream in right groin with soft dressing to help absorb urinary leak from fistula, I was at bedside and pt did not feel ready to turn for assessment of buttocks.      Nephrology - evaluated the patient and no acute needs for acute HD, they will continue to follow, did inform pt that nephrology feels initiating HD would not improve overall morbidity/mortality from his co-morbid conditions and HD initiation comes with its own risks and invasive procedures required.      Palliative care - evaluated pt to assist in overall care planning.  MD staff to assist with pain management not available this week.      5/23- patient complained of discomfort. He is still unsure of what he wants for medical management. He is still considering hemodialysis as an option. His renal function continued to worsen but his main concern was his discomfort.     5/24- patient unable to voice his  "wishes nor does he want to have a goals of care discussion. He has poor insight into his disease process. He is verbally abusive to his providers and nursing staff. Discussed with his wife and grandson scheduling a family meeting to establish therapeutic plan.      5/25- he continues to be abusive to medical personnel. When trying to assess his pain he becomes abusive and screams that he can't tell.      5/26-Family meeting today to establish therapeutic plan. Patient has agreed to hemodialysis and oral anticoagulation. He also agreed to PT and OT however he has refused SNF. He has developed some mental "foggyness"      5/27-Underwent HD.      5/28- overnight patient with hypotension. Narcotic analgesics held and volume replaced by overnight physician. Moderate to severe pain on evaluation and analgesics resumed. Metoprolol dose further adjusted.     5/29- Patient could not be dialyzed due to hypotension. Options discussed by Nephrology service. Plan to hold metoprolol for now and hold narcotic analgesics 4 hours prior to HD tomorrow. If patient unable to tolerate then will need hospice care.    Interval History:     Patient seen this afternoon, overnight his Hgb downtrended to 6.2.  Heparin infusion stopped this morning.   When I went to see the patient, son (FRIDA) Nitin informed me that after further discussion, patient has decided he does not want to pursue further dialysis treatment at this time, and they wanted to pursue more comfort care measures    Nephrology service informed of this information, patient received HD yesterday but unable to remove volume and pt tolerated 2.5hrs of therapy, but per family and nephrology staff pt with ongoing discomfort with procedure.     Palliative care service involved with care, and informed of patient's decision they are meeting with patient @ 3pm and Dr. Glynn to evaluate later this afternoon.       Review of Systems   Constitutional: Positive for fatigue. Negative for " fever.   HENT: Positive for trouble swallowing. Negative for rhinorrhea and sore throat.    Eyes: Negative for visual disturbance.   Respiratory: Negative for chest tightness and shortness of breath.    Cardiovascular: Negative for chest pain.   Gastrointestinal: Negative for abdominal pain, diarrhea and nausea.   Genitourinary: Positive for penile pain.   Musculoskeletal: Positive for arthralgias and myalgias.     Objective:     Vital Signs (Most Recent):  Temp: 98.7 °F (37.1 °C) (05/31/17 1600)  Pulse: 65 (05/31/17 1600)  Resp: 18 (05/31/17 1600)  BP: 114/62 (05/31/17 1600)  SpO2: 96 % (05/31/17 1600) Vital Signs (24h Range):  Temp:  [98.6 °F (37 °C)-98.7 °F (37.1 °C)] 98.7 °F (37.1 °C)  Pulse:  [59-69] 65  Resp:  [18] 18  SpO2:  [94 %-99 %] 96 %  BP: (103-119)/(52-62) 114/62     Weight: 72.6 kg (160 lb 0.9 oz)  Body mass index is 22.97 kg/m².    Intake/Output Summary (Last 24 hours) at 05/31/17 1731  Last data filed at 05/31/17 1500   Gross per 24 hour   Intake              420 ml   Output              575 ml   Net             -155 ml      Physical Exam   Constitutional: No distress.   HENT:   Dry oropharynx   Eyes: No scleral icterus.   Neck: No JVD present.   Cardiovascular:   Irregular rhythm, systolic murmur present    Pulmonary/Chest: Effort normal. No respiratory distress.   Anterior exam CTA in upper fields, limited exam at lateral bases   Abdominal: Soft. Bowel sounds are normal. There is no tenderness.   Genitourinary:   Genitourinary Comments: Stevens catheter to gravity drainage, urine clearing with regard to color, more dark brown/yellow  Right groin urethrocutaneous fistula dressing in place   Musculoskeletal: He exhibits tenderness.   Neurological: He is alert.   Skin: Skin is warm and dry.   Psychiatric:   Slowed response to questioning, flat affect       Significant Labs:   BMP:     Recent Labs  Lab 05/31/17  0418   GLU 87      K 4.0      CO2 22*   BUN 39*   CREATININE 2.7*   CALCIUM 8.8    MG 2.2     CBC:     Recent Labs  Lab 05/30/17  0348 05/31/17  0418   WBC 4.95 4.85   HGB 7.3* 6.4*   HCT 23.0* 21.2*   * 122*       Significant Imaging: I have reviewed all pertinent imaging results/findings within the past 24 hours.    Assessment/Plan:      1. THEO on CKD  -s/p placement of temp HD access and has received HD x 1 on Saturday, unable to receive yesterday  -Patient has decided not to pursue further HD treatments and based on the few treatments he received, nephrology has noted he has poor prognosis    2. Bacteremia, ESBL Klebsiella  -continue ertapenem, day 11 of IV abx    3. STEMI/Coronary Artery disease  -continue medical management, ASA, statin/plavix  -holding beta blocker due to #1    4. Goals of Care   - Will transition to more comfort care measures, stopping Dialysis. I was planning to discuss possible PRBC transfusion but in light of this decision, will not plan any blood product transfusion.   - Cancel AM Labs   - Team SW informed of update in care plan, to discuss with family regarding  Choice of hospice agency  -Palliative NP has recommended inpatient hospice, family still discussing next steps for patient.   - palliative service following, appreciate assistance with family counseling and management recs       5. Chronic Pain & Nausea  -Switch to Fentanyl 12.5mcg/hr TD patch  -change scheduled PO dilauidid 4mg PO q4hrs to PRN q4hrs  -continue PRN IV dilaudid  -For N/V   >Zofran 4mg IV q4hrs PRN   >Haldol 0.5mg IV q4hrs PRN, 2nd line therapy    6.  Atrial fibrillation   -rate controlled,  -heparin drip stopped secondary to progressive anemia    7. Urethrocutaneous fistula  -continue montano to gravity drainage  -local wound care   -s/p urology eval w/o any operative interventions.     8. Dysphagia  -dental soft, nectar thick diet    Active Diagnoses:    Diagnosis Date Noted POA    PRINCIPAL PROBLEM:  Acute kidney injury superimposed on CKD [N17.9, N18.9] 05/21/2017 Yes    Bacteremia  due to Klebsiella pneumoniae [R78.81] 05/21/2017 Yes    Ischemic cardiomyopathy [I25.5] 05/21/2017 Yes    Atrial fibrillation with RVR [I48.91]  Yes    ST elevation myocardial infarction (STEMI) [I21.3] 05/19/2017 Yes    Urinary tract infection due to extended-spectrum beta lactamase (ESBL)-producing Klebsiella [N39.0, B96.89] 07/08/2013 Yes    Chronic pain [G89.29] 05/21/2017 Yes    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 [N36.0] 05/02/2017 Yes    Complicated open wound of right thigh [S71.101A] 01/25/2017 Yes    Debility [R53.81] 08/04/2015 Yes    Pain [R52] 05/23/2017 Yes    Palliative care encounter [Z51.5]  Not Applicable    Goals of care, counseling/discussion [Z71.89]  Not Applicable    Hyperphosphatemia [E83.39] 05/22/2017 Yes    Oropharyngeal dysphagia [R13.12] 05/21/2017 No    CKD (chronic kidney disease) [N18.9] 10/10/2016 Yes      Problems Resolved During this Admission:    Diagnosis Date Noted Date Resolved POA    Chest pain [R07.9] 05/19/2017 05/21/2017 Yes    Respiratory distress [R06.00] 05/19/2017 05/21/2017 Yes     VTE Risk Mitigation         Ordered     Medium Risk of VTE  Once      05/19/17 0945     Reason for No Pharmacological VTE Prophylaxis  Once      05/19/17 0945          Dave Bro MD  Department of Hospital Medicine   [facility]

## 2017-05-31 NOTE — PLAN OF CARE
Problem: Patient Care Overview  Goal: Plan of Care Review  Outcome: Ongoing (interventions implemented as appropriate)  Plan of care reviewed with patient. Patient remains free from injury. No acute events noted at this time. Stevens remains in place. Antibiotics continued. Will continue to monitor patient.

## 2017-05-31 NOTE — PROGRESS NOTES
[facility]  Adult Nutrition  Progress Note    SUMMARY     Recommendations    Recommendation/Intervention: 1.Continue with current diet. 2 Encourage Good PO intake of High Value Protein 3. PO intake >75% 4. RD to follow  Goals: PO intake >75% EEN/EPN  Nutrition Goal Status: progressing towards goal  Communication of RD Recs: reviewed with RN    Reason for Assessment    Reason for Assessment: RD follow-up  Diagnosis:  (venous insufficiency, A-fib, Respiratory distress, STEMI)  Relevant Medical History: afib, acute appendicitis, anemia, arthritis, CKD stg 3, HTN, prostate ca     Interdisciplinary Rounds: attended  General Information Comments: Pt appetite has improved po intake is 75%+ boost. No N/V, Receiving HD for fluid removal.  Nutrition Discharge Planning: Cardiac/renal diet    Nutrition Prescription Ordered    Current Diet Order: Dental soft  Nutrition Order Comments: [place] Thick   Oral Nutrition Supplement: Boost Plus      Nutrition Risk Screen     Nutrition Risk Screen: no indicators present    Nutrition/Diet History    Patient Reported Diet/Restrictions/Preferences: general  Typical Food/Fluid Intake: adequate PTA  Food Preferences: no cultural or Rastafarian needs identified  Factors Affecting Nutritional Intake: pain, chewing difficulties/inability to chew food, difficulty/impaired swallowing     Labs/Tests/Procedures/Meds    Diagnostic Test/Procedure Review: reviewed  Pertinent Labs Reviewed: reviewed  Pertinent Labs Comments: INR-1.2, Alb-2.5, Pre Alb-29, CRP-74.2, GFR-13.6, BUN-71, Cr-3.8, Phos-6.1  Pertinent Medications Reviewed: reviewed  Pertinent Medications Comments: statin, docusate, heparin, simethicone    Physical Findings    Overall Physical Appearance: weak  Oral/Mouth Cavity: all teeth present (age appropriate)  Skin: non-healing wound(s) (wounds x 2 - ulceration)    Anthropometrics    Height Method: Stated  Height (inches): 70 in  Weight Method: Stated  Weight (kg): 77.8  kg  Ideal Body Weight (IBW), Male: 166 lb  % Ideal Body Weight, Male (lb): 96.42 lb  BMI (kg/m2): 24.61  BMI Grade: 18.5-24.9 - normal     Estimated/Assessed Needs    Weight Used For Calorie Calculations: 77.8 kg (171 lb 8.3 oz)   Height (cm): 177.8 cm  Energy Need Method: Kcal/kg (0666-5326 kcal (20-25 kcal/kg))   RMR (Mariposa-St. Jeor Equation): 1417.25  Weight Used For Protein Calculations: 77.8 kg (171 lb 8.3 oz)  Protein Requirements:  g (1.0-1.3 g/kg)  Fluid Need Method: RDA Method     Assessment and Plan    CKD (chronic kidney disease)    Nutrition Problem:   Altered nutrition related laboratory values    Etiology/Related to:   Decreased Kidney Function    As Evident By  GFR-13.6    Treatment Strategy:   Continue with Renal diet orders + Novosource.    Continue with HD as per MD orders    Nutrition Diagnosis Status:   New              Monitor and Evaluation    Food and Nutrient Intake: energy intake, food and beverage intake, enteral nutrition intake, parenteral nutrition intake  Food and Nutrient Administration: diet order, enteral and parenteral nutrition administration  Anthropometric Measurements: weight change, body mass index, weight  Biochemical Data, Medical Tests and Procedures: electrolyte and renal panel, glucose/endocrine profile, lipid profile  Nutrition-Focused Physical Findings: overall appearance  % Intake of Estimated Energy Needs: 50 - 75 %  % Meal Intake: 50%    Nutrition Risk    Level of Risk: other (see comments) (1x/week)    Nutrition Follow-Up    RD Follow-up?: Yes    Bayron Ontiveros RD

## 2017-05-31 NOTE — PROGRESS NOTES
Palliative care in room with patient/family.  Spoke with nurse Sharel.  GOYO overlay in use.  Nursing continues to use barrier paste for moist areas/fistula area.  Ordered exudry pads to absorb moisture and wick away from skin for comfort.  Patient not seen- palliative care meeting/family meeting.  Nursing to continue care.

## 2017-06-01 PROBLEM — R52 PAIN: Status: RESOLVED | Noted: 2017-05-23 | Resolved: 2017-06-01

## 2017-06-01 PROBLEM — I21.3 ST ELEVATION MYOCARDIAL INFARCTION (STEMI): Status: RESOLVED | Noted: 2017-05-19 | Resolved: 2017-06-01

## 2017-06-01 PROCEDURE — 25000003 PHARM REV CODE 250: Performed by: INTERNAL MEDICINE

## 2017-06-01 PROCEDURE — 99233 SBSQ HOSP IP/OBS HIGH 50: CPT | Mod: ,,, | Performed by: INTERNAL MEDICINE

## 2017-06-01 PROCEDURE — 99232 SBSQ HOSP IP/OBS MODERATE 35: CPT | Mod: ,,, | Performed by: HOSPITALIST

## 2017-06-01 PROCEDURE — 63600175 PHARM REV CODE 636 W HCPCS: Performed by: INTERNAL MEDICINE

## 2017-06-01 PROCEDURE — 63600175 PHARM REV CODE 636 W HCPCS: Performed by: HOSPITALIST

## 2017-06-01 PROCEDURE — 25000003 PHARM REV CODE 250: Performed by: STUDENT IN AN ORGANIZED HEALTH CARE EDUCATION/TRAINING PROGRAM

## 2017-06-01 PROCEDURE — 97530 THERAPEUTIC ACTIVITIES: CPT

## 2017-06-01 PROCEDURE — 20600001 HC STEP DOWN PRIVATE ROOM

## 2017-06-01 PROCEDURE — 25000003 PHARM REV CODE 250: Performed by: HOSPITALIST

## 2017-06-01 RX ORDER — HALOPERIDOL 2 MG/ML
0.5 SOLUTION ORAL EVERY 4 HOURS PRN
Status: DISCONTINUED | OUTPATIENT
Start: 2017-06-01 | End: 2017-06-02 | Stop reason: HOSPADM

## 2017-06-01 RX ORDER — HALOPERIDOL 2 MG/ML
0.5 SOLUTION ORAL NIGHTLY
Status: DISCONTINUED | OUTPATIENT
Start: 2017-06-01 | End: 2017-06-02 | Stop reason: HOSPADM

## 2017-06-01 RX ORDER — LIDOCAINE HYDROCHLORIDE 20 MG/ML
JELLY TOPICAL DAILY PRN
Status: DISCONTINUED | OUTPATIENT
Start: 2017-06-01 | End: 2017-06-02 | Stop reason: HOSPADM

## 2017-06-01 RX ADMIN — HYDROMORPHONE HYDROCHLORIDE 0.5 MG: 1 INJECTION, SOLUTION INTRAMUSCULAR; INTRAVENOUS; SUBCUTANEOUS at 10:06

## 2017-06-01 RX ADMIN — MICONAZOLE NITRATE: 20 CREAM TOPICAL at 08:06

## 2017-06-01 RX ADMIN — HALOPERIDOL LACTATE 0.5 MG: 5 INJECTION, SOLUTION INTRAMUSCULAR at 09:06

## 2017-06-01 RX ADMIN — ASPIRIN 81 MG CHEWABLE TABLET 81 MG: 81 TABLET CHEWABLE at 08:06

## 2017-06-01 RX ADMIN — HYDROMORPHONE HYDROCHLORIDE 4 MG: 2 TABLET ORAL at 12:06

## 2017-06-01 RX ADMIN — HYDROXYCHLOROQUINE SULFATE 200 MG: 200 TABLET, FILM COATED ORAL at 09:06

## 2017-06-01 RX ADMIN — HYDROMORPHONE HYDROCHLORIDE 4 MG: 2 TABLET ORAL at 09:06

## 2017-06-01 RX ADMIN — Medication 3 ML: at 09:06

## 2017-06-01 RX ADMIN — DOCUSATE SODIUM 100 MG: 100 CAPSULE, LIQUID FILLED ORAL at 09:06

## 2017-06-01 RX ADMIN — Medication 3 ML: at 05:06

## 2017-06-01 RX ADMIN — ONDANSETRON 4 MG: 2 INJECTION INTRAMUSCULAR; INTRAVENOUS at 01:06

## 2017-06-01 RX ADMIN — HYDROMORPHONE HYDROCHLORIDE 0.5 MG: 1 INJECTION, SOLUTION INTRAMUSCULAR; INTRAVENOUS; SUBCUTANEOUS at 03:06

## 2017-06-01 RX ADMIN — HYDROMORPHONE HYDROCHLORIDE 4 MG: 2 TABLET ORAL at 08:06

## 2017-06-01 RX ADMIN — ATORVASTATIN CALCIUM 80 MG: 20 TABLET, FILM COATED ORAL at 08:06

## 2017-06-01 RX ADMIN — HALOPERIDOL 0.5 MG: 2 SOLUTION ORAL at 12:06

## 2017-06-01 RX ADMIN — CLOPIDOGREL 75 MG: 75 TABLET, FILM COATED ORAL at 08:06

## 2017-06-01 RX ADMIN — HYDROMORPHONE HYDROCHLORIDE 4 MG: 2 TABLET ORAL at 05:06

## 2017-06-01 RX ADMIN — HALOPERIDOL LACTATE 0.5 MG: 5 INJECTION, SOLUTION INTRAMUSCULAR at 05:06

## 2017-06-01 RX ADMIN — DOCUSATE SODIUM 100 MG: 100 CAPSULE, LIQUID FILLED ORAL at 08:06

## 2017-06-01 RX ADMIN — HYDROMORPHONE HYDROCHLORIDE 0.5 MG: 1 INJECTION, SOLUTION INTRAMUSCULAR; INTRAVENOUS; SUBCUTANEOUS at 07:06

## 2017-06-01 RX ADMIN — HALOPERIDOL 0.5 MG: 2 SOLUTION ORAL at 09:06

## 2017-06-01 RX ADMIN — ALLOPURINOL 100 MG: 100 TABLET ORAL at 08:06

## 2017-06-01 RX ADMIN — HALOPERIDOL 0.5 MG: 2 SOLUTION ORAL at 05:06

## 2017-06-01 RX ADMIN — SODIUM CHLORIDE 0.5 G: 900 INJECTION, SOLUTION INTRAVENOUS at 03:06

## 2017-06-01 NOTE — PLAN OF CARE
Problem: Patient Care Overview  Goal: Plan of Care Review  Outcome: Ongoing (interventions implemented as appropriate)  Pt remained free of injuries, falls, and trauma throughout the shift. VSS. No distress or complaints noted throughout the shift.  Pt has fentanyl patch on right shoulder. Pt also have PRN pain medication ordered. Family presence at bedside. Palliative care consulted. Plan of care reviewed with pt. Pt and family verbalize understanding. Bed in lowest position. Call bell within reach. Will continue to monitor.

## 2017-06-01 NOTE — PLAN OF CARE
Problem: Patient Care Overview  Goal: Plan of Care Review  Outcome: Ongoing (interventions implemented as appropriate)  Plan of care reviewed with patient. Patient remains free from injury. No acute events noted at this time. Monitoring pain levels. Will continue to monitor patient and keep him comfortable.

## 2017-06-01 NOTE — PLAN OF CARE
met with pts two sons at the bedside today for follow up.  Social woker explained role and answered questions.  Pts sons stated they do not have power of  and pts other son will give input and make decisions.  Family will contact [facility].  sw to follow.

## 2017-06-01 NOTE — PT/OT/SLP DISCHARGE
Physical Therapy Discharge Summary    uHmphrey Machado  MRN: [id number]   Acute kidney injury superimposed on CKD   Patient Discharged from acute Physical Therapy on 17.  Please refer to prior PT noted date on 17 for functional status.     Assessment:   Patient appropriate for care in another setting.  GOALS:    Physical Therapy Goals        Problem: Physical Therapy Goal    Goal Priority Disciplines Outcome Goal Variances Interventions   Physical Therapy Goal     PT/OT, PT Ongoing (interventions implemented as appropriate)     Description:  Goals to be met by: 17     Patient will increase functional independence with mobility by performin. Supine to sit with Moderate Assistance  2. Sit to supine with Moderate Assistance  3. Sit to stand transfer with Moderate Assistance  4. Bed to chair transfer with Moderate Assistance using appropriate AD.  5. Gait  x 5 feet with Moderate Assistance using appropriate AD.   6. Lower extremity exercise program x10 reps, with assistance as needed, in order to increase LE strength and (I) with functional mobility.                     Reasons for Discontinuation of Therapy Services  Patient and family declines to continue care. and Patient is unable to continue work toward goals because of medical or psychosocial complications.      Plan:  Patient Discharged to: currently still at [facility]. Plan to d/c to Palliative Care/Hospice and family assist.  Recommend hospital bed with rotation air mattress at d/c.     Nataly De Oliveira, PT, DPT   2017  [phone]

## 2017-06-01 NOTE — PT/OT/SLP PROGRESS
Occupational Therapy  Treatment and D/C    Humphrey Machado   MRN: [id number]   Admitting Diagnosis: Acute kidney injury superimposed on CKD    OT Date of Treatment: 06/01/17   OT Start Time: 0958  OT Stop Time: 1024  OT Total Time (min): 26 min    Billable Minutes:  Therapeutic Activity 26    Pt and family see at bedside after speaking with MD team.  OT informed pt has decided to D/C with hospice care.  OT engaged in lengthy discussion with family regarding their wishes of if therapy should be involved in this stage or not.  Ultimately it was decided that OT/PT will sign off on pt as he has too much pain/limited pain tolerance for treatments.  Son with good understanding of how OT moving pt to EOB with Total A x2 is of limited benefit to pt at this stage as he is unable to put forth much effort, making functional gains unlikely.  Son with questions regarding positioning of pt and comfort positioning was discussed along with importance of continued pt rotation for off loading of weight bear on bony prominences.  Family with questions about air mattress at D/C and was educated on different types of hospital beds/mattresses.  All parties agreeable to D/C from acute OT at this time.  PT alerted of conversation and will be signing off as well.     D/C recommendations: no OT/PT needs at D/C.  Recommend home with family vs family choice for hospice vs NH placement.  Encourage 24 hr assist/supervision.   DME recommendations: hospital bed with rotating air mattress.    Plan of Care reviewed with: patient, family    DOREEN [place] LOTR, CKTP  06/01/2017

## 2017-06-01 NOTE — PT/OT/SLP DISCHARGE
Occupational Therapy Discharge Summary    Humphrey Machado  MRN: [id number]   Acute kidney injury superimposed on CKD   Patient Discharged from acute Occupational Therapy on 6/1/17.  Please refer to prior OT note dated on 6/1/17 for functional status.     Assessment:   Patient appropriate for care in another setting.  GOALS:    Occupational Therapy Goals     Not on file          Multidisciplinary Problems (Resolved)        Problem: Occupational Therapy Goal    Goal Priority Disciplines Outcome Interventions   Occupational Therapy Goal   (Resolved)     OT, PT/OT Outcome(s) achieved    Description:  Goals to be met by: 7 days (6/1/17)     Patient will increase functional independence with ADLs by performing:    UE Dressing with Minimal Assistance.  Grooming while EOB with Supervision.  Toileting from bedside commode with Moderate Assistance for hygiene and clothing management.   Sitting at edge of bed x15 minutes with Stand-by Assistance.  Supine to sit with Moderate Assistance.  Toilet transfer to bedside commode with Moderate Assistance.                    Reasons for Discontinuation of Therapy Services  Patient declines to continue care., Patient is unable to continue work toward goals because of medical or psychosocial complications. and Therapist determines that the patient will no longer benefit from therapy services.      Plan:  Patient Discharged to: [place] [facility]   HIEU [place]  6/1/2017  .

## 2017-06-01 NOTE — PT/OT/SLP PROGRESS
Physical Therapy      Humphrey Machado  MRN: [id number]    PT informed by OT that family decision was made for pt to d/c with hospice care. OT had family meeting earlier this date to discuss goals of therapy with pt and family, who decided that PT and OT can sign off so comfort care measures can be taken. Pt d/c from IP PT at this time. Full d/c summary to follow.     Nataly De Oliveira, PT, DPT   6/1/2017  [phone]

## 2017-06-01 NOTE — PLAN OF CARE
Problem: Occupational Therapy Goal  Goal: Occupational Therapy Goal  Goals to be met by: 7 days (6/1/17)     Patient will increase functional independence with ADLs by performing:    UE Dressing with Minimal Assistance.  Grooming while EOB with Supervision.  Toileting from bedside commode with Moderate Assistance for hygiene and clothing management.   Sitting at edge of bed x15 minutes with Stand-by Assistance.  Supine to sit with Moderate Assistance.  Toilet transfer to bedside commode with Moderate Assistance.     Outcome: Outcome(s) achieved Date Met: 06/01/17  Pt has decided to D/C with hospice care.  See OT note for full details.  D/C from acute OT.   HIEU Drummond  6/1/2017

## 2017-06-01 NOTE — PROGRESS NOTES
Progress  Note  Palliative Care          ASSESSMENT/PLAN:       Impression: Mr. Machado is a 85 yr old gentleman s/p STEMI  and refusal of right heart cath 5/20/17. Worsening THEO in setting of chronic kidney disease.  BUN and creatinine improved and remain elevated (BUN 39 Cr. 2.7) montano catheter in place, dark concentrated urine.  He is awake, alert and oriented to person,place time and situation. HD trial completed- patient unable to tolerate.             Goals of Care:   Family meeting this afternoon at 2:45 PM by palliative care APRN with patient, his wife, two sons, daughter and his brother.   Focus of meeting to further develop goals of care with consideration given to transition to comfort care. Patient and family state complete understanding of current clinical condition and that dialysis is not a treatment option at this time.  Mr. Machado states his wish is to go home and enjoy his last days in comfort with his family.  Mr. Machado further clarified that he is amenable to hospice.  Numerous questions asked and answered to patient and family's  satisfaction.  Discussed philosophy, settings of hospice, symptom control, return to hospital/clinic.  Mr. Machado and his family insistent upon home hospice. The family has a day time care taker already and plan to hire another for evening and night.  Palliative care recommended a hospice agency with inpatient capacity.  List of inpatient hospice agencies given to family.  They will visit this evening. Intense emotional support given.     The following goals were established.   -  Patient and family amenable to hospice.  Family is hopeful this transition will take place before the weekend.   - Pain management recommendations as per Dr. Glynn      Symptom Management   Pain:   Patient states pain is better controlled today than yesterday. recommend continuing current pain medications:   Fentanyl 12 mcg transdermal patch every 72 hrs  Hydromorphone 0.5 mg IVP every 2  hrs as needed for severe pain 7-10/10 pain scale   Hydromorphone 4 mg by mouth every 4 hrs as needed for moderate pain 4-6/10 pain scale   Nausea   Patient has no complaints of nausea or vomiting recommend continuing current nausea medicines  Ondansetron 4 mg IVP every 4 hrs as needed for nausea/vomiting 1st choice  Haloperidol 0.5 mg IVP every 4 hrs as needed for nausea  .    Plan/Recommendations:  1. Symptom management: allow 24 hrs before making changes to fentanyl patch dose   encourage use of oral dilaudid before IV dilaudid. Discontinue IV dilaudid 12 hrs after fentanyl patch placed  2.  Mr. Machado has made decision to transition to hospice. He and family insistent on home hospice.  Further recommended hospice agency with inpatient capacity. Family given list of hospice agencies with inpatient units. Family is going to visit and make decisions by tomorrow 6/1/17    3. Hospice education provided   4. Consult  for hospice consult     5. [facility] on discharge. Form placed in blue chart.  Gold copy to be given to patient and copy to be scanned in EMR   6. Emotional support      Recommendations discussed with Dr. Bro and he will notify  of patient's decision for hospice.  Thank you for opportunity to participate in Carter Regalado's care      Signature: Leah Parson, MARILEE, ACNS-BC, OCN     > 50% of  45 min visit spent in chart review, face to face discussion of goals of care,  symptom assessment, coordination of care and emotional support.      SUBJECTIVE:     History of Present Illness:86 yo M with PMHx of HTN,HLD, NSTEMI previously refused [facility] 5/2016, CKD stage IV (baseline 2.7), moderate AS, AF, venous insufficiency, cirrhosis, Ureterocutaneous fistula, chronic osteomyelitis of the pelvic region on cefpodoxime, suspected inflammatory arthritis on HCQ, neuromuscular disorder with peripheral neuropathy with recurrent right foot ulcer, prostate cancer s/p radiation, RCC s/p left  nephrectomy, skin cancer of the hand, urethral stricture, urinary incontinence p/w SOB which started an hour prior to admission and EKG concerning for STEMI with afib with q waves in inferior/anterior-septal leads with resolution of chest pain with SL NTG. Patient was evaluated and recommended to be taken to cath lab considering his clinical picture but he refused to proceed.          Past Medical History:   Diagnosis Date    *Atrial fibrillation     Acute appendicitis 2/19/2015    Anemia     Anxiety     Arthritis     generalized    Basal cell carcinoma 01/2013    right temple    BCC (basal cell carcinoma)     right mid forearm    Bladder stone 6/28/2016    Blood transfusion     Chronic urethral stricture 10/14/2015    Chronic venous insufficiency 7/8/2013    CKD (chronic kidney disease) stage 3, GFR 30-59 ml/min 9/6/2012    Coronary artery disease     Elevated PSA     Essential hypertension 7/30/2015    Hematuria, gross     History of Left Nephrectomy (~2006) 1/29/2014    Done at [facility].     Hypertension     Inflammatory arthritis 8/14/2012    Kidney stone     Long-term use of Plaquenil 6/4/2015    Mixed incontinence urge and stress 4/11/2014    Nonrheumatic aortic valve stenosis 5/30/2016    NSTEMI (non-ST elevated myocardial infarction) 5/30/2016    Olecranon bursitis 1/14/2013    Osteopenia 8/14/2012    Personal history of kidney cancer 4/11/2014    Prostate cancer     Radiation     treatment for prostate cancer    Recurrent UTI 7/8/2013    Respiratory distress     S/P radiation therapy 4/11/2014    Skin cancer of arm     left leg (malignant)    Solitary kidney 7/15/2013    Venous insufficiency of leg 7/12/2012    Venous stasis ulcers 7/6/2012    Vitamin D deficiency disease 5/8/2013     Past Surgical History:   Procedure Laterality Date    APPENDECTOMY      CYSTOSCOPY      with dialation    NEPHRECTOMY  2006-07?    Removal of left kidney    TONSILLECTOMY       Family  "History   Problem Relation Age of Onset    Cancer Mother      bone     Heart disease Father     Urolithiasis Father     Mental illness Daughter     Cancer Brother      prostate cancer, (Ervin) removed by surgery    Cancer Brother      prostate cancer    Cancer Maternal Aunt     Melanoma Neg Hx     Lupus Neg Hx     Rheum arthritis Neg Hx      Review of patient's allergies indicates:   Allergen Reactions    Aspirin Other (See Comments)     Stomach      Ditropan [oxybutynin chloride]      Unable to describe side effect other than "felt strange"     Keflex [cephalexin] Rash     Morbilliform  Has tolerated multiple cephalosporins since 2013.    Macrobid [nitrofurantoin monohyd/m-cryst] Hives and Rash       Medications:  Continuous Infusions:    heparin (porcine) in D5W 17 Units/kg/hr (05/24/17 1112)     Scheduled:    allopurinol  100 mg Oral Daily    aspirin  81 mg Oral Daily    atorvastatin  80 mg Oral Daily    clopidogrel  75 mg Oral Daily    docusate sodium  100 mg Oral BID    ertapenem (INVANZ) IVPB  500 mg Intravenous Q24H    HYDROmorphone  2 mg Oral Q6H    hydroxychloroquine  200 mg Oral BID    metoprolol  6.25 mg Oral BID    miconazole   Topical (Top) BID    sevelamer carbonate  1,600 mg Oral TID WM    sodium bicarbonate  650 mg Oral BID    sodium chloride 0.9%  3 mL Intravenous Q8H    sodium chloride 0.9%  3 mL Intravenous Q8H     PRN Meds: bisacodyl, dextrose 50%, dextrose 50%, glucagon (human recombinant), glucose, glucose, heparin (PORCINE), HYDROmorphone, nitroGLYCERIN, ondansetron, simethicone    24h Oral Morphine Equivalents (OME): 78    Bowel Management Plan (BMP): Yes (X )  NO  (  )    OBJECTIVE:   Symptom Assessment (ESAS 0-10 scale)     ESAS 0 1 2 3 4 5 6 7 8 9 10   Pain      X        Dyspnea   X           Anxiety X             Nausea X             Depression  X             Anorexia X             Fatigue X             Insomnia X             Restlessness  X           "   Agitation X             Constipation     __ --  ___+   Diarrhea           __ --  ___+     Pain: OME 78  Character: acute on chronic pain  described as sharp and burning   Duration: chronic pain for several years secondary to  pelvic osteomyelitis   Effect:  States when  pain is severe it interferes with ability to  participate in care (turn frequently)  and  It is difficult to concentrate  Factors: pain has been aggravated by recent surgical procedure and is relieved with prn and scheduled oral medications. With good effects.  Patient has been refusing scheduled pain medications as the as needed pain medications have been effective.   Frequency: intermittent    Location: sacral and at central line site   Severity :States pain is 5/10     Comments:     Performance Status: PPS Score (30  )       Physical Exam:  Vitals: reviewed  General: Afebrile, alert, comfortable, no acute distress.   Pulmonary:dyspnea on exertion,clear to auscultation anteriorly.   Cardiac: normal S1 & S2 w/o rubs/murmurs/gallops.   Abdominal: Non-tender, non-distended.Bowel sounds present x 4. No appreciable hepatosplenomegaly. No guarding or rebound tenderness. No complaints of nausea or vomiting   Extremities: Moves all extremities x 4. No peripheral edema. 2+ pulses.  Skin: No jaundice,venous stasis  Rashes to bilateral lower extremities   Neurological: Alert and oriented x 4. No focal neuro deficits.   Psych/Mental Status: cooperative,  Sarcastic sense of humor patient reports it is a coping strategie  CAM / Delirium _not present   FAST Stage for Dementia:      Labs: Reviewed     Radiology: reviewed     Legal/Advanced Directives: Patient has both living will and HPOA that are not on file.  Family was encouraged to bring to hospital   Living Will: not on file   Resuscitate Status: DNR  Decision-Making Capacity: yes   Medical Power of : son Nitin Machado [phone], next of kin is wife Taryn,     Psychosocial/Cultural:  for  60 yrs 4 children: 3 sons (Humphrey, Denton, Rashard) and one daughter Emy.  He has 8 grandchildren and 2 great grandchildren.  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  Prior to becoming ill he enjoyed time with the family and fishing     Spiritual:     F- Christine and Belief: Adventist    I - Importance: very devout christine and attends Mass regularly   .  C - Community    A - Address in Care: anointing of the sick received 5/20/17       Problem list:  Active Hospital Problems    Diagnosis  POA    *Bacteremia due to Klebsiella pneumoniae [R78.81]  Yes    Pain [R52]  Yes    Palliative care encounter [Z51.5]  Not Applicable    Goals of care, counseling/discussion [Z71.89]  Not Applicable    Hyperphosphatemia [E83.39]  Unknown    Acute kidney injury superimposed on CKD [N17.9, N18.9]  Yes    Chronic pain [G89.29]  Yes    Ischemic cardiomyopathy [I25.5]  Yes    Oropharyngeal dysphagia [R13.12]  No    Atrial fibrillation with RVR [I48.91]  Yes    ST elevation myocardial infarction (STEMI) [I21.3]  Yes    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

## 2017-06-01 NOTE — PROGRESS NOTES
Received call from primary. Pt decided to go for hospice, does not want dialysis .   Will sign off     Diya Vega MD   Nephrology fellow   Pager [phone]

## 2017-06-01 NOTE — PROGRESS NOTES
Progress  Note  Palliative Care          ASSESSMENT/PLAN:       Impression: Mr. Machado is a 85 yr old gentleman s/p STEMI  and refusal of right heart cath 5/20/17. Worsening THEO in setting of chronic kidney disease.  He is awake, alert and oriented to person,place time and situation. Fentanyl 12 mcg in use and still requiring as needed hydromorphone.  Appears to be grieving.  No acute distress              Goals of Care: Visit made with Dr. Glynn.  Son Humphrey and brother Damion at the bedside. Son reports he had a bad night - having flash backs about being trapped in the attic of a burning house.  Benefit of using  haldol at bedtime discussed per Dr. Glynn.  Review of current comfort meds with emphasis given to titrating fentanyl dose  Discussed with family.     Transition to hospice:  Family is adamant about taking him home with hospice.  Palliative care shared concern that his care needs may become difficult for family to manage and recommended choosing a hospice with inpatient hospice access.  A list of inpatient hospice agencies was provided to family on previous visit.  Family is in process of visiting.  [facility] ([facility]) will notify palliative care of choice - At this time Nitin reports Mr. Machado and the family have chosen [facility].  Nitin reiterates they would like to expedite process.      Symptom Management   Pain:  Patient states pain is controlled.  Fentanyl 12 mcg in use. PRN hydromorphone has not been optimized.     Recommend continuing Fentanyl 12 mcg transdermal patch every 72 hrs  Recommend continuing  Hydromorphone 0.5 mg IVP every 2 hrs as needed for severe pain 7-10/10 pain scale  Recommend continuing Hydromorphone 4 mg by mouth every 4 hrs as needed for moderate pain 4-6/10      Nausea   Current:   Ondansetron 4 mg IVP every 4 hrs as needed for nausea/vomiting 1st choice   haloperidol 0.5 mg PO every 4 hrs as needed for agitation, anxiety     .    Plan/Recommendations:  1. Symptom management:  allow 24 hrs before making changes to fentanyl patch dose   encourage use of oral dilaudid before IV dilaudid.   2. See above symptom management recommendations   3.  Mr. Machado has made decision to transition to  Home hospice. He and family have chosen [facility].  4. Hospice education provided   5.  Consult  for hospice consult   [facility]   5. [facility] on discharge. Form placed in blue chart.  Gold copy to be given to patient and copy to be scanned in EMR   6. Emotional support      Recommendations discussed with primary team.  [facility] primary team  notified of family decision. Thank you for opportunity to participate in Carter Regalado's care      Signature: Leah Parson, APRN, ACNS-BC, OCN     > 50% of  45 min visit spent in chart review, face to face discussion of goals of care,  symptom assessment, coordination of care and emotional support.      SUBJECTIVE:     History of Present Illness:86 yo M with PMHx of HTN,HLD, NSTEMI previously refused [facility] 5/2016, CKD stage IV (baseline 2.7), moderate AS, AF, venous insufficiency, cirrhosis, Ureterocutaneous fistula, chronic osteomyelitis of the pelvic region on cefpodoxime, suspected inflammatory arthritis on HCQ, neuromuscular disorder with peripheral neuropathy with recurrent right foot ulcer, prostate cancer s/p radiation, RCC s/p left nephrectomy, skin cancer of the hand, urethral stricture, urinary incontinence p/w SOB which started an hour prior to admission and EKG concerning for STEMI with afib with q waves in inferior/anterior-septal leads with resolution of chest pain with SL NTG. Patient was evaluated and recommended to be taken to cath lab considering his clinical picture but he refused to proceed.          Past Medical History:   Diagnosis Date    *Atrial fibrillation     Acute appendicitis 2/19/2015    Anemia     Anxiety     Arthritis     generalized    Basal cell carcinoma 01/2013    right temple    BCC (basal cell  "carcinoma)     right mid forearm    Bladder stone 6/28/2016    Blood transfusion     Chronic urethral stricture 10/14/2015    Chronic venous insufficiency 7/8/2013    CKD (chronic kidney disease) stage 3, GFR 30-59 ml/min 9/6/2012    Coronary artery disease     Elevated PSA     Essential hypertension 7/30/2015    Hematuria, gross     History of Left Nephrectomy (~2006) 1/29/2014    [facility] at [facility].     Hypertension     Inflammatory arthritis 8/14/2012    Kidney stone     Long-term use of Plaquenil 6/4/2015    Mixed incontinence urge and stress 4/11/2014    Nonrheumatic aortic valve stenosis 5/30/2016    NSTEMI (non-ST elevated myocardial infarction) 5/30/2016    Olecranon bursitis 1/14/2013    Osteopenia 8/14/2012    Personal history of kidney cancer 4/11/2014    Prostate cancer     Radiation     treatment for prostate cancer    Recurrent UTI 7/8/2013    Respiratory distress     S/P radiation therapy 4/11/2014    Skin cancer of arm     left leg (malignant)    Solitary kidney 7/15/2013    Venous insufficiency of leg 7/12/2012    Venous stasis ulcers 7/6/2012    Vitamin D deficiency disease 5/8/2013     Past Surgical History:   Procedure Laterality Date    APPENDECTOMY      CYSTOSCOPY      with dialation    NEPHRECTOMY  2006-07?    Removal of left kidney    TONSILLECTOMY       Family History   Problem Relation Age of Onset    Cancer Mother      bone     Heart disease Father     Urolithiasis Father     Mental illness Daughter     Cancer Brother      prostate cancer, (Ervin) removed by surgery    Cancer Brother      prostate cancer    Cancer Maternal Aunt     Melanoma Neg Hx     Lupus Neg Hx     Rheum arthritis Neg Hx      Review of patient's allergies indicates:   Allergen Reactions    Aspirin Other (See Comments)     Stomach      Ditropan [oxybutynin chloride]      Unable to describe side effect other than "felt strange"     Keflex [cephalexin] Rash     Morbilliform  Has " tolerated multiple cephalosporins since 2013.    Macrobid [nitrofurantoin monohyd/m-cryst] Hives and Rash       Medications:  Continuous Infusions:    heparin (porcine) in D5W 17 Units/kg/hr (05/24/17 1112)     Scheduled:    allopurinol  100 mg Oral Daily    aspirin  81 mg Oral Daily    atorvastatin  80 mg Oral Daily    clopidogrel  75 mg Oral Daily    docusate sodium  100 mg Oral BID    ertapenem (INVANZ) IVPB  500 mg Intravenous Q24H    HYDROmorphone  2 mg Oral Q6H    hydroxychloroquine  200 mg Oral BID    metoprolol  6.25 mg Oral BID    miconazole   Topical (Top) BID    sevelamer carbonate  1,600 mg Oral TID WM    sodium bicarbonate  650 mg Oral BID    sodium chloride 0.9%  3 mL Intravenous Q8H    sodium chloride 0.9%  3 mL Intravenous Q8H     PRN Meds: bisacodyl, dextrose 50%, dextrose 50%, glucagon (human recombinant), glucose, glucose, heparin (PORCINE), HYDROmorphone, nitroGLYCERIN, ondansetron, simethicone    24h Oral Morphine Equivalents (OME): 102    Bowel Management Plan (BMP): Yes (X )  NO  (  )    OBJECTIVE:   Symptom Assessment (ESAS 0-10 scale)     ESAS 0 1 2 3 4 5 6 7 8 9 10   Pain      X        Dyspnea X             Anxiety X             Nausea    X          Depression  X             Anorexia X             Fatigue X             Insomnia X             Restlessness  X             Agitation X             Constipation     __ --  ___+   Diarrhea           __ --  ___+     Pain: OME 78  Character: acute on chronic pain  described as sharp and burning   Duration: chronic pain for several years secondary to  pelvic osteomyelitis   Effect:  States when  pain is severe it interferes with ability to  participate in care (turn frequently)  and  It is difficult to concentrate  Factors: pain has been aggravated by recent surgical procedure and is relieved with prn and scheduled oral medications. With good effects.  Patient has been refusing scheduled pain medications as the as needed pain  medications have been effective.   Frequency: intermittent    Location: sacral and at central line site   Severity :States pain is 5/10     Comments:     Performance Status: PPS Score (20 )       Physical Exam:  Vitals: reviewed  General: Afebrile, alert, comfortable, no acute distress.   Pulmonary:dyspnea on exertion,clear to auscultation anteriorly.   Cardiac: normal S1 & S2 w/o rubs/murmurs/gallops.   Abdominal: Non-tender, non-distended.Bowel sounds present x 4. No appreciable hepatosplenomegaly. No guarding or rebound tenderness. Complains of mild nausea, no emesis at this time.   Extremities: Moves all extremities x 4. No peripheral edema. 2+ pulses.  Skin: No jaundice,venous stasis  Rashes to bilateral lower extremities   Neurological: Alert and oriented x 4. No focal neuro deficits.   Psych/Mental Status: cooperative,  Sarcastic sense of humor patient reports it is a coping strategie  CAM / Delirium _not present   FAST Stage for Dementia:      Legal/Advanced Directives: Patient has both living will and HPOA that are not on file.  Family was encouraged to bring to hospital   Living Will: not on file   Resuscitate Status: DNR  Decision-Making Capacity: yes   Medical Power of : son Nitin Machado [phone], next of kin is wife Taryn,     Psychosocial/Cultural:  for 60 yrs 4 children: 3 sons (Humphrey, Denton, Rashard) and one daughter Emy.  He has 8 grandchildren and 2 great grandchildren.  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  Prior to becoming ill he enjoyed time with the family and fishing     Spiritual:     F- Christine and Belief: Gnosticism    I - Importance: very devout christine and attends Mass regularly   .  C - Community    A - Address in Care: anointing of the sick received 5/20/17       Problem list:  Active Hospital Problems    Diagnosis  POA    *Bacteremia due to Klebsiella pneumoniae [R78.81]  Yes    Pain [R52]  Yes    Palliative care encounter [Z51.5]   Not Applicable    Goals of care, counseling/discussion [Z71.89]  Not Applicable    Hyperphosphatemia [E83.39]  Unknown    Acute kidney injury superimposed on CKD [N17.9, N18.9]  Yes    Chronic pain [G89.29]  Yes    Ischemic cardiomyopathy [I25.5]  Yes    Oropharyngeal dysphagia [R13.12]  No    Atrial fibrillation with RVR [I48.91]  Yes    ST elevation myocardial infarction (STEMI) [I21.3]  Yes    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

## 2017-06-01 NOTE — PLAN OF CARE
spoke to palliative care nurse today in regards to hospice for pt.  Family agreed to [facility] hospice.   spoke with hospice coordinator, Mihai Joshua who states she will meet with pt later today or tomorrow morning.  Pt may require equipment and family will arrange sitters at home.   will make referral via [facility] care to [facility].  Pt should be ready for discharge in the morning.

## 2017-06-02 VITALS
WEIGHT: 160.06 LBS | BODY MASS INDEX: 22.91 KG/M2 | HEART RATE: 71 BPM | OXYGEN SATURATION: 95 % | SYSTOLIC BLOOD PRESSURE: 129 MMHG | RESPIRATION RATE: 16 BRPM | HEIGHT: 70 IN | TEMPERATURE: 99 F | DIASTOLIC BLOOD PRESSURE: 61 MMHG

## 2017-06-02 PROCEDURE — 25000003 PHARM REV CODE 250: Performed by: STUDENT IN AN ORGANIZED HEALTH CARE EDUCATION/TRAINING PROGRAM

## 2017-06-02 PROCEDURE — 99356 PR PROLONGED SERV,INPATIENT,1ST HR: CPT | Mod: ,,, | Performed by: INTERNAL MEDICINE

## 2017-06-02 PROCEDURE — 25000003 PHARM REV CODE 250: Performed by: HOSPITALIST

## 2017-06-02 PROCEDURE — 99233 SBSQ HOSP IP/OBS HIGH 50: CPT | Mod: ,,, | Performed by: INTERNAL MEDICINE

## 2017-06-02 PROCEDURE — 25000003 PHARM REV CODE 250: Performed by: INTERNAL MEDICINE

## 2017-06-02 PROCEDURE — 99239 HOSP IP/OBS DSCHRG MGMT >30: CPT | Mod: ,,, | Performed by: HOSPITALIST

## 2017-06-02 RX ORDER — HALOPERIDOL 2 MG/ML
0.5 SOLUTION ORAL EVERY 4 HOURS PRN
Qty: 15 ML | Refills: 0
Start: 2017-06-02 | End: 2018-06-02

## 2017-06-02 RX ORDER — ONDANSETRON 2 MG/ML
4 INJECTION INTRAMUSCULAR; INTRAVENOUS EVERY 4 HOURS PRN
Start: 2017-06-02

## 2017-06-02 RX ORDER — SIMETHICONE 80 MG
80 TABLET,CHEWABLE ORAL 3 TIMES DAILY PRN
Refills: 0
Start: 2017-06-02

## 2017-06-02 RX ORDER — HYDROMORPHONE HYDROCHLORIDE 1 MG/ML
0.5 INJECTION, SOLUTION INTRAMUSCULAR; INTRAVENOUS; SUBCUTANEOUS
Refills: 0
Start: 2017-06-02

## 2017-06-02 RX ORDER — HYDROMORPHONE HYDROCHLORIDE 2 MG/1
4 TABLET ORAL
Status: DISCONTINUED | OUTPATIENT
Start: 2017-06-02 | End: 2017-06-02 | Stop reason: HOSPADM

## 2017-06-02 RX ORDER — HALOPERIDOL 2 MG/ML
0.5 SOLUTION ORAL NIGHTLY
Qty: 15 ML | Refills: 0
Start: 2017-06-02 | End: 2018-06-02

## 2017-06-02 RX ORDER — DOXYLAMINE SUCCINATE 25 MG
TABLET ORAL 2 TIMES DAILY
Refills: 0
Start: 2017-06-02

## 2017-06-02 RX ORDER — BISACODYL 10 MG
10 SUPPOSITORY, RECTAL RECTAL DAILY PRN
Refills: 0
Start: 2017-06-02

## 2017-06-02 RX ORDER — SEVELAMER CARBONATE 800 MG/1
1600 TABLET, FILM COATED ORAL
Start: 2017-06-02 | End: 2018-06-02

## 2017-06-02 RX ORDER — FENTANYL 25 UG/1
1 PATCH TRANSDERMAL
Status: DISCONTINUED | OUTPATIENT
Start: 2017-06-02 | End: 2017-06-02 | Stop reason: HOSPADM

## 2017-06-02 RX ORDER — ONDANSETRON 4 MG/1
4 TABLET, ORALLY DISINTEGRATING ORAL EVERY 4 HOURS PRN
Qty: 1 TABLET | Refills: 0
Start: 2017-06-02

## 2017-06-02 RX ORDER — FENTANYL 25 UG/1
1 PATCH TRANSDERMAL
Qty: 10 PATCH | Refills: 0 | Status: SHIPPED | OUTPATIENT
Start: 2017-06-02

## 2017-06-02 RX ORDER — HYDROMORPHONE HYDROCHLORIDE 4 MG/1
4 TABLET ORAL
Qty: 56 TABLET | Refills: 0 | Status: SHIPPED | OUTPATIENT
Start: 2017-06-02

## 2017-06-02 RX ORDER — CLOPIDOGREL BISULFATE 75 MG/1
75 TABLET ORAL DAILY
Qty: 30 TABLET | Refills: 11
Start: 2017-06-02 | End: 2018-06-02

## 2017-06-02 RX ADMIN — FENTANYL 1 PATCH: 25 PATCH, EXTENDED RELEASE TRANSDERMAL at 10:06

## 2017-06-02 RX ADMIN — HYDROXYCHLOROQUINE SULFATE 200 MG: 200 TABLET, FILM COATED ORAL at 09:06

## 2017-06-02 RX ADMIN — HYDROMORPHONE HYDROCHLORIDE 0.5 MG: 1 INJECTION, SOLUTION INTRAMUSCULAR; INTRAVENOUS; SUBCUTANEOUS at 11:06

## 2017-06-02 RX ADMIN — ATORVASTATIN CALCIUM 80 MG: 20 TABLET, FILM COATED ORAL at 09:06

## 2017-06-02 RX ADMIN — Medication 3 ML: at 05:06

## 2017-06-02 RX ADMIN — HYDROMORPHONE HYDROCHLORIDE 4 MG: 2 TABLET ORAL at 01:06

## 2017-06-02 RX ADMIN — ALLOPURINOL 100 MG: 100 TABLET ORAL at 09:06

## 2017-06-02 RX ADMIN — ASPIRIN 81 MG CHEWABLE TABLET 81 MG: 81 TABLET CHEWABLE at 09:06

## 2017-06-02 RX ADMIN — DOCUSATE SODIUM 100 MG: 100 CAPSULE, LIQUID FILLED ORAL at 09:06

## 2017-06-02 RX ADMIN — HYDROMORPHONE HYDROCHLORIDE 4 MG: 2 TABLET ORAL at 06:06

## 2017-06-02 RX ADMIN — HYDROMORPHONE HYDROCHLORIDE 0.5 MG: 1 INJECTION, SOLUTION INTRAMUSCULAR; INTRAVENOUS; SUBCUTANEOUS at 03:06

## 2017-06-02 RX ADMIN — HYDROMORPHONE HYDROCHLORIDE 0.5 MG: 1 INJECTION, SOLUTION INTRAMUSCULAR; INTRAVENOUS; SUBCUTANEOUS at 08:06

## 2017-06-02 RX ADMIN — CLOPIDOGREL 75 MG: 75 TABLET, FILM COATED ORAL at 09:06

## 2017-06-02 RX ADMIN — HYDROMORPHONE HYDROCHLORIDE 4 MG: 2 TABLET ORAL at 10:06

## 2017-06-02 NOTE — PROGRESS NOTES
Progress  Note  Palliative Care          ASSESSMENT/PLAN:       Impression: Mr. Machado is a 85 yr old gentleman s/p STEMI  and refusal of right heart cath 5/20/17. Worsening THEO in setting of chronic kidney disease.  He is awake, alert and oriented to person,place time and situation. Lying in bed and participating in conversation. Reports pain is not adequately controlled.  Fentanyl 12 mcg in use.  IV and oral hydromorphone required for nicholas breakthrough pain.   Appears to be grieving.  No acute distress              Goals of Care: Visit made with Dr. Glynn. Patient's wife and care taker, Agnieszka are present.  During conversation with patient and family regarding transition to home hospice, Agnieszka states he is going to be discharged to the facility.  In previous discussions the family was adamant about taking him home with home hospice. It has been explained Mr. Machado does not meet criteria for inpatient hospice at this time.  The family had been encouraged to choose a hospice agency with inpatient capacity.  Palliative care not aware of change  in goals of care.       Telephone conversation with son Nitin, [facility], who states he made this decision because his father, Mr. Machado was uncertain his family could meet his needs at home.  His wife also has special needs at home.  Nitin made contact with [facility] for inpatient hospice.  At this time reiterated that Mr. Machado does not meet criteria for long term (2 week ) inpatient hospice.  Explained that Mr. Machado may meet inpatient criteria for symptom control and monitoring of pain medications in the inpatient setting in the short term.   Informed Nitin that there is the possibility  that he could be transferred to home hospice once symptoms are controlled.  Also explained if his father's condition deteriorates the hospice will re-evaluate for inpatient hospice.  Son Nitin states understanding.      Comfort:  Pain medications have been adjusted per   Gretta.  Fentanyl transdermal patch increased to 25 mcg every 72 hrs.  Oral dilaudid changed to 4 mg every 3 hrs as needed and patient will continue with IV dilaudid 0.5 mg IVP every 2 hrs as needed until he is discharged        Symptom Management   Pain:  Patient states pain is controlled.  Fentanyl 12 mcg in use. PRN hydromorphone has not been optimized.     Recommend continuing Fentanyl 25 mcg transdermal patch every 72 hrs  Recommend continuing  Hydromorphone 0.5 mg IVP every 2 hrs as needed for severe pain 7-10/10 pain scale  Recommend continuing Hydromorphone 4 mg by mouth every 3 hrs as needed for moderate pain 4-6/10      Nausea   Current:   Ondansetron 4 mg IVP every 4 hrs as needed for nausea/vomiting 1st choice   haloperidol 0.5 mg PO every 4 hrs as needed for agitation, anxiety     .    Plan/Recommendations:  1. Symptom management: allow 24 hrs before making changes to fentanyl patch dose   encourage use of oral dilaudid before IV dilaudid.   2. See above symptom management recommendations   3.  Mr. Machado transition to [facility] inpatient hospice.     4. Hospice education completed   5.  Consult  for hospice consult   [facility] primary team  completing tranportation arrangements   5. [facility] on discharge. Form placed in blue chart.  Gold copy to be given to patient and copy to be scanned in EMR   6. Emotional support      Recommendations discussed with primary team.  [facility] primary team  notified of family decision. Thank you for opportunity to participate in Carter Regalado's care      Signature: Leah Parson, MARILEE, ACNS-BC, OCN     > 50% of  45 min visit spent in chart review, face to face discussion of goals of care,  symptom assessment, coordination of care and emotional support.      SUBJECTIVE:     History of Present Illness:84 yo M with PMHx of HTN,HLD, NSTEMI previously refused [facility] 5/2016, CKD stage IV (baseline 2.7), moderate AS, AF, venous  insufficiency, cirrhosis, Ureterocutaneous fistula, chronic osteomyelitis of the pelvic region on cefpodoxime, suspected inflammatory arthritis on HCQ, neuromuscular disorder with peripheral neuropathy with recurrent right foot ulcer, prostate cancer s/p radiation, RCC s/p left nephrectomy, skin cancer of the hand, urethral stricture, urinary incontinence p/w SOB which started an hour prior to admission and EKG concerning for STEMI with afib with q waves in inferior/anterior-septal leads with resolution of chest pain with SL NTG. Patient was evaluated and recommended to be taken to cath lab considering his clinical picture but he refused to proceed.          Past Medical History:   Diagnosis Date    *Atrial fibrillation     Acute appendicitis 2/19/2015    Anemia     Anxiety     Arthritis     generalized    Basal cell carcinoma 01/2013    right temple    BCC (basal cell carcinoma)     right mid forearm    Bladder stone 6/28/2016    Blood transfusion     Chronic urethral stricture 10/14/2015    Chronic venous insufficiency 7/8/2013    CKD (chronic kidney disease) stage 3, GFR 30-59 ml/min 9/6/2012    Coronary artery disease     Elevated PSA     Essential hypertension 7/30/2015    Hematuria, gross     History of Left Nephrectomy (~2006) 1/29/2014    [facility] at [facility].     Hypertension     Inflammatory arthritis 8/14/2012    Kidney stone     Long-term use of Plaquenil 6/4/2015    Mixed incontinence urge and stress 4/11/2014    Nonrheumatic aortic valve stenosis 5/30/2016    NSTEMI (non-ST elevated myocardial infarction) 5/30/2016    Olecranon bursitis 1/14/2013    Osteopenia 8/14/2012    Personal history of kidney cancer 4/11/2014    Prostate cancer     Radiation     treatment for prostate cancer    Recurrent UTI 7/8/2013    Respiratory distress     S/P radiation therapy 4/11/2014    Skin cancer of arm     left leg (malignant)    Solitary kidney 7/15/2013    Venous insufficiency of leg  "7/12/2012    Venous stasis ulcers 7/6/2012    Vitamin D deficiency disease 5/8/2013     Past Surgical History:   Procedure Laterality Date    APPENDECTOMY      CYSTOSCOPY      with dialation    NEPHRECTOMY  2006-07?    Removal of left kidney    TONSILLECTOMY       Family History   Problem Relation Age of Onset    Cancer Mother      bone     Heart disease Father     Urolithiasis Father     Mental illness Daughter     Cancer Brother      prostate cancer, (Ervin) removed by surgery    Cancer Brother      prostate cancer    Cancer Maternal Aunt     Melanoma Neg Hx     Lupus Neg Hx     Rheum arthritis Neg Hx      Review of patient's allergies indicates:   Allergen Reactions    Aspirin Other (See Comments)     Stomach      Ditropan [oxybutynin chloride]      Unable to describe side effect other than "felt strange"     Keflex [cephalexin] Rash     Morbilliform  Has tolerated multiple cephalosporins since 2013.    Macrobid [nitrofurantoin monohyd/m-cryst] Hives and Rash       Medications:  Continuous Infusions:    heparin (porcine) in D5W 17 Units/kg/hr (05/24/17 1112)     Scheduled:    allopurinol  100 mg Oral Daily    aspirin  81 mg Oral Daily    atorvastatin  80 mg Oral Daily    clopidogrel  75 mg Oral Daily    docusate sodium  100 mg Oral BID    ertapenem (INVANZ) IVPB  500 mg Intravenous Q24H    HYDROmorphone  2 mg Oral Q6H    hydroxychloroquine  200 mg Oral BID    metoprolol  6.25 mg Oral BID    miconazole   Topical (Top) BID    sevelamer carbonate  1,600 mg Oral TID WM    sodium bicarbonate  650 mg Oral BID    sodium chloride 0.9%  3 mL Intravenous Q8H    sodium chloride 0.9%  3 mL Intravenous Q8H     PRN Meds: bisacodyl, dextrose 50%, dextrose 50%, glucagon (human recombinant), glucose, glucose, heparin (PORCINE), HYDROmorphone, nitroGLYCERIN, ondansetron, simethicone    24h Oral Morphine Equivalents (OME): 102    Bowel Management Plan (BMP): Yes (X )  NO  (  )    OBJECTIVE: "   Symptom Assessment (ESAS 0-10 scale)     ESAS 0 1 2 3 4 5 6 7 8 9 10   Pain       X       Dyspnea X             Anxiety X             Nausea    X          Depression  X             Anorexia X             Fatigue X             Insomnia X             Restlessness  X             Agitation X             Constipation     __ --  ___+   Diarrhea           __ --  ___+     Pain: OME 78  Character: acute on chronic pain  described as sharp and burning   Duration: chronic pain for several years secondary to  pelvic osteomyelitis   Effect:  States when  pain is severe it interferes with ability to  participate in care (turn frequently)  and  It is difficult to concentrate  Factors: pain has been aggravated by recent surgical procedure and is relieved with prn and scheduled oral medications. With good effects.  Patient has been refusing scheduled pain medications as the as needed pain medications have been effective.   Frequency: intermittent    Location: sacral and at central line site   Severity :States pain is 5/10     Comments:     Performance Status: PPS Score (20 )       Physical Exam:  Vitals: reviewed  General: Afebrile, alert, comfortable, no acute distress.   Pulmonary:dyspnea on exertion,clear to auscultation anteriorly.   Cardiac: normal S1 & S2 murmurs   Abdominal: Non-tender, non-distended.Bowel sounds present x 4. No appreciable hepatosplenomegaly. No guarding or rebound tenderness. Complains of mild nausea, no emesis at this time.   Extremities: Moves all extremities x 4. No peripheral edema. 2+ pulses.  Skin: No jaundice,venous stasis  Rashes to bilateral lower extremities, multiple bruises to upper extremities    Neurological: Alert and oriented x 4. No focal neuro deficits.   Psych/Mental Status: cooperative, mood and behavior appropriate, appears to be grieving   CAM / Delirium _not present   FAST Stage for Dementia:      Legal/Advanced Directives: Patient has both living will and HPOA that are not on file.   Family was encouraged to bring to hospital   Living Will: not on file   Resuscitate Status: DNR  Decision-Making Capacity: yes   Medical Power of : son Nitin Machado [phone], next of kin is wife Taryn,     Psychosocial/Cultural:  for 60 yrs 4 children: 3 sons (Humphrey, Denton, Rashard) and one daughter Emy.  He has 8 grandchildren and 2 great grandchildren.  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  Prior to becoming ill he enjoyed time with the family and fishing     Spiritual:     F- Christine and Belief: Adventism    I - Importance: very devout christine and attends [employer] regularly   .  C - Community    A - Address in Care: anointing of the sick received 5/20/17       Problem list:  Active Hospital Problems    Diagnosis  POA    *Bacteremia due to Klebsiella pneumoniae [R78.81]  Yes    Pain [R52]  Yes    Palliative care encounter [Z51.5]  Not Applicable    Goals of care, counseling/discussion [Z71.89]  Not Applicable    Hyperphosphatemia [E83.39]  Unknown    Acute kidney injury superimposed on CKD [N17.9, N18.9]  Yes    Chronic pain [G89.29]  Yes    Ischemic cardiomyopathy [I25.5]  Yes    Oropharyngeal dysphagia [R13.12]  No    Atrial fibrillation with RVR [I48.91]  Yes    ST elevation myocardial infarction (STEMI) [I21.3]  Yes    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

## 2017-06-02 NOTE — PLAN OF CARE
Problem: Patient Care Overview  Goal: Individualization & Mutuality  Plan of care discussed with patient. Patient is free of fall/trauma/injury. Denies CP, SOB. Pain is being managed with prn pain medications for comfort. All questions addressed. Will continue to monitor

## 2017-06-02 NOTE — PROGRESS NOTES
Patient is ready for discharge. Patient stable alert and oriented. PIV removed. No complaints of pain. Discussed discharge plan. Answered questions with patient and family. Hydromorphone and  Fentenyl patch RX given included in packet to be transferred with [facility]. Copy of [facility] form made for our records, original given to family.Pt left per [facility].

## 2017-06-02 NOTE — PLAN OF CARE
"   [facility]     Department of Hospital Medicine     [address] [place] [address]     [phone] after hours  [phone] fax                                   HOSPICE  ORDERS     06/01/2017    Admit to Hospice:  Inpatient hospice     Diagnoses:  Active Hospital Problems    Diagnosis  POA    *Acute kidney injury superimposed on CKD [N17.9, N18.9]  Yes     Priority: 1 - High    Bacteremia due to Klebsiella pneumoniae [R78.81]  Yes     Priority: 2     Ischemic cardiomyopathy [I25.5]  Yes     Priority: 3     Atrial fibrillation with RVR [I48.91]  Yes     Priority: 3     ST elevation myocardial infarction (STEMI) [I21.3]  Yes     Priority: 3     Urinary tract infection due to extended-spectrum beta lactamase (ESBL)-producing Klebsiella [N39.0, B96.89]  Yes     Priority: 5     Chronic pain [G89.29]  Yes     Priority: 6     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 [N36.0]  Yes     Priority: 7     Complicated open wound of right thigh [S71.101A]  Yes     Priority: 8     Debility [R53.81]  Yes     Priority: 12     Pain [R52]  Yes    Palliative care encounter [Z51.5]  Not Applicable    Goals of care, counseling/discussion [Z71.89]  Not Applicable    Hyperphosphatemia [E83.39]  Yes    Oropharyngeal dysphagia [R13.12]  No    CKD (chronic kidney disease) [N18.9]  Yes      Resolved Hospital Problems    Diagnosis Date Resolved POA    Chest pain [R07.9] 05/21/2017 Yes    Respiratory distress [R06.00] 05/21/2017 Yes       Hospice Qualifying Diagnoses: Acute Kidney Injury, intolerant of dialysis       Patient has a life expectancy < 6 months due to these conditions.    Vital Signs: Routine per Hospice Protocol.    Allergies:  Review of patient's allergies indicates:   Allergen Reactions    Aspirin Other (See Comments)     Stomach      Ditropan [oxybutynin chloride]      Unable to describe side effect other than "felt strange"     Keflex [cephalexin] Rash     " "Morbilliform  Has tolerated multiple cephalosporins since 2013.    Macrobid [nitrofurantoin monohyd/m-cryst] Hives and Rash       Diet: Dental Soft,     Activities: As tolerated    Nursing: Per Hospice Routine    1. Crush all oral medications, serve with vanilla puddding or other food substance    2. Right groin:   nursing to place a ABD pad/sanitary napkin in the right groin to absorb urine from fistula or blue pads to wick away fluid as needed      Future Orders:  Hospice Medical Director may dictate new orders for comfortable care measures & sign death certificate.    Medications:            Humphrey Machado   Home Medication Instructions LONNIE:[id number]    Printed on:06/02/17 1240   Medication Information                      aspirin 81 MG Chew  Take 1 tablet (81 mg total) by mouth once daily.             atorvastatin (LIPITOR) 80 MG tablet  Take 1 tablet (80 mg total) by mouth once daily.             bisacodyl (DULCOLAX) 10 mg Supp  Place 1 suppository (10 mg total) rectally daily as needed (until bowel movement if patient has not had a bowel movement for 2 days).             catheter 16-16 Fr-" Misc  1 Units by Misc.(Non-Drug; Combo Route) route once daily.             clopidogrel (PLAVIX) 75 mg tablet  Take 1 tablet (75 mg total) by mouth once daily.             docusate sodium (COLACE) 100 MG capsule  Take by mouth 2 (two) times daily.             fentaNYL (DURAGESIC) 25 mcg/hr  Place 1 patch onto the skin every 72 hours.             haloperidol (HALDOL) 2 mg/mL solution  Take 0.3 mLs (0.6 mg total) by mouth every 4 (four) hours as needed (can be used for nausea or agitation).             haloperidol (HALDOL) 2 mg/mL solution  Take 0.3 mLs (0.6 mg total) by mouth every evening.             hydromorphone (DILAUDID) 0.5 mg/0.5 mL Syrg  Inject 0.5 mLs (0.5 mg total) into the vein every 2 (two) hours as needed (if patient appears in acute distress).             HYDROmorphone (DILAUDID) 4 MG tablet  Take 1 tablet " (4 mg total) by mouth every 3 (three) hours as needed.             miconazole (MICOTIN) 2 % cream  Apply topically 2 (two) times daily.             nitroGLYCERIN (NITROSTAT) 0.3 MG SL tablet  Place 1 tablet (0.3 mg total) under the tongue every 5 (five) minutes as needed for Chest pain.             omeprazole (PRILOSEC) 40 MG capsule  Take 40 mg by mouth every evening.              ondansetron (ZOFRAN-ODT) 4 MG TbDL  Take 1 tablet (4 mg total) by mouth every 4 (four) hours as needed (nausea/vomiting).             ondansetron 4 mg/2 mL Soln  Inject 4 mg into the vein every 4 (four) hours as needed.             sevelamer carbonate (RENVELA) 800 mg Tab  Take 2 tablets (1,600 mg total) by mouth 3 (three) times daily with meals.             simethicone (MYLICON) 80 MG chewable tablet  Take 1 tablet (80 mg total) by mouth 3 (three) times daily as needed for Flatulence (bloating).                       _________________________________  Dave Bro MD  06/01/2017

## 2017-06-02 NOTE — PLAN OF CARE
Pt has been accepted to [facility] and can be transferred today.  Floor nurse will call report to [phone].  [facility] 4:00 today.  pts family notified and agreeable.

## 2017-06-02 NOTE — PROGRESS NOTES
[facility] Hospital Medicine  Progress Note    Patient Name: Humphrey Machado  MRN: [id number]  Patient Class: IP- Inpatient   Admission Date: 5/19/2017  Length of Stay: 13 days  Attending Physician: Dave Bro MD  Primary Care Provider: Andrew Murray MD    [facility] Medicine Team: [facility] Dave Bro MD    Subjective:     Principal Problem:Acute kidney injury superimposed on CKD    HPI: 85-year-old man with HTN, HLD, NSTEMI previously refused [facility] 5/2016, CKD stage IV (baseline 2.7), moderate AS, AF, venous insufficiency, cirrhosis, Ureterocutaneous fistula, chronic osteomyelitis of the pelvic region on cefpodoxime, suspected inflammatory arthritis on HCQ, neuromuscular disorder with peripheral neuropathy with recurrent right foot ulcer, prostate cancer s/p radiation, RCC s/p left nephrectomy, skin cancer of the hand, urethral stricture, urinary incontinence p/w SOB which started an hour prior to admission and EKG concerning for STEMI with afib with q waves in inferior/anterior-septal leads with resolution of chest pain with SL NTG. Patient was evaluated in the ED by Dr Duval and recommended to be taken to cath lab considering his clinical picture but he refused to proceed with angiogram due to risk of hemodialysis possibility he will be admitted fort mdical treatment in CCU.     Currently he is very SOB with fluid overload and aneuric.looks like not responding to lasix his son is his POA and they are discussing code status and if they will proceed with more procedures including CRRT.     Patient with recent cystoscopy/cystogram/fistulogram by Dr. 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 Abraham's  Operative noted.        Hospital Course: Hospital course as above, while in the  CCU patient was medically managed for his STEMI, and for his AMIRA.  CCU team discussed comfort vs aggressive measures, patient's code status changed to DNR and due to concern that patient was anuric with developing AMIRA that may require HD. Discussion per EMR with family revealed they would not want to pursue dialysis. He was started on Lasix drip @ 20mg/hr and IV Diuril q12 hrs and had improved urine output on this regimen.  PRN Morphine and Ativan orders had been placed for patient comfort     Patient stepped down to IM-C casiano service on 5/20.  Patient was acutely encephalopathic, developed fevers and notified by RN blood cultures from admission were growing GNR.  Surveillance cultures sent patient was on Vancomycin and Cefepime.  He was having urine output on the lasix and diuril.  Extensive family discussion held (see progress notes for detail)  Family was not universally ready for comfort care, care plan more no escalation of care, given his altered mentation, I suspected morphine and ativan in Amira contributing, opiates switched to dilaudid and ativan d/c.   Discussed NG tube placement for oral med administration and nutrition but was held off.      5/21 - Patient is more awake and alert, likely related to toxic encephalopathy, remains hemodynamically stable, but was having more pain complaints, was receiving IV dilaudid  0.5mg q2hrs.   SLP evaluated patient and he is safe for dental soft nectar thick liquids.   His heart rhythm converted to atrial fibrillation with controlled rate in the 80-90s.   Will need repeat cardiology recs on management in AM.      Patient with nearly 2L UOP x 24hrs, family with more questions about possible HD or what course his Amira may take.  Nephrology re-contacted to follow the patient.  Diuril will be stopped this evening to monitor how pts UOp  And Cr does.     I reviewed more of recent urologic history and patient has a urinary source of his bacteremia, however with complicated  chronic fistula and wound we may not have source control. Discussed with patient and will consult urology to assist in any other management for source control and chronic urethrocutaneous fistula that may be needed - Urology consult placed for mon Am.      Reviewed prior culture data and patient with hx of Klebsiella ESBL and Pseudomonas in urine, both not sensitive to empiric cefepime so ID approval obtained to change to renal dosed Meropenem      Patient says pain control is improving but is still seems he has chronic pain issues from multiple sites, adding po opiates to pain regimen.      5/22 - Additional consultants have evaluated the patient  Cardiology - no additional med management recs at this time, patient has completed 48hrs of heparin for ACS, however with atrial fibrillation, will continue for now.  Coreg change to low dose metoprolol due to hypotension     Urology - evaluated patient and removed ureteral catheter, no further recs for chronic urtherocutaneous fistula, local wound care.   Wound care evaluated the patient and placed barrier cream in right groin with soft dressing to help absorb urinary leak from fistula, I was at bedside and pt did not feel ready to turn for assessment of buttocks.      Nephrology - evaluated the patient and no acute needs for acute HD, they will continue to follow, did inform pt that nephrology feels initiating HD would not improve overall morbidity/mortality from his co-morbid conditions and HD initiation comes with its own risks and invasive procedures required.      Palliative care - evaluated pt to assist in overall care planning.  MD staff to assist with pain management not available this week.      5/23- patient complained of discomfort. He is still unsure of what he wants for medical management. He is still considering hemodialysis as an option. His renal function continued to worsen but his main concern was his discomfort.     5/24- patient unable to voice his  "wishes nor does he want to have a goals of care discussion. He has poor insight into his disease process. He is verbally abusive to his providers and nursing staff. Discussed with his wife and grandson scheduling a family meeting to establish therapeutic plan.      5/25- he continues to be abusive to medical personnel. When trying to assess his pain he becomes abusive and screams that he can't tell.      5/26-Family meeting today to establish therapeutic plan. Patient has agreed to hemodialysis and oral anticoagulation. He also agreed to PT and OT however he has refused SNF. He has developed some mental "foggyness"      5/27-Underwent HD.      5/28- overnight patient with hypotension. Narcotic analgesics held and volume replaced by overnight physician. Moderate to severe pain on evaluation and analgesics resumed. Metoprolol dose further adjusted.     5/29- Patient could not be dialyzed due to hypotension. Options discussed by Nephrology service. Plan to hold metoprolol for now and hold narcotic analgesics 4 hours prior to HD tomorrow. If patient unable to tolerate then will need hospice care.    In recent days, care has been transitioned to more comfort care, palliative care measures as patient has chosen not to pursue further dialysis treatments.      Interval History:     Palliative care assisting in discharge planning, on my visit today, family reports they have chosen [facility] as a hospice agency with plan to discharge to home hospice, but agency can facilitate upgrading to inpatient hospice if necessary.  Pt is intermittently awake and alert, no acute complaints at this time.       Review of Systems   Constitutional: Positive for fatigue. Negative for fever.   HENT: Positive for trouble swallowing. Negative for rhinorrhea and sore throat.    Eyes: Negative for visual disturbance.   Respiratory: Negative for chest tightness and shortness of breath.    Cardiovascular: Negative for chest pain.   Gastrointestinal: " Negative for abdominal pain, diarrhea and nausea.   Genitourinary: Positive for penile pain.   Musculoskeletal: Positive for arthralgias and myalgias.     Objective:     Vital Signs (Most Recent):  Temp: 98.1 °F (36.7 °C) (06/01/17 2014)  Pulse: 63 (06/01/17 2014)  Resp: 16 (06/01/17 2014)  BP: (!) 106/51 (06/01/17 2014)  SpO2: 98 % (06/01/17 2014) Vital Signs (24h Range):  Temp:  [97.4 °F (36.3 °C)-98.4 °F (36.9 °C)] 98.1 °F (36.7 °C)  Pulse:  [56-78] 63  Resp:  [16-18] 16  SpO2:  [93 %-98 %] 98 %  BP: (106-133)/(51-68) 106/51     Weight: 72.6 kg (160 lb 0.9 oz)  Body mass index is 22.97 kg/m².    Intake/Output Summary (Last 24 hours) at 06/01/17 2205  Last data filed at 06/01/17 1512   Gross per 24 hour   Intake              180 ml   Output              700 ml   Net             -520 ml      Physical Exam   Constitutional: No distress.   HENT:   Dry oropharynx   Eyes: No scleral icterus.   Neck: No JVD present.   Cardiovascular:   Irregular rhythm, systolic murmur present    Pulmonary/Chest: Effort normal. No respiratory distress.   Anterior exam CTA in upper fields, limited exam at lateral bases   Abdominal: Soft. Bowel sounds are normal. There is no tenderness.   Genitourinary:   Genitourinary Comments: Stevens catheter to gravity drainage,clear yellow urine.  Hematuria appears to have resolved. Right groin urethrocutaneous fistula dressing in place   Musculoskeletal: He exhibits tenderness.   Neurological: He is alert.   Skin: Skin is warm and dry.   Psychiatric:   Slowed response to questioning, flat affect       Significant Labs:   BMP:     Recent Labs  Lab 05/31/17 0418   GLU 87      K 4.0      CO2 22*   BUN 39*   CREATININE 2.7*   CALCIUM 8.8   MG 2.2     CBC:     Recent Labs  Lab 05/31/17 0418   WBC 4.85   HGB 6.4*   HCT 21.2*   *       Significant Imaging: I have reviewed all pertinent imaging results/findings within the past 24 hours.    Assessment/Plan:      1. THEO on CKD  -s/p  placement of temp HD access and has received HD x 1 on Saturday, unable to receive yesterday  -Patient has decided not to pursue further HD treatments and transitioning to comfort care    2. Bacteremia, ESBL Klebsiella  -continue ertapenem, day 12 of IV abx, will not be continued on discharge    3. STEMI/Coronary Artery disease  -continue medical management, ASA, statin/plavix  -holding beta blocker due to #1    4. Goals of Care   - Will transition to more comfort care measures, stopping Dialysis. I was planning to discuss possible PRBC transfusion but in light of this decision, will not plan any blood product transfusion.   - Appreciate palliative care assistance in hospice transition and in medication management recs    5. Chronic Pain & Nausea  -Fentanyl 12.5mcg/hr TD patch  -PO dilauidid 4mg PO q4hrs PRN  -continue PRN IV dilaudid  -For N/V   >Zofran 4mg IV q4hrs PRN   >Haldol 0.5mg IV q4hrs PRN, 2nd line therapy    6.  Atrial fibrillation   -rate controlled,  -heparin drip stopped secondary to progressive anemia    7. Urethrocutaneous fistula  -continue montano to gravity drainage  -local wound care   -s/p urology eval w/o any operative interventions.     8. Dysphagia  -dental soft, nectar thick diet    Active Diagnoses:    Diagnosis Date Noted POA    PRINCIPAL PROBLEM:  Acute kidney injury superimposed on CKD [N17.9, N18.9] 05/21/2017 Yes    Bacteremia due to Klebsiella pneumoniae [R78.81] 05/21/2017 Yes    Ischemic cardiomyopathy [I25.5] 05/21/2017 Yes    Atrial fibrillation with RVR [I48.91]  Yes    ST elevation myocardial infarction (STEMI) [I21.3] 05/19/2017 Yes    Urinary tract infection due to extended-spectrum beta lactamase (ESBL)-producing Klebsiella [N39.0, B96.89] 07/08/2013 Yes    Chronic pain [G89.29] 05/21/2017 Yes    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 [N36.0] 05/02/2017 Yes    Complicated open wound of right thigh [S71.101A] 01/25/2017 Yes    Debility [R53.81] 08/04/2015 Yes    Pain [R52]  05/23/2017 Yes    Palliative care encounter [Z51.5]  Not Applicable    Goals of care, counseling/discussion [Z71.89]  Not Applicable    Hyperphosphatemia [E83.39] 05/22/2017 Yes    Oropharyngeal dysphagia [R13.12] 05/21/2017 No    CKD (chronic kidney disease) [N18.9] 10/10/2016 Yes      Problems Resolved During this Admission:    Diagnosis Date Noted Date Resolved POA    Chest pain [R07.9] 05/19/2017 05/21/2017 Yes    Respiratory distress [R06.00] 05/19/2017 05/21/2017 Yes     VTE Risk Mitigation         Ordered     Medium Risk of VTE  Once      05/19/17 0945     Reason for No Pharmacological VTE Prophylaxis  Once      05/19/17 0945          Dave Bro MD  Department of Hospital Medicine   [facility]

## 2017-06-02 NOTE — PLAN OF CARE
06/02/17 0759   Discharge Reassessment   Assessment Type Discharge Planning Reassessment   Can the patient answer the patient profile reliably? Yes, cognitively intact   How does the patient rate their overall health at the present time? Fair   Describe the patient's ability to walk at the present time. No restrictions   How often would a person be available to care for the patient? Whenever needed   Number of comorbid conditions (as recorded on the chart) Three   During the past month, has the patient often been bothered by feeling down, depressed or hopeless? No   During the past month, has the patient often been bothered by little interest or pleasure in doing things? No   Discharge plan remains the same: No   Discharge Plan A Hospice/home

## 2017-06-02 NOTE — PLAN OF CARE
Problem: Patient Care Overview  Goal: Plan of Care Review  Outcome: Ongoing (interventions implemented as appropriate)  Pt remained free of injuries, falls, and trauma throughout the shift. VSS. No distress or complaints noted throughout the shift.  Pt have PRN pain medications ordered to control pain and to keep him comfortable. Family presence at bedside. Plan for patient to be discharge to an inpatient hospice. Plan of care reviewed with pt. Pt and family verbalize understanding. Bed in lowest position. Call bell within reach. Will continue to monitor.

## 2017-06-03 NOTE — DISCHARGE SUMMARY
[facility] Hospital Medicine  Discharge Summary      Patient Name: Humphrey Machado  MRN: [id number]  Admission Date: 5/19/2017  Hospital Length of Stay: 14 days  Discharge Date and Time: 6/2/2017  3:49 PM  Attending Physician: Dave Bro MD  Discharging Provider: Dave Bro MD  Primary Care Provider: Andrew Murray MD    Hospital Medicine Team: [facility] C Dave Bro MD    HPI: 85-year-old man with HTN, HLD, NSTEMI previously refused [facility] 5/2016, CKD stage IV (baseline 2.7), moderate AS, AF, venous insufficiency, cirrhosis, Ureterocutaneous fistula, chronic osteomyelitis of the pelvic region on cefpodoxime, suspected inflammatory arthritis on HCQ, neuromuscular disorder with peripheral neuropathy with recurrent right foot ulcer, prostate cancer s/p radiation, RCC s/p left nephrectomy, skin cancer of the hand, urethral stricture, urinary incontinence p/w SOB which started an hour prior to admission and EKG concerning for STEMI with afib with q waves in inferior/anterior-septal leads with resolution of chest pain with SL NTG. Patient was evaluated in the ED by Dr Duval and recommended to be taken to cath lab considering his clinical picture but he refused to proceed with angiogram due to risk of hemodialysis possibility he will be admitted fort mdical treatment in CCU.     Currently he is very SOB with fluid overload and aneuric.looks like not responding to lasix his son is his POA and they are discussing code status and if they will proceed with more procedures including CRRT.     Patient with recent cystoscopy/cystogram/fistulogram by Dr. 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 Abraham's  Operative noted.     Procedure(s) (LRB):  HEART CATH-LEFT (N/A)      Indwelling  Lines/Drains at time of discharge:   Lines/Drains/Airways     Central Venous Catheter Line                 Hemodialysis Catheter right internal jugular 31724 days         Percutaneous Central Line Insertion/Assessment - double lumen  05/26/17 1500 right internal jugular 7 days          Drain                 Urethral Catheter 05/19/17 1200 14 days              Hospital Course:  Hospital course as above, while in the CCU patient was medically managed for his STEMI, and for his AMIRA.  CCU team discussed comfort vs aggressive measures, patient's code status changed to DNR and due to concern that patient was anuric with developing AMIRA that may require HD. Discussion per EMR with family revealed they would not want to pursue dialysis. He was started on Lasix drip @ 20mg/hr and IV Diuril q12 hrs and had improved urine output on this regimen.  PRN Morphine and Ativan orders had been placed for patient comfort     Patient stepped down to IM-C casiano service on 5/20.  Patient was acutely encephalopathic, developed fevers and notified by RN blood cultures from admission were growing GNR.  Surveillance cultures sent patient was on Vancomycin and Cefepime.  He was having urine output on the lasix and diuril.  Extensive family discussion held (see progress notes for detail)  Family was not universally ready for comfort care, care plan more no escalation of care, given his altered mentation, I suspected morphine and ativan in Amira contributing, opiates switched to dilaudid and ativan d/c.   Discussed NG tube placement for oral med administration and nutrition but was held off.      5/21 - Patient is more awake and alert, likely related to toxic encephalopathy, remains hemodynamically stable, but was having more pain complaints, was receiving IV dilaudid  0.5mg q2hrs.   SLP evaluated patient and he is safe for dental soft nectar thick liquids.   His heart rhythm converted to atrial fibrillation with controlled rate in the 80-90s.    Will need repeat cardiology recs on management in AM.      Patient with nearly 2L UOP x 24hrs, family with more questions about possible HD or what course his Amira may take.  Nephrology re-contacted to follow the patient.  Diuril will be stopped this evening to monitor how pts UOp  And Cr does.     I reviewed more of recent urologic history and patient has a urinary source of his bacteremia, however with complicated chronic fistula and wound we may not have source control. Discussed with patient and will consult urology to assist in any other management for source control and chronic urethrocutaneous fistula that may be needed - Urology consult placed for mon Am.      Reviewed prior culture data and patient with hx of Klebsiella ESBL and Pseudomonas in urine, both not sensitive to empiric cefepime so ID approval obtained to change to renal dosed Meropenem      Patient says pain control is improving but is still seems he has chronic pain issues from multiple sites, adding po opiates to pain regimen.      5/22 - Additional consultants have evaluated the patient  Cardiology - no additional med management recs at this time, patient has completed 48hrs of heparin for ACS, however with atrial fibrillation, will continue for now.  Coreg change to low dose metoprolol due to hypotension     Urology - evaluated patient and removed ureteral catheter, no further recs for chronic urtherocutaneous fistula, local wound care.   Wound care evaluated the patient and placed barrier cream in right groin with soft dressing to help absorb urinary leak from fistula, I was at bedside and pt did not feel ready to turn for assessment of buttocks.      Nephrology - evaluated the patient and no acute needs for acute HD, they will continue to follow, did inform pt that nephrology feels initiating HD would not improve overall morbidity/mortality from his co-morbid conditions and HD initiation comes with its own risks and invasive procedures  "required.      Palliative care - evaluated pt to assist in overall care planning.  MD staff to assist with pain management not available this week.      5/23- patient complained of discomfort. He is still unsure of what he wants for medical management. He is still considering hemodialysis as an option. His renal function continued to worsen but his main concern was his discomfort.     5/24- patient unable to voice his wishes nor does he want to have a goals of care discussion. He has poor insight into his disease process. He is verbally abusive to his providers and nursing staff. Discussed with his wife and grandson scheduling a family meeting to establish therapeutic plan.      5/25- he continues to be abusive to medical personnel. When trying to assess his pain he becomes abusive and screams that he can't tell.      5/26-Family meeting today to establish therapeutic plan. Patient has agreed to hemodialysis and oral anticoagulation. He also agreed to PT and OT however he has refused SNF. He has developed some mental "foggyness"      5/27-Underwent HD.      5/28- overnight patient with hypotension. Narcotic analgesics held and volume replaced by overnight physician. Moderate to severe pain on evaluation and analgesics resumed. Metoprolol dose further adjusted.     5/29- Patient could not be dialyzed due to hypotension. Options discussed by Nephrology service. Plan to hold metoprolol for now and hold narcotic analgesics 4 hours prior to HD tomorrow. If patient unable to tolerate then will need hospice care.     In recent days, care has been transitioned to more comfort care, palliative care measures as patient has chosen not to pursue further dialysis treatments.  Palliative care team assisted in adjusting pain and nausea medications and to assist with hospice planning.  Ultimate decision by family and patient was to discharge to inpatient hospice @ [facility] hospice.     Pt seen day of discharge  Vitals reviewed. "   Physical Exam   Constitutional: No distress.   HENT:   Dry oropharynx   Eyes: No scleral icterus.   Neck: No JVD present.   Cardiovascular:   Irregular rhythm, systolic murmur present    Pulmonary/Chest: Effort normal. No respiratory distress.   Anterior exam CTA in upper fields, limited exam at lateral bases   Abdominal: Soft. Bowel sounds are normal. There is no tenderness.   Genitourinary:   Genitourinary Comments: Stevens catheter to gravity drainage,clear yellow urine.  Hematuria appears to have resolved. Right groin urethrocutaneous fistula dressing in place   Musculoskeletal: He exhibits tenderness.   Neurological: He is alert.   Skin: Skin is warm and dry.   Psychiatric: awake/alert, upset.   Consults:   Consults         Status Ordering Provider     Inpatient consult to Dietary  Once     Provider:  (Not yet assigned)    Completed KALEN MENDEZ     Inpatient consult to Infectious Diseases  Once     Provider:  (Not yet assigned)    Completed KALEN MENDEZ     Inpatient consult to Interventional Cardiology  Once     Provider:  (Not yet assigned)    Completed MEME FUNG     Inpatient consult to Nephrology  Once     Provider:  (Not yet assigned)    Completed CORNELIUS VARGAS     Inpatient consult to Palliative Care  Once     Provider:  (Not yet assigned)    Completed KALEN MENDEZ     Inpatient consult to Urology  Once     Provider:  Seth Abraham MD    Completed KALEN MENDEZ     IP consult to dietary  Once     Provider:  (Not yet assigned)    Completed ALEXSANDRA OLMEDO          Significant Diagnostic Studies: Labs:     Results for RUIZ SOTO (MRN [id number]) as of 6/2/2017 20:22   Ref. Range 5/31/2017 04:18   WBC Latest Ref Range: 3.90 - 12.70 K/uL 4.85   RBC Latest Ref Range: 4.60 - 6.20 M/uL 2.42 (L)   Hemoglobin Latest Ref Range: 14.0 - 18.0 g/dL 6.4 (L)   Hematocrit Latest Ref Range: 40.0 - 54.0 % 21.2 (L)   MCV Latest Ref Range: 82 - 98 fL 88   MCH Latest Ref Range: 27.0 -  31.0 pg 26.4 (L)   MCHC Latest Ref Range: 32.0 - 36.0 % 30.2 (L)   RDW Latest Ref Range: 11.5 - 14.5 % 15.9 (H)   Platelets Latest Ref Range: 150 - 350 K/uL 122 (L)   MPV Latest Ref Range: 9.2 - 12.9 fL 10.4   Gran% Latest Ref Range: 38.0 - 73.0 % 68.5   Gran # Latest Ref Range: 1.8 - 7.7 K/uL 3.3   Lymph% Latest Ref Range: 18.0 - 48.0 % 20.2   Lymph # Latest Ref Range: 1.0 - 4.8 K/uL 1.0   Mono% Latest Ref Range: 4.0 - 15.0 % 8.0   Mono # Latest Ref Range: 0.3 - 1.0 K/uL 0.4   Eosinophil% Latest Ref Range: 0.0 - 8.0 % 1.9   Eos # Latest Ref Range: 0.0 - 0.5 K/uL 0.1   Basophil% Latest Ref Range: 0.0 - 1.9 % 0.4   Baso # Latest Ref Range: 0.00 - 0.20 K/uL 0.02   Protime Latest Ref Range: 9.0 - 12.5 sec 12.1   Coumadin Monitoring INR Latest Ref Range: 0.8 - 1.2  1.2   aPTT Latest Ref Range: 21.0 - 32.0 sec 55.8 (H)   Heparin Anti-Xa Latest Ref Range: 0.30 - 0.70 IU/mL 0.57   Sodium Latest Ref Range: 136 - 145 mmol/L 140   Potassium Latest Ref Range: 3.5 - 5.1 mmol/L 4.0   Chloride Latest Ref Range: 95 - 110 mmol/L 108   CO2 Latest Ref Range: 23 - 29 mmol/L 22 (L)   Anion Gap Latest Ref Range: 8 - 16 mmol/L 10   BUN, Bld Latest Ref Range: 8 - 23 mg/dL 39 (H)   Creatinine Latest Ref Range: 0.5 - 1.4 mg/dL 2.7 (H)   eGFR if non African American Latest Ref Range: >60 mL/min/1.73 m^2 20.6 (A)   eGFR if African American Latest Ref Range: >60 mL/min/1.73 m^2 23.8 (A)   Glucose Latest Ref Range: 70 - 110 mg/dL 87   Calcium Latest Ref Range: 8.7 - 10.5 mg/dL 8.8   Phosphorus Latest Ref Range: 2.7 - 4.5 mg/dL 4.2   Magnesium Latest Ref Range: 1.6 - 2.6 mg/dL 2.2   Alkaline Phosphatase Latest Ref Range: 55 - 135 U/L 79   Total Protein Latest Ref Range: 6.0 - 8.4 g/dL 6.4   Albumin Latest Ref Range: 3.5 - 5.2 g/dL 2.4 (L)   Total Bilirubin Latest Ref Range: 0.1 - 1.0 mg/dL 0.3   Bilirubin, Direct Latest Ref Range: 0.1 - 0.3 mg/dL 0.2   AST Latest Ref Range: 10 - 40 U/L 28   ALT Latest Ref Range: 10 - 44 U/L 14       Lab Results    Component Value Date    INR 1.2 05/31/2017    INR 1.2 05/30/2017    INR 1.1 05/29/2017   , Lipid Panel   Lab Results   Component Value Date    CHOL 105 (L) 05/19/2017    HDL 37 (L) 05/19/2017    LDLCALC 54.6 (L) 05/19/2017    TRIG 67 05/19/2017    CHOLHDL 35.2 05/19/2017    and Troponin No results for input(s): TROPONINI in the last 168 hours.  Cardiac Graphics: Echocardiogram:   2D echo with color flow doppler:   Results for orders placed or performed during the hospital encounter of 05/19/17   2D echo with color flow doppler   Result Value Ref Range    EF 45 55 - 65    Mitral Valve Regurgitation MODERATE (A)     Aortic Valve Regurgitation MILD     Aortic Valve Stenosis MODERATE (A)     Est. PA Systolic Pressure 55.13 (A)     Mitral Valve Mobility NORMAL     Tricuspid Valve Regurgitation TRIVIAL        Pending Diagnostic Studies:     Procedure Component Value Units Date/Time    CBC auto differential [[id number]] Collected:  05/21/17 1654    Order Status:  Sent Lab Status:  In process Updated:  05/21/17 1655    Specimen:  Blood from Blood     VANCOMYCIN, TROUGH before 4th dose [[id number]] Collected:  05/20/17 1452    Order Status:  Sent Lab Status:  In process Updated:  05/20/17 1453    Specimen:  Blood from Blood     Narrative:       Collection Instructions:->before 4th dose        Final Active Diagnoses:    Diagnosis Date Noted POA    PRINCIPAL PROBLEM:  Acute kidney injury superimposed on CKD [N17.9, N18.9] 05/21/2017 Yes    Bacteremia due to Klebsiella pneumoniae [R78.81] 05/21/2017 Yes    Ischemic cardiomyopathy [I25.5] 05/21/2017 Yes    Atrial fibrillation with RVR [I48.91]  Yes    Urinary tract infection due to extended-spectrum beta lactamase (ESBL)-producing Klebsiella [N39.0, B96.89] 07/08/2013 Yes    Chronic pain [G89.29] 05/21/2017 Yes    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 [N36.0] 05/02/2017 Yes    Complicated open wound of right thigh [S71.101A] 01/25/2017 Yes    Debility [R53.81] 08/04/2015 Yes     Palliative care encounter [Z51.5]  Not Applicable    Goals of care, counseling/discussion [Z71.89]  Not Applicable    Hyperphosphatemia [E83.39] 05/22/2017 Yes    Oropharyngeal dysphagia [R13.12] 05/21/2017 No    CKD (chronic kidney disease) [N18.9] 10/10/2016 Yes      Problems Resolved During this Admission:    Diagnosis Date Noted Date Resolved POA    ST elevation myocardial infarction (STEMI) [I21.3] 05/19/2017 06/01/2017 Yes    Pain [R52] 05/23/2017 06/01/2017 Yes    Chest pain [R07.9] 05/19/2017 05/21/2017 Yes    Respiratory distress [R06.00] 05/19/2017 05/21/2017 Yes      Discharged Condition: poor    Disposition: Hospice/Medical Facility    Follow Up:    Patient Instructions:     Ambulatory referral to Outpatient Case Management   Referral Priority: Routine Referral Type: Consultation   Referral Reason: Specialty Services Required    Number of Visits Requested: 1      Diet renal   Order Comments: Dental soft - nectar thick     Activity as tolerated       Medications:  Reconciled Home Medications:   Discharge Medication List as of 6/2/2017  2:12 PM      START taking these medications    Details   bisacodyl (DULCOLAX) 10 mg Supp Place 1 suppository (10 mg total) rectally daily as needed (until bowel movement if patient has not had a bowel movement for 2 days)., Starting Fri 6/2/2017, No Print      clopidogrel (PLAVIX) 75 mg tablet Take 1 tablet (75 mg total) by mouth once daily., Starting Fri 6/2/2017, Until Sat 6/2/2018, No Print      fentaNYL (DURAGESIC) 25 mcg/hr Place 1 patch onto the skin every 72 hours., Starting Fri 6/2/2017, Print      !! haloperidol (HALDOL) 2 mg/mL solution Take 0.3 mLs (0.6 mg total) by mouth every 4 (four) hours as needed (can be used for nausea or agitation)., Starting Fri 6/2/2017, Until Sat 6/2/2018, No Print      !! haloperidol (HALDOL) 2 mg/mL solution Take 0.3 mLs (0.6 mg total) by mouth every evening., Starting Fri 6/2/2017, Until Sat 6/2/2018, No Print     "  hydromorphone (DILAUDID) 0.5 mg/0.5 mL Syrg Inject 0.5 mLs (0.5 mg total) into the vein every 2 (two) hours as needed (if patient appears in acute distress)., Starting Fri 6/2/2017, No Print      HYDROmorphone (DILAUDID) 4 MG tablet Take 1 tablet (4 mg total) by mouth every 3 (three) hours as needed., Starting Fri 6/2/2017, Print      miconazole (MICOTIN) 2 % cream Apply topically 2 (two) times daily., Starting Fri 6/2/2017, No Print      ondansetron (ZOFRAN-ODT) 4 MG TbDL Take 1 tablet (4 mg total) by mouth every 4 (four) hours as needed (nausea/vomiting)., Starting Fri 6/2/2017, No Print      ondansetron 4 mg/2 mL Soln Inject 4 mg into the vein every 4 (four) hours as needed., Starting Fri 6/2/2017, No Print      sevelamer carbonate (RENVELA) 800 mg Tab Take 2 tablets (1,600 mg total) by mouth 3 (three) times daily with meals., Starting Fri 6/2/2017, Until Sat 6/2/2018, No Print      simethicone (MYLICON) 80 MG chewable tablet Take 1 tablet (80 mg total) by mouth 3 (three) times daily as needed for Flatulence (bloating)., Starting Fri 6/2/2017, No Print       !! - Potential duplicate medications found. Please discuss with provider.      CONTINUE these medications which have NOT CHANGED    Details   aspirin 81 MG Chew Take 1 tablet (81 mg total) by mouth once daily., Starting 5/30/2016, Until Tue 5/30/17, OTC      atorvastatin (LIPITOR) 80 MG tablet Take 1 tablet (80 mg total) by mouth once daily., Starting 5/30/2016, Until Tue 5/30/17, Normal      catheter 16-16 Fr-" Misc 1 Units by Misc.(Non-Drug; Combo Route) route once daily., Starting 9/8/2016, Until Discontinued, Print      docusate sodium (COLACE) 100 MG capsule Take by mouth 2 (two) times daily., Until Discontinued, Historical Med      nitroGLYCERIN (NITROSTAT) 0.3 MG SL tablet Place 1 tablet (0.3 mg total) under the tongue every 5 (five) minutes as needed for Chest pain., Starting 6/6/2016, Until Tue 6/6/17, Normal      omeprazole (PRILOSEC) 40 MG " capsule Take 40 mg by mouth every evening. , Starting 11/29/2016, Until Discontinued, Historical Med         STOP taking these medications       allopurinol (ZYLOPRIM) 100 MG tablet Comments:   Reason for Stopping:         calcitRIOL (ROCALTROL) 0.5 MCG Cap Comments:   Reason for Stopping:         carvedilol (COREG) 3.125 MG tablet Comments:   Reason for Stopping:         ciprofloxacin HCl (CIPRO) 500 MG tablet Comments:   Reason for Stopping:         ferrous sulfate 324 mg (65 mg iron) TbEC Comments:   Reason for Stopping:         folic acid-vit B6-vit B12 (FOLBEE) 2.5-25-1 mg Tab Comments:   Reason for Stopping:         gabapentin (NEURONTIN) 300 MG capsule Comments:   Reason for Stopping:         hydrocodone-acetaminophen 10-325mg (NORCO)  mg Tab Comments:   Reason for Stopping:         hydroxychloroquine (PLAQUENIL) 200 mg tablet Comments:   Reason for Stopping:         LACTOBACILLUS ACIDOPHILUS (PROBIOTIC ORAL) Comments:   Reason for Stopping:         mirabegron (MYRBETRIQ) 25 mg Tb24 ER tablet Comments:   Reason for Stopping:         oxycodone-acetaminophen (PERCOCET) 5-325 mg per tablet Comments:   Reason for Stopping:         sodium bicarbonate 650 MG tablet Comments:   Reason for Stopping:         triamcinolone acetonide 0.1% (KENALOG) 0.1 % cream Comments:   Reason for Stopping:         vitamin D 1000 units Tab Comments:   Reason for Stopping:             Time spent on the discharge of patient: 50 minutes    Dave Bro MD  Department of Hospital Medicine  [facility]

## 2017-06-05 ENCOUNTER — OUTPATIENT CASE MANAGEMENT (OUTPATIENT)
Dept: ADMINISTRATIVE | Facility: OTHER | Age: 82
End: 2017-06-05

## 2017-06-05 NOTE — PROGRESS NOTES
Please note that this patient was not enrolled in Outpatient Case Management at this time due to being transferred to a facility.     Please contact \[facility]\"" at Ext. [phone] with questions.    Thank you,      Marcia Beckwith, SSC

## 2017-11-29 RX ORDER — OMEPRAZOLE 40 MG/1
CAPSULE, DELAYED RELEASE ORAL
Qty: 90 CAPSULE | Refills: 1 | OUTPATIENT
Start: 2017-11-29

## 2020-03-29 NOTE — PLAN OF CARE
Problem: Patient Care Overview  Goal: Plan of Care Review  Outcome: Ongoing (interventions implemented as appropriate)    Pt free of falls, injuries this shift. POC reviewed with pt, family at bedside, verbalized understanding. 250cc bolus given for BP 84/52(60), BP came up to 122/57. Held PM dose of metoprolol and 0000 dose of morphine d/t low BPs. Pt to continue to receive IV antibiotics outpt; pt will need CVC access. Fistula draining clear, yellow urine to montano catheter. VSS throughout shift, NAD noted.        Statement Selected

## 2020-04-22 NOTE — PLAN OF CARE
22-Apr-2020 07:41 Problem: Patient Care Overview  Goal: Plan of Care Review  Outcome: Ongoing (interventions implemented as appropriate)  Pt remains on contact isolation for ESBL in the urine and blood. Heparin gtt continues to infuse at MD ordered rate. Pt is on 2L nasal cannula, O2 sats remain stable. Stevens catheter in place and continue to drain. Pt remained free from falls/trauma/injury. VSS. Denies any CP, SOB, palpitations, dizziness, pain and discomfort. Turning/repositioning independently in bed. Plan of care reviewed with patient and all questions answered, verbalizes understanding. No acute distress noted. Will continue to monitor.         22-Apr-2020 07:40

## 2020-10-01 ENCOUNTER — PATIENT MESSAGE (OUTPATIENT)
Dept: OTHER | Facility: OTHER | Age: 85
End: 2020-10-01

## 2020-12-11 ENCOUNTER — PATIENT MESSAGE (OUTPATIENT)
Dept: OTHER | Facility: OTHER | Age: 85
End: 2020-12-11

## 2022-06-05 NOTE — ASSESSMENT & PLAN NOTE
Pain related to chronic  issues, buttock pain, also noted to complain of extremity pain in LUE, has a chronic neuropathy diagnosis. Per patient his pain is uncontrolled.   1. Dilaudid 4mg po every 6 hrs.    2. As needed Dilaudid 0.5mg IV every 2 hrs for breakthrough.   3. Would consider very low dose fentanyl patch only if plan of care shifted to full comfort measures.   4. If pain continues to be as severe then will continue to titrate therapy.     Male

## 2022-06-24 NOTE — ASSESSMENT & PLAN NOTE
Pain related to chronic  issues, buttock pain, also noted to complain of extremity pain in LUE, has a chronic neuropathy diagnosis. Per patient his pain is uncontrolled.   1. Dilaudid 4mg po every 6 hrs.    2. As needed Dilaudid 0.5mg IV every 2 hrs for breakthrough.   3. Would consider very low dose fentanyl patch only if plan of care shifted to full comfort measures.   4. If pain continues to be as severe then will continue to titrate therapy.     Negative

## 2024-10-01 NOTE — ASSESSMENT & PLAN NOTE
EF 35%, mangagement as above     [Normal] : normal [___] : [unfilled] [LVEF ___%] : LVEF [unfilled]% [de-identified] : EKG 3/14/2022 sinus bradycardia  [de-identified] : [facility] 12/9/2019  [de-identified] : ETT 4/13/2022 [facility] protocol 9 min  with no evidence of exercise induced ischemia  Nuclear stress test 10/1/2024 Raymon protocol 11 min with no EKG changes and mild perfusion defect in apical region [de-identified] : Echo 2019 EF 70% Echo 3/24/2022 EF 65%, mild MR Echo 9/2024 EF 55-60 aortic root 4.0  [de-identified] : CT calcium score 9/11/2024: calcium score 372, , hypattenuating lesion in hepatic lobe and 4mm pulmonary nodule.